# Patient Record
Sex: FEMALE | Race: WHITE | NOT HISPANIC OR LATINO | Employment: OTHER | URBAN - NONMETROPOLITAN AREA
[De-identification: names, ages, dates, MRNs, and addresses within clinical notes are randomized per-mention and may not be internally consistent; named-entity substitution may affect disease eponyms.]

---
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
## 2020-07-17 DIAGNOSIS — R22.42 LOCALIZED SWELLING, MASS AND LUMP, LEFT LOWER LIMB: ICD-10-CM

## 2020-10-25 ENCOUNTER — APPOINTMENT (EMERGENCY)
Dept: RADIOLOGY | Facility: HOSPITAL | Age: 75
DRG: 492 | End: 2020-10-25
Payer: MEDICARE

## 2020-10-25 ENCOUNTER — APPOINTMENT (EMERGENCY)
Dept: CT IMAGING | Facility: HOSPITAL | Age: 75
DRG: 492 | End: 2020-10-25
Payer: MEDICARE

## 2020-10-25 ENCOUNTER — HOSPITAL ENCOUNTER (INPATIENT)
Facility: HOSPITAL | Age: 75
LOS: 9 days | Discharge: NURSING, REHAB OR LONG-TERM CARE | DRG: 492 | End: 2020-11-03
Attending: EMERGENCY MEDICINE | Admitting: INTERNAL MEDICINE
Payer: MEDICARE

## 2020-10-25 DIAGNOSIS — I50.33 ACUTE ON CHRONIC DIASTOLIC CHF (CONGESTIVE HEART FAILURE) (HCC): ICD-10-CM

## 2020-10-25 DIAGNOSIS — S82.892A CLOSED FRACTURE OF LEFT ANKLE, INITIAL ENCOUNTER: Primary | ICD-10-CM

## 2020-10-25 DIAGNOSIS — R07.9 CHEST PAIN: ICD-10-CM

## 2020-10-25 DIAGNOSIS — E11.9 TYPE 2 DIABETES MELLITUS WITHOUT COMPLICATION, WITHOUT LONG-TERM CURRENT USE OF INSULIN (HCC): ICD-10-CM

## 2020-10-25 DIAGNOSIS — R26.2 AMBULATORY DYSFUNCTION: ICD-10-CM

## 2020-10-25 PROBLEM — I48.91 ATRIAL FIBRILLATION (HCC): Status: ACTIVE | Noted: 2020-10-25

## 2020-10-25 PROBLEM — G47.33 OSA (OBSTRUCTIVE SLEEP APNEA): Status: ACTIVE | Noted: 2020-10-25

## 2020-10-25 PROBLEM — S99.919A ANKLE INJURY: Status: ACTIVE | Noted: 2020-10-25

## 2020-10-25 LAB
ABO GROUP BLD: NORMAL
ABO GROUP BLD: NORMAL
ALBUMIN SERPL BCP-MCNC: 3.6 G/DL (ref 3.5–5)
ALP SERPL-CCNC: 95 U/L (ref 46–116)
ALT SERPL W P-5'-P-CCNC: 21 U/L (ref 12–78)
ANION GAP SERPL CALCULATED.3IONS-SCNC: 9 MMOL/L (ref 4–13)
APTT PPP: 53 SECONDS (ref 23–37)
AST SERPL W P-5'-P-CCNC: 16 U/L (ref 5–45)
BASOPHILS # BLD AUTO: 0.06 THOUSANDS/ΜL (ref 0–0.1)
BASOPHILS NFR BLD AUTO: 1 % (ref 0–1)
BILIRUB SERPL-MCNC: 0.73 MG/DL (ref 0.2–1)
BLD GP AB SCN SERPL QL: NEGATIVE
BUN SERPL-MCNC: 35 MG/DL (ref 5–25)
CALCIUM SERPL-MCNC: 9.4 MG/DL (ref 8.3–10.1)
CHLORIDE SERPL-SCNC: 102 MMOL/L (ref 100–108)
CO2 SERPL-SCNC: 30 MMOL/L (ref 21–32)
CREAT SERPL-MCNC: 1.35 MG/DL (ref 0.6–1.3)
EOSINOPHIL # BLD AUTO: 0.14 THOUSAND/ΜL (ref 0–0.61)
EOSINOPHIL NFR BLD AUTO: 2 % (ref 0–6)
ERYTHROCYTE [DISTWIDTH] IN BLOOD BY AUTOMATED COUNT: 15.4 % (ref 11.6–15.1)
GFR SERPL CREATININE-BSD FRML MDRD: 39 ML/MIN/1.73SQ M
GLUCOSE SERPL-MCNC: 155 MG/DL (ref 65–140)
GLUCOSE SERPL-MCNC: 210 MG/DL (ref 65–140)
GLUCOSE SERPL-MCNC: 215 MG/DL (ref 65–140)
HCT VFR BLD AUTO: 36.1 % (ref 34.8–46.1)
HGB BLD-MCNC: 11.8 G/DL (ref 11.5–15.4)
IMM GRANULOCYTES # BLD AUTO: 0.03 THOUSAND/UL (ref 0–0.2)
IMM GRANULOCYTES NFR BLD AUTO: 0 % (ref 0–2)
INR PPP: 3.13 (ref 0.84–1.19)
LYMPHOCYTES # BLD AUTO: 0.92 THOUSANDS/ΜL (ref 0.6–4.47)
LYMPHOCYTES NFR BLD AUTO: 11 % (ref 14–44)
MCH RBC QN AUTO: 26.9 PG (ref 26.8–34.3)
MCHC RBC AUTO-ENTMCNC: 32.7 G/DL (ref 31.4–37.4)
MCV RBC AUTO: 82 FL (ref 82–98)
MONOCYTES # BLD AUTO: 0.88 THOUSAND/ΜL (ref 0.17–1.22)
MONOCYTES NFR BLD AUTO: 10 % (ref 4–12)
NEUTROPHILS # BLD AUTO: 6.6 THOUSANDS/ΜL (ref 1.85–7.62)
NEUTS SEG NFR BLD AUTO: 76 % (ref 43–75)
NRBC BLD AUTO-RTO: 0 /100 WBCS
NT-PROBNP SERPL-MCNC: 952 PG/ML
PLATELET # BLD AUTO: 155 THOUSANDS/UL (ref 149–390)
PMV BLD AUTO: 10.6 FL (ref 8.9–12.7)
POTASSIUM SERPL-SCNC: 3.9 MMOL/L (ref 3.5–5.3)
PROT SERPL-MCNC: 7.5 G/DL (ref 6.4–8.2)
PROTHROMBIN TIME: 31.5 SECONDS (ref 11.6–14.5)
RBC # BLD AUTO: 4.38 MILLION/UL (ref 3.81–5.12)
RH BLD: POSITIVE
RH BLD: POSITIVE
SODIUM SERPL-SCNC: 141 MMOL/L (ref 136–145)
SPECIMEN EXPIRATION DATE: NORMAL
TROPONIN I SERPL-MCNC: <0.02 NG/ML
WBC # BLD AUTO: 8.63 THOUSAND/UL (ref 4.31–10.16)

## 2020-10-25 PROCEDURE — 73560 X-RAY EXAM OF KNEE 1 OR 2: CPT

## 2020-10-25 PROCEDURE — 99285 EMERGENCY DEPT VISIT HI MDM: CPT

## 2020-10-25 PROCEDURE — 99223 1ST HOSP IP/OBS HIGH 75: CPT | Performed by: INTERNAL MEDICINE

## 2020-10-25 PROCEDURE — 85730 THROMBOPLASTIN TIME PARTIAL: CPT | Performed by: PHYSICIAN ASSISTANT

## 2020-10-25 PROCEDURE — 1124F ACP DISCUSS-NO DSCNMKR DOCD: CPT | Performed by: PHYSICIAN ASSISTANT

## 2020-10-25 PROCEDURE — 84484 ASSAY OF TROPONIN QUANT: CPT | Performed by: INTERNAL MEDICINE

## 2020-10-25 PROCEDURE — 72170 X-RAY EXAM OF PELVIS: CPT

## 2020-10-25 PROCEDURE — 80053 COMPREHEN METABOLIC PANEL: CPT | Performed by: PHYSICIAN ASSISTANT

## 2020-10-25 PROCEDURE — 73552 X-RAY EXAM OF FEMUR 2/>: CPT

## 2020-10-25 PROCEDURE — 86900 BLOOD TYPING SEROLOGIC ABO: CPT | Performed by: PHYSICIAN ASSISTANT

## 2020-10-25 PROCEDURE — 73700 CT LOWER EXTREMITY W/O DYE: CPT

## 2020-10-25 PROCEDURE — 85610 PROTHROMBIN TIME: CPT | Performed by: INTERNAL MEDICINE

## 2020-10-25 PROCEDURE — 84484 ASSAY OF TROPONIN QUANT: CPT | Performed by: PHYSICIAN ASSISTANT

## 2020-10-25 PROCEDURE — 96374 THER/PROPH/DIAG INJ IV PUSH: CPT

## 2020-10-25 PROCEDURE — 85610 PROTHROMBIN TIME: CPT | Performed by: PHYSICIAN ASSISTANT

## 2020-10-25 PROCEDURE — 86850 RBC ANTIBODY SCREEN: CPT | Performed by: PHYSICIAN ASSISTANT

## 2020-10-25 PROCEDURE — 96375 TX/PRO/DX INJ NEW DRUG ADDON: CPT

## 2020-10-25 PROCEDURE — 71045 X-RAY EXAM CHEST 1 VIEW: CPT

## 2020-10-25 PROCEDURE — 85025 COMPLETE CBC W/AUTO DIFF WBC: CPT | Performed by: PHYSICIAN ASSISTANT

## 2020-10-25 PROCEDURE — 83880 ASSAY OF NATRIURETIC PEPTIDE: CPT | Performed by: PHYSICIAN ASSISTANT

## 2020-10-25 PROCEDURE — 99285 EMERGENCY DEPT VISIT HI MDM: CPT | Performed by: PHYSICIAN ASSISTANT

## 2020-10-25 PROCEDURE — 73620 X-RAY EXAM OF FOOT: CPT

## 2020-10-25 PROCEDURE — 86901 BLOOD TYPING SEROLOGIC RH(D): CPT | Performed by: PHYSICIAN ASSISTANT

## 2020-10-25 PROCEDURE — 73610 X-RAY EXAM OF ANKLE: CPT

## 2020-10-25 PROCEDURE — 2W3RX1Z IMMOBILIZATION OF LEFT LOWER LEG USING SPLINT: ICD-10-PCS | Performed by: EMERGENCY MEDICINE

## 2020-10-25 PROCEDURE — 36415 COLL VENOUS BLD VENIPUNCTURE: CPT | Performed by: PHYSICIAN ASSISTANT

## 2020-10-25 PROCEDURE — 82948 REAGENT STRIP/BLOOD GLUCOSE: CPT

## 2020-10-25 PROCEDURE — 29515 APPLICATION SHORT LEG SPLINT: CPT | Performed by: PHYSICIAN ASSISTANT

## 2020-10-25 RX ORDER — ONDANSETRON 2 MG/ML
4 INJECTION INTRAMUSCULAR; INTRAVENOUS ONCE
Status: COMPLETED | OUTPATIENT
Start: 2020-10-25 | End: 2020-10-25

## 2020-10-25 RX ORDER — FUROSEMIDE 10 MG/ML
40 INJECTION INTRAMUSCULAR; INTRAVENOUS
Status: DISCONTINUED | OUTPATIENT
Start: 2020-10-25 | End: 2020-10-26

## 2020-10-25 RX ORDER — FENTANYL CITRATE 50 UG/ML
50 INJECTION, SOLUTION INTRAMUSCULAR; INTRAVENOUS ONCE
Status: COMPLETED | OUTPATIENT
Start: 2020-10-25 | End: 2020-10-25

## 2020-10-25 RX ORDER — DILTIAZEM HYDROCHLORIDE 300 MG/1
300 CAPSULE, COATED, EXTENDED RELEASE ORAL DAILY
COMMUNITY
Start: 2020-09-08 | End: 2021-07-29 | Stop reason: HOSPADM

## 2020-10-25 RX ORDER — TRAMADOL HYDROCHLORIDE 50 MG/1
50 TABLET ORAL EVERY 6 HOURS PRN
Status: DISCONTINUED | OUTPATIENT
Start: 2020-10-25 | End: 2020-11-03 | Stop reason: HOSPADM

## 2020-10-25 RX ORDER — GABAPENTIN 300 MG/1
300 CAPSULE ORAL 2 TIMES DAILY
Status: DISCONTINUED | OUTPATIENT
Start: 2020-10-25 | End: 2020-11-03 | Stop reason: HOSPADM

## 2020-10-25 RX ORDER — FAMOTIDINE 20 MG/1
20 TABLET, FILM COATED ORAL DAILY
Status: DISCONTINUED | OUTPATIENT
Start: 2020-10-25 | End: 2020-11-03 | Stop reason: HOSPADM

## 2020-10-25 RX ORDER — LEVOTHYROXINE SODIUM 0.15 MG/1
150 TABLET ORAL
Status: DISCONTINUED | OUTPATIENT
Start: 2020-10-26 | End: 2020-11-03 | Stop reason: HOSPADM

## 2020-10-25 RX ORDER — FUROSEMIDE 10 MG/ML
20 INJECTION INTRAMUSCULAR; INTRAVENOUS ONCE
Status: COMPLETED | OUTPATIENT
Start: 2020-10-25 | End: 2020-10-25

## 2020-10-25 RX ORDER — FUROSEMIDE 40 MG/1
80 TABLET ORAL DAILY
COMMUNITY
Start: 2020-09-08 | End: 2021-07-29 | Stop reason: HOSPADM

## 2020-10-25 RX ORDER — NITROGLYCERIN 0.4 MG/1
0.4 TABLET SUBLINGUAL ONCE
Status: COMPLETED | OUTPATIENT
Start: 2020-10-25 | End: 2020-10-25

## 2020-10-25 RX ORDER — ACETAMINOPHEN 325 MG/1
650 TABLET ORAL EVERY 6 HOURS PRN
Status: DISCONTINUED | OUTPATIENT
Start: 2020-10-25 | End: 2020-10-29

## 2020-10-25 RX ORDER — WARFARIN SODIUM 5 MG/1
4 TABLET ORAL
COMMUNITY
Start: 2020-09-08

## 2020-10-25 RX ORDER — MORPHINE SULFATE 4 MG/ML
4 INJECTION, SOLUTION INTRAMUSCULAR; INTRAVENOUS ONCE
Status: COMPLETED | OUTPATIENT
Start: 2020-10-25 | End: 2020-10-25

## 2020-10-25 RX ORDER — GABAPENTIN 300 MG/1
300 CAPSULE ORAL 2 TIMES DAILY
COMMUNITY
Start: 2020-09-08

## 2020-10-25 RX ORDER — RANITIDINE 300 MG/1
300 TABLET ORAL DAILY PRN
COMMUNITY
End: 2022-07-08

## 2020-10-25 RX ORDER — LEVOTHYROXINE SODIUM 0.15 MG/1
150 TABLET ORAL DAILY
COMMUNITY
Start: 2020-09-08

## 2020-10-25 RX ORDER — ONDANSETRON 2 MG/ML
4 INJECTION INTRAMUSCULAR; INTRAVENOUS EVERY 6 HOURS PRN
Status: DISCONTINUED | OUTPATIENT
Start: 2020-10-25 | End: 2020-11-03 | Stop reason: HOSPADM

## 2020-10-25 RX ORDER — NITROGLYCERIN 0.4 MG/1
0.4 TABLET SUBLINGUAL
Status: DISCONTINUED | OUTPATIENT
Start: 2020-10-25 | End: 2020-11-03 | Stop reason: HOSPADM

## 2020-10-25 RX ORDER — ASCORBIC ACID 500 MG
1 TABLET ORAL DAILY
COMMUNITY

## 2020-10-25 RX ADMIN — GABAPENTIN 300 MG: 300 CAPSULE ORAL at 17:08

## 2020-10-25 RX ADMIN — NITROGLYCERIN 0.4 MG: 0.4 TABLET SUBLINGUAL at 11:50

## 2020-10-25 RX ADMIN — FAMOTIDINE 20 MG: 20 TABLET, FILM COATED ORAL at 13:07

## 2020-10-25 RX ADMIN — INSULIN LISPRO 1 UNITS: 100 INJECTION, SOLUTION INTRAVENOUS; SUBCUTANEOUS at 21:46

## 2020-10-25 RX ADMIN — FUROSEMIDE 20 MG: 10 INJECTION, SOLUTION INTRAMUSCULAR; INTRAVENOUS at 12:31

## 2020-10-25 RX ADMIN — FENTANYL CITRATE 50 MCG: 0.05 INJECTION, SOLUTION INTRAMUSCULAR; INTRAVENOUS at 11:52

## 2020-10-25 RX ADMIN — MORPHINE SULFATE 4 MG: 4 INJECTION INTRAVENOUS at 11:10

## 2020-10-25 RX ADMIN — FUROSEMIDE 40 MG: 10 INJECTION, SOLUTION INTRAMUSCULAR; INTRAVENOUS at 15:36

## 2020-10-25 RX ADMIN — ACETAMINOPHEN 650 MG: 325 TABLET ORAL at 15:36

## 2020-10-25 RX ADMIN — INSULIN LISPRO 1 UNITS: 100 INJECTION, SOLUTION INTRAVENOUS; SUBCUTANEOUS at 15:37

## 2020-10-25 RX ADMIN — GABAPENTIN 300 MG: 300 CAPSULE ORAL at 13:07

## 2020-10-25 RX ADMIN — ONDANSETRON 4 MG: 2 INJECTION INTRAMUSCULAR; INTRAVENOUS at 11:49

## 2020-10-26 ENCOUNTER — APPOINTMENT (INPATIENT)
Dept: CT IMAGING | Facility: HOSPITAL | Age: 75
DRG: 492 | End: 2020-10-26
Payer: MEDICARE

## 2020-10-26 PROBLEM — N28.9 KIDNEY DYSFUNCTION: Status: ACTIVE | Noted: 2020-10-26

## 2020-10-26 PROBLEM — R07.9 CHEST PAIN: Status: RESOLVED | Noted: 2020-10-25 | Resolved: 2020-10-26

## 2020-10-26 LAB
ALBUMIN SERPL BCP-MCNC: 3.2 G/DL (ref 3.5–5)
ALP SERPL-CCNC: 93 U/L (ref 46–116)
ALT SERPL W P-5'-P-CCNC: 22 U/L (ref 12–78)
ANION GAP SERPL CALCULATED.3IONS-SCNC: 7 MMOL/L (ref 4–13)
AST SERPL W P-5'-P-CCNC: 15 U/L (ref 5–45)
BASOPHILS # BLD AUTO: 0.06 THOUSANDS/ΜL (ref 0–0.1)
BASOPHILS NFR BLD AUTO: 1 % (ref 0–1)
BILIRUB SERPL-MCNC: 1.08 MG/DL (ref 0.2–1)
BUN SERPL-MCNC: 35 MG/DL (ref 5–25)
CALCIUM ALBUM COR SERPL-MCNC: 9.5 MG/DL (ref 8.3–10.1)
CALCIUM SERPL-MCNC: 8.9 MG/DL (ref 8.3–10.1)
CHLORIDE SERPL-SCNC: 101 MMOL/L (ref 100–108)
CO2 SERPL-SCNC: 32 MMOL/L (ref 21–32)
CREAT SERPL-MCNC: 1.33 MG/DL (ref 0.6–1.3)
EOSINOPHIL # BLD AUTO: 0.21 THOUSAND/ΜL (ref 0–0.61)
EOSINOPHIL NFR BLD AUTO: 3 % (ref 0–6)
ERYTHROCYTE [DISTWIDTH] IN BLOOD BY AUTOMATED COUNT: 15.4 % (ref 11.6–15.1)
GFR SERPL CREATININE-BSD FRML MDRD: 39 ML/MIN/1.73SQ M
GLUCOSE SERPL-MCNC: 159 MG/DL (ref 65–140)
GLUCOSE SERPL-MCNC: 169 MG/DL (ref 65–140)
GLUCOSE SERPL-MCNC: 171 MG/DL (ref 65–140)
GLUCOSE SERPL-MCNC: 203 MG/DL (ref 65–140)
GLUCOSE SERPL-MCNC: 215 MG/DL (ref 65–140)
HCT VFR BLD AUTO: 32.9 % (ref 34.8–46.1)
HGB BLD-MCNC: 10.8 G/DL (ref 11.5–15.4)
IMM GRANULOCYTES # BLD AUTO: 0.03 THOUSAND/UL (ref 0–0.2)
IMM GRANULOCYTES NFR BLD AUTO: 0 % (ref 0–2)
INR PPP: 3.78 (ref 0.84–1.19)
LYMPHOCYTES # BLD AUTO: 1 THOUSANDS/ΜL (ref 0.6–4.47)
LYMPHOCYTES NFR BLD AUTO: 13 % (ref 14–44)
MCH RBC QN AUTO: 27.1 PG (ref 26.8–34.3)
MCHC RBC AUTO-ENTMCNC: 32.8 G/DL (ref 31.4–37.4)
MCV RBC AUTO: 83 FL (ref 82–98)
MONOCYTES # BLD AUTO: 0.75 THOUSAND/ΜL (ref 0.17–1.22)
MONOCYTES NFR BLD AUTO: 9 % (ref 4–12)
NEUTROPHILS # BLD AUTO: 5.97 THOUSANDS/ΜL (ref 1.85–7.62)
NEUTS SEG NFR BLD AUTO: 74 % (ref 43–75)
NRBC BLD AUTO-RTO: 0 /100 WBCS
PLATELET # BLD AUTO: 133 THOUSANDS/UL (ref 149–390)
PMV BLD AUTO: 11.3 FL (ref 8.9–12.7)
POTASSIUM SERPL-SCNC: 3.9 MMOL/L (ref 3.5–5.3)
PROT SERPL-MCNC: 6.9 G/DL (ref 6.4–8.2)
PROTHROMBIN TIME: 36.4 SECONDS (ref 11.6–14.5)
RBC # BLD AUTO: 3.98 MILLION/UL (ref 3.81–5.12)
SODIUM SERPL-SCNC: 140 MMOL/L (ref 136–145)
WBC # BLD AUTO: 8.02 THOUSAND/UL (ref 4.31–10.16)

## 2020-10-26 PROCEDURE — 97530 THERAPEUTIC ACTIVITIES: CPT

## 2020-10-26 PROCEDURE — 82948 REAGENT STRIP/BLOOD GLUCOSE: CPT

## 2020-10-26 PROCEDURE — 80053 COMPREHEN METABOLIC PANEL: CPT | Performed by: INTERNAL MEDICINE

## 2020-10-26 PROCEDURE — 99232 SBSQ HOSP IP/OBS MODERATE 35: CPT | Performed by: NURSE PRACTITIONER

## 2020-10-26 PROCEDURE — 97163 PT EVAL HIGH COMPLEX 45 MIN: CPT

## 2020-10-26 PROCEDURE — 85025 COMPLETE CBC W/AUTO DIFF WBC: CPT | Performed by: INTERNAL MEDICINE

## 2020-10-26 PROCEDURE — 99223 1ST HOSP IP/OBS HIGH 75: CPT | Performed by: ORTHOPAEDIC SURGERY

## 2020-10-26 PROCEDURE — 97167 OT EVAL HIGH COMPLEX 60 MIN: CPT

## 2020-10-26 RX ORDER — FUROSEMIDE 10 MG/ML
40 INJECTION INTRAMUSCULAR; INTRAVENOUS
Status: COMPLETED | OUTPATIENT
Start: 2020-10-26 | End: 2020-10-26

## 2020-10-26 RX ORDER — CEFAZOLIN SODIUM 2 G/50ML
2000 SOLUTION INTRAVENOUS ONCE
Status: COMPLETED | OUTPATIENT
Start: 2020-10-29 | End: 2020-10-29

## 2020-10-26 RX ADMIN — FAMOTIDINE 20 MG: 20 TABLET, FILM COATED ORAL at 09:14

## 2020-10-26 RX ADMIN — FUROSEMIDE 40 MG: 10 INJECTION, SOLUTION INTRAMUSCULAR; INTRAVENOUS at 09:14

## 2020-10-26 RX ADMIN — DILTIAZEM HYDROCHLORIDE 300 MG: 180 CAPSULE, COATED, EXTENDED RELEASE ORAL at 09:13

## 2020-10-26 RX ADMIN — LEVOTHYROXINE SODIUM 150 MCG: 150 TABLET ORAL at 05:10

## 2020-10-26 RX ADMIN — GABAPENTIN 300 MG: 300 CAPSULE ORAL at 17:24

## 2020-10-26 RX ADMIN — INSULIN LISPRO 1 UNITS: 100 INJECTION, SOLUTION INTRAVENOUS; SUBCUTANEOUS at 11:46

## 2020-10-26 RX ADMIN — INSULIN LISPRO 1 UNITS: 100 INJECTION, SOLUTION INTRAVENOUS; SUBCUTANEOUS at 09:14

## 2020-10-26 RX ADMIN — INSULIN LISPRO 1 UNITS: 100 INJECTION, SOLUTION INTRAVENOUS; SUBCUTANEOUS at 21:24

## 2020-10-26 RX ADMIN — INSULIN LISPRO 1 UNITS: 100 INJECTION, SOLUTION INTRAVENOUS; SUBCUTANEOUS at 17:24

## 2020-10-26 RX ADMIN — FUROSEMIDE 40 MG: 10 INJECTION, SOLUTION INTRAMUSCULAR; INTRAVENOUS at 17:24

## 2020-10-26 RX ADMIN — GABAPENTIN 300 MG: 300 CAPSULE ORAL at 09:13

## 2020-10-27 ENCOUNTER — APPOINTMENT (INPATIENT)
Dept: CT IMAGING | Facility: HOSPITAL | Age: 75
DRG: 492 | End: 2020-10-27
Payer: MEDICARE

## 2020-10-27 PROBLEM — E87.6 HYPOKALEMIA: Status: ACTIVE | Noted: 2020-10-27

## 2020-10-27 PROBLEM — N18.30 STAGE 3 CHRONIC KIDNEY DISEASE (HCC): Status: ACTIVE | Noted: 2020-10-26

## 2020-10-27 LAB
ANION GAP SERPL CALCULATED.3IONS-SCNC: 7 MMOL/L (ref 4–13)
BASOPHILS # BLD AUTO: 0.05 THOUSANDS/ΜL (ref 0–0.1)
BASOPHILS NFR BLD AUTO: 1 % (ref 0–1)
BUN SERPL-MCNC: 36 MG/DL (ref 5–25)
CALCIUM SERPL-MCNC: 8.6 MG/DL (ref 8.3–10.1)
CHLORIDE SERPL-SCNC: 99 MMOL/L (ref 100–108)
CO2 SERPL-SCNC: 32 MMOL/L (ref 21–32)
CREAT SERPL-MCNC: 1.27 MG/DL (ref 0.6–1.3)
EOSINOPHIL # BLD AUTO: 0.2 THOUSAND/ΜL (ref 0–0.61)
EOSINOPHIL NFR BLD AUTO: 2 % (ref 0–6)
ERYTHROCYTE [DISTWIDTH] IN BLOOD BY AUTOMATED COUNT: 15 % (ref 11.6–15.1)
GFR SERPL CREATININE-BSD FRML MDRD: 42 ML/MIN/1.73SQ M
GLUCOSE SERPL-MCNC: 133 MG/DL (ref 65–140)
GLUCOSE SERPL-MCNC: 165 MG/DL (ref 65–140)
GLUCOSE SERPL-MCNC: 167 MG/DL (ref 65–140)
GLUCOSE SERPL-MCNC: 187 MG/DL (ref 65–140)
GLUCOSE SERPL-MCNC: 282 MG/DL (ref 65–140)
HCT VFR BLD AUTO: 30.8 % (ref 34.8–46.1)
HGB BLD-MCNC: 10.4 G/DL (ref 11.5–15.4)
IMM GRANULOCYTES # BLD AUTO: 0.05 THOUSAND/UL (ref 0–0.2)
IMM GRANULOCYTES NFR BLD AUTO: 1 % (ref 0–2)
INR PPP: 3.01 (ref 0.84–1.19)
LYMPHOCYTES # BLD AUTO: 1 THOUSANDS/ΜL (ref 0.6–4.47)
LYMPHOCYTES NFR BLD AUTO: 12 % (ref 14–44)
MCH RBC QN AUTO: 27.5 PG (ref 26.8–34.3)
MCHC RBC AUTO-ENTMCNC: 33.8 G/DL (ref 31.4–37.4)
MCV RBC AUTO: 82 FL (ref 82–98)
MONOCYTES # BLD AUTO: 0.8 THOUSAND/ΜL (ref 0.17–1.22)
MONOCYTES NFR BLD AUTO: 10 % (ref 4–12)
NEUTROPHILS # BLD AUTO: 6.11 THOUSANDS/ΜL (ref 1.85–7.62)
NEUTS SEG NFR BLD AUTO: 74 % (ref 43–75)
NRBC BLD AUTO-RTO: 0 /100 WBCS
PLATELET # BLD AUTO: 136 THOUSANDS/UL (ref 149–390)
PMV BLD AUTO: 10.6 FL (ref 8.9–12.7)
POTASSIUM SERPL-SCNC: 3.4 MMOL/L (ref 3.5–5.3)
PROTHROMBIN TIME: 30.6 SECONDS (ref 11.6–14.5)
RBC # BLD AUTO: 3.78 MILLION/UL (ref 3.81–5.12)
SODIUM SERPL-SCNC: 138 MMOL/L (ref 136–145)
WBC # BLD AUTO: 8.21 THOUSAND/UL (ref 4.31–10.16)

## 2020-10-27 PROCEDURE — 99232 SBSQ HOSP IP/OBS MODERATE 35: CPT | Performed by: FAMILY MEDICINE

## 2020-10-27 PROCEDURE — 80048 BASIC METABOLIC PNL TOTAL CA: CPT | Performed by: NURSE PRACTITIONER

## 2020-10-27 PROCEDURE — 97530 THERAPEUTIC ACTIVITIES: CPT

## 2020-10-27 PROCEDURE — 73700 CT LOWER EXTREMITY W/O DYE: CPT

## 2020-10-27 PROCEDURE — NC001 PR NO CHARGE: Performed by: ORTHOPAEDIC SURGERY

## 2020-10-27 PROCEDURE — 85610 PROTHROMBIN TIME: CPT | Performed by: NURSE PRACTITIONER

## 2020-10-27 PROCEDURE — 82948 REAGENT STRIP/BLOOD GLUCOSE: CPT

## 2020-10-27 PROCEDURE — 85025 COMPLETE CBC W/AUTO DIFF WBC: CPT | Performed by: NURSE PRACTITIONER

## 2020-10-27 PROCEDURE — 97110 THERAPEUTIC EXERCISES: CPT

## 2020-10-27 RX ADMIN — INSULIN LISPRO 2 UNITS: 100 INJECTION, SOLUTION INTRAVENOUS; SUBCUTANEOUS at 16:42

## 2020-10-27 RX ADMIN — GABAPENTIN 300 MG: 300 CAPSULE ORAL at 08:07

## 2020-10-27 RX ADMIN — DILTIAZEM HYDROCHLORIDE 300 MG: 180 CAPSULE, COATED, EXTENDED RELEASE ORAL at 08:10

## 2020-10-27 RX ADMIN — LEVOTHYROXINE SODIUM 150 MCG: 150 TABLET ORAL at 05:22

## 2020-10-27 RX ADMIN — FUROSEMIDE 60 MG: 40 TABLET ORAL at 08:07

## 2020-10-27 RX ADMIN — FAMOTIDINE 20 MG: 20 TABLET, FILM COATED ORAL at 08:08

## 2020-10-27 RX ADMIN — TRAMADOL HYDROCHLORIDE 50 MG: 50 TABLET, FILM COATED ORAL at 21:18

## 2020-10-27 RX ADMIN — ACETAMINOPHEN 650 MG: 325 TABLET ORAL at 05:23

## 2020-10-27 RX ADMIN — GABAPENTIN 300 MG: 300 CAPSULE ORAL at 17:51

## 2020-10-27 RX ADMIN — INSULIN LISPRO 1 UNITS: 100 INJECTION, SOLUTION INTRAVENOUS; SUBCUTANEOUS at 21:10

## 2020-10-27 RX ADMIN — ACETAMINOPHEN 650 MG: 325 TABLET ORAL at 17:53

## 2020-10-27 RX ADMIN — INSULIN LISPRO 1 UNITS: 100 INJECTION, SOLUTION INTRAVENOUS; SUBCUTANEOUS at 07:57

## 2020-10-28 ENCOUNTER — ANESTHESIA EVENT (INPATIENT)
Dept: PERIOP | Facility: HOSPITAL | Age: 75
DRG: 492 | End: 2020-10-28
Payer: MEDICARE

## 2020-10-28 LAB
ALBUMIN SERPL BCP-MCNC: 2.9 G/DL (ref 3.5–5)
ALP SERPL-CCNC: 101 U/L (ref 46–116)
ALT SERPL W P-5'-P-CCNC: 18 U/L (ref 12–78)
ANION GAP SERPL CALCULATED.3IONS-SCNC: 7 MMOL/L (ref 4–13)
AST SERPL W P-5'-P-CCNC: 12 U/L (ref 5–45)
BASOPHILS # BLD AUTO: 0.04 THOUSANDS/ΜL (ref 0–0.1)
BASOPHILS NFR BLD AUTO: 1 % (ref 0–1)
BILIRUB SERPL-MCNC: 0.89 MG/DL (ref 0.2–1)
BUN SERPL-MCNC: 40 MG/DL (ref 5–25)
CALCIUM ALBUM COR SERPL-MCNC: 9.8 MG/DL (ref 8.3–10.1)
CALCIUM SERPL-MCNC: 8.9 MG/DL (ref 8.3–10.1)
CHLORIDE SERPL-SCNC: 100 MMOL/L (ref 100–108)
CO2 SERPL-SCNC: 31 MMOL/L (ref 21–32)
CREAT SERPL-MCNC: 1.32 MG/DL (ref 0.6–1.3)
EOSINOPHIL # BLD AUTO: 0.3 THOUSAND/ΜL (ref 0–0.61)
EOSINOPHIL NFR BLD AUTO: 4 % (ref 0–6)
ERYTHROCYTE [DISTWIDTH] IN BLOOD BY AUTOMATED COUNT: 14.6 % (ref 11.6–15.1)
GFR SERPL CREATININE-BSD FRML MDRD: 40 ML/MIN/1.73SQ M
GLUCOSE SERPL-MCNC: 158 MG/DL (ref 65–140)
GLUCOSE SERPL-MCNC: 160 MG/DL (ref 65–140)
GLUCOSE SERPL-MCNC: 177 MG/DL (ref 65–140)
GLUCOSE SERPL-MCNC: 185 MG/DL (ref 65–140)
GLUCOSE SERPL-MCNC: 206 MG/DL (ref 65–140)
HCT VFR BLD AUTO: 32 % (ref 34.8–46.1)
HGB BLD-MCNC: 10.7 G/DL (ref 11.5–15.4)
IMM GRANULOCYTES # BLD AUTO: 0.04 THOUSAND/UL (ref 0–0.2)
IMM GRANULOCYTES NFR BLD AUTO: 1 % (ref 0–2)
INR PPP: 2.15 (ref 0.84–1.19)
LYMPHOCYTES # BLD AUTO: 1.01 THOUSANDS/ΜL (ref 0.6–4.47)
LYMPHOCYTES NFR BLD AUTO: 14 % (ref 14–44)
MCH RBC QN AUTO: 27.2 PG (ref 26.8–34.3)
MCHC RBC AUTO-ENTMCNC: 33.4 G/DL (ref 31.4–37.4)
MCV RBC AUTO: 81 FL (ref 82–98)
MONOCYTES # BLD AUTO: 0.71 THOUSAND/ΜL (ref 0.17–1.22)
MONOCYTES NFR BLD AUTO: 10 % (ref 4–12)
NEUTROPHILS # BLD AUTO: 5.35 THOUSANDS/ΜL (ref 1.85–7.62)
NEUTS SEG NFR BLD AUTO: 70 % (ref 43–75)
NRBC BLD AUTO-RTO: 0 /100 WBCS
PLATELET # BLD AUTO: 168 THOUSANDS/UL (ref 149–390)
PMV BLD AUTO: 11.1 FL (ref 8.9–12.7)
POTASSIUM SERPL-SCNC: 3.5 MMOL/L (ref 3.5–5.3)
PROT SERPL-MCNC: 6.9 G/DL (ref 6.4–8.2)
PROTHROMBIN TIME: 23.5 SECONDS (ref 11.6–14.5)
RBC # BLD AUTO: 3.94 MILLION/UL (ref 3.81–5.12)
SODIUM SERPL-SCNC: 138 MMOL/L (ref 136–145)
WBC # BLD AUTO: 7.45 THOUSAND/UL (ref 4.31–10.16)

## 2020-10-28 PROCEDURE — 97110 THERAPEUTIC EXERCISES: CPT

## 2020-10-28 PROCEDURE — 85610 PROTHROMBIN TIME: CPT | Performed by: FAMILY MEDICINE

## 2020-10-28 PROCEDURE — 80053 COMPREHEN METABOLIC PANEL: CPT | Performed by: FAMILY MEDICINE

## 2020-10-28 PROCEDURE — 82948 REAGENT STRIP/BLOOD GLUCOSE: CPT

## 2020-10-28 PROCEDURE — 97530 THERAPEUTIC ACTIVITIES: CPT

## 2020-10-28 PROCEDURE — 99232 SBSQ HOSP IP/OBS MODERATE 35: CPT | Performed by: FAMILY MEDICINE

## 2020-10-28 PROCEDURE — 85025 COMPLETE CBC W/AUTO DIFF WBC: CPT | Performed by: FAMILY MEDICINE

## 2020-10-28 PROCEDURE — NC001 PR NO CHARGE: Performed by: ORTHOPAEDIC SURGERY

## 2020-10-28 RX ORDER — POTASSIUM CHLORIDE 20 MEQ/1
20 TABLET, EXTENDED RELEASE ORAL DAILY
Status: DISCONTINUED | OUTPATIENT
Start: 2020-10-28 | End: 2020-11-03 | Stop reason: HOSPADM

## 2020-10-28 RX ORDER — DOCUSATE SODIUM 100 MG/1
100 CAPSULE, LIQUID FILLED ORAL 2 TIMES DAILY
Status: DISCONTINUED | OUTPATIENT
Start: 2020-10-28 | End: 2020-11-01

## 2020-10-28 RX ORDER — POLYETHYLENE GLYCOL 3350 17 G/17G
17 POWDER, FOR SOLUTION ORAL DAILY PRN
Status: DISCONTINUED | OUTPATIENT
Start: 2020-10-28 | End: 2020-11-01

## 2020-10-28 RX ORDER — SODIUM CHLORIDE 9 MG/ML
100 INJECTION, SOLUTION INTRAVENOUS ONCE
Status: COMPLETED | OUTPATIENT
Start: 2020-10-28 | End: 2020-10-28

## 2020-10-28 RX ADMIN — POTASSIUM CHLORIDE 20 MEQ: 1500 TABLET, EXTENDED RELEASE ORAL at 12:52

## 2020-10-28 RX ADMIN — LEVOTHYROXINE SODIUM 150 MCG: 150 TABLET ORAL at 05:18

## 2020-10-28 RX ADMIN — SODIUM CHLORIDE 100 ML/HR: 0.9 INJECTION, SOLUTION INTRAVENOUS at 09:11

## 2020-10-28 RX ADMIN — INSULIN LISPRO 1 UNITS: 100 INJECTION, SOLUTION INTRAVENOUS; SUBCUTANEOUS at 15:55

## 2020-10-28 RX ADMIN — POLYETHYLENE GLYCOL 3350 17 G: 17 POWDER, FOR SOLUTION ORAL at 09:52

## 2020-10-28 RX ADMIN — GABAPENTIN 300 MG: 300 CAPSULE ORAL at 17:24

## 2020-10-28 RX ADMIN — DOCUSATE SODIUM 100 MG: 100 CAPSULE, LIQUID FILLED ORAL at 09:52

## 2020-10-28 RX ADMIN — GABAPENTIN 300 MG: 300 CAPSULE ORAL at 08:27

## 2020-10-28 RX ADMIN — ACETAMINOPHEN 650 MG: 325 TABLET ORAL at 15:56

## 2020-10-28 RX ADMIN — DILTIAZEM HYDROCHLORIDE 300 MG: 180 CAPSULE, COATED, EXTENDED RELEASE ORAL at 08:27

## 2020-10-28 RX ADMIN — INSULIN LISPRO 1 UNITS: 100 INJECTION, SOLUTION INTRAVENOUS; SUBCUTANEOUS at 11:39

## 2020-10-28 RX ADMIN — FAMOTIDINE 20 MG: 20 TABLET, FILM COATED ORAL at 08:27

## 2020-10-28 RX ADMIN — INSULIN LISPRO 1 UNITS: 100 INJECTION, SOLUTION INTRAVENOUS; SUBCUTANEOUS at 08:28

## 2020-10-28 RX ADMIN — ACETAMINOPHEN 650 MG: 325 TABLET ORAL at 05:18

## 2020-10-28 RX ADMIN — DOCUSATE SODIUM 100 MG: 100 CAPSULE, LIQUID FILLED ORAL at 17:24

## 2020-10-28 RX ADMIN — FUROSEMIDE 60 MG: 40 TABLET ORAL at 08:27

## 2020-10-28 RX ADMIN — INSULIN LISPRO 1 UNITS: 100 INJECTION, SOLUTION INTRAVENOUS; SUBCUTANEOUS at 21:03

## 2020-10-29 ENCOUNTER — APPOINTMENT (INPATIENT)
Dept: RADIOLOGY | Facility: HOSPITAL | Age: 75
DRG: 492 | End: 2020-10-29
Payer: MEDICARE

## 2020-10-29 ENCOUNTER — ANESTHESIA (INPATIENT)
Dept: PERIOP | Facility: HOSPITAL | Age: 75
DRG: 492 | End: 2020-10-29
Payer: MEDICARE

## 2020-10-29 VITALS — HEART RATE: 73 BPM

## 2020-10-29 PROBLEM — E11.40 TYPE 2 DIABETES MELLITUS WITH DIABETIC NEUROPATHY (HCC): Status: ACTIVE | Noted: 2020-10-29

## 2020-10-29 PROBLEM — E66.9 CLASS 2 OBESITY IN ADULT: Status: ACTIVE | Noted: 2020-10-29

## 2020-10-29 PROBLEM — Z95.0 PACEMAKER: Status: ACTIVE | Noted: 2020-10-29

## 2020-10-29 LAB
ALBUMIN SERPL BCP-MCNC: 2.7 G/DL (ref 3.5–5)
ALP SERPL-CCNC: 98 U/L (ref 46–116)
ALT SERPL W P-5'-P-CCNC: 21 U/L (ref 12–78)
ANION GAP SERPL CALCULATED.3IONS-SCNC: 8 MMOL/L (ref 4–13)
AST SERPL W P-5'-P-CCNC: 12 U/L (ref 5–45)
BASOPHILS # BLD AUTO: 0.05 THOUSANDS/ΜL (ref 0–0.1)
BASOPHILS NFR BLD AUTO: 1 % (ref 0–1)
BILIRUB SERPL-MCNC: 0.71 MG/DL (ref 0.2–1)
BUN SERPL-MCNC: 42 MG/DL (ref 5–25)
CALCIUM ALBUM COR SERPL-MCNC: 9.9 MG/DL (ref 8.3–10.1)
CALCIUM SERPL-MCNC: 8.9 MG/DL (ref 8.3–10.1)
CHLORIDE SERPL-SCNC: 103 MMOL/L (ref 100–108)
CO2 SERPL-SCNC: 29 MMOL/L (ref 21–32)
CREAT SERPL-MCNC: 1.2 MG/DL (ref 0.6–1.3)
EOSINOPHIL # BLD AUTO: 0.3 THOUSAND/ΜL (ref 0–0.61)
EOSINOPHIL NFR BLD AUTO: 4 % (ref 0–6)
ERYTHROCYTE [DISTWIDTH] IN BLOOD BY AUTOMATED COUNT: 14.7 % (ref 11.6–15.1)
GFR SERPL CREATININE-BSD FRML MDRD: 45 ML/MIN/1.73SQ M
GLUCOSE SERPL-MCNC: 155 MG/DL (ref 65–140)
GLUCOSE SERPL-MCNC: 167 MG/DL (ref 65–140)
GLUCOSE SERPL-MCNC: 169 MG/DL (ref 65–140)
GLUCOSE SERPL-MCNC: 170 MG/DL (ref 65–140)
GLUCOSE SERPL-MCNC: 199 MG/DL (ref 65–140)
HCT VFR BLD AUTO: 31.3 % (ref 34.8–46.1)
HGB BLD-MCNC: 10.3 G/DL (ref 11.5–15.4)
IMM GRANULOCYTES # BLD AUTO: 0.02 THOUSAND/UL (ref 0–0.2)
IMM GRANULOCYTES NFR BLD AUTO: 0 % (ref 0–2)
INR PPP: 1.5 (ref 0.84–1.19)
LYMPHOCYTES # BLD AUTO: 0.85 THOUSANDS/ΜL (ref 0.6–4.47)
LYMPHOCYTES NFR BLD AUTO: 12 % (ref 14–44)
MCH RBC QN AUTO: 26.9 PG (ref 26.8–34.3)
MCHC RBC AUTO-ENTMCNC: 32.9 G/DL (ref 31.4–37.4)
MCV RBC AUTO: 82 FL (ref 82–98)
MONOCYTES # BLD AUTO: 0.66 THOUSAND/ΜL (ref 0.17–1.22)
MONOCYTES NFR BLD AUTO: 10 % (ref 4–12)
NEUTROPHILS # BLD AUTO: 5.02 THOUSANDS/ΜL (ref 1.85–7.62)
NEUTS SEG NFR BLD AUTO: 73 % (ref 43–75)
NRBC BLD AUTO-RTO: 0 /100 WBCS
PLATELET # BLD AUTO: 166 THOUSANDS/UL (ref 149–390)
PMV BLD AUTO: 10.4 FL (ref 8.9–12.7)
POTASSIUM SERPL-SCNC: 3.9 MMOL/L (ref 3.5–5.3)
PROT SERPL-MCNC: 6.6 G/DL (ref 6.4–8.2)
PROTHROMBIN TIME: 17.8 SECONDS (ref 11.6–14.5)
RBC # BLD AUTO: 3.83 MILLION/UL (ref 3.81–5.12)
SODIUM SERPL-SCNC: 140 MMOL/L (ref 136–145)
WBC # BLD AUTO: 6.9 THOUSAND/UL (ref 4.31–10.16)

## 2020-10-29 PROCEDURE — C1713 ANCHOR/SCREW BN/BN,TIS/BN: HCPCS | Performed by: ORTHOPAEDIC SURGERY

## 2020-10-29 PROCEDURE — 73610 X-RAY EXAM OF ANKLE: CPT

## 2020-10-29 PROCEDURE — 85610 PROTHROMBIN TIME: CPT | Performed by: FAMILY MEDICINE

## 2020-10-29 PROCEDURE — 85025 COMPLETE CBC W/AUTO DIFF WBC: CPT | Performed by: FAMILY MEDICINE

## 2020-10-29 PROCEDURE — C1769 GUIDE WIRE: HCPCS | Performed by: ORTHOPAEDIC SURGERY

## 2020-10-29 PROCEDURE — G9197 ORDER FOR CEPH: HCPCS | Performed by: ORTHOPAEDIC SURGERY

## 2020-10-29 PROCEDURE — 82948 REAGENT STRIP/BLOOD GLUCOSE: CPT

## 2020-10-29 PROCEDURE — 0QSH04Z REPOSITION LEFT TIBIA WITH INTERNAL FIXATION DEVICE, OPEN APPROACH: ICD-10-PCS | Performed by: ORTHOPAEDIC SURGERY

## 2020-10-29 PROCEDURE — 0QSK04Z REPOSITION LEFT FIBULA WITH INTERNAL FIXATION DEVICE, OPEN APPROACH: ICD-10-PCS | Performed by: ORTHOPAEDIC SURGERY

## 2020-10-29 PROCEDURE — 27814 TREATMENT OF ANKLE FRACTURE: CPT | Performed by: PHYSICIAN ASSISTANT

## 2020-10-29 PROCEDURE — 99232 SBSQ HOSP IP/OBS MODERATE 35: CPT | Performed by: FAMILY MEDICINE

## 2020-10-29 PROCEDURE — 27814 TREATMENT OF ANKLE FRACTURE: CPT | Performed by: ORTHOPAEDIC SURGERY

## 2020-10-29 PROCEDURE — 80053 COMPREHEN METABOLIC PANEL: CPT | Performed by: FAMILY MEDICINE

## 2020-10-29 DEVICE — 2.7MM LOCKING SCREW SLF-TPNG WITH T8 STARDRIVE RECESS 18MM: Type: IMPLANTABLE DEVICE | Site: ANKLE | Status: FUNCTIONAL

## 2020-10-29 DEVICE — WASHER 7.0MM: Type: IMPLANTABLE DEVICE | Site: ANKLE | Status: FUNCTIONAL

## 2020-10-29 DEVICE — 4.0MM CANNULATED SCREW-SHORT THREAD 56MM: Type: IMPLANTABLE DEVICE | Site: ANKLE | Status: FUNCTIONAL

## 2020-10-29 DEVICE — 4.0MM CANNULATED SCREW SHORT THREAD/40MM: Type: IMPLANTABLE DEVICE | Site: ANKLE | Status: FUNCTIONAL

## 2020-10-29 DEVICE — 3.5MM CORTEX SCREW SELF-TAPPING 14MM: Type: IMPLANTABLE DEVICE | Site: ANKLE | Status: FUNCTIONAL

## 2020-10-29 DEVICE — 2.7MM LOCKING SCREW SLF-TPNG WITH T8 STARDRIVE RECESS 20MM: Type: IMPLANTABLE DEVICE | Site: ANKLE | Status: FUNCTIONAL

## 2020-10-29 DEVICE — 2.7MM LOCKING SCREW SLF-TPNG WITH T8 STARDRIVE RECESS 16MM: Type: IMPLANTABLE DEVICE | Site: ANKLE | Status: FUNCTIONAL

## 2020-10-29 DEVICE — 2.7MM/3.5MM LCP LATERAL DISTAL FIBULA PLATE 6H/LEFT/112MM
Type: IMPLANTABLE DEVICE | Site: ANKLE | Status: FUNCTIONAL
Brand: LCP

## 2020-10-29 RX ORDER — CEFAZOLIN SODIUM 2 G/50ML
2000 SOLUTION INTRAVENOUS EVERY 8 HOURS
Status: COMPLETED | OUTPATIENT
Start: 2020-10-29 | End: 2020-10-30

## 2020-10-29 RX ORDER — PANTOPRAZOLE SODIUM 40 MG/1
40 TABLET, DELAYED RELEASE ORAL DAILY
Status: DISCONTINUED | OUTPATIENT
Start: 2020-10-29 | End: 2020-11-03 | Stop reason: HOSPADM

## 2020-10-29 RX ORDER — SODIUM CHLORIDE 9 MG/ML
75 INJECTION, SOLUTION INTRAVENOUS CONTINUOUS
Status: DISCONTINUED | OUTPATIENT
Start: 2020-10-29 | End: 2020-11-01

## 2020-10-29 RX ORDER — MAGNESIUM HYDROXIDE 1200 MG/15ML
LIQUID ORAL AS NEEDED
Status: DISCONTINUED | OUTPATIENT
Start: 2020-10-29 | End: 2020-10-29 | Stop reason: HOSPADM

## 2020-10-29 RX ORDER — ACETAMINOPHEN 325 MG/1
650 TABLET ORAL EVERY 6 HOURS SCHEDULED
Status: DISCONTINUED | OUTPATIENT
Start: 2020-10-29 | End: 2020-11-03 | Stop reason: HOSPADM

## 2020-10-29 RX ORDER — SODIUM CHLORIDE, SODIUM LACTATE, POTASSIUM CHLORIDE, CALCIUM CHLORIDE 600; 310; 30; 20 MG/100ML; MG/100ML; MG/100ML; MG/100ML
INJECTION, SOLUTION INTRAVENOUS CONTINUOUS PRN
Status: DISCONTINUED | OUTPATIENT
Start: 2020-10-29 | End: 2020-10-29

## 2020-10-29 RX ORDER — GINSENG 100 MG
CAPSULE ORAL AS NEEDED
Status: DISCONTINUED | OUTPATIENT
Start: 2020-10-29 | End: 2020-10-29 | Stop reason: HOSPADM

## 2020-10-29 RX ORDER — CEFAZOLIN SODIUM 2 G/50ML
SOLUTION INTRAVENOUS AS NEEDED
Status: DISCONTINUED | OUTPATIENT
Start: 2020-10-29 | End: 2020-10-29

## 2020-10-29 RX ORDER — FENTANYL CITRATE 50 UG/ML
INJECTION, SOLUTION INTRAMUSCULAR; INTRAVENOUS AS NEEDED
Status: DISCONTINUED | OUTPATIENT
Start: 2020-10-29 | End: 2020-10-29

## 2020-10-29 RX ORDER — ONDANSETRON 2 MG/ML
INJECTION INTRAMUSCULAR; INTRAVENOUS AS NEEDED
Status: DISCONTINUED | OUTPATIENT
Start: 2020-10-29 | End: 2020-10-29

## 2020-10-29 RX ORDER — LIDOCAINE HYDROCHLORIDE 10 MG/ML
INJECTION, SOLUTION EPIDURAL; INFILTRATION; INTRACAUDAL; PERINEURAL AS NEEDED
Status: DISCONTINUED | OUTPATIENT
Start: 2020-10-29 | End: 2020-10-29

## 2020-10-29 RX ORDER — FENTANYL CITRATE/PF 50 MCG/ML
25 SYRINGE (ML) INJECTION
Status: DISCONTINUED | OUTPATIENT
Start: 2020-10-29 | End: 2020-10-29 | Stop reason: HOSPADM

## 2020-10-29 RX ORDER — CEFAZOLIN SODIUM 2 G/50ML
2000 SOLUTION INTRAVENOUS ONCE
Status: DISCONTINUED | OUTPATIENT
Start: 2020-10-29 | End: 2020-10-29

## 2020-10-29 RX ORDER — WARFARIN SODIUM 5 MG/1
5 TABLET ORAL
Status: DISCONTINUED | OUTPATIENT
Start: 2020-10-29 | End: 2020-11-01

## 2020-10-29 RX ORDER — PROPOFOL 10 MG/ML
INJECTION, EMULSION INTRAVENOUS AS NEEDED
Status: DISCONTINUED | OUTPATIENT
Start: 2020-10-29 | End: 2020-10-29

## 2020-10-29 RX ADMIN — POTASSIUM CHLORIDE 20 MEQ: 1500 TABLET, EXTENDED RELEASE ORAL at 14:24

## 2020-10-29 RX ADMIN — ONDANSETRON 4 MG: 2 INJECTION INTRAMUSCULAR; INTRAVENOUS at 10:57

## 2020-10-29 RX ADMIN — LEVOTHYROXINE SODIUM 150 MCG: 150 TABLET ORAL at 05:25

## 2020-10-29 RX ADMIN — ACETAMINOPHEN 650 MG: 325 TABLET ORAL at 13:25

## 2020-10-29 RX ADMIN — INSULIN LISPRO 1 UNITS: 100 INJECTION, SOLUTION INTRAVENOUS; SUBCUTANEOUS at 21:44

## 2020-10-29 RX ADMIN — ACETAMINOPHEN 650 MG: 325 TABLET ORAL at 23:43

## 2020-10-29 RX ADMIN — ACETAMINOPHEN 650 MG: 325 TABLET ORAL at 17:17

## 2020-10-29 RX ADMIN — CEFAZOLIN SODIUM 2000 MG: 2 SOLUTION INTRAVENOUS at 05:28

## 2020-10-29 RX ADMIN — FUROSEMIDE 60 MG: 40 TABLET ORAL at 14:24

## 2020-10-29 RX ADMIN — SODIUM CHLORIDE, SODIUM LACTATE, POTASSIUM CHLORIDE, AND CALCIUM CHLORIDE: .6; .31; .03; .02 INJECTION, SOLUTION INTRAVENOUS at 09:13

## 2020-10-29 RX ADMIN — TRAMADOL HYDROCHLORIDE 50 MG: 50 TABLET, FILM COATED ORAL at 14:25

## 2020-10-29 RX ADMIN — PROPOFOL 130 MG: 10 INJECTION, EMULSION INTRAVENOUS at 09:30

## 2020-10-29 RX ADMIN — TRAMADOL HYDROCHLORIDE 50 MG: 50 TABLET, FILM COATED ORAL at 20:44

## 2020-10-29 RX ADMIN — FENTANYL CITRATE 25 MCG: 50 INJECTION INTRAMUSCULAR; INTRAVENOUS at 11:46

## 2020-10-29 RX ADMIN — DILTIAZEM HYDROCHLORIDE 300 MG: 180 CAPSULE, COATED, EXTENDED RELEASE ORAL at 14:23

## 2020-10-29 RX ADMIN — PANTOPRAZOLE SODIUM 40 MG: 40 TABLET, DELAYED RELEASE ORAL at 13:26

## 2020-10-29 RX ADMIN — FENTANYL CITRATE 50 MCG: 50 INJECTION, SOLUTION INTRAMUSCULAR; INTRAVENOUS at 09:30

## 2020-10-29 RX ADMIN — CEFAZOLIN SODIUM 2000 MG: 2 SOLUTION INTRAVENOUS at 09:21

## 2020-10-29 RX ADMIN — WARFARIN SODIUM 5 MG: 5 TABLET ORAL at 17:17

## 2020-10-29 RX ADMIN — ENOXAPARIN SODIUM 30 MG: 40 INJECTION SUBCUTANEOUS at 21:43

## 2020-10-29 RX ADMIN — LIDOCAINE HYDROCHLORIDE 50 MG: 10 INJECTION, SOLUTION EPIDURAL; INFILTRATION; INTRACAUDAL; PERINEURAL at 09:30

## 2020-10-29 RX ADMIN — INSULIN LISPRO 1 UNITS: 100 INJECTION, SOLUTION INTRAVENOUS; SUBCUTANEOUS at 16:42

## 2020-10-29 RX ADMIN — CEFAZOLIN SODIUM 2000 MG: 2 SOLUTION INTRAVENOUS at 16:19

## 2020-10-29 RX ADMIN — GABAPENTIN 300 MG: 300 CAPSULE ORAL at 17:18

## 2020-10-29 RX ADMIN — FENTANYL CITRATE 50 MCG: 50 INJECTION, SOLUTION INTRAMUSCULAR; INTRAVENOUS at 11:09

## 2020-10-29 RX ADMIN — FAMOTIDINE 20 MG: 20 TABLET, FILM COATED ORAL at 14:26

## 2020-10-29 RX ADMIN — SODIUM CHLORIDE, SODIUM LACTATE, POTASSIUM CHLORIDE, AND CALCIUM CHLORIDE: .6; .31; .03; .02 INJECTION, SOLUTION INTRAVENOUS at 11:11

## 2020-10-29 RX ADMIN — DOCUSATE SODIUM 100 MG: 100 CAPSULE, LIQUID FILLED ORAL at 17:17

## 2020-10-29 RX ADMIN — SODIUM CHLORIDE 75 ML/HR: 0.9 INJECTION, SOLUTION INTRAVENOUS at 12:17

## 2020-10-30 LAB
ANION GAP SERPL CALCULATED.3IONS-SCNC: 7 MMOL/L (ref 4–13)
BASOPHILS # BLD AUTO: 0.05 THOUSANDS/ΜL (ref 0–0.1)
BASOPHILS NFR BLD AUTO: 1 % (ref 0–1)
BUN SERPL-MCNC: 30 MG/DL (ref 5–25)
CALCIUM SERPL-MCNC: 8.6 MG/DL (ref 8.3–10.1)
CHLORIDE SERPL-SCNC: 103 MMOL/L (ref 100–108)
CO2 SERPL-SCNC: 30 MMOL/L (ref 21–32)
CREAT SERPL-MCNC: 1.24 MG/DL (ref 0.6–1.3)
EOSINOPHIL # BLD AUTO: 0.27 THOUSAND/ΜL (ref 0–0.61)
EOSINOPHIL NFR BLD AUTO: 3 % (ref 0–6)
ERYTHROCYTE [DISTWIDTH] IN BLOOD BY AUTOMATED COUNT: 14.8 % (ref 11.6–15.1)
GFR SERPL CREATININE-BSD FRML MDRD: 43 ML/MIN/1.73SQ M
GLUCOSE SERPL-MCNC: 155 MG/DL (ref 65–140)
GLUCOSE SERPL-MCNC: 156 MG/DL (ref 65–140)
GLUCOSE SERPL-MCNC: 160 MG/DL (ref 65–140)
GLUCOSE SERPL-MCNC: 164 MG/DL (ref 65–140)
GLUCOSE SERPL-MCNC: 203 MG/DL (ref 65–140)
HCT VFR BLD AUTO: 32.9 % (ref 34.8–46.1)
HGB BLD-MCNC: 10.8 G/DL (ref 11.5–15.4)
IMM GRANULOCYTES # BLD AUTO: 0.03 THOUSAND/UL (ref 0–0.2)
IMM GRANULOCYTES NFR BLD AUTO: 0 % (ref 0–2)
INR PPP: 1.68 (ref 0.84–1.19)
LYMPHOCYTES # BLD AUTO: 0.79 THOUSANDS/ΜL (ref 0.6–4.47)
LYMPHOCYTES NFR BLD AUTO: 9 % (ref 14–44)
MCH RBC QN AUTO: 27.1 PG (ref 26.8–34.3)
MCHC RBC AUTO-ENTMCNC: 32.8 G/DL (ref 31.4–37.4)
MCV RBC AUTO: 83 FL (ref 82–98)
MONOCYTES # BLD AUTO: 0.88 THOUSAND/ΜL (ref 0.17–1.22)
MONOCYTES NFR BLD AUTO: 10 % (ref 4–12)
NEUTROPHILS # BLD AUTO: 6.43 THOUSANDS/ΜL (ref 1.85–7.62)
NEUTS SEG NFR BLD AUTO: 77 % (ref 43–75)
NRBC BLD AUTO-RTO: 0 /100 WBCS
PLATELET # BLD AUTO: 182 THOUSANDS/UL (ref 149–390)
PMV BLD AUTO: 10.2 FL (ref 8.9–12.7)
POTASSIUM SERPL-SCNC: 4.1 MMOL/L (ref 3.5–5.3)
PROTHROMBIN TIME: 19.4 SECONDS (ref 11.6–14.5)
RBC # BLD AUTO: 3.99 MILLION/UL (ref 3.81–5.12)
SODIUM SERPL-SCNC: 140 MMOL/L (ref 136–145)
WBC # BLD AUTO: 8.45 THOUSAND/UL (ref 4.31–10.16)

## 2020-10-30 PROCEDURE — 97530 THERAPEUTIC ACTIVITIES: CPT

## 2020-10-30 PROCEDURE — 99024 POSTOP FOLLOW-UP VISIT: CPT | Performed by: PHYSICIAN ASSISTANT

## 2020-10-30 PROCEDURE — 99232 SBSQ HOSP IP/OBS MODERATE 35: CPT | Performed by: FAMILY MEDICINE

## 2020-10-30 PROCEDURE — 85610 PROTHROMBIN TIME: CPT | Performed by: FAMILY MEDICINE

## 2020-10-30 PROCEDURE — 82948 REAGENT STRIP/BLOOD GLUCOSE: CPT

## 2020-10-30 PROCEDURE — 85025 COMPLETE CBC W/AUTO DIFF WBC: CPT | Performed by: FAMILY MEDICINE

## 2020-10-30 PROCEDURE — 80048 BASIC METABOLIC PNL TOTAL CA: CPT | Performed by: FAMILY MEDICINE

## 2020-10-30 PROCEDURE — 97535 SELF CARE MNGMENT TRAINING: CPT

## 2020-10-30 RX ADMIN — TRAMADOL HYDROCHLORIDE 50 MG: 50 TABLET, FILM COATED ORAL at 05:28

## 2020-10-30 RX ADMIN — INSULIN LISPRO 1 UNITS: 100 INJECTION, SOLUTION INTRAVENOUS; SUBCUTANEOUS at 11:09

## 2020-10-30 RX ADMIN — DILTIAZEM HYDROCHLORIDE 300 MG: 180 CAPSULE, COATED, EXTENDED RELEASE ORAL at 08:37

## 2020-10-30 RX ADMIN — FUROSEMIDE 60 MG: 40 TABLET ORAL at 08:38

## 2020-10-30 RX ADMIN — FAMOTIDINE 20 MG: 20 TABLET, FILM COATED ORAL at 08:38

## 2020-10-30 RX ADMIN — ACETAMINOPHEN 650 MG: 325 TABLET ORAL at 11:10

## 2020-10-30 RX ADMIN — INSULIN LISPRO 1 UNITS: 100 INJECTION, SOLUTION INTRAVENOUS; SUBCUTANEOUS at 21:43

## 2020-10-30 RX ADMIN — ENOXAPARIN SODIUM 30 MG: 40 INJECTION SUBCUTANEOUS at 08:38

## 2020-10-30 RX ADMIN — ACETAMINOPHEN 650 MG: 325 TABLET ORAL at 05:28

## 2020-10-30 RX ADMIN — INSULIN LISPRO 1 UNITS: 100 INJECTION, SOLUTION INTRAVENOUS; SUBCUTANEOUS at 07:47

## 2020-10-30 RX ADMIN — DOCUSATE SODIUM 100 MG: 100 CAPSULE, LIQUID FILLED ORAL at 08:37

## 2020-10-30 RX ADMIN — WARFARIN SODIUM 5 MG: 5 TABLET ORAL at 17:02

## 2020-10-30 RX ADMIN — POTASSIUM CHLORIDE 20 MEQ: 1500 TABLET, EXTENDED RELEASE ORAL at 08:38

## 2020-10-30 RX ADMIN — GABAPENTIN 300 MG: 300 CAPSULE ORAL at 17:02

## 2020-10-30 RX ADMIN — DOCUSATE SODIUM 100 MG: 100 CAPSULE, LIQUID FILLED ORAL at 17:02

## 2020-10-30 RX ADMIN — SODIUM CHLORIDE 75 ML/HR: 0.9 INJECTION, SOLUTION INTRAVENOUS at 15:27

## 2020-10-30 RX ADMIN — CEFAZOLIN SODIUM 2000 MG: 2 SOLUTION INTRAVENOUS at 07:48

## 2020-10-30 RX ADMIN — CEFAZOLIN SODIUM 2000 MG: 2 SOLUTION INTRAVENOUS at 00:55

## 2020-10-30 RX ADMIN — ACETAMINOPHEN 650 MG: 325 TABLET ORAL at 23:20

## 2020-10-30 RX ADMIN — INSULIN LISPRO 1 UNITS: 100 INJECTION, SOLUTION INTRAVENOUS; SUBCUTANEOUS at 17:01

## 2020-10-30 RX ADMIN — GABAPENTIN 300 MG: 300 CAPSULE ORAL at 08:38

## 2020-10-30 RX ADMIN — PANTOPRAZOLE SODIUM 40 MG: 40 TABLET, DELAYED RELEASE ORAL at 08:38

## 2020-10-30 RX ADMIN — ACETAMINOPHEN 650 MG: 325 TABLET ORAL at 17:01

## 2020-10-30 RX ADMIN — LEVOTHYROXINE SODIUM 150 MCG: 150 TABLET ORAL at 05:28

## 2020-10-31 LAB
ANION GAP SERPL CALCULATED.3IONS-SCNC: 8 MMOL/L (ref 4–13)
BASOPHILS # BLD AUTO: 0.05 THOUSANDS/ΜL (ref 0–0.1)
BASOPHILS NFR BLD AUTO: 1 % (ref 0–1)
BUN SERPL-MCNC: 25 MG/DL (ref 5–25)
CALCIUM SERPL-MCNC: 8.7 MG/DL (ref 8.3–10.1)
CHLORIDE SERPL-SCNC: 105 MMOL/L (ref 100–108)
CO2 SERPL-SCNC: 28 MMOL/L (ref 21–32)
CREAT SERPL-MCNC: 1.27 MG/DL (ref 0.6–1.3)
EOSINOPHIL # BLD AUTO: 0.41 THOUSAND/ΜL (ref 0–0.61)
EOSINOPHIL NFR BLD AUTO: 6 % (ref 0–6)
ERYTHROCYTE [DISTWIDTH] IN BLOOD BY AUTOMATED COUNT: 14.8 % (ref 11.6–15.1)
GFR SERPL CREATININE-BSD FRML MDRD: 42 ML/MIN/1.73SQ M
GLUCOSE SERPL-MCNC: 143 MG/DL (ref 65–140)
GLUCOSE SERPL-MCNC: 151 MG/DL (ref 65–140)
GLUCOSE SERPL-MCNC: 161 MG/DL (ref 65–140)
GLUCOSE SERPL-MCNC: 169 MG/DL (ref 65–140)
GLUCOSE SERPL-MCNC: 207 MG/DL (ref 65–140)
HCT VFR BLD AUTO: 29.4 % (ref 34.8–46.1)
HGB BLD-MCNC: 9.6 G/DL (ref 11.5–15.4)
IMM GRANULOCYTES # BLD AUTO: 0.02 THOUSAND/UL (ref 0–0.2)
IMM GRANULOCYTES NFR BLD AUTO: 0 % (ref 0–2)
INR PPP: 1.8 (ref 0.84–1.19)
LYMPHOCYTES # BLD AUTO: 0.71 THOUSANDS/ΜL (ref 0.6–4.47)
LYMPHOCYTES NFR BLD AUTO: 11 % (ref 14–44)
MCH RBC QN AUTO: 27 PG (ref 26.8–34.3)
MCHC RBC AUTO-ENTMCNC: 32.7 G/DL (ref 31.4–37.4)
MCV RBC AUTO: 83 FL (ref 82–98)
MONOCYTES # BLD AUTO: 0.65 THOUSAND/ΜL (ref 0.17–1.22)
MONOCYTES NFR BLD AUTO: 10 % (ref 4–12)
NEUTROPHILS # BLD AUTO: 4.53 THOUSANDS/ΜL (ref 1.85–7.62)
NEUTS SEG NFR BLD AUTO: 72 % (ref 43–75)
NRBC BLD AUTO-RTO: 0 /100 WBCS
PLATELET # BLD AUTO: 158 THOUSANDS/UL (ref 149–390)
PMV BLD AUTO: 10.2 FL (ref 8.9–12.7)
POTASSIUM SERPL-SCNC: 3.7 MMOL/L (ref 3.5–5.3)
PROTHROMBIN TIME: 20.5 SECONDS (ref 11.6–14.5)
RBC # BLD AUTO: 3.55 MILLION/UL (ref 3.81–5.12)
SODIUM SERPL-SCNC: 141 MMOL/L (ref 136–145)
WBC # BLD AUTO: 6.37 THOUSAND/UL (ref 4.31–10.16)

## 2020-10-31 PROCEDURE — 82948 REAGENT STRIP/BLOOD GLUCOSE: CPT

## 2020-10-31 PROCEDURE — 85610 PROTHROMBIN TIME: CPT | Performed by: FAMILY MEDICINE

## 2020-10-31 PROCEDURE — 85025 COMPLETE CBC W/AUTO DIFF WBC: CPT | Performed by: FAMILY MEDICINE

## 2020-10-31 PROCEDURE — 99024 POSTOP FOLLOW-UP VISIT: CPT | Performed by: PHYSICIAN ASSISTANT

## 2020-10-31 PROCEDURE — 99232 SBSQ HOSP IP/OBS MODERATE 35: CPT | Performed by: FAMILY MEDICINE

## 2020-10-31 PROCEDURE — 80048 BASIC METABOLIC PNL TOTAL CA: CPT | Performed by: FAMILY MEDICINE

## 2020-10-31 RX ADMIN — ACETAMINOPHEN 650 MG: 325 TABLET ORAL at 05:59

## 2020-10-31 RX ADMIN — POLYETHYLENE GLYCOL 3350 17 G: 17 POWDER, FOR SOLUTION ORAL at 23:10

## 2020-10-31 RX ADMIN — ACETAMINOPHEN 650 MG: 325 TABLET ORAL at 12:02

## 2020-10-31 RX ADMIN — SODIUM CHLORIDE 75 ML/HR: 0.9 INJECTION, SOLUTION INTRAVENOUS at 03:31

## 2020-10-31 RX ADMIN — ENOXAPARIN SODIUM 30 MG: 40 INJECTION SUBCUTANEOUS at 08:18

## 2020-10-31 RX ADMIN — INSULIN LISPRO 1 UNITS: 100 INJECTION, SOLUTION INTRAVENOUS; SUBCUTANEOUS at 16:37

## 2020-10-31 RX ADMIN — INSULIN LISPRO 1 UNITS: 100 INJECTION, SOLUTION INTRAVENOUS; SUBCUTANEOUS at 21:37

## 2020-10-31 RX ADMIN — DILTIAZEM HYDROCHLORIDE 300 MG: 180 CAPSULE, COATED, EXTENDED RELEASE ORAL at 08:17

## 2020-10-31 RX ADMIN — INSULIN LISPRO 1 UNITS: 100 INJECTION, SOLUTION INTRAVENOUS; SUBCUTANEOUS at 07:33

## 2020-10-31 RX ADMIN — GLYCERIN 1 SUPPOSITORY: 2 SUPPOSITORY RECTAL at 17:18

## 2020-10-31 RX ADMIN — PANTOPRAZOLE SODIUM 40 MG: 40 TABLET, DELAYED RELEASE ORAL at 08:17

## 2020-10-31 RX ADMIN — INSULIN LISPRO 1 UNITS: 100 INJECTION, SOLUTION INTRAVENOUS; SUBCUTANEOUS at 12:02

## 2020-10-31 RX ADMIN — ACETAMINOPHEN 650 MG: 325 TABLET ORAL at 23:04

## 2020-10-31 RX ADMIN — FUROSEMIDE 60 MG: 40 TABLET ORAL at 08:17

## 2020-10-31 RX ADMIN — GABAPENTIN 300 MG: 300 CAPSULE ORAL at 08:18

## 2020-10-31 RX ADMIN — ACETAMINOPHEN 650 MG: 325 TABLET ORAL at 16:36

## 2020-10-31 RX ADMIN — POLYETHYLENE GLYCOL 3350 17 G: 17 POWDER, FOR SOLUTION ORAL at 16:33

## 2020-10-31 RX ADMIN — DOCUSATE SODIUM 100 MG: 100 CAPSULE, LIQUID FILLED ORAL at 08:17

## 2020-10-31 RX ADMIN — LEVOTHYROXINE SODIUM 150 MCG: 150 TABLET ORAL at 05:59

## 2020-10-31 RX ADMIN — SODIUM CHLORIDE 75 ML/HR: 0.9 INJECTION, SOLUTION INTRAVENOUS at 16:35

## 2020-10-31 RX ADMIN — DOCUSATE SODIUM 100 MG: 100 CAPSULE, LIQUID FILLED ORAL at 16:36

## 2020-10-31 RX ADMIN — FAMOTIDINE 20 MG: 20 TABLET, FILM COATED ORAL at 08:18

## 2020-10-31 RX ADMIN — WARFARIN SODIUM 5 MG: 5 TABLET ORAL at 16:36

## 2020-10-31 RX ADMIN — POTASSIUM CHLORIDE 20 MEQ: 1500 TABLET, EXTENDED RELEASE ORAL at 08:17

## 2020-10-31 RX ADMIN — GABAPENTIN 300 MG: 300 CAPSULE ORAL at 16:36

## 2020-11-01 PROBLEM — K59.03 DRUG-INDUCED CONSTIPATION: Status: ACTIVE | Noted: 2020-11-01

## 2020-11-01 PROBLEM — E87.6 HYPOKALEMIA: Status: RESOLVED | Noted: 2020-10-27 | Resolved: 2020-11-01

## 2020-11-01 LAB
ANION GAP SERPL CALCULATED.3IONS-SCNC: 7 MMOL/L (ref 4–13)
BASOPHILS # BLD AUTO: 0.03 THOUSANDS/ΜL (ref 0–0.1)
BASOPHILS NFR BLD AUTO: 1 % (ref 0–1)
BUN SERPL-MCNC: 27 MG/DL (ref 5–25)
CALCIUM SERPL-MCNC: 8.7 MG/DL (ref 8.3–10.1)
CHLORIDE SERPL-SCNC: 107 MMOL/L (ref 100–108)
CO2 SERPL-SCNC: 28 MMOL/L (ref 21–32)
CREAT SERPL-MCNC: 1.25 MG/DL (ref 0.6–1.3)
EOSINOPHIL # BLD AUTO: 0.36 THOUSAND/ΜL (ref 0–0.61)
EOSINOPHIL NFR BLD AUTO: 6 % (ref 0–6)
ERYTHROCYTE [DISTWIDTH] IN BLOOD BY AUTOMATED COUNT: 15 % (ref 11.6–15.1)
GFR SERPL CREATININE-BSD FRML MDRD: 42 ML/MIN/1.73SQ M
GLUCOSE SERPL-MCNC: 120 MG/DL (ref 65–140)
GLUCOSE SERPL-MCNC: 126 MG/DL (ref 65–140)
GLUCOSE SERPL-MCNC: 150 MG/DL (ref 65–140)
GLUCOSE SERPL-MCNC: 176 MG/DL (ref 65–140)
GLUCOSE SERPL-MCNC: 220 MG/DL (ref 65–140)
HCT VFR BLD AUTO: 29.1 % (ref 34.8–46.1)
HGB BLD-MCNC: 9.5 G/DL (ref 11.5–15.4)
IMM GRANULOCYTES # BLD AUTO: 0.02 THOUSAND/UL (ref 0–0.2)
IMM GRANULOCYTES NFR BLD AUTO: 0 % (ref 0–2)
INR PPP: 1.83 (ref 0.84–1.19)
LYMPHOCYTES # BLD AUTO: 0.77 THOUSANDS/ΜL (ref 0.6–4.47)
LYMPHOCYTES NFR BLD AUTO: 13 % (ref 14–44)
MCH RBC QN AUTO: 27 PG (ref 26.8–34.3)
MCHC RBC AUTO-ENTMCNC: 32.6 G/DL (ref 31.4–37.4)
MCV RBC AUTO: 83 FL (ref 82–98)
MONOCYTES # BLD AUTO: 0.56 THOUSAND/ΜL (ref 0.17–1.22)
MONOCYTES NFR BLD AUTO: 9 % (ref 4–12)
NEUTROPHILS # BLD AUTO: 4.32 THOUSANDS/ΜL (ref 1.85–7.62)
NEUTS SEG NFR BLD AUTO: 71 % (ref 43–75)
NRBC BLD AUTO-RTO: 0 /100 WBCS
PLATELET # BLD AUTO: 189 THOUSANDS/UL (ref 149–390)
PMV BLD AUTO: 10.4 FL (ref 8.9–12.7)
POTASSIUM SERPL-SCNC: 3.9 MMOL/L (ref 3.5–5.3)
PROTHROMBIN TIME: 20.8 SECONDS (ref 11.6–14.5)
RBC # BLD AUTO: 3.52 MILLION/UL (ref 3.81–5.12)
SODIUM SERPL-SCNC: 142 MMOL/L (ref 136–145)
WBC # BLD AUTO: 6.06 THOUSAND/UL (ref 4.31–10.16)

## 2020-11-01 PROCEDURE — 82948 REAGENT STRIP/BLOOD GLUCOSE: CPT

## 2020-11-01 PROCEDURE — 99024 POSTOP FOLLOW-UP VISIT: CPT | Performed by: PHYSICIAN ASSISTANT

## 2020-11-01 PROCEDURE — 80048 BASIC METABOLIC PNL TOTAL CA: CPT | Performed by: FAMILY MEDICINE

## 2020-11-01 PROCEDURE — 85025 COMPLETE CBC W/AUTO DIFF WBC: CPT | Performed by: FAMILY MEDICINE

## 2020-11-01 PROCEDURE — 85610 PROTHROMBIN TIME: CPT | Performed by: FAMILY MEDICINE

## 2020-11-01 PROCEDURE — 99232 SBSQ HOSP IP/OBS MODERATE 35: CPT | Performed by: FAMILY MEDICINE

## 2020-11-01 RX ORDER — POLYETHYLENE GLYCOL 3350 17 G/17G
17 POWDER, FOR SOLUTION ORAL DAILY
Status: DISCONTINUED | OUTPATIENT
Start: 2020-11-01 | End: 2020-11-03 | Stop reason: HOSPADM

## 2020-11-01 RX ORDER — WARFARIN SODIUM 5 MG/1
5 TABLET ORAL
Status: DISCONTINUED | OUTPATIENT
Start: 2020-11-02 | End: 2020-11-01

## 2020-11-01 RX ORDER — BISACODYL 10 MG
10 SUPPOSITORY, RECTAL RECTAL DAILY PRN
Status: DISCONTINUED | OUTPATIENT
Start: 2020-11-01 | End: 2020-11-03 | Stop reason: HOSPADM

## 2020-11-01 RX ORDER — WARFARIN SODIUM 7.5 MG/1
7.5 TABLET ORAL
Status: DISCONTINUED | OUTPATIENT
Start: 2020-11-01 | End: 2020-11-01

## 2020-11-01 RX ORDER — WARFARIN SODIUM 5 MG/1
5 TABLET ORAL
Status: DISCONTINUED | OUTPATIENT
Start: 2020-11-03 | End: 2020-11-03 | Stop reason: HOSPADM

## 2020-11-01 RX ORDER — WARFARIN SODIUM 7.5 MG/1
7.5 TABLET ORAL
Status: DISCONTINUED | OUTPATIENT
Start: 2020-11-03 | End: 2020-11-01

## 2020-11-01 RX ORDER — AMOXICILLIN 250 MG
2 CAPSULE ORAL 2 TIMES DAILY
Status: DISCONTINUED | OUTPATIENT
Start: 2020-11-01 | End: 2020-11-03 | Stop reason: HOSPADM

## 2020-11-01 RX ORDER — WARFARIN SODIUM 5 MG/1
5 TABLET ORAL
Status: DISCONTINUED | OUTPATIENT
Start: 2020-11-01 | End: 2020-11-01

## 2020-11-01 RX ORDER — WARFARIN SODIUM 7.5 MG/1
7.5 TABLET ORAL
Status: DISCONTINUED | OUTPATIENT
Start: 2020-11-01 | End: 2020-11-03 | Stop reason: HOSPADM

## 2020-11-01 RX ADMIN — FAMOTIDINE 20 MG: 20 TABLET, FILM COATED ORAL at 08:13

## 2020-11-01 RX ADMIN — ACETAMINOPHEN 650 MG: 325 TABLET ORAL at 17:01

## 2020-11-01 RX ADMIN — INSULIN LISPRO 1 UNITS: 100 INJECTION, SOLUTION INTRAVENOUS; SUBCUTANEOUS at 12:11

## 2020-11-01 RX ADMIN — POTASSIUM CHLORIDE 20 MEQ: 1500 TABLET, EXTENDED RELEASE ORAL at 08:09

## 2020-11-01 RX ADMIN — LEVOTHYROXINE SODIUM 150 MCG: 150 TABLET ORAL at 05:32

## 2020-11-01 RX ADMIN — DOCUSATE SODIUM 100 MG: 100 CAPSULE, LIQUID FILLED ORAL at 08:13

## 2020-11-01 RX ADMIN — ACETAMINOPHEN 650 MG: 325 TABLET ORAL at 05:31

## 2020-11-01 RX ADMIN — POLYETHYLENE GLYCOL 3350 17 G: 17 POWDER, FOR SOLUTION ORAL at 08:08

## 2020-11-01 RX ADMIN — POLYETHYLENE GLYCOL 3350 17 G: 17 POWDER, FOR SOLUTION ORAL at 10:32

## 2020-11-01 RX ADMIN — WARFARIN SODIUM 7.5 MG: 7.5 TABLET ORAL at 17:01

## 2020-11-01 RX ADMIN — INSULIN LISPRO 1 UNITS: 100 INJECTION, SOLUTION INTRAVENOUS; SUBCUTANEOUS at 08:08

## 2020-11-01 RX ADMIN — DOCUSATE SODIUM AND SENNOSIDES 2 TABLET: 8.6; 5 TABLET ORAL at 17:01

## 2020-11-01 RX ADMIN — GABAPENTIN 300 MG: 300 CAPSULE ORAL at 08:13

## 2020-11-01 RX ADMIN — DOCUSATE SODIUM AND SENNOSIDES 2 TABLET: 8.6; 5 TABLET ORAL at 10:32

## 2020-11-01 RX ADMIN — BISACODYL 10 MG: 10 SUPPOSITORY RECTAL at 10:32

## 2020-11-01 RX ADMIN — ACETAMINOPHEN 650 MG: 325 TABLET ORAL at 12:11

## 2020-11-01 RX ADMIN — PANTOPRAZOLE SODIUM 40 MG: 40 TABLET, DELAYED RELEASE ORAL at 08:13

## 2020-11-01 RX ADMIN — DILTIAZEM HYDROCHLORIDE 300 MG: 180 CAPSULE, COATED, EXTENDED RELEASE ORAL at 08:13

## 2020-11-01 RX ADMIN — GABAPENTIN 300 MG: 300 CAPSULE ORAL at 17:01

## 2020-11-01 RX ADMIN — FUROSEMIDE 60 MG: 40 TABLET ORAL at 08:13

## 2020-11-01 RX ADMIN — ACETAMINOPHEN 650 MG: 325 TABLET ORAL at 23:29

## 2020-11-01 RX ADMIN — INSULIN LISPRO 5 UNITS: 100 INJECTION, SOLUTION INTRAVENOUS; SUBCUTANEOUS at 17:01

## 2020-11-01 RX ADMIN — ENOXAPARIN SODIUM 30 MG: 40 INJECTION SUBCUTANEOUS at 08:09

## 2020-11-02 PROBLEM — K59.03 DRUG-INDUCED CONSTIPATION: Status: RESOLVED | Noted: 2020-11-01 | Resolved: 2020-11-02

## 2020-11-02 LAB
GLUCOSE SERPL-MCNC: 135 MG/DL (ref 65–140)
GLUCOSE SERPL-MCNC: 135 MG/DL (ref 65–140)
GLUCOSE SERPL-MCNC: 144 MG/DL (ref 65–140)
GLUCOSE SERPL-MCNC: 156 MG/DL (ref 65–140)
INR PPP: 1.88 (ref 0.84–1.19)
PROTHROMBIN TIME: 21.2 SECONDS (ref 11.6–14.5)

## 2020-11-02 PROCEDURE — 97535 SELF CARE MNGMENT TRAINING: CPT

## 2020-11-02 PROCEDURE — 97530 THERAPEUTIC ACTIVITIES: CPT

## 2020-11-02 PROCEDURE — 82948 REAGENT STRIP/BLOOD GLUCOSE: CPT

## 2020-11-02 PROCEDURE — 97116 GAIT TRAINING THERAPY: CPT

## 2020-11-02 PROCEDURE — 85610 PROTHROMBIN TIME: CPT | Performed by: NURSE PRACTITIONER

## 2020-11-02 PROCEDURE — 99024 POSTOP FOLLOW-UP VISIT: CPT | Performed by: PHYSICIAN ASSISTANT

## 2020-11-02 PROCEDURE — 99232 SBSQ HOSP IP/OBS MODERATE 35: CPT | Performed by: NURSE PRACTITIONER

## 2020-11-02 RX ORDER — WARFARIN SODIUM 2.5 MG/1
2.5 TABLET ORAL
Status: COMPLETED | OUTPATIENT
Start: 2020-11-02 | End: 2020-11-02

## 2020-11-02 RX ADMIN — GABAPENTIN 300 MG: 300 CAPSULE ORAL at 17:19

## 2020-11-02 RX ADMIN — ENOXAPARIN SODIUM 30 MG: 40 INJECTION SUBCUTANEOUS at 08:06

## 2020-11-02 RX ADMIN — FAMOTIDINE 20 MG: 20 TABLET, FILM COATED ORAL at 08:07

## 2020-11-02 RX ADMIN — INSULIN LISPRO 5 UNITS: 100 INJECTION, SOLUTION INTRAVENOUS; SUBCUTANEOUS at 12:45

## 2020-11-02 RX ADMIN — ACETAMINOPHEN 650 MG: 325 TABLET ORAL at 05:33

## 2020-11-02 RX ADMIN — INSULIN LISPRO 5 UNITS: 100 INJECTION, SOLUTION INTRAVENOUS; SUBCUTANEOUS at 08:04

## 2020-11-02 RX ADMIN — PANTOPRAZOLE SODIUM 40 MG: 40 TABLET, DELAYED RELEASE ORAL at 08:07

## 2020-11-02 RX ADMIN — ACETAMINOPHEN 650 MG: 325 TABLET ORAL at 12:13

## 2020-11-02 RX ADMIN — POTASSIUM CHLORIDE 20 MEQ: 1500 TABLET, EXTENDED RELEASE ORAL at 08:07

## 2020-11-02 RX ADMIN — LEVOTHYROXINE SODIUM 150 MCG: 150 TABLET ORAL at 05:33

## 2020-11-02 RX ADMIN — DOCUSATE SODIUM AND SENNOSIDES 2 TABLET: 8.6; 5 TABLET ORAL at 08:06

## 2020-11-02 RX ADMIN — WARFARIN SODIUM 7.5 MG: 7.5 TABLET ORAL at 17:19

## 2020-11-02 RX ADMIN — ACETAMINOPHEN 650 MG: 325 TABLET ORAL at 23:48

## 2020-11-02 RX ADMIN — WARFARIN SODIUM 2.5 MG: 2.5 TABLET ORAL at 17:20

## 2020-11-02 RX ADMIN — ACETAMINOPHEN 650 MG: 325 TABLET ORAL at 17:18

## 2020-11-02 RX ADMIN — INSULIN LISPRO 5 UNITS: 100 INJECTION, SOLUTION INTRAVENOUS; SUBCUTANEOUS at 17:20

## 2020-11-02 RX ADMIN — DOCUSATE SODIUM AND SENNOSIDES 2 TABLET: 8.6; 5 TABLET ORAL at 17:18

## 2020-11-02 RX ADMIN — INSULIN LISPRO 1 UNITS: 100 INJECTION, SOLUTION INTRAVENOUS; SUBCUTANEOUS at 08:04

## 2020-11-02 RX ADMIN — GABAPENTIN 300 MG: 300 CAPSULE ORAL at 08:06

## 2020-11-02 RX ADMIN — FUROSEMIDE 60 MG: 40 TABLET ORAL at 08:06

## 2020-11-02 RX ADMIN — DILTIAZEM HYDROCHLORIDE 300 MG: 180 CAPSULE, COATED, EXTENDED RELEASE ORAL at 08:06

## 2020-11-03 VITALS
DIASTOLIC BLOOD PRESSURE: 68 MMHG | HEIGHT: 62 IN | BODY MASS INDEX: 39.66 KG/M2 | SYSTOLIC BLOOD PRESSURE: 149 MMHG | HEART RATE: 76 BPM | RESPIRATION RATE: 18 BRPM | TEMPERATURE: 97.8 F | OXYGEN SATURATION: 97 % | WEIGHT: 215.5 LBS

## 2020-11-03 LAB
GLUCOSE SERPL-MCNC: 129 MG/DL (ref 65–140)
GLUCOSE SERPL-MCNC: 148 MG/DL (ref 65–140)
GLUCOSE SERPL-MCNC: 184 MG/DL (ref 65–140)
INR PPP: 2.08 (ref 0.84–1.19)
PROTHROMBIN TIME: 22.9 SECONDS (ref 11.6–14.5)
SARS-COV-2 RNA RESP QL NAA+PROBE: NEGATIVE

## 2020-11-03 PROCEDURE — 87635 SARS-COV-2 COVID-19 AMP PRB: CPT | Performed by: NURSE PRACTITIONER

## 2020-11-03 PROCEDURE — 85610 PROTHROMBIN TIME: CPT | Performed by: NURSE PRACTITIONER

## 2020-11-03 PROCEDURE — 99024 POSTOP FOLLOW-UP VISIT: CPT | Performed by: ORTHOPAEDIC SURGERY

## 2020-11-03 PROCEDURE — 97530 THERAPEUTIC ACTIVITIES: CPT

## 2020-11-03 PROCEDURE — 82948 REAGENT STRIP/BLOOD GLUCOSE: CPT

## 2020-11-03 PROCEDURE — 99239 HOSP IP/OBS DSCHRG MGMT >30: CPT | Performed by: NURSE PRACTITIONER

## 2020-11-03 PROCEDURE — 97110 THERAPEUTIC EXERCISES: CPT

## 2020-11-03 RX ORDER — POTASSIUM CHLORIDE 20 MEQ/1
20 TABLET, EXTENDED RELEASE ORAL DAILY
Qty: 30 TABLET | Refills: 0
Start: 2020-11-04 | End: 2022-07-08

## 2020-11-03 RX ORDER — PANTOPRAZOLE SODIUM 40 MG/1
40 TABLET, DELAYED RELEASE ORAL DAILY
Qty: 30 TABLET | Refills: 0
Start: 2020-11-04 | End: 2021-06-18 | Stop reason: HOSPADM

## 2020-11-03 RX ADMIN — DOCUSATE SODIUM AND SENNOSIDES 2 TABLET: 8.6; 5 TABLET ORAL at 08:38

## 2020-11-03 RX ADMIN — INSULIN LISPRO 5 UNITS: 100 INJECTION, SOLUTION INTRAVENOUS; SUBCUTANEOUS at 12:16

## 2020-11-03 RX ADMIN — POTASSIUM CHLORIDE 20 MEQ: 1500 TABLET, EXTENDED RELEASE ORAL at 08:38

## 2020-11-03 RX ADMIN — INSULIN LISPRO 5 UNITS: 100 INJECTION, SOLUTION INTRAVENOUS; SUBCUTANEOUS at 08:39

## 2020-11-03 RX ADMIN — FUROSEMIDE 60 MG: 40 TABLET ORAL at 08:38

## 2020-11-03 RX ADMIN — ACETAMINOPHEN 650 MG: 325 TABLET ORAL at 05:01

## 2020-11-03 RX ADMIN — FAMOTIDINE 20 MG: 20 TABLET, FILM COATED ORAL at 08:38

## 2020-11-03 RX ADMIN — LEVOTHYROXINE SODIUM 150 MCG: 150 TABLET ORAL at 05:01

## 2020-11-03 RX ADMIN — DILTIAZEM HYDROCHLORIDE 300 MG: 180 CAPSULE, COATED, EXTENDED RELEASE ORAL at 08:38

## 2020-11-03 RX ADMIN — GABAPENTIN 300 MG: 300 CAPSULE ORAL at 08:38

## 2020-11-03 RX ADMIN — ACETAMINOPHEN 650 MG: 325 TABLET ORAL at 12:15

## 2020-11-03 RX ADMIN — PANTOPRAZOLE SODIUM 40 MG: 40 TABLET, DELAYED RELEASE ORAL at 08:38

## 2020-11-04 ENCOUNTER — TELEPHONE (OUTPATIENT)
Dept: OBGYN CLINIC | Facility: HOSPITAL | Age: 75
End: 2020-11-04

## 2020-11-11 ENCOUNTER — HOSPITAL ENCOUNTER (OUTPATIENT)
Dept: RADIOLOGY | Facility: CLINIC | Age: 75
Discharge: HOME | End: 2020-11-11
Payer: COMMERCIAL

## 2020-11-11 ENCOUNTER — OFFICE VISIT (OUTPATIENT)
Dept: OBGYN CLINIC | Facility: CLINIC | Age: 75
End: 2020-11-11

## 2020-11-11 VITALS
TEMPERATURE: 97 F | SYSTOLIC BLOOD PRESSURE: 126 MMHG | DIASTOLIC BLOOD PRESSURE: 78 MMHG | WEIGHT: 215 LBS | HEIGHT: 62 IN | BODY MASS INDEX: 39.56 KG/M2 | HEART RATE: 74 BPM

## 2020-11-11 DIAGNOSIS — S82.842D CLOSED BIMALLEOLAR FRACTURE OF LEFT ANKLE WITH ROUTINE HEALING, SUBSEQUENT ENCOUNTER: ICD-10-CM

## 2020-11-11 DIAGNOSIS — S82.842D CLOSED BIMALLEOLAR FRACTURE OF LEFT ANKLE WITH ROUTINE HEALING, SUBSEQUENT ENCOUNTER: Primary | ICD-10-CM

## 2020-11-11 PROBLEM — S82.842A CLOSED BIMALLEOLAR FRACTURE OF LEFT ANKLE: Status: ACTIVE | Noted: 2020-11-11

## 2020-11-11 PROCEDURE — 99024 POSTOP FOLLOW-UP VISIT: CPT | Performed by: ORTHOPAEDIC SURGERY

## 2020-11-11 PROCEDURE — 73610 X-RAY EXAM OF ANKLE: CPT

## 2020-11-18 ENCOUNTER — OFFICE VISIT (OUTPATIENT)
Dept: OBGYN CLINIC | Facility: CLINIC | Age: 75
End: 2020-11-18

## 2020-11-18 VITALS
SYSTOLIC BLOOD PRESSURE: 120 MMHG | HEART RATE: 55 BPM | DIASTOLIC BLOOD PRESSURE: 70 MMHG | WEIGHT: 205 LBS | HEIGHT: 62 IN | TEMPERATURE: 97 F | BODY MASS INDEX: 37.73 KG/M2

## 2020-11-18 DIAGNOSIS — S82.842D CLOSED BIMALLEOLAR FRACTURE OF LEFT ANKLE WITH ROUTINE HEALING, SUBSEQUENT ENCOUNTER: Primary | ICD-10-CM

## 2020-11-18 PROCEDURE — 99024 POSTOP FOLLOW-UP VISIT: CPT | Performed by: ORTHOPAEDIC SURGERY

## 2020-11-18 RX ORDER — FAMOTIDINE 40 MG/1
TABLET, FILM COATED ORAL
COMMUNITY
Start: 2020-09-08 | End: 2021-06-18 | Stop reason: HOSPADM

## 2020-12-11 ENCOUNTER — HOSPITAL ENCOUNTER (OUTPATIENT)
Dept: RADIOLOGY | Facility: CLINIC | Age: 75
Discharge: HOME | End: 2020-12-11
Payer: MEDICARE

## 2020-12-11 ENCOUNTER — OFFICE VISIT (OUTPATIENT)
Dept: OBGYN CLINIC | Facility: CLINIC | Age: 75
End: 2020-12-11

## 2020-12-11 VITALS
RESPIRATION RATE: 16 BRPM | SYSTOLIC BLOOD PRESSURE: 154 MMHG | BODY MASS INDEX: 37.73 KG/M2 | HEART RATE: 62 BPM | DIASTOLIC BLOOD PRESSURE: 70 MMHG | HEIGHT: 62 IN | WEIGHT: 205 LBS

## 2020-12-11 DIAGNOSIS — S82.842D CLOSED BIMALLEOLAR FRACTURE OF LEFT ANKLE WITH ROUTINE HEALING, SUBSEQUENT ENCOUNTER: ICD-10-CM

## 2020-12-11 DIAGNOSIS — S82.842D CLOSED BIMALLEOLAR FRACTURE OF LEFT ANKLE WITH ROUTINE HEALING, SUBSEQUENT ENCOUNTER: Primary | ICD-10-CM

## 2020-12-11 PROCEDURE — 73610 X-RAY EXAM OF ANKLE: CPT

## 2020-12-11 PROCEDURE — 99024 POSTOP FOLLOW-UP VISIT: CPT | Performed by: ORTHOPAEDIC SURGERY

## 2021-01-08 ENCOUNTER — OFFICE VISIT (OUTPATIENT)
Dept: OBGYN CLINIC | Facility: CLINIC | Age: 76
End: 2021-01-08

## 2021-01-08 ENCOUNTER — HOSPITAL ENCOUNTER (OUTPATIENT)
Dept: RADIOLOGY | Facility: CLINIC | Age: 76
Discharge: HOME | End: 2021-01-08
Payer: MEDICARE

## 2021-01-08 VITALS
DIASTOLIC BLOOD PRESSURE: 76 MMHG | BODY MASS INDEX: 37.73 KG/M2 | HEIGHT: 62 IN | WEIGHT: 205 LBS | TEMPERATURE: 96.5 F | HEART RATE: 44 BPM | SYSTOLIC BLOOD PRESSURE: 138 MMHG

## 2021-01-08 DIAGNOSIS — S82.892D CLOSED FRACTURE OF LEFT ANKLE WITH ROUTINE HEALING, SUBSEQUENT ENCOUNTER: Primary | ICD-10-CM

## 2021-01-08 DIAGNOSIS — S82.892D CLOSED FRACTURE OF LEFT ANKLE WITH ROUTINE HEALING, SUBSEQUENT ENCOUNTER: ICD-10-CM

## 2021-01-08 PROBLEM — S82.892A CLOSED FRACTURE OF LEFT ANKLE: Status: ACTIVE | Noted: 2021-01-08

## 2021-01-08 PROCEDURE — 99024 POSTOP FOLLOW-UP VISIT: CPT | Performed by: ORTHOPAEDIC SURGERY

## 2021-01-08 PROCEDURE — 73610 X-RAY EXAM OF ANKLE: CPT

## 2021-01-08 NOTE — PROGRESS NOTES
Patient Name:  Carly Hartman  MRN:  [id number]    Assessment     1  Closed fracture of left ankle with routine healing, subsequent encounter  XR ankle 3+ vw left    Ambulatory referral to Physical Therapy    Ankle Cude ankle/Ankle Brace       Plan     The patient may begin to WBAT on the LLE  She will also be transitioned to a brace  The patient does have a history of bilateral foot drop  She was informed that it is ok for her to transition to her foot drop brace if she feels she needs to  She was again provided a referral to PT  We will see the patient back in 4-6 weeks for recheck  XRs of the left ankle will be obtained only if clinical indicated  She and her hsuband were encouraged to contact the office should questions or concerns arise  Return in about 4 weeks (around 2/5/2021)  This patient was evaluated both by myself and Dr Loli Dove  Subjective   Carly Hartman returns for follow-up of her left ankle fracture  The patient is 10 week(s) post ORIF and returns for routine follow-up  She reports overall, she has been doing well  She denies any significant pain in her left ankle at this time  She has continued to be nonweightbearing in the walker cast boot  She did not go to physical therapy since she was last seen  Objective     /76 (BP Location: Right arm, Patient Position: Sitting, Cuff Size: Large)   Pulse (!) 44   Temp (!) 96 5 °F (35 8 °C) (Temporal)   Ht 5' 2" (1 575 m)   Wt 93 kg (205 lb)   BMI 37 49 kg/m²     The left ankle exam demonstrates boot to be in place upon arrival, this was removed without difficulty  The incisions are well healed and benign  She has mild residual swelling as to be expected  Thigh and calf compartments are soft and compressible  She has significantly limited range of motion secondary to prolonged immobilization  She has no significant tenderness to palpation  Motor and sensory exams are grossly intact, distal pulses are palpable    Limb is warm and well perfused good color and capillary refill the toes  The remainder of the left lower extremity exam is benign  Data Review     I have personally reviewed pertinent films and reports in PACS and my interpretation is as follows:     X-rays of the left ankle performed today in clinic demonstrate a bimalleolar fracture status post ORIF is nearly completely healed  No interval changes in alignment        Ananya Garcia PA-C

## 2021-03-05 ENCOUNTER — HOSPITAL ENCOUNTER (OUTPATIENT)
Dept: RADIOLOGY | Facility: CLINIC | Age: 76
Discharge: HOME | End: 2021-03-05
Payer: MEDICARE

## 2021-03-05 ENCOUNTER — OFFICE VISIT (OUTPATIENT)
Dept: OBGYN CLINIC | Facility: CLINIC | Age: 76
End: 2021-03-05
Payer: MEDICARE

## 2021-03-05 VITALS
WEIGHT: 200 LBS | HEART RATE: 46 BPM | SYSTOLIC BLOOD PRESSURE: 132 MMHG | DIASTOLIC BLOOD PRESSURE: 70 MMHG | TEMPERATURE: 97.4 F | BODY MASS INDEX: 36.8 KG/M2 | HEIGHT: 62 IN

## 2021-03-05 DIAGNOSIS — S82.892D CLOSED FRACTURE OF LEFT ANKLE WITH ROUTINE HEALING, SUBSEQUENT ENCOUNTER: ICD-10-CM

## 2021-03-05 DIAGNOSIS — S82.892D CLOSED FRACTURE OF LEFT ANKLE WITH ROUTINE HEALING, SUBSEQUENT ENCOUNTER: Primary | ICD-10-CM

## 2021-03-05 PROCEDURE — 99213 OFFICE O/P EST LOW 20 MIN: CPT | Performed by: ORTHOPAEDIC SURGERY

## 2021-03-05 PROCEDURE — 73610 X-RAY EXAM OF ANKLE: CPT

## 2021-03-05 NOTE — PROGRESS NOTES
ASSESSMENT/PLAN:    Diagnoses and all orders for this visit:    Closed fracture of left ankle with routine healing, subsequent encounter  -     XR ankle 3+ vw left; Future  -     Ambulatory referral to Physical Therapy; Future    Other orders  -     Cancel: DXA bone density spine hip and pelvis; Future  -     Cancel: XR ankle 3+ vw left; Future          Plan:  I would recommend follow-up in 6 weeks  I do recommend that she attend physical therapy and a prescription was placed in her chart and provided  She may transition from the ankle brace to her footdrop brace  She is permitted weight-bearing as tolerated on the left lower extremity  She was reassured it is safe for her to try and attempt to resume her pre-injury level of activity  Return in about 6 weeks (around 4/16/2021)  _____________________________________________________  CHIEF COMPLAINT:  Chief Complaint   Patient presents with    Follow-up         SUBJECTIVE:  Babatunde Muñoz is a 77y o  year old female who presents for follow up   Of her left ankle fracture  She is doing better  She has not attended physical therapy  She states that she did not feel that she was ready to attend physical therapy  She is not yet doing much ambulation, significantly less ambulation than she was doing prior to her injury  She states that she is nervous and anxious that she might re-injure herself  Charan Moreau PAST MEDICAL HISTORY:  Past Medical History:   Diagnosis Date    Arthritis     [facility]     Diabetes mellitus [facility]     Disease of thyroid gland     Hypertension     Renal disorder        PAST SURGICAL HISTORY:  Past Surgical History:   Procedure Laterality Date    CARDIAC PACEMAKER PLACEMENT      CHOLECYSTECTOMY      JOINT REPLACEMENT      bilateral knee replacements    ORIF TIBIA & FIBULA FRACTURES Left 10/29/2020    Procedure: OPEN REDUCTION W/ INTERNAL FIXATION (ORIF) ANKLE;  Surgeon: Yash Hodge;   Location:  MAIN OR;  Service: Orthopedics    TONSILLECTOMY      TUBAL LIGATION         FAMILY HISTORY:  Family History   Problem Relation Age of Onset    Atrial fibrillation Mother     Cancer Father        SOCIAL HISTORY:  Social History     Tobacco Use    Smoking status: Never Smoker    Smokeless tobacco: Never Used   Substance Use Topics    Alcohol use: Not Currently    Drug use: Not Currently       MEDICATIONS:    Current Outpatient Medications:     Ascorbic Acid, Vitamin C, (VITAMIN C) 100 MG tablet, Take 500 mg by mouth, Disp: , Rfl:     diltiazem (CARDIZEM CD) 300 mg 24 hr capsule, Take 300 mg by mouth daily , Disp: , Rfl:     famotidine (PEPCID) 40 MG tablet, , Disp: , Rfl:     furosemide (LASIX) 40 mg tablet, Take 80 mg by mouth daily , Disp: , Rfl:     gabapentin (NEURONTIN) 300 mg capsule, Take 300 mg by mouth 2 (two) times a day 1 tab qam, 2 tabs qhs, Disp: , Rfl:     levothyroxine 150 mcg tablet, Take 150 mcg by mouth daily , Disp: , Rfl:     metFORMIN (GLUCOPHAGE) 500 mg tablet, 500 mg 2 (two) times a day with meals , Disp: , Rfl:     Multiple Vitamin (Multi-Day) TABS, Take 1 tablet by mouth daily , Disp: , Rfl:     warfarin (COUMADIN) 5 mg tablet, Take 5 mg by mouth 1 and 1/2 tab every day, except Tuesday and Thursday take 1 tablet , Disp: , Rfl:     pantoprazole (PROTONIX) 40 mg tablet, Take 1 tablet (40 mg total) by mouth daily (Patient not taking: Reported on 3/5/2021), Disp: 30 tablet, Rfl: 0    potassium chloride (K-DUR,KLOR-CON) 20 mEq tablet, Take 1 tablet (20 mEq total) by mouth daily (Patient not taking: Reported on 3/5/2021), Disp: 30 tablet, Rfl: 0    ranitidine (ZANTAC) 300 MG tablet, Take 300 mg by mouth daily as needed , Disp: , Rfl:     ALLERGIES:  Allergies   Allergen Reactions    Rofecoxib Angioedema, Swelling, Hives and Other (See Comments)     swelling, sob  swelling, sob  Other reaction(s): Swelling / Edema  swelling, sob  Other reaction(s): SWELLING  Other reaction(s): Angioedema      Oxycodone-Acetaminophen GI Intolerance and Other (See Comments)     Unsure of rxn   Unsure of rxn   Unsure of rxn   Other reaction(s): "CAN'T BREATH"      Medical Tape Rash       REVIEW OF SYSTEMS:  Pertinent items are noted in HPI  A comprehensive review of systems was negative       _____________________________________________________  PHYSICAL EXAMINATION:  General: alert, oriented times 3 and appears comfortable  Psychiatric: Normal  HEENT:  Normocephalic, atraumatic  Cardiovascular:  Regular  Pulmonary: No wheezing or stridor  Skin: No masses, erthema, lacerations, fluctation, ulcerations  Neurovascular: Motor and sensory exams are grossly intact although the patient does have chronic bilateral foot drop, unchanged today  Pulses are palpable  Sensation is decreased, consistent with her chronic sensory deficit in the lower extremities  MUSCULOSKELETAL EXAMINATION:      The left ankle exam demonstrates well-healed incisions  There is no significant swelling  There is decreased active dorsiflexion but I am able to passively dorsiflex to neutral, slightly beyond  The medial and a lateral ankle are nontender over both bony and soft tissue structures  There is no tenderness throughout the foot  There is generalized thinning of the skin and changes consistent with her chronic neurologic issues  _____________________________________________________  STUDIES REVIEWED:    X-rays of her ankle were obtained today  These demonstrate the fracture to be fully healed with stable fixation  There is no evidence of loosening of any hardware              Shiv Mott

## 2021-03-18 ENCOUNTER — OFFICE VISIT (OUTPATIENT)
Dept: OBGYN CLINIC | Facility: CLINIC | Age: 76
End: 2021-03-18
Payer: MEDICARE

## 2021-03-18 ENCOUNTER — HOSPITAL ENCOUNTER (OUTPATIENT)
Dept: RADIOLOGY | Facility: CLINIC | Age: 76
Discharge: HOME | End: 2021-03-18
Payer: MEDICARE

## 2021-03-18 VITALS
HEART RATE: 78 BPM | BODY MASS INDEX: 36.8 KG/M2 | WEIGHT: 200 LBS | SYSTOLIC BLOOD PRESSURE: 130 MMHG | DIASTOLIC BLOOD PRESSURE: 70 MMHG | HEIGHT: 62 IN | TEMPERATURE: 97.2 F

## 2021-03-18 DIAGNOSIS — S82.842D CLOSED BIMALLEOLAR FRACTURE OF LEFT ANKLE WITH ROUTINE HEALING, SUBSEQUENT ENCOUNTER: Primary | ICD-10-CM

## 2021-03-18 DIAGNOSIS — S82.892D CLOSED FRACTURE OF LEFT ANKLE WITH ROUTINE HEALING, SUBSEQUENT ENCOUNTER: ICD-10-CM

## 2021-03-18 PROCEDURE — 99213 OFFICE O/P EST LOW 20 MIN: CPT | Performed by: ORTHOPAEDIC SURGERY

## 2021-03-18 PROCEDURE — 73610 X-RAY EXAM OF ANKLE: CPT

## 2021-03-18 NOTE — PROGRESS NOTES
ASSESSMENT/PLAN:    Diagnoses and all orders for this visit:    Closed bimalleolar fracture of left ankle with routine healing, subsequent encounter  -     XR ankle 3+ vw left; Future    Other orders  -     Ferrous Sulfate (IRON PO); Take by mouth          X-rays performed today in clinic reviewed with the patient and her   The patient was again reassured that her fracture is fully healed and the hardware has not shifted in position  She was reminded that due to prolonged immobilization, she should expect to feel some pain as she begins to resume her normal activities  She should continue using the walker as needed for additional stability  She will continue using her footdrop brace as she was using preoperatively  We will see her back as scheduled in April  She was encouraged to contact us should questions or concerns arise    Return in about 4 weeks (around 4/15/2021)  This patient was evaluated both by myself and Dr Fausto Atwood         _____________________________________________________  CHIEF COMPLAINT:  Chief Complaint   Patient presents with    Follow-up         SUBJECTIVE:  Magdalena Manuel is a 77 y o  female who presents for follow up   Of her left ankle  The patient is now 5 months out status post ORIF left ankle  She reports since she was last seen at the beginning of the month, she has noted increased pain in the lateral aspect of her foot  She is concerned that the hardware in her ankle has shifted positions  She notes the pain when she is ambulating and weight bearing  She has been wearing her foot drop brace but notes the pain with and without it on  She denies new injuries or trauma  Denies new numbness and tingling       PAST MEDICAL HISTORY:  Past Medical History:   Diagnosis Date    Arthritis     [facility] [address] [facility]     [facility]     Disease of thyroid gland     Hypertension     Renal disorder        PAST SURGICAL HISTORY:  Past Surgical History:   Procedure Laterality Date    CARDIAC PACEMAKER PLACEMENT      CHOLECYSTECTOMY      JOINT REPLACEMENT      bilateral knee replacements    ORIF TIBIA & FIBULA FRACTURES Left 10/29/2020    Procedure: OPEN REDUCTION W/ INTERNAL FIXATION (ORIF) ANKLE;  Surgeon: Bree Martinez;   Location: [facility];  Service: Orthopedics    TONSILLECTOMY      TUBAL LIGATION         FAMILY HISTORY:  Family History   Problem Relation Age of Onset    Atrial fibrillation Mother     Cancer Father        SOCIAL HISTORY:  Social History     Tobacco Use    Smoking status: Never Smoker    Smokeless tobacco: Never Used   Substance Use Topics    Alcohol use: Not Currently    Drug use: Not Currently       MEDICATIONS:    Current Outpatient Medications:     Ascorbic Acid, Vitamin C, (VITAMIN C) 100 MG tablet, Take 500 mg by mouth, Disp: , Rfl:     diltiazem (CARDIZEM CD) 300 mg 24 hr capsule, Take 300 mg by mouth daily , Disp: , Rfl:     Ferrous Sulfate (IRON PO), Take by mouth, Disp: , Rfl:     furosemide (LASIX) 40 mg tablet, Take 80 mg by mouth daily , Disp: , Rfl:     gabapentin (NEURONTIN) 300 mg capsule, Take 300 mg by mouth 2 (two) times a day 1 tab qam, 2 tabs qhs, Disp: , Rfl:     levothyroxine 150 mcg tablet, Take 150 mcg by mouth daily , Disp: , Rfl:     metFORMIN (GLUCOPHAGE) 500 mg tablet, 500 mg 2 (two) times a day with meals , Disp: , Rfl:     Multiple Vitamin (Multi-Day) TABS, Take 1 tablet by mouth daily , Disp: , Rfl:     ranitidine (ZANTAC) 300 MG tablet, Take 300 mg by mouth daily as needed , Disp: , Rfl:     warfarin (COUMADIN) 5 mg tablet, Take 5 mg by mouth 1 and 1/2 tab every day, except Tuesday and Thursday take 1 tablet , Disp: , Rfl:     famotidine (PEPCID) 40 MG tablet, , Disp: , Rfl:     pantoprazole (PROTONIX) 40 mg tablet, Take 1 tablet (40 mg total) by mouth daily (Patient not taking: Reported on 3/5/2021), Disp: 30 tablet, Rfl: 0    potassium chloride (K-DUR,KLOR-CON) 20 mEq tablet, Take 1 tablet (20 mEq total) by mouth daily (Patient not taking: Reported on 3/5/2021), Disp: 30 tablet, Rfl: 0    ALLERGIES:  Allergies   Allergen Reactions    Rofecoxib Angioedema, Swelling, Hives and Other (See Comments)     swelling, sob  swelling, sob  Other reaction(s): Swelling / Edema  swelling, sob  Other reaction(s): SWELLING  Other reaction(s): Angioedema      Oxycodone-Acetaminophen GI Intolerance and Other (See Comments)     Unsure of rxn   Unsure of rxn   Unsure of rxn   Other reaction(s): "CAN'T BREATH"      Medical Tape Rash       REVIEW OF SYSTEMS:  Pertinent items are noted in HPI  A comprehensive review of systems was negative       _____________________________________________________  PHYSICAL EXAMINATION:  General: well developed and well nourished, alert, oriented times 3 and appears comfortable  Psychiatric: Normal  HEENT:  Normocephalic, atraumatic  Cardiovascular:  Regular  Pulmonary: No wheezing or stridor  Skin: No masses, erthema, lacerations, fluctation, ulcerations  Neurovascular: Motor and sensory exams are grossly intact  Distal pulses are palpable  Limb is warm and well perfused with good color and capillary refill  MUSCULOSKELETAL EXAMINATION:    The left ankle exam demonstrates skin intact, the incisions are well healed and benign  No erythema, no active drainage  Moderate lower extremity swelling  She has significant stiffness as result of prolonged immobilization  She has no tenderness to palpation over the hardware or osseous structures  She does demonstrate tenderness over the peroneal tendons  She has little active Misty E flexion of the left ankle but I am able to passively dorsiflex her to neutral   The remainder of the left lower extremity exam is benign      _____________________________________________________  STUDIES REVIEWED:  I have personally reviewed pertinent films and reports in PACS and my interpretation is as follows:      X-rays of the left ankle performed today in clinic demonstrate no acute osseous abnormalities, fracture appears healed and hardware remains in stable alignment  PROCEDURES PERFORMED:   Procedures  None performed today            Ananya Garcia PA-C

## 2021-03-23 ENCOUNTER — EVALUATION (OUTPATIENT)
Dept: PHYSICAL THERAPY | Facility: CLINIC | Age: 76
End: 2021-03-23
Payer: MEDICARE

## 2021-03-23 DIAGNOSIS — S82.892D CLOSED FRACTURE OF LEFT ANKLE WITH ROUTINE HEALING, SUBSEQUENT ENCOUNTER: ICD-10-CM

## 2021-03-23 DIAGNOSIS — Z48.89 AFTERCARE FOLLOWING SURGERY: Primary | ICD-10-CM

## 2021-03-23 DIAGNOSIS — M25.572 ACUTE LEFT ANKLE PAIN: ICD-10-CM

## 2021-03-23 DIAGNOSIS — R26.2 DIFFICULTY WALKING: ICD-10-CM

## 2021-03-23 PROCEDURE — 97112 NEUROMUSCULAR REEDUCATION: CPT | Performed by: PHYSICAL THERAPIST

## 2021-03-23 PROCEDURE — 97162 PT EVAL MOD COMPLEX 30 MIN: CPT | Performed by: PHYSICAL THERAPIST

## 2021-03-23 PROCEDURE — 97535 SELF CARE MNGMENT TRAINING: CPT | Performed by: PHYSICAL THERAPIST

## 2021-03-23 PROCEDURE — 97140 MANUAL THERAPY 1/> REGIONS: CPT | Performed by: PHYSICAL THERAPIST

## 2021-03-23 NOTE — PROGRESS NOTES
PT Evaluation   Today's date: 3/23/2021  Patient name: Chaparro Turner  : 1945  MRN: [id number]  Referring provider: Shady Guzman DO  Dx:   Encounter Diagnosis     ICD-10-CM    1  Aftercare following surgery  Z48 [id number]    2  Closed fracture of left ankle with routine healing, subsequent encounter  [id number] Ambulatory referral to Physical Therapy   3  Difficulty walking  R26 2    4  Acute left ankle pain  M25 572      Assessment  Assessment details: Patient displays Fractured left ankle with surgical repairs and placement of screws / Pins / Plates per patient  ORIF  Using a wheeled walker to ambulate now since her accident  Wearing Bilateral AFO's to assist foot drop issues  Impairments: abnormal gait, abnormal or restricted ROM, abnormal movement, activity intolerance, impaired balance, impaired physical strength and pain with function  Understanding of Dx/Px/POC: excellent   Prognosis: good    Goals  STG 2-4 weeks:    Increase B LE strength 2-5 lbs  Decrease pain by 1-2 levels on 1-10 pain scale  Increase standing/walking tolerance to >30 minutes  Patient independent with HEP  LTG 6-8 weeks:   Increase B LE strength 10-20 lbs  Decrease pain to 0-2/10 with activity  Increase single leg stance >30 seconds  Increase standing/walking tolerance to >90 minutes  Increase range of motion to normal   Improve gait pattern, coordination and balance to normal   D/C with HEP  Plan  Plan details: All planned modality interventions and planned therapy interventions are provided PRN    Patient would benefit from: PT eval and skilled physical therapy  Planned modality interventions: unattended electrical stimulation  Planned therapy interventions: joint mobilization, manual therapy, balance, balance/weight bearing training, neuromuscular re-education, patient education, postural training, self care, coordination, strengthening, stretching, therapeutic activities, therapeutic exercise, therapeutic training, transfer training, home exercise program, graded exercise, gait training and flexibility  Frequency: 3x week  Duration in weeks: 12  Treatment plan discussed with: patient      Subjective Evaluation    Pain  Quality: discomfort, knife-like, pulling, squeezing, sharp, tight and throbbing  Relieving factors: support, rest, relaxation and change in position  Aggravating factors: lifting, running, stair climbing, walking and standing    Treatments  Current treatment: physical therapy  Patient Goals  Patient goals for therapy: decreased pain, improved balance, increased motion, return to work, return to [employer] activities, independence with ADLs/IADLs and increased strength      Date of onset:  11/29/2021     Date of Surgery:  11/29/2021    History of Present Episode: 3/23/2021  Raven Delgado states she was at home and she went to hang up a coat in the closet  She lost her balance and fell  She injured her left ankle at that time  Surgical repair of her left ankle fracture with ORIF / pins / screws and plates per patient  Patient has foot drop and wears ankle braces to prevent foot drop  Past Medical History:    3/23/2021  Raven Delgado reports diabetes type two  L TKR SX x 2  Heart issues  Pacemaker  CHF at times  Previous Level of Functional Ability:  3/23/2021  Raven Delgado states she had been doing well and able to stand and walk without difficulty  Inspection / Palpation:  Ankle / Foot:   3/23/2021  Mesomorphic / Endomorphic body type  No signs of infection  No signs of wounds  No signs of drainage  No signs of ecchymotic regions  No signs of erythremic regions  Mild to moderate signs of muscle spasm  Mild to moderate signs of muscle guarding  Mild to moderate signs of tenderness reported to palpation  Mild to moderate signs of atrophy noted  Mild to moderate signs of swelling  Positive signs of a surgery site  Current conditions appears consistent with recent acute episode      Chief Complaints:  3/23/2021  Aristides Hollingsworth reports moderate difficulty with standing  Aristides Hollingsworth reports moderate difficulty with walking  Aristides Hollingsworth reports moderate to severe difficulty with movement / use of her left ankle  Aristides Hollingsworth reports moderate to severe difficulty with use of stairs  Aristides Hollingsworth reports severe difficulty with running  Aristides Hollingsworth reports severe difficulty with jumping  Aristides Hollingsworth reports moderate to severe difficulty with use of inclines  Aristides Hollingsworth reports moderate difficulty with sleeping  Aristides Hollingsworth reports moderate to severe difficulty with her strength and endurance  Aristides Hollingsworth reports moderate limitations with her range of motion  Aristides Hollingsworth reports mild to moderate difficulty lying on her left ankle region  Aristides Hollingsworth reports moderate to severe difficulty performing chores while using her left ankle region  ANKLE  PAIN Resting Moving Palpation Standing Walking   3/23/2021 Rt 0 0 0 0 0   3/23/2021 Lt 3-4 3-4 3-6 5-7 4-7     ANKLE  PAIN Jumping Running Sleeping Stairs Twisting   3/23/2021 Rt 0 0 0 0 0   3/23/2021 Lt NA NA 2-6 7-10 5-8     ANKLE PROM Plantarflexion Dorsiflexion Inversion Eversion   3/23/2021  Rt 45° 14° 24° 10°   3/23/2021  Lt 44° 13° 23° 9°     ANKLE MMT / PAIN Plantarflexion Dorsiflexion Inversion Eversion   3/23/2021    Rt 0/10   14 lbs 0/10   4 lbs 0/10   3 lbs 0/10   3 lbs   3/23/2021    Lt 0/10   14 lbs 0/10   3 lbs 0/10   3 lbs 0/10   3 lbs     Ankle Screen Inversion Stress Eversion Stress Achilles Tendon Pes Planus   3/23/2021 Rt Negative Negative Negative Negative   3/23/2021 Lt Negative Negative Negative Negative     Ankle Screen Bursitis / Tendonitis Anterior Talofibular Posterior Talofibular Plantar fasciitis   3/23/2021 Rt Negative Negative Negative Negative   3/23/2021 Lt Negative Negative Negative Negative     Precautions:  Aftercare Left Ankle Fracture with ORIF / Nelsonla Ibrahim / Delmy Cabot / Screws / Repair      All treatments below will be provided with a focus on strengthening, flexibility, ROM, postural, endurance and any possible swelling and pain which may be present without ignoring   neural issues involving balance, coordination and proprioception plus potential numbness   and tingling which are also important and necessary to provide full functional mobility and   quality care        Daily Treatment Log  Manual  3/23       MT, ROM 15'       HEP 15'       Exercise Log 3/23       Balance Board        Chair Squats        P-Bar-GT-Forward, Backward,Side-Even & Dips        BOSU-Walk        Foam Pad SLR,Hip/KneeFl,Step Ups        Foam Beam        Fitter-Riky        BAPS- L-2        Monster Steps        T/G-Squats,PF        W/P- Ankle IR,ER        W/P-Hip-Abd,Add,Flex,Ext        NuStep        NK Table        [id number]        TM        Stepper        Bike        ME, PE 15'               Modalities 3/23       Andres Ernst / Cesar Aj / Cinthia Hernandez

## 2021-03-23 NOTE — LETTER
2021  PT Evaluation Plan of [address] [facility], DO  [address] [facility]  2nd [facility]  [facility]    Patient: Coleman Sommers   YOB: 1945   Date of Visit: 3/23/2021     Encounter Diagnosis     ICD-10-CM    1  Aftercare following surgery  Z48 89    2  Closed fracture of left ankle with routine healing, subsequent encounter  S82 892D Ambulatory referral to Physical Therapy   3  Difficulty walking  R26 2    4  Acute left ankle pain  M25 572        Dear Dr Nell Keller:    Thank you for your recent referral of Coleman Sommers  Please review the attached evaluation summary from Allison's recent visit  Please verify that you agree with the plan of care by signing the attached order  If you have any questions or concerns, please do not hesitate to call  I sincerely appreciate the opportunity to share in the care of one of your patients and hope to have another opportunity to work with you in the near future  Sincerely,    Zackary Garcia, PT      Referring Provider:      I certify that I have read the below Plan of Care and certify the need for these services furnished under this plan of treatment while under my care  Yadira Faith, DO  [address] [address]  [place]          PT Evaluation   Today's date: 3/23/2021  Patient name: Coleman Sommers  : 1945  MRN: [id number]  Referring provider: Gerard Quiroga DO  Dx:   Encounter Diagnosis     ICD-10-CM    1  Aftercare following surgery  Z48 89    2  Closed fracture of left ankle with routine healing, subsequent encounter  S82 892D Ambulatory referral to Physical Therapy   3  Difficulty walking  R26 2    4  Acute left ankle pain  M25 572      Assessment  Assessment details: Patient displays Fractured left ankle with surgical repairs and placement of screws / Pins / Plates per patient  ORIF  Using a wheeled walker to ambulate now since her accident    Wearing Bilateral AFO's to assist foot drop issues  Impairments: abnormal gait, abnormal or restricted ROM, abnormal movement, activity intolerance, impaired balance, impaired physical strength and pain with function  Understanding of Dx/Px/POC: excellent   Prognosis: good    Goals  STG 2-4 weeks:    Increase B LE strength 2-5 lbs  Decrease pain by 1-2 levels on 1-10 pain scale  Increase standing/walking tolerance to >30 minutes  Patient independent with HEP  LTG 6-8 weeks:   Increase B LE strength 10-20 lbs  Decrease pain to 0-2/10 with activity  Increase single leg stance >30 seconds  Increase standing/walking tolerance to >90 minutes  Increase range of motion to normal   Improve gait pattern, coordination and balance to normal   D/C with HEP  Plan  Plan details: All planned modality interventions and planned therapy interventions are provided PRN    Patient would benefit from: PT eval and skilled physical therapy  Planned modality interventions: unattended electrical stimulation  Planned therapy interventions: joint mobilization, manual therapy, balance, balance/weight bearing training, neuromuscular re-education, patient education, postural training, self care, coordination, strengthening, stretching, therapeutic activities, therapeutic exercise, therapeutic training, transfer training, home exercise program, graded exercise, gait training and flexibility  Frequency: 3x week  Duration in weeks: 12  Treatment plan discussed with: patient      Subjective Evaluation    Pain  Quality: discomfort, knife-like, pulling, squeezing, sharp, tight and throbbing  Relieving factors: support, rest, relaxation and change in position  Aggravating factors: lifting, running, stair climbing, walking and standing    Treatments  Current treatment: physical therapy  Patient Goals  Patient goals for therapy: decreased pain, improved balance, increased motion, return to work, return to [employer] activities, independence with ADLs/IADLs and increased strength      Date of onset:  11/29/2021     Date of Surgery:  11/29/2021    History of Present Episode: 3/23/2021  Stacia Oh states she was at home and she went to hang up a coat in the closet  She lost her balance and fell  She injured her left ankle at that time  Surgical repair of her left ankle fracture with ORIF / pins / screws and plates per patient  Patient has foot drop and wears ankle braces to prevent foot drop  Past Medical History:    3/23/2021  Stacia Oh reports diabetes type two  L TKR SX x 2  Heart issues  Pacemaker  CHF at times  Previous Level of Functional Ability:  3/23/2021  [facility] states she had been doing well and able to stand and walk without difficulty  Inspection / Palpation:  Ankle / Foot:   3/23/2021  Mesomorphic / Endomorphic body type  No signs of infection  No signs of wounds  No signs of drainage  No signs of ecchymotic regions  No signs of erythremic regions  Mild to moderate signs of muscle spasm  Mild to moderate signs of muscle guarding  Mild to moderate signs of tenderness reported to palpation  Mild to moderate signs of atrophy noted  Mild to moderate signs of swelling  Positive signs of a surgery site  Current conditions appears consistent with recent acute episode  Chief Complaints:  3/23/2021  [facility] reports moderate difficulty with standing  [facility] reports moderate difficulty with walking  [facility] reports moderate to severe difficulty with movement / use of her left ankle  [facility] reports moderate to severe difficulty with use of stairs  [facility] reports severe difficulty with running  [facility] reports severe difficulty with jumping  [facility] reports moderate to severe difficulty with use of inclines  [facility] reports moderate difficulty with sleeping  [facility] reports moderate to severe difficulty with her strength and endurance  [facility] reports moderate limitations with her range of motion    [facility] reports mild to moderate difficulty lying on her left ankle region  Earnstine Fair reports moderate to severe difficulty performing chores while using her left ankle region  ANKLE  PAIN Resting Moving Palpation Standing Walking   3/23/2021 Rt 0 0 0 0 0   3/23/2021 Lt 3-4 3-4 3-6 5-7 4-7     ANKLE  PAIN Jumping Running Sleeping Stairs Twisting   3/23/2021 Rt 0 0 0 0 0   3/23/2021 Lt NA NA 2-6 7-10 5-8     ANKLE PROM Plantarflexion Dorsiflexion Inversion Eversion   3/23/2021  Rt 45° 14° 24° 10°   3/23/2021  Lt 44° 13° 23° 9°     ANKLE MMT / PAIN Plantarflexion Dorsiflexion Inversion Eversion   3/23/2021    Rt 0/10   14 lbs 0/10   4 lbs 0/10   3 lbs 0/10   3 lbs   3/23/2021    Lt 0/10   14 lbs 0/10   3 lbs 0/10   3 lbs 0/10   3 lbs     Ankle Screen Inversion Stress Eversion Stress Achilles Tendon Pes Planus   3/23/2021 Rt Negative Negative Negative Negative   3/23/2021 Lt Negative Negative Negative Negative     Ankle Screen Bursitis / Tendonitis Anterior Talofibular Posterior Talofibular Plantar fasciitis   3/23/2021 Rt Negative Negative Negative Negative   3/23/2021 Lt Negative Negative Negative Negative     Precautions:  Aftercare Left Ankle Fracture with ORIF / Lacho Germain / Eric Amend / Screws / Repair  All treatments below will be provided with a focus on strengthening, flexibility, ROM, postural,   endurance and any possible swelling and pain which may be present without ignoring   neural issues involving balance, coordination and proprioception plus potential numbness   and tingling which are also important and necessary to provide full functional mobility and   quality care        Daily Treatment Log  Manual  3/23       MT, ROM 15'       HEP 15'       Exercise Log 3/23       Balance Board        Chair Squats        P-Bar-GT-Forward, Backward,Side-Even & Dips        BOSU-Walk        Foam Pad SLR,Hip/KneeFl,Step Ups        Foam Beam        Fitter-Slalom        BAPS- L-2        Monster Steps        T/G-Squats,PF        W/P- Ankle IR,ER W/P-Hip-Abd,Add,Flex,Ext        NuStep        NK Table        Briggki-HU-Av        TM        Stepper        Bike        ME, PE 15'               Modalities 3/23       Eber Eisenberg / Jovanni Tracy / YAYA        US

## 2021-03-25 ENCOUNTER — OFFICE VISIT (OUTPATIENT)
Dept: PHYSICAL THERAPY | Facility: CLINIC | Age: 76
End: 2021-03-25
Payer: MEDICARE

## 2021-03-25 DIAGNOSIS — R26.2 DIFFICULTY WALKING: ICD-10-CM

## 2021-03-25 DIAGNOSIS — M25.572 ACUTE LEFT ANKLE PAIN: ICD-10-CM

## 2021-03-25 DIAGNOSIS — Z48.89 AFTERCARE FOLLOWING SURGERY: Primary | ICD-10-CM

## 2021-03-25 DIAGNOSIS — S82.892D CLOSED FRACTURE OF LEFT ANKLE WITH ROUTINE HEALING, SUBSEQUENT ENCOUNTER: ICD-10-CM

## 2021-03-25 PROCEDURE — 97112 NEUROMUSCULAR REEDUCATION: CPT

## 2021-03-25 PROCEDURE — 97140 MANUAL THERAPY 1/> REGIONS: CPT

## 2021-03-25 NOTE — PROGRESS NOTES
Today's date: 3/25/2021  Patient name: Aleah Godwin  : 1945  MRN: [id number]  Referring provider: Gisel Yan DO  Dx:   Encounter Diagnosis     ICD-10-CM    1  Aftercare following surgery  Z48 89    2  Closed fracture of left ankle with routine healing, subsequent encounter  S82 892D    3  Difficulty walking  R26 2    4  Acute left ankle pain  M25 572      Subjective:  No new c/o pain today  Objective: See treatment log below  [facility] continues to be advised to follow her home exercise program as tolerated  When [facility] is feeling good she follows her rehab exercises in the gym and on other days she follows her home exercise program at home as tolerated  In the future we plan on having [facility] stay at home and follow her home exercise program for four to six weeks and than assess for either more Rehab treatments or discharge from Rehab care  Assessment: Tolerated treatment well  Patient exhibited good technique with therapeutic exercises and would benefit from continued PT  Allison's goals are to continue to improve with her rehab program and improve with functional mobility, speed, repetition and decreased c/o pain with her gym and home exercise program   Allison's final goal for her rehab is to be discharged from Rehab care after obtaining her full functional rehab potential     Plan: Continue per plan of care  Progress treatment as tolerated  Precautions:  Aftercare Left Ankle Fracture with ORIF / [place] / Pins / Screws / Repair  All treatments below will be provided with a focus on strengthening, flexibility, ROM, postural,   endurance and any possible swelling and pain which may be present without ignoring   neural issues involving balance, coordination and proprioception plus potential numbness   and tingling which are also important and necessary to provide full functional mobility and   quality care        Daily Treatment Log  Manual  3/23 3/25      MT, ROM 15' 30'      HEP 15' Exercise Log 3/23 3/25      Balance Board        Chair Squats        P-Bar-GT-Forward, Backward,Side-Even & Dips        BOSU-Walk        Foam Pad SLR,Hip/KneeFl,Step Ups        Foam Beam        Fitter-Slalom        BAPS- L-2        Monster Steps        T/G-Squats,PF        W/P- Ankle IR,ER  5# 30x      W/P-Hip-Abd,Add,Flex,Ext        NuStep  10' L1      NK Table        Nmdtkts-IW-Ed        TM        Stepper        Bike        ME, PE 15' 15'              Modalities 3/23 3/25      MH / PE / ES  21' [facility]

## 2021-03-30 ENCOUNTER — OFFICE VISIT (OUTPATIENT)
Dept: PHYSICAL THERAPY | Facility: CLINIC | Age: 76
End: 2021-03-30
Payer: MEDICARE

## 2021-03-30 DIAGNOSIS — R26.2 DIFFICULTY WALKING: ICD-10-CM

## 2021-03-30 DIAGNOSIS — S82.892D CLOSED FRACTURE OF LEFT ANKLE WITH ROUTINE HEALING, SUBSEQUENT ENCOUNTER: ICD-10-CM

## 2021-03-30 DIAGNOSIS — M25.572 ACUTE LEFT ANKLE PAIN: ICD-10-CM

## 2021-03-30 DIAGNOSIS — Z48.89 AFTERCARE FOLLOWING SURGERY: Primary | ICD-10-CM

## 2021-03-30 PROCEDURE — 97110 THERAPEUTIC EXERCISES: CPT | Performed by: PHYSICAL THERAPIST

## 2021-03-30 PROCEDURE — 97112 NEUROMUSCULAR REEDUCATION: CPT | Performed by: PHYSICAL THERAPIST

## 2021-03-30 PROCEDURE — 97140 MANUAL THERAPY 1/> REGIONS: CPT | Performed by: PHYSICAL THERAPIST

## 2021-03-30 NOTE — PROGRESS NOTES
Daily Note     Today's date: 3/30/2021  Patient name: Juliano Bruno  : 1945  MRN: [id number]  Referring provider: Eulogio Beltran DO  Dx:   Encounter Diagnosis     ICD-10-CM    1  Aftercare following surgery  Z48 89    2  Closed fracture of left ankle with routine healing, subsequent encounter  S82 892D    3  Difficulty walking  R26 2    4  Acute left ankle pain  M25 572                   Subjective: The patient states that she was sore after her last PT session and needed to take Tylenol  She also took some Tylenol before coming to her PT session today because she was sore  Objective: See treatment diary below  Assessment: Good tolerance to TE today  Progressed patient as outlined below in daily treatment diary  Some verbal cues needed for correct form with completing TE  Tightness is noted in her ankle with all planes for PROM  No increase in pain noted during session today  HP x 20 minutes to end session  Continued PT would be beneficial to improve function  Plan: Continue per plan of care  Progress as able in upcoming visits         Precautions:  Aftercare Left Ankle Fracture with ORIF / Plates / Pins / Screws / Repair      All treatments below will be provided with a focus on strengthening, flexibility, ROM, postural,   endurance and any possible swelling and pain which may be present without ignoring   neural issues involving balance, coordination and proprioception plus potential numbness   and tingling which are also important and necessary to provide full functional mobility and   quality care        Daily Treatment Log  Manual  3/23 3/25 3/30       MT, ROM 15' 30' 30'       HEP 15'           Exercise Log 3/23 3/25 3/30       Balance Board             Chair Squats     15x Squats       P-Bar-GT-Forward, Backward,Side-Even & Dips             BOSU-Walk             Foam Pad SLR,Hip/KneeFl,Step Ups             Foam Beam            Fitter-Slalom     F/B 20x        BAPS- L-2             Monster Steps             T/G-Squats,PF             W/P- Ankle IR,ER   5# 30x 5# 30x       W/P-Hip-Abd,Add,Flex,Ext             NuStep   10' L1 L1 10'       NK Table             Febssjj-QT-Fa             TM             Stepper             Bike             ME, PE 13' 13'                       Modalities 3/23 3/25 3/30       MH / PE / ES   20' [address] 20' HP        US

## 2021-04-01 ENCOUNTER — OFFICE VISIT (OUTPATIENT)
Dept: PHYSICAL THERAPY | Facility: CLINIC | Age: 76
End: 2021-04-01
Payer: MEDICARE

## 2021-04-01 DIAGNOSIS — S82.892D CLOSED FRACTURE OF LEFT ANKLE WITH ROUTINE HEALING, SUBSEQUENT ENCOUNTER: ICD-10-CM

## 2021-04-01 DIAGNOSIS — Z48.89 AFTERCARE FOLLOWING SURGERY: Primary | ICD-10-CM

## 2021-04-01 DIAGNOSIS — M25.572 ACUTE LEFT ANKLE PAIN: ICD-10-CM

## 2021-04-01 DIAGNOSIS — R26.2 DIFFICULTY WALKING: ICD-10-CM

## 2021-04-01 PROCEDURE — 97112 NEUROMUSCULAR REEDUCATION: CPT

## 2021-04-01 PROCEDURE — 97140 MANUAL THERAPY 1/> REGIONS: CPT

## 2021-04-01 NOTE — PROGRESS NOTES
Daily Note     Today's date: 2021  Patient name: Heaven Lombardi  : 1945  MRN: [id number]  Referring provider: Esequiel John DO  Dx:   Encounter Diagnosis     ICD-10-CM    1  Aftercare following surgery  Z48 89    2  Closed fracture of left ankle with routine healing, subsequent encounter  S82 892D    3  Difficulty walking  R26 2    4  Acute left ankle pain  M25 572                   Subjective: No new c/o pain today  Objective: See treatment diary below      Assessment: Tolerated treatment well  Patient exhibited good technique with therapeutic exercises and would benefit from continued PT to increase ROM/strength and endurance to improve mobility and gait  Patient presents with to therapy with open L medial heel blister  PTA recommended tegaderm bandage to aid healing process  Plan: Continue per plan of care  Progress treatment as tolerated            Precautions:  Aftercare Left Ankle Fracture with ORIF / Plates / Pins / Screws / Repair      All treatments below will be provided with a focus on strengthening, flexibility, ROM, postural,   endurance and any possible swelling and pain which may be present without ignoring   neural issues involving balance, coordination and proprioception plus potential numbness   and tingling which are also important and necessary to provide full functional mobility and   quality care        Daily Treatment Log  Manual  3/23 3/25 3/30  4/1     MT, ROM 15' 30' 30'  30'     HEP 15'           Exercise Log 3/23 3/25 3/30  4/1     Balance Board             Chair Squats     15x Squats       P-Bar-GT-Forward, Backward,Side-Even & Dips             BOSU-Walk             Foam Pad SLR,Hip/KneeFl,Step Ups             Foam Beam            Fitter-Slalom     F/B 20x        BAPS- L-2             Toe/Heel Raises        3x10     T/G-Squats,PF             W/P- Ankle IR,ER   5# 30x 5# 30x  Towel Ex 3x10     W/P-Hip-Abd,Add,Flex,Ext             NuStep   10' L1 L1 10'  20' L1   NK Justin Noguera-PF-Ex             TM             Stepper             Bike             ME, PE 13' 13'    15'                   Modalities 3/23 3/25 3/30  4/1     MH / PE / ES   20' MH 20'         US

## 2021-04-06 ENCOUNTER — OFFICE VISIT (OUTPATIENT)
Dept: PHYSICAL THERAPY | Facility: CLINIC | Age: 76
End: 2021-04-06
Payer: MEDICARE

## 2021-04-06 DIAGNOSIS — Z48.89 AFTERCARE FOLLOWING SURGERY: Primary | ICD-10-CM

## 2021-04-06 DIAGNOSIS — R26.2 DIFFICULTY WALKING: ICD-10-CM

## 2021-04-06 DIAGNOSIS — S82.892D CLOSED FRACTURE OF LEFT ANKLE WITH ROUTINE HEALING, SUBSEQUENT ENCOUNTER: ICD-10-CM

## 2021-04-06 DIAGNOSIS — M25.572 ACUTE LEFT ANKLE PAIN: ICD-10-CM

## 2021-04-06 NOTE — PROGRESS NOTES
Daily Note     Today's date: 2021  Patient name: Carina Rhodes  : 1945  MRN: [id number]  Referring provider: Kirti Wilson DO  Dx:   Encounter Diagnosis     ICD-10-CM    1  Aftercare following surgery  Z48 89    2  Closed fracture of left ankle with routine healing, subsequent encounter  S82 892D    3  Difficulty walking  R26 2    4  Acute left ankle pain  M25 572                   Subjective: Patient presents to therapy today with no shoe on her L foot because of her blister on her L heel getting worse  "My blister won't stop seeping  I can't keep a bandage on because they just fall off because of all the wetness  I ripped more of the skin off last night while I was sleeping because the bandage fell off  My  has been cleaning it and trying to bandage it, but its not getting better  I also have decreased sensation in my legs and can't feel the wound "  PTA consulted with PT and patient was referred to her PCP to get the L heel checked out especially due to the area of blister getting larger and decreased sensation in B LEs  Objective: See treatment diary below      Assessment: Tolerated treatment well  Patient exhibited good technique with therapeutic exercises and would benefit from continued PT to increase L ankle ROM/strength and endurance to improve mobility and gait  Plan: Continue per plan of care  Progress treatment as tolerated         Precautions:  Aftercare Left Ankle Fracture with ORIF / Plates / Pins / Screws / Repair      All treatments below will be provided with a focus on strengthening, flexibility, ROM, postural,   endurance and any possible swelling and pain which may be present without ignoring   neural issues involving balance, coordination and proprioception plus potential numbness   and tingling which are also important and necessary to provide full functional mobility and   quality care        Daily Treatment Log  Manual  3/23 3/25 3/30  4/1     MT, ROM 15' 30' 30'  30'     HEP 15'           Neuro Re-Ed 3/23 3/25 3/30  4/1    Balance Board             Chair Squats     15x Squats       P-Bar-GT-Forward, Backward,Side-Even & Dips             BOSU-Walk             Foam Pad SLR,Hip/KneeFl,Step Ups             Foam Beam            Fitter-Slalom     F/B 20x        BAPS- L-2             Toe/Heel Raises        3x10     Ther Exer        T/G-Squats,PF             W/P- Ankle IR,ER   5# 30x 5# 30x  Towel Ex 3x10     W/P-Hip-Abd,Add,Flex,Ext             NuStep   10' L1 L1 10'  20' L1     NK Table             Drtpgmd-ME-Jz             TM             Stepper             Bike             ME, PE 13' 13'    15'                  Modalities 3/23 3/25 3/30  4/1     MH / PE / ES   20' MH 20' HP        US

## 2021-04-08 ENCOUNTER — APPOINTMENT (OUTPATIENT)
Dept: PHYSICAL THERAPY | Facility: CLINIC | Age: 76
End: 2021-04-08
Payer: MEDICARE

## 2021-04-13 ENCOUNTER — APPOINTMENT (OUTPATIENT)
Dept: PHYSICAL THERAPY | Facility: CLINIC | Age: 76
End: 2021-04-13
Payer: MEDICARE

## 2021-04-15 ENCOUNTER — OFFICE VISIT (OUTPATIENT)
Dept: OBGYN CLINIC | Facility: CLINIC | Age: 76
End: 2021-04-15
Payer: MEDICARE

## 2021-04-15 VITALS
TEMPERATURE: 97.7 F | BODY MASS INDEX: 37.54 KG/M2 | SYSTOLIC BLOOD PRESSURE: 130 MMHG | HEART RATE: 63 BPM | HEIGHT: 62 IN | DIASTOLIC BLOOD PRESSURE: 80 MMHG | WEIGHT: 204 LBS

## 2021-04-15 DIAGNOSIS — S82.892A CLOSED FRACTURE OF LEFT ANKLE, INITIAL ENCOUNTER: Primary | ICD-10-CM

## 2021-04-15 PROCEDURE — 99213 OFFICE O/P EST LOW 20 MIN: CPT | Performed by: ORTHOPAEDIC SURGERY

## 2021-04-15 NOTE — PROGRESS NOTES
ASSESSMENT/PLAN:    Diagnoses and all orders for this visit:    Closed fracture of left ankle, initial encounter    Other orders  -     Cancel: DXA bone density spine hip and pelvis; Future          Mary Yee was reassured that the swelling on the media land posterior aspect of the calf does not appear to be a blood clot or abscess  She was offered a Doppler ultrasound to formally rule out both of these which she declined  In regards to her left knee, she was encouraged to contact her surgeon for follow-up   However, I saw no evidence of instability on the exam and she admits that she has no sensation of instability of the knee  She and her  are both hesitant to return to him because he is based out of the [facility] [place] and that is quite far for them to drive  She was informed that we would be happy to see her for evaluation if she chooses  However, she was was reassured, once again, I saw no cause for concern based on today's exam and due to the fact that she denies any episodes of instability or pain in the knee  Return in about 6 weeks (around 5/27/2021)  _____________________________________________________  CHIEF COMPLAINT:  Chief Complaint   Patient presents with    Follow-up         SUBJECTIVE:  Pete Gomes is a 77 y o  female who presents for follow up Left ankle fracture  The patient reports overall, she has been doing well  She does continue to report some intermittent sharp pains in the left foot  She is more concerned about some new swelling in her lateral calf  She denies new injuries or trauma  She is concerned for a blood clot or possible abscess  Mary Yee typically will wear a footdrop brace on the left lower extremity but has not been wearing it within the past week because she had a blister on her heel from the brace  She denies fevers, chills, chest pain, or shortness of breath  Denies numbness or tingling distally          The patient also complains of left knee pain which has been present since her left ankle injury in October  Recently her physical therapist commented that he believes that   Her knee feels "loose"  She is approximately 2 years status post left knee TKA  She has not seen her surgeon, Dr Geraldine Adam of [facility], since shortly after the  Surgery   She does not notice any evidence of instability when she is ambulating  PAST MEDICAL HISTORY:  Past Medical History:   Diagnosis Date    Arthritis     [facility]     [facility]     Disease of thyroid gland     Hypertension     Renal disorder        PAST SURGICAL HISTORY:  Past Surgical History:   Procedure Laterality Date    CARDIAC PACEMAKER PLACEMENT      CHOLECYSTECTOMY      JOINT REPLACEMENT      bilateral knee replacements    ORIF TIBIA & FIBULA FRACTURES Left 10/29/2020    Procedure: OPEN REDUCTION W/ INTERNAL FIXATION (ORIF) ANKLE;  Surgeon: Inez Sarmiento;   Location:  MAIN OR;  Service: Orthopedics    TONSILLECTOMY      TUBAL LIGATION         FAMILY HISTORY:  Family History   Problem Relation Age of Onset    Atrial fibrillation Mother     Cancer Father        SOCIAL HISTORY:  Social History     Tobacco Use    Smoking status: Never Smoker    Smokeless tobacco: Never Used   Substance Use Topics    Alcohol use: Not Currently    Drug use: Not Currently       MEDICATIONS:    Current Outpatient Medications:     Ascorbic Acid, Vitamin C, (VITAMIN C) 100 MG tablet, Take 500 mg by mouth, Disp: , Rfl:     diltiazem (CARDIZEM CD) 300 mg 24 hr capsule, Take 300 mg by mouth daily , Disp: , Rfl:     famotidine (PEPCID) 40 MG tablet, , Disp: , Rfl:     Ferrous Sulfate (IRON PO), Take by mouth, Disp: , Rfl:     furosemide (LASIX) 40 mg tablet, Take 80 mg by mouth daily , Disp: , Rfl:     gabapentin (NEURONTIN) 300 mg capsule, Take 300 mg by mouth 2 (two) times a day 1 tab qam, 2 tabs qhs, Disp: , Rfl:     levothyroxine 150 mcg tablet, Take 150 mcg by mouth daily , Disp: , Rfl:     metFORMIN (GLUCOPHAGE) 500 mg tablet, 500 mg 2 (two) times a day with meals , Disp: , Rfl:     Multiple Vitamin (Multi-Day) TABS, Take 1 tablet by mouth daily , Disp: , Rfl:     ranitidine (ZANTAC) 300 MG tablet, Take 300 mg by mouth daily as needed , Disp: , Rfl:     warfarin (COUMADIN) 5 mg tablet, Take 5 mg by mouth 1 and 1/2 tab every day, except Tuesday and Thursday take 1 tablet , Disp: , Rfl:     pantoprazole (PROTONIX) 40 mg tablet, Take 1 tablet (40 mg total) by mouth daily (Patient not taking: Reported on 3/5/2021), Disp: 30 tablet, Rfl: 0    potassium chloride (K-DUR,KLOR-CON) 20 mEq tablet, Take 1 tablet (20 mEq total) by mouth daily (Patient not taking: Reported on 3/5/2021), Disp: 30 tablet, Rfl: 0    ALLERGIES:  Allergies   Allergen Reactions    Rofecoxib Angioedema, Swelling, Hives and Other (See Comments)     swelling, sob  swelling, sob  Other reaction(s): Swelling / Edema  swelling, sob  Other reaction(s): SWELLING  Other reaction(s): Angioedema      Oxycodone-Acetaminophen GI Intolerance and Other (See Comments)     Unsure of rxn   Unsure of rxn   Unsure of rxn   Other reaction(s): "CAN'T BREATH"      Medical Tape Rash       REVIEW OF SYSTEMS:  Pertinent items are noted in HPI  A comprehensive review of systems was negative       _____________________________________________________  PHYSICAL EXAMINATION:  General: well developed and well nourished, alert, oriented times 3 and appears comfortable  Psychiatric: Normal  HEENT:  Normocephalic, atraumatic  Cardiovascular:  Regular  Pulmonary: No wheezing or stridor  Skin: No masses, erthema, lacerations, fluctation, ulcerations  Neurovascular: Motor and sensory exams are grossly intact, distal pulses are palpable  Limb is warm and well perfused good color and capillary refill the toes       MUSCULOSKELETAL EXAMINATION:      The left ankle exam demonstrates the incisions to be well healed and benign, no erythema, no ecchymosis, no active drainage  She does have mild residual swelling  No tenderness to palpation over the   Medial and lateral malleoli  She is able to actively dorsiflex and plantar flex the ankle without pain  She does have obvious swelling on the  Proximal calf without erythema rubor  Palpation to the swelling does not elicit any complaints of pain  Homans sign is negative  There is no palpable fluid collection  The remainder of the left lower extremity exam is benign  _____________________________________________________  STUDIES REVIEWED:  No new imaging performed today    PROCEDURES PERFORMED:   Procedures  None performed today            Antwon Houston

## 2021-04-16 DIAGNOSIS — R18.8 OTHER ASCITES: ICD-10-CM

## 2021-04-16 DIAGNOSIS — M79.662 PAIN IN LEFT LOWER LEG: ICD-10-CM

## 2021-04-16 DIAGNOSIS — J90 PLEURAL EFFUSION, NOT ELSEWHERE CLASSIFIED: ICD-10-CM

## 2021-04-19 ENCOUNTER — HOSPITAL ENCOUNTER (OUTPATIENT)
Dept: NON INVASIVE DIAGNOSTICS | Facility: HOSPITAL | Age: 76
Discharge: HOME | End: 2021-04-19
Payer: MEDICARE

## 2021-04-19 DIAGNOSIS — M79.662 PAIN IN LEFT LOWER LEG: ICD-10-CM

## 2021-04-19 PROCEDURE — 93971 EXTREMITY STUDY: CPT | Performed by: SURGERY

## 2021-04-19 PROCEDURE — 93971 EXTREMITY STUDY: CPT

## 2021-04-25 NOTE — PROGRESS NOTES
PT Discharge  Today's date: 2021  Patient name: Joaquina Cedillo  : 1945  MRN: [id number]  Referring provider: Jacky Julian DO  Dx:   Encounter Diagnosis     ICD-10-CM    1  Aftercare following surgery  Z48 [id number]    2  Closed fracture of left ankle with routine healing, subsequent encounter  [id number] [id number]    3  Difficulty walking  R26 2    4  Acute left ankle pain  M25 572      Assessment/Plan    Subjective    Objective     2021  Joaquina Cedillo has not returned for more treatment  Joaquina Cedillo did not attend today's appointment  I cannot provide you with a current progress report but I can provide you with information based on previous performance  Joaquina Cedillo is discharged at this time

## 2021-05-27 ENCOUNTER — OFFICE VISIT (OUTPATIENT)
Dept: OBGYN CLINIC | Facility: CLINIC | Age: 76
End: 2021-05-27
Payer: MEDICARE

## 2021-05-27 VITALS
HEART RATE: 60 BPM | TEMPERATURE: 97.1 F | SYSTOLIC BLOOD PRESSURE: 138 MMHG | WEIGHT: 205 LBS | HEIGHT: 62 IN | BODY MASS INDEX: 37.73 KG/M2 | DIASTOLIC BLOOD PRESSURE: 76 MMHG

## 2021-05-27 DIAGNOSIS — S82.892A CLOSED FRACTURE OF LEFT ANKLE, INITIAL ENCOUNTER: Primary | ICD-10-CM

## 2021-05-27 PROCEDURE — 99213 OFFICE O/P EST LOW 20 MIN: CPT | Performed by: ORTHOPAEDIC SURGERY

## 2021-05-27 NOTE — PROGRESS NOTES
ASSESSMENT/PLAN:    Diagnoses and all orders for this visit:    Closed fracture of left ankle, initial encounter    Other orders  -     Cancel: DXA bone density spine hip and pelvis; Future        Pennie Brown was provided home exercises to work on knee and ankle strengthening  She can resume activities as tolerated  She was informed that persistent pain are common after fractures and that she may experience pain in the knee and ankle for the rest of her life  She was encouraged to elevate the left lower extremity more often as she typically sits with her legs dependent  She was encouraged to contact her knee surgeon should pain persist despite exercises and formal PT  We will see Mrs Danette Mott on an as needed basis  She and her  were encouraged to contact the office should questions or concern arise  Return if symptoms worsen or fail to improve       _____________________________________________________  CHIEF COMPLAINT:  Chief Complaint   Patient presents with    Follow-up         SUBJECTIVE:  Hilda Phillips is a 77 y o  female who presents for follow up of her left ankle fracture  She reports overall, she is doing well  She continues to see her PCP's office for wound care of an ulcer on left foot as a result of her foot drop brace  She had to discontinue PT because of the ulcer but plans to resume PT when cleared by her PCP  She denies new falls or trauma  She also complains of left knee pain that comes and goes  She is s/p left distal femur replacement, performed at [facility] in 2018         PAST MEDICAL HISTORY:  Past Medical History:   Diagnosis Date    Arthritis     [facility]     [facility]     Disease of thyroid gland     Hypertension     Renal disorder        PAST SURGICAL HISTORY:  Past Surgical History:   Procedure Laterality Date    CARDIAC PACEMAKER PLACEMENT      CHOLECYSTECTOMY      JOINT REPLACEMENT      bilateral knee replacements    ORIF TIBIA & FIBULA FRACTURES Left 10/29/2020    Procedure: OPEN REDUCTION W/ INTERNAL FIXATION (ORIF) ANKLE;  Surgeon: Gregg Barnhart;   Location: [facility];  Service: Orthopedics    TONSILLECTOMY      TUBAL LIGATION         FAMILY HISTORY:  Family History   Problem Relation Age of Onset    Atrial fibrillation Mother     Cancer Father        SOCIAL HISTORY:  Social History     Tobacco Use    Smoking status: Never Smoker    Smokeless tobacco: Never Used   Substance Use Topics    Alcohol use: Not Currently    Drug use: Not Currently       MEDICATIONS:    Current Outpatient Medications:     Ascorbic Acid, Vitamin C, (VITAMIN C) 100 MG tablet, Take 500 mg by mouth, Disp: , Rfl:     diltiazem (CARDIZEM CD) 300 mg 24 hr capsule, Take 300 mg by mouth daily , Disp: , Rfl:     famotidine (PEPCID) 40 MG tablet, , Disp: , Rfl:     Ferrous Sulfate (IRON PO), Take by mouth, Disp: , Rfl:     furosemide (LASIX) 40 mg tablet, Take 80 mg by mouth daily , Disp: , Rfl:     gabapentin (NEURONTIN) 300 mg capsule, Take 300 mg by mouth 2 (two) times a day 1 tab qam, 2 tabs qhs, Disp: , Rfl:     levothyroxine 150 mcg tablet, Take 150 mcg by mouth daily , Disp: , Rfl:     metFORMIN (GLUCOPHAGE) 500 mg tablet, 500 mg 2 (two) times a day with meals , Disp: , Rfl:     Multiple Vitamin (Multi-Day) TABS, Take 1 tablet by mouth daily , Disp: , Rfl:     potassium chloride (K-DUR,KLOR-CON) 20 mEq tablet, Take 1 tablet (20 mEq total) by mouth daily, Disp: 30 tablet, Rfl: 0    ranitidine (ZANTAC) 300 MG tablet, Take 300 mg by mouth daily as needed , Disp: , Rfl:     warfarin (COUMADIN) 5 mg tablet, Take 5 mg by mouth 1 and 1/2 tab every day, except Tuesday and Thursday take 1 tablet , Disp: , Rfl:     pantoprazole (PROTONIX) 40 mg tablet, Take 1 tablet (40 mg total) by mouth daily (Patient not taking: Reported on 3/5/2021), Disp: 30 tablet, Rfl: 0    ALLERGIES:  Allergies   Allergen Reactions    Rofecoxib Angioedema, Swelling, Hives and Other (See Comments)     swelling, sob  swelling, sob  Other reaction(s): Swelling / Edema  swelling, sob  Other reaction(s): SWELLING  Other reaction(s): Angioedema      Oxycodone-Acetaminophen GI Intolerance and Other (See Comments)     Unsure of rxn   Unsure of rxn   Unsure of rxn   Other reaction(s): "CAN'T BREATH"      Medical Tape Rash       REVIEW OF SYSTEMS:  Pertinent items are noted in HPI  A comprehensive review of systems was negative       _____________________________________________________  PHYSICAL EXAMINATION:  General: well developed and well nourished, alert, oriented times 3 and appears comfortable  Psychiatric: Normal  HEENT:  Normocephalic, atraumatic  Cardiovascular:  Regular  Pulmonary: No wheezing or stridor  Skin: No masses, erthema, lacerations, fluctation, ulcerations  Neurovascular: Motor and sensory exams are grossly intact  Distal pulses are palpable  Limb is warm and well perfused with good color and capillary refill  MUSCULOSKELETAL EXAMINATION:    The left ankle exam demonstrates the ulcer to be cover with wound care dressing, this was not removed during today's visit  She has limited active and passive ROM of the ankle secondary to her injury  Calf pain unchanged from prior examination  Thigh and calf compartments are soft and compressible  The remainder of the left lower extremity exam is benign  _____________________________________________________  STUDIES REVIEWED:  No new imaging performed today    PROCEDURES PERFORMED:   Procedures  None performed today            Ananya Garcia PA-C

## 2021-06-15 ENCOUNTER — HOSPITAL ENCOUNTER (INPATIENT)
Facility: HOSPITAL | Age: 76
LOS: 3 days | Discharge: HOME HEALTH | DRG: 291 | End: 2021-06-18
Attending: EMERGENCY MEDICINE | Admitting: FAMILY MEDICINE
Payer: MEDICARE

## 2021-06-15 ENCOUNTER — APPOINTMENT (EMERGENCY)
Dept: RADIOLOGY | Facility: HOSPITAL | Age: 76
DRG: 291 | End: 2021-06-15
Payer: MEDICARE

## 2021-06-15 DIAGNOSIS — I50.9 CHF (CONGESTIVE HEART FAILURE) (HCC): ICD-10-CM

## 2021-06-15 DIAGNOSIS — R79.1 SUPRATHERAPEUTIC INR: ICD-10-CM

## 2021-06-15 DIAGNOSIS — N17.9 ACUTE KIDNEY INJURY SUPERIMPOSED ON CHRONIC KIDNEY DISEASE (HCC): ICD-10-CM

## 2021-06-15 DIAGNOSIS — R06.00 DYSPNEA: Primary | ICD-10-CM

## 2021-06-15 DIAGNOSIS — E11.9 TYPE 2 DIABETES MELLITUS WITHOUT COMPLICATION, WITHOUT LONG-TERM CURRENT USE OF INSULIN (HCC): ICD-10-CM

## 2021-06-15 DIAGNOSIS — I50.33 ACUTE ON CHRONIC DIASTOLIC CHF (CONGESTIVE HEART FAILURE) (HCC): ICD-10-CM

## 2021-06-15 DIAGNOSIS — J90 PLEURAL EFFUSION: ICD-10-CM

## 2021-06-15 DIAGNOSIS — N18.9 ACUTE KIDNEY INJURY SUPERIMPOSED ON CHRONIC KIDNEY DISEASE (HCC): ICD-10-CM

## 2021-06-15 LAB
ALBUMIN SERPL BCP-MCNC: 3.8 G/DL (ref 3.5–5)
ALP SERPL-CCNC: 116 U/L (ref 46–116)
ALT SERPL W P-5'-P-CCNC: 19 U/L (ref 12–78)
ANION GAP SERPL CALCULATED.3IONS-SCNC: 8 MMOL/L (ref 4–13)
AST SERPL W P-5'-P-CCNC: 15 U/L (ref 5–45)
ATRIAL RATE: 55 BPM
BASOPHILS # BLD AUTO: 0.08 THOUSANDS/ΜL (ref 0–0.1)
BASOPHILS NFR BLD AUTO: 1 % (ref 0–1)
BILIRUB SERPL-MCNC: 0.65 MG/DL (ref 0.2–1)
BUN SERPL-MCNC: 36 MG/DL (ref 5–25)
CALCIUM SERPL-MCNC: 9.1 MG/DL (ref 8.3–10.1)
CHLORIDE SERPL-SCNC: 103 MMOL/L (ref 100–108)
CO2 SERPL-SCNC: 31 MMOL/L (ref 21–32)
CREAT SERPL-MCNC: 1.72 MG/DL (ref 0.6–1.3)
EOSINOPHIL # BLD AUTO: 0.22 THOUSAND/ΜL (ref 0–0.61)
EOSINOPHIL NFR BLD AUTO: 4 % (ref 0–6)
ERYTHROCYTE [DISTWIDTH] IN BLOOD BY AUTOMATED COUNT: 17.4 % (ref 11.6–15.1)
EST. AVERAGE GLUCOSE BLD GHB EST-MCNC: 154 MG/DL
GFR SERPL CREATININE-BSD FRML MDRD: 29 ML/MIN/1.73SQ M
GLUCOSE SERPL-MCNC: 151 MG/DL (ref 65–140)
GLUCOSE SERPL-MCNC: 155 MG/DL (ref 65–140)
GLUCOSE SERPL-MCNC: 159 MG/DL (ref 65–140)
GLUCOSE SERPL-MCNC: 204 MG/DL (ref 65–140)
HBA1C MFR BLD: 7 %
HCT VFR BLD AUTO: 35.9 % (ref 34.8–46.1)
HGB BLD-MCNC: 11.2 G/DL (ref 11.5–15.4)
IMM GRANULOCYTES # BLD AUTO: 0.03 THOUSAND/UL (ref 0–0.2)
IMM GRANULOCYTES NFR BLD AUTO: 1 % (ref 0–2)
INR PPP: 2.95 (ref 0.84–1.19)
INR PPP: 3.12 (ref 0.84–1.19)
LYMPHOCYTES # BLD AUTO: 0.59 THOUSANDS/ΜL (ref 0.6–4.47)
LYMPHOCYTES NFR BLD AUTO: 9 % (ref 14–44)
MCH RBC QN AUTO: 24.9 PG (ref 26.8–34.3)
MCHC RBC AUTO-ENTMCNC: 31.2 G/DL (ref 31.4–37.4)
MCV RBC AUTO: 80 FL (ref 82–98)
MONOCYTES # BLD AUTO: 0.48 THOUSAND/ΜL (ref 0.17–1.22)
MONOCYTES NFR BLD AUTO: 8 % (ref 4–12)
NEUTROPHILS # BLD AUTO: 4.85 THOUSANDS/ΜL (ref 1.85–7.62)
NEUTS SEG NFR BLD AUTO: 77 % (ref 43–75)
NRBC BLD AUTO-RTO: 0 /100 WBCS
NT-PROBNP SERPL-MCNC: 1808 PG/ML
PLATELET # BLD AUTO: 172 THOUSANDS/UL (ref 149–390)
PMV BLD AUTO: 10.4 FL (ref 8.9–12.7)
POTASSIUM SERPL-SCNC: 4.6 MMOL/L (ref 3.5–5.3)
PROT SERPL-MCNC: 7.7 G/DL (ref 6.4–8.2)
PROTHROMBIN TIME: 30.1 SECONDS (ref 11.6–14.5)
PROTHROMBIN TIME: 31.4 SECONDS (ref 11.6–14.5)
QRS AXIS: 152 DEGREES
QRSD INTERVAL: 118 MS
QT INTERVAL: 464 MS
QTC INTERVAL: 464 MS
RBC # BLD AUTO: 4.49 MILLION/UL (ref 3.81–5.12)
SODIUM SERPL-SCNC: 142 MMOL/L (ref 136–145)
T WAVE AXIS: 86 DEGREES
TROPONIN I SERPL-MCNC: <0.02 NG/ML
TSH SERPL DL<=0.05 MIU/L-ACNC: 5.83 UIU/ML (ref 0.36–3.74)
VENTRICULAR RATE: 60 BPM
WBC # BLD AUTO: 6.25 THOUSAND/UL (ref 4.31–10.16)

## 2021-06-15 PROCEDURE — 99285 EMERGENCY DEPT VISIT HI MDM: CPT

## 2021-06-15 PROCEDURE — 80053 COMPREHEN METABOLIC PANEL: CPT

## 2021-06-15 PROCEDURE — 84484 ASSAY OF TROPONIN QUANT: CPT

## 2021-06-15 PROCEDURE — 36415 COLL VENOUS BLD VENIPUNCTURE: CPT

## 2021-06-15 PROCEDURE — 96374 THER/PROPH/DIAG INJ IV PUSH: CPT

## 2021-06-15 PROCEDURE — 83880 ASSAY OF NATRIURETIC PEPTIDE: CPT | Performed by: EMERGENCY MEDICINE

## 2021-06-15 PROCEDURE — 99223 1ST HOSP IP/OBS HIGH 75: CPT | Performed by: FAMILY MEDICINE

## 2021-06-15 PROCEDURE — 85025 COMPLETE CBC W/AUTO DIFF WBC: CPT

## 2021-06-15 PROCEDURE — 84443 ASSAY THYROID STIM HORMONE: CPT | Performed by: NURSE PRACTITIONER

## 2021-06-15 PROCEDURE — 85610 PROTHROMBIN TIME: CPT | Performed by: EMERGENCY MEDICINE

## 2021-06-15 PROCEDURE — 99285 EMERGENCY DEPT VISIT HI MDM: CPT | Performed by: EMERGENCY MEDICINE

## 2021-06-15 PROCEDURE — 71045 X-RAY EXAM CHEST 1 VIEW: CPT

## 2021-06-15 PROCEDURE — 82948 REAGENT STRIP/BLOOD GLUCOSE: CPT

## 2021-06-15 PROCEDURE — 93005 ELECTROCARDIOGRAM TRACING: CPT

## 2021-06-15 PROCEDURE — 85610 PROTHROMBIN TIME: CPT | Performed by: NURSE PRACTITIONER

## 2021-06-15 PROCEDURE — 83036 HEMOGLOBIN GLYCOSYLATED A1C: CPT | Performed by: NURSE PRACTITIONER

## 2021-06-15 RX ORDER — GABAPENTIN 300 MG/1
300 CAPSULE ORAL 2 TIMES DAILY
Status: DISCONTINUED | OUTPATIENT
Start: 2021-06-15 | End: 2021-06-18 | Stop reason: HOSPADM

## 2021-06-15 RX ORDER — INSULIN GLARGINE 100 [IU]/ML
15 INJECTION, SOLUTION SUBCUTANEOUS
Status: DISCONTINUED | OUTPATIENT
Start: 2021-06-15 | End: 2021-06-18 | Stop reason: HOSPADM

## 2021-06-15 RX ORDER — INSULIN GLARGINE 100 [IU]/ML
20 INJECTION, SOLUTION SUBCUTANEOUS
Status: DISCONTINUED | OUTPATIENT
Start: 2021-06-15 | End: 2021-06-15

## 2021-06-15 RX ORDER — POTASSIUM CHLORIDE 20 MEQ/1
20 TABLET, EXTENDED RELEASE ORAL DAILY
Status: DISCONTINUED | OUTPATIENT
Start: 2021-06-15 | End: 2021-06-18 | Stop reason: HOSPADM

## 2021-06-15 RX ORDER — ASCORBIC ACID 500 MG
500 TABLET ORAL DAILY
Status: DISCONTINUED | OUTPATIENT
Start: 2021-06-15 | End: 2021-06-18 | Stop reason: HOSPADM

## 2021-06-15 RX ORDER — WARFARIN SODIUM 5 MG/1
5 TABLET ORAL
Status: DISCONTINUED | OUTPATIENT
Start: 2021-06-15 | End: 2021-06-15

## 2021-06-15 RX ORDER — WARFARIN SODIUM 5 MG/1
5 TABLET ORAL
Status: DISCONTINUED | OUTPATIENT
Start: 2021-06-15 | End: 2021-06-18 | Stop reason: HOSPADM

## 2021-06-15 RX ORDER — CALCIUM CARBONATE 200(500)MG
1000 TABLET,CHEWABLE ORAL DAILY PRN
Status: DISCONTINUED | OUTPATIENT
Start: 2021-06-15 | End: 2021-06-18 | Stop reason: HOSPADM

## 2021-06-15 RX ORDER — FUROSEMIDE 10 MG/ML
40 INJECTION INTRAMUSCULAR; INTRAVENOUS 2 TIMES DAILY
Status: DISCONTINUED | OUTPATIENT
Start: 2021-06-15 | End: 2021-06-15

## 2021-06-15 RX ORDER — FUROSEMIDE 10 MG/ML
40 INJECTION INTRAMUSCULAR; INTRAVENOUS ONCE
Status: COMPLETED | OUTPATIENT
Start: 2021-06-15 | End: 2021-06-15

## 2021-06-15 RX ORDER — ONDANSETRON 2 MG/ML
4 INJECTION INTRAMUSCULAR; INTRAVENOUS EVERY 6 HOURS PRN
Status: DISCONTINUED | OUTPATIENT
Start: 2021-06-15 | End: 2021-06-18 | Stop reason: HOSPADM

## 2021-06-15 RX ORDER — FAMOTIDINE 20 MG/1
20 TABLET, FILM COATED ORAL DAILY
Status: DISCONTINUED | OUTPATIENT
Start: 2021-06-15 | End: 2021-06-18 | Stop reason: HOSPADM

## 2021-06-15 RX ORDER — WARFARIN SODIUM 7.5 MG/1
7.5 TABLET ORAL
Status: DISCONTINUED | OUTPATIENT
Start: 2021-06-16 | End: 2021-06-18

## 2021-06-15 RX ORDER — HEPARIN SODIUM 5000 [USP'U]/ML
5000 INJECTION, SOLUTION INTRAVENOUS; SUBCUTANEOUS EVERY 8 HOURS SCHEDULED
Status: DISCONTINUED | OUTPATIENT
Start: 2021-06-15 | End: 2021-06-15 | Stop reason: SDUPTHER

## 2021-06-15 RX ORDER — FUROSEMIDE 10 MG/ML
60 INJECTION INTRAMUSCULAR; INTRAVENOUS 2 TIMES DAILY
Status: DISCONTINUED | OUTPATIENT
Start: 2021-06-15 | End: 2021-06-18 | Stop reason: HOSPADM

## 2021-06-15 RX ORDER — LEVOTHYROXINE SODIUM 0.15 MG/1
150 TABLET ORAL
Status: DISCONTINUED | OUTPATIENT
Start: 2021-06-16 | End: 2021-06-18 | Stop reason: HOSPADM

## 2021-06-15 RX ORDER — POLYETHYLENE GLYCOL 3350 17 G/17G
17 POWDER, FOR SOLUTION ORAL DAILY PRN
Status: DISCONTINUED | OUTPATIENT
Start: 2021-06-15 | End: 2021-06-18 | Stop reason: HOSPADM

## 2021-06-15 RX ADMIN — GABAPENTIN 300 MG: 300 CAPSULE ORAL at 13:04

## 2021-06-15 RX ADMIN — POTASSIUM CHLORIDE 20 MEQ: 1500 TABLET, EXTENDED RELEASE ORAL at 13:29

## 2021-06-15 RX ADMIN — OXYCODONE HYDROCHLORIDE AND ACETAMINOPHEN 500 MG: 500 TABLET ORAL at 13:04

## 2021-06-15 RX ADMIN — GABAPENTIN 300 MG: 300 CAPSULE ORAL at 21:48

## 2021-06-15 RX ADMIN — INSULIN LISPRO 1 UNITS: 100 INJECTION, SOLUTION INTRAVENOUS; SUBCUTANEOUS at 17:37

## 2021-06-15 RX ADMIN — INSULIN LISPRO 1 UNITS: 100 INJECTION, SOLUTION INTRAVENOUS; SUBCUTANEOUS at 13:06

## 2021-06-15 RX ADMIN — FUROSEMIDE 60 MG: 10 INJECTION, SOLUTION INTRAMUSCULAR; INTRAVENOUS at 17:37

## 2021-06-15 RX ADMIN — FAMOTIDINE 20 MG: 20 TABLET, FILM COATED ORAL at 13:04

## 2021-06-15 RX ADMIN — FUROSEMIDE 40 MG: 10 INJECTION, SOLUTION INTRAMUSCULAR; INTRAVENOUS at 10:48

## 2021-06-15 RX ADMIN — INSULIN GLARGINE 15 UNITS: 100 INJECTION, SOLUTION SUBCUTANEOUS at 21:48

## 2021-06-15 RX ADMIN — NITROGLYCERIN 1 INCH: 20 OINTMENT TOPICAL at 10:48

## 2021-06-15 NOTE — ASSESSMENT & PLAN NOTE
Wt Readings from Last 3 Encounters:   06/15/21 102 kg (224 lb 6 9 oz)   05/27/21 93 kg (205 lb)   04/15/21 92 5 kg (204 lb)     Background:  Presented to the ED given significant shortness of breath  She denies any chest pain on my evaluation however she did report chest pain in the emergency department and has had status post nitro paste application  Also endorses orthopnea, leg swelling, leg gain, and occasional abdominal discomfort  She also denies any dietary indiscretion or mixed medication doses     With acute exacerbation as evidence by dyspnea, lower extremity swelling, and reports awaking   CXR appears to have some vascular congestion; final read pending   BNP pending   EKG reviewed   Unable to review echocardiogram   Repeat ECHO ordered   Lasix 40mg IV BID   Lipid panel ordered    Monitor electrolytes with diuresis    Daily weights   Strict I & Os   Cardiac low sodium <2g, fluid restriction <1500, carb controlled diet   Cardiology consulted; input appreciated

## 2021-06-15 NOTE — H&P
[address] 1945, 77 y o  female MRN: [id number]  Unit/Bed#: -01 Encounter: [id number]  Primary Care Provider: Cierra Burgos MD   Date and time admitted to hospital: 6/15/2021  9:27 AM    Acute on chronic diastolic CHF (congestive heart failure) ([address] )  Assessment & Plan  Wt Readings from Last 3 Encounters:   06/15/21 102 kg (224 lb 6 9 oz)   05/27/21 93 kg (205 lb)   04/15/21 92 5 kg (204 lb)     Background:  Presented to the ED given significant shortness of breath  She denies any chest pain on my evaluation however she did report chest pain in the emergency department and has had status post nitro paste application  Also endorses orthopnea, leg swelling, leg gain, and occasional abdominal discomfort  She also denies any dietary indiscretion or mixed medication doses     With acute exacerbation as evidence by dyspnea, lower extremity swelling, and reports awaking   CXR appears to have some vascular congestion; final read pending   BNP pending   EKG reviewed   Unable to review echocardiogram   Repeat ECHO ordered   Lasix 40mg IV BID   Lipid panel ordered    Monitor electrolytes with diuresis    Daily weights   Strict I & Os   Cardiac low sodium <2g, fluid restriction <1500, carb controlled diet   Cardiology consulted; input appreciated            Acute kidney injury superimposed on chronic kidney disease [facility])  Assessment & Plan  Lab Results   Component Value Date    EGFR 29 06/15/2021    EGFR 42 11/01/2020    EGFR 42 10/31/2020    CREATININE 1 72 (H) 06/15/2021    CREATININE 1 25 11/01/2020    CREATININE 1 27 10/31/2020   · Present on admission as evidence by creatinine of 1 72  · Baseline appears to be approximately 1 2 on review of records  · Likely secondary to hypervolemia in the setting of CHF exacerbation  · IV diuresis ordered; cardiology to manage  · Avoid nephrotoxins, relative hypotension, and NSAIDs as appropriate  · Monitor electrolytes and volume status closely  · Repeat kidney function with morning labs    Atrial fibrillation [facility])  Assessment & Plan  · Appears rate controlled on admission given heart rate of 51  · Continue home dose Cardizem  · Anticoagulated on Coumadin  · 5 mg on Tuesday and Thursday  · 7 5 mg on Sunday, Monday, Wednesday, Friday, and Saturday  · Goal INR 2-3  · Monitor with routine vitals    Type 2 diabetes mellitus with diabetic neuropathy [facility])  Assessment & Plan  Lab Results   Component Value Date    HGBA1C 5 9 (H) 11/30/2018     No results for input(s): POCGLU in the last 72 hours  Blood Sugar Average: Last 72 hrs:  · Repeat A1c ordered; appears to be well controlled as of last value however  · Managed as an outpatient on metformin; hold while inpatient  · Long-acting and SSI ordered  · Carb controlled diet  · Monitor with POCT BG and titrate regimen as indicated    JOS (obstructive sleep apnea)  Assessment & Plan  · CPAP while inpatient    Pacemaker  Assessment & Plan  · Secondary to tachy-yunier syndrome  · Follows outpatient with Cardiology in [address] [place] [address] Dr Angelica Huang with [facility]   · Continue diltiazem per PTA medication regimen  · EKG reviewed    Hypertension  Assessment & Plan  · Blood pressure mildly elevated on admission with systolics in the [address]  · Managed as an outpatient on diltiazem and oral Lasix  · Continue diltiazem  · IV Lasix ordered in the setting of primary problem  · Monitor with routine vitals    Disease of thyroid gland  Assessment & Plan  · Continue home dose Synthroid therapy    VTE Pharmacologic Prophylaxis: VTE Score: 6 High Risk (Score >/= 5) - Pharmacological DVT Prophylaxis Ordered: warfarin (Coumadin)  Sequential Compression Devices Ordered  Code Status: Level 1 - Full Code POA   Discussion with family: Updated  () at bedside      Anticipated Length of Stay: Patient will be admitted on an inpatient basis with an anticipated length of stay of greater than 2 midnights secondary to CHF exacerbation  Total Time for Visit, including Counseling / Coordination of Care: 45 minutes Greater than 50% of this total time spent on direct patient counseling and coordination of care  Chief Complaint:  Shortness of breath    History of Present Illness:  Leellen Bumpers is a 77 y o  female with a PMH of sick sinus syndrome/tachy-yunier syndrome status post pacemaker placement, chronic congestive heart failure, hypertension, hyperlipidemia, hypothyroidism, GERD, CKD and diabetes who presents with worsening shortness of breath  Patient reports that over the past few days she has increasingly become more and more shortness of breath and on 06/15 was unable to take it anymore and came to the emergency department  She reports that significantly worse with exertion  At her last cardiology visit she reports that her diuretic was doubled however it was only recently she began to feel worsening shortness of breath  In the emergency department she did report chest pain and a nitro paste was applied  On my evaluation she is asymptomatic in regards to chest pain but does report ongoing shortness of breath as well as lower extremity edema  She denies any dietary indiscretion or missed medication doses  See rest of plan as documented above    Review of Systems:  Review of Systems   Constitutional: Negative for chills, fatigue and fever  HENT: Negative for congestion  Respiratory: Positive for shortness of breath  Negative for cough and wheezing  Orthopnea, dyspnea on exertion   Cardiovascular: Positive for leg swelling  Negative for chest pain and palpitations  Gastrointestinal: Positive for abdominal pain (At times in the epigastric region)  Negative for abdominal distention, diarrhea, nausea and vomiting  Genitourinary: Negative for difficulty urinating and dysuria  Neurological: Positive for dizziness and light-headedness  Negative for syncope and headaches  Past Medical and Surgical History:   Past Medical History:   Diagnosis Date    Arthritis     [facility]est[facility]     Diabetes mellitus [facility]     Disease of thyroid gland     Hypertension     Renal disorder        Past Surgical History:   Procedure Laterality Date    CARDIAC PACEMAKER PLACEMENT      CHOLECYSTECTOMY      JOINT REPLACEMENT      bilateral knee replacements    ORIF TIBIA & FIBULA FRACTURES Left 10/29/2020    Procedure: OPEN REDUCTION W/ INTERNAL FIXATION (ORIF) ANKLE;  Surgeon: Jose Patton; Location:  MAIN OR;  Service: Orthopedics    TONSILLECTOMY      TUBAL LIGATION         Meds/Allergies:  Prior to Admission medications    Medication Sig Start Date End Date Taking?  Authorizing Provider   Ascorbic Acid, Vitamin C, (VITAMIN C) 100 MG tablet Take 500 mg by mouth   Yes Historical Provider, MD   diltiazem (CARDIZEM CD) 300 mg 24 hr capsule Take 300 mg by mouth daily  9/8/20  Yes Historical Provider, MD   famotidine (PEPCID) 40 MG tablet  9/8/20  Yes Historical Provider, MD   furosemide (LASIX) 40 mg tablet Take 80 mg by mouth daily  9/8/20  Yes Historical Provider, MD   gabapentin (NEURONTIN) 300 mg capsule Take 300 mg by mouth 2 (two) times a day 1 tab qam, 2 tabs qhs 9/8/20  Yes Historical Provider, MD   levothyroxine 150 mcg tablet Take 150 mcg by mouth daily  9/8/20  Yes Historical Provider, MD   metFORMIN (GLUCOPHAGE) 500 mg tablet 500 mg 2 (two) times a day with meals  9/8/20  Yes Historical Provider, MD   Multiple Vitamin (Multi-Day) TABS Take 1 tablet by mouth daily    Yes Historical Provider, MD   potassium chloride (K-DUR,KLOR-CON) 20 mEq tablet Take 1 tablet (20 mEq total) by mouth daily 11/4/20 6/15/21 Yes SAM Kimbrough   warfarin (COUMADIN) 5 mg tablet Take 5 mg by mouth Sun, Wed, Friday- 1 tab  Tues, Thursday, Saturday- 2 tab 9/8/20  Yes Historical Provider, MD   Ferrous Sulfate (IRON PO) Take by mouth    Historical Provider, MD pantoprazole (PROTONIX) 40 mg tablet Take 1 tablet (40 mg total) by mouth daily  Patient not taking: Reported on 3/5/2021 11/4/20   SAM Kimbrough   ranitidine (ZANTAC) 300 MG tablet Take 300 mg by mouth daily as needed     Historical Provider, MD     I have reviewed home medications with patient personally  Allergies: Allergies   Allergen Reactions    Rofecoxib Angioedema, Swelling, Hives and Other (See Comments)     swelling, sob  swelling, sob  Other reaction(s): Swelling / Edema  swelling, sob  Other reaction(s): SWELLING  Other reaction(s): Angioedema      Oxycodone-Acetaminophen GI Intolerance and Other (See Comments)     Unsure of rxn   Unsure of rxn   Unsure of rxn   Other reaction(s): "CAN'T BREATH"      Medical Tape Rash       Social History:  Marital Status: /Civil Union   Patient Pre-hospital Living Situation: Home  Patient Pre-hospital Level of Mobility: walks  Patient Pre-hospital Diet Restrictions:  Low sodium with fluid restriction  Substance Use History:   Social History     Substance and Sexual Activity   Alcohol Use Not Currently     Social History     Tobacco Use   Smoking Status Never Smoker   Smokeless Tobacco Never Used     Social History     Substance and Sexual Activity   Drug Use Not Currently       Family History:  Family History   Problem Relation Age of Onset    Atrial fibrillation Mother     Cancer Father        Physical Exam:     Vitals:   Blood Pressure: 169/70 (06/15/21 1144)  Pulse: (!) 51 (06/15/21 1144)  Temperature: (!) 97 2 °F (36 2 °C) (06/15/21 1144)  Temp Source: Temporal (06/15/21 0941)  Respirations: 12 (06/15/21 1144)  Height: 5' 2" (157 5 cm) (06/15/21 0941)  Weight - Scale: 102 kg (224 lb 6 9 oz) (06/15/21 0941)  SpO2: 96 % (06/15/21 1144)    Physical Exam  Vitals and nursing note reviewed  Constitutional:       General: She is not in acute distress  Appearance: Normal appearance  She is well-developed  She is obese   She is not ill-appearing or diaphoretic  HENT:      Head: Normocephalic and atraumatic  Eyes:      Conjunctiva/sclera: Conjunctivae normal    Cardiovascular:      Rate and Rhythm: Normal rate and regular rhythm  Pulses: Normal pulses  Radial pulses are 2+ on the right side and 2+ on the left side  Dorsalis pedis pulses are 2+ on the right side and 2+ on the left side  Pulmonary:      Effort: No respiratory distress  Breath sounds: Rales present  No wheezing  Abdominal:      General: Abdomen is flat  Bowel sounds are normal  There is no distension  Palpations: Abdomen is soft  Tenderness: There is no abdominal tenderness  Musculoskeletal:      Cervical back: Neck supple  Right lower leg: Edema present  Left lower leg: Edema present  Skin:     General: Skin is warm and dry  Capillary Refill: Capillary refill takes less than 2 seconds  Neurological:      Mental Status: She is alert and oriented to person, place, and time  Mental status is at baseline  Psychiatric:         Mood and Affect: Mood normal          Behavior: Behavior normal  Behavior is cooperative           Judgment: Judgment normal           Additional Data:     Lab Results:  Results from last 7 days   Lab Units 06/15/21  0955   WBC Thousand/uL 6 25   HEMOGLOBIN g/dL 11 2*   HEMATOCRIT % 35 9   PLATELETS Thousands/uL 172   NEUTROS PCT % 77*   LYMPHS PCT % 9*   MONOS PCT % 8   EOS PCT % 4     Results from last 7 days   Lab Units 06/15/21  0955   SODIUM mmol/L 142   POTASSIUM mmol/L 4 6   CHLORIDE mmol/L 103   CO2 mmol/L 31   BUN mg/dL 36*   CREATININE mg/dL 1 72*   ANION GAP mmol/L 8   CALCIUM mg/dL 9 1   ALBUMIN g/dL 3 8   TOTAL BILIRUBIN mg/dL 0 65   ALK PHOS U/L 116   ALT U/L 19   AST U/L 15   GLUCOSE RANDOM mg/dL 204*     Results from last 7 days   Lab Units 06/15/21  0955   INR  2 95*                   Imaging: Reviewed radiology reports from this admission including: chest xray  XR chest 1 view portable   ED Interpretation by Yvette Gaston DO (06/15 1023)   CHF changes, right pleural effusion          EKG and Other Studies Reviewed on Admission:   · EKG: Reviewed  As well as outpatient records  ** Please Note: This note has been constructed using a voice recognition system   **

## 2021-06-15 NOTE — ASSESSMENT & PLAN NOTE
· Secondary to tachy-yunier syndrome  · Follows outpatient with Cardiology in [address] [facility] [address] Maryjane Goodwin with [facility]   · Continue diltiazem per PTA medication regimen  · EKG reviewed

## 2021-06-15 NOTE — CONSULTS
Consultation - Cardiology   Deisi Speaker 77 y o  female MRN: [id number]  Unit/Bed#: -01 Encounter: [id number]    Assessment/Plan     Assessment:  Acute on chronic HFpEF, abdominal fullness, elevated JVD, 14 lb weight gain, orthopnea  Persistent Afib- rate controlled on coumadin   HTN  SSS sp PPM- [employer], atrial lead programmed off for poor function/sensing  CKD  DM2  Mod pHTN    Plan:  1  Continue lasix 40 IV BID, may need to up-titrate based on response  2  Monitor I and O, daily standing weights, electrolytes and renal function  3  Repeat EKG as most recent ekg appears to be in SB but notes reporting persistent afib  4  Will continue to follow     History of Present Illness   Physician Requesting Consult: Nayeli Brito MD  Reason for Consult / Principal Problem: chf  HPI: Deisi Speaker is a 77y o  year old female with history of persistent afib, HFpEF, SSS sp PPM, HTN, Dm2, mod pHTN presented to the ED from PCP office for concerns with CHF exacerbation  Pt follows as an OP with [facility] cardiology  She reports that she has been feeling more SOB lately associated with orthopnea, 14 lb weight gain and abdominal fullness  She tried to increase her lasix on her own but it did not help her symptoms  She typically takes lasix 80 mg daily  Pt is currently on RA but required oxygen via nasal cannula upon admission  She would like to transfer her care to [facility]  Inpatient consult to Cardiology  Consult performed by: Frank Stone PA-C  Consult ordered by: SAM Frost          Review of Systems   Constitutional: Positive for fatigue and unexpected weight change  Negative for chills  Respiratory: Positive for shortness of breath  Negative for chest tightness and wheezing  Cardiovascular: Negative for chest pain, palpitations and leg swelling  Gastrointestinal: Positive for abdominal distention  Genitourinary: Negative for difficulty urinating  Musculoskeletal: Positive for arthralgias  Skin: Negative for color change, pallor and rash  Neurological: Negative for dizziness, syncope and light-headedness  Psychiatric/Behavioral: Negative for agitation, behavioral problems and confusion  Historical Information   Past Medical History:   Diagnosis Date    Arthritis     [facility]     Diabetes mellitus ([address] )     Disease of thyroid gland     Hypertension     Renal disorder      Past Surgical History:   Procedure Laterality Date    CARDIAC PACEMAKER PLACEMENT      CHOLECYSTECTOMY      JOINT REPLACEMENT      bilateral knee replacements    ORIF TIBIA & FIBULA FRACTURES Left 10/29/2020    Procedure: OPEN REDUCTION W/ INTERNAL FIXATION (ORIF) ANKLE;  Surgeon: Mitul Shipley; Location:  MAIN OR;  Service: Orthopedics    TONSILLECTOMY      TUBAL LIGATION       Social History     Substance and Sexual Activity   Alcohol Use Not Currently     Social History     Substance and Sexual Activity   Drug Use Not Currently     E-Cigarette/Vaping    E-Cigarette Use Never User      E-Cigarette/Vaping Substances     Social History     Tobacco Use   Smoking Status Never Smoker   Smokeless Tobacco Never Used     Family History: non-contributory    Meds/Allergies   all current active meds have been reviewed  Allergies   Allergen Reactions    Rofecoxib Angioedema, Swelling, Hives and Other (See Comments)     swelling, sob  swelling, sob  Other reaction(s): Swelling / Edema  swelling, sob  Other reaction(s): SWELLING  Other reaction(s): Angioedema      Oxycodone-Acetaminophen GI Intolerance and Other (See Comments)     Unsure of rxn   Unsure of rxn   Unsure of rxn   Other reaction(s): "CAN'T BREATH"      Medical Tape Rash       Objective   Vitals: Blood pressure 169/70, pulse (!) 51, temperature (!) 97 2 °F (36 2 °C), resp  rate 12, height 5' 2" (1 575 m), weight 102 kg (224 lb 6 9 oz), SpO2 96 %    Orthostatic Blood Pressures Most Recent Value   Blood Pressure  169/70 filed at 06/15/2021 1144   Patient Position - Orthostatic VS  Lying filed at 06/15/2021 1115          No intake or output data in the 24 hours ending 06/15/21 1200    Invasive Devices     Peripheral Intravenous Line            Peripheral IV 06/15/21 Right Antecubital <1 day                Physical Exam    Lab Results:   I have personally reviewed pertinent lab results  CBC with diff:   Results from last 7 days   Lab Units 06/15/21  0955   WBC Thousand/uL 6 25   RBC Million/uL 4 49   HEMOGLOBIN g/dL 11 2*   HEMATOCRIT % 35 9   MCV fL 80*   MCH pg 24 9*   MCHC g/dL 31 2*   RDW % 17 4*   MPV fL 10 4   PLATELETS Thousands/uL 172     CMP:   Results from last 7 days   Lab Units 06/15/21  0955   SODIUM mmol/L 142   POTASSIUM mmol/L 4 6   CHLORIDE mmol/L 103   CO2 mmol/L 31   BUN mg/dL 36*   CREATININE mg/dL 1 72*   CALCIUM mg/dL 9 1   AST U/L 15   ALT U/L 19   ALK PHOS U/L 116   EGFR ml/min/1 73sq m 29     Troponin:   0   Lab Value Date/Time    TROPONINI <0 02 06/15/2021 0955    TROPONINI <0 02 10/25/2020 1803    TROPONINI <0 02 10/25/2020 1532    TROPONINI <0 02 10/25/2020 1144     BNP:   Results from last 7 days   Lab Units 06/15/21  0955   POTASSIUM mmol/L 4 6   CHLORIDE mmol/L 103   CO2 mmol/L 31   BUN mg/dL 36*   CREATININE mg/dL 1 72*   CALCIUM mg/dL 9 1   EGFR ml/min/1 73sq m 29     Coags:   Results from last 7 days   Lab Units 06/15/21  0955   INR  2 95*     TSH:     Magnesium:     Imaging: I have personally reviewed pertinent reports  EKG: Baseline artifact  Possible NSR with first degree AV block  Right axis deviation  Incomplete right bundle branch block  Possible Right ventricular hypertrophy  Possible Anterior infarct , age undetermined  Abnormal ECG  No previous ECGs available  Confirmed by Mary Larson ([id number]) on 6/15/2021 10:05:36 AM      Echocardiogram: 04/05/2021: Normal ventricle size, wall thickness with preserved systolic function   Ejection fraction 60%  Dilated right ventricle  Leads in the right heart  Dilated left and right atrium  Trace mitral regurgitation  Moderate to severe tricuspid regurgitation with estimated right ventricular systolic pressure of 72 mm of mercury  Echocardiogram: 09/08/2020: Normal ventricle size with preserved systolic function  Ejection fraction 59%  Septal flattening secondary to right-sided pressure/volume overload  Severely dilated right ventricle with preserved systolic function  Leads in the right heart  Severely dilated left atrium  Mild-to-moderate mitral regurgitation  Trace aortic insufficiency  Moderate tricuspid regurgitation with estimated right ventricular systolic pressure of 95-52 mm of mercury  Compared to prior study right ventricle systolic pressure was previously estimated at 47 mm of mercury  Julienne Dye

## 2021-06-15 NOTE — ED PROVIDER NOTES
History  Chief Complaint   Patient presents with    Shortness of Breath     started about 5 days ago, progessively worse, worse when ambulating causing frequent rest periods denies chest pain     81-year-old female complains of worsening dyspnea over the past 5 days, exertional dyspnea no orthopnea  No home oxygen or CPAP  Similar prior episodes of CHF  Diuretic use yesterday  No chest pain  No hemoptysis  No fever or chills  Past medical history includes CKD, CHF, atrial fibrillation on warfarin      History provided by:  Patient and spouse  Shortness of Breath  Severity:  Moderate  Onset quality:  Gradual  Timing:  Constant  Progression:  Worsening  Chronicity:  Recurrent  Context: activity    Worsened by: Activity  Ineffective treatments:  Diuretics  Associated symptoms: no abdominal pain, no chest pain, no diaphoresis, no fever and no hemoptysis        Prior to Admission Medications   Prescriptions Last Dose Informant Patient Reported? Taking?    Ascorbic Acid, Vitamin C, (VITAMIN C) 100 MG tablet 6/15/2021 at Unknown time  Yes Yes   Sig: Take 500 mg by mouth   Ferrous Sulfate (IRON PO) More than a month at Unknown time  Yes No   Sig: Take by mouth   Multiple Vitamin (Multi-Day) TABS 6/15/2021 at Unknown time  Yes Yes   Sig: Take 1 tablet by mouth daily    diltiazem (CARDIZEM CD) 300 mg 24 hr capsule 6/15/2021 at Unknown time  Yes Yes   Sig: Take 300 mg by mouth daily    famotidine (PEPCID) 40 MG tablet 2021 at Unknown time  Yes Yes   furosemide (LASIX) 40 mg tablet 6/15/2021 at Unknown time  Yes Yes   Sig: Take 80 mg by mouth daily    gabapentin (NEURONTIN) 300 mg capsule 6/15/2021 at Unknown time  Yes Yes   Sig: Take 300 mg by mouth 2 (two) times a day 1 tab qam, 2 tabs qhs   levothyroxine 150 mcg tablet 6/15/2021 at Unknown time  Yes Yes   Sig: Take 150 mcg by mouth daily    metFORMIN (GLUCOPHAGE) 500 mg tablet 6/15/2021 at Unknown time  Yes Yes   Si mg 2 (two) times a day with meals pantoprazole (PROTONIX) 40 mg tablet Not Taking at Unknown time  No No   Sig: Take 1 tablet (40 mg total) by mouth daily   Patient not taking: Reported on 3/5/2021   potassium chloride (K-DUR,KLOR-CON) 20 mEq tablet 6/15/2021 at Unknown time  No Yes   Sig: Take 1 tablet (20 mEq total) by mouth daily   ranitidine (ZANTAC) 300 MG tablet Not Taking at Unknown time  Yes No   Sig: Take 300 mg by mouth daily as needed    warfarin (COUMADIN) 5 mg tablet   Yes Yes   Sig: Take 5 mg by mouth Sun, Wed, Friday- 1 tab  Tues, Thursday, Saturday- 2 tab      Facility-Administered Medications: None       Past Medical History:   Diagnosis Date    Arthritis     CHF (congestive heart failure) ([facility] )     Diabetes mellitus ([facility] )     Disease of thyroid gland     Hypertension     Renal disorder        Past Surgical History:   Procedure Laterality Date    CARDIAC PACEMAKER PLACEMENT      CHOLECYSTECTOMY      JOINT REPLACEMENT      bilateral knee replacements    ORIF TIBIA & FIBULA FRACTURES Left 10/29/2020    Procedure: OPEN REDUCTION W/ INTERNAL FIXATION (ORIF) ANKLE;  Surgeon: Keri Funes; Location:  MAIN OR;  Service: Orthopedics    TONSILLECTOMY      TUBAL LIGATION         Family History   Problem Relation Age of Onset    Atrial fibrillation Mother     Cancer Father      I have reviewed and agree with the history as documented  E-Cigarette/Vaping    E-Cigarette Use Never User      E-Cigarette/Vaping Substances     Social History     Tobacco Use    Smoking status: Never Smoker    Smokeless tobacco: Never Used   Vaping Use    Vaping Use: Never used   Substance Use Topics    Alcohol use: Not Currently    Drug use: Not Currently       Review of Systems   Constitutional: Negative for diaphoresis and fever  Respiratory: Positive for shortness of breath  Negative for hemoptysis  Cardiovascular: Negative for chest pain  Gastrointestinal: Negative for abdominal pain     All other systems reviewed and are negative  Physical Exam  Physical Exam  Vitals and nursing note reviewed  Constitutional:       Comments: Pleasant, comfortable-appearing, but speaks sentences   HENT:      Head: Normocephalic and atraumatic  Eyes:      Conjunctiva/sclera: Conjunctivae normal       Pupils: Pupils are equal, round, and reactive to light  Cardiovascular:      Rate and Rhythm: Normal rate and regular rhythm  Heart sounds: Normal heart sounds  Pulmonary:      Effort: Pulmonary effort is normal       Breath sounds: Examination of the right-middle field reveals decreased breath sounds  Examination of the right-lower field reveals decreased breath sounds  Examination of the left-lower field reveals decreased breath sounds  Decreased breath sounds present  Abdominal:      General: Bowel sounds are normal  There is no distension  Palpations: Abdomen is soft  Tenderness: There is no abdominal tenderness  Musculoskeletal:         General: Normal range of motion  Cervical back: Neck supple  Right lower leg: Edema present  Left lower leg: Edema present  Comments: Right ankle posterior foot brace   Skin:     General: Skin is warm and dry  Neurological:      Mental Status: She is alert and oriented to person, place, and time  Cranial Nerves: No cranial nerve deficit  Coordination: Coordination normal    Psychiatric:         Behavior: Behavior normal          Thought Content:  Thought content normal          Judgment: Judgment normal          Vital Signs  ED Triage Vitals [06/15/21 0941]   Temperature Pulse Respirations Blood Pressure SpO2   (!) 97 2 °F (36 2 °C) (!) 52 21 (!) 171/74 95 %      Temp Source Heart Rate Source Patient Position - Orthostatic VS BP Location FiO2 (%)   Temporal Monitor Lying Left arm --      Pain Score       1           Vitals:    06/15/21 1030 06/15/21 1115 06/15/21 1144 06/15/21 1236   BP: 161/69 146/67 169/70    Pulse: (!) 46 (!) 44 (!) 51 (!) 47   Patient Position - Orthostatic VS: Lying Lying           Visual Acuity      ED Medications  Medications   ascorbic acid (VITAMIN C) tablet 500 mg (500 mg Oral Given 6/15/21 1304)   diltiazem (CARDIZEM CD) 24 hr capsule 300 mg (300 mg Oral Not Given 6/15/21 1308)   famotidine (PEPCID) tablet 20 mg (20 mg Oral Given 6/15/21 1304)   gabapentin (NEURONTIN) capsule 300 mg (300 mg Oral Given 6/15/21 1304)   levothyroxine tablet 150 mcg (has no administration in time range)   potassium chloride (K-DUR,KLOR-CON) CR tablet 20 mEq (20 mEq Oral Given 6/15/21 1329)   warfarin (COUMADIN) tablet 7 5 mg (has no administration in time range)   polyethylene glycol (MIRALAX) packet 17 g (has no administration in time range)   ondansetron (ZOFRAN) injection 4 mg (has no administration in time range)   calcium carbonate (TUMS) chewable tablet 1,000 mg (has no administration in time range)   furosemide (LASIX) injection 40 mg (has no administration in time range)   insulin glargine (LANTUS) subcutaneous injection 20 Units 0 2 mL (has no administration in time range)   insulin lispro (HumaLOG) 100 units/mL subcutaneous injection 1-6 Units (1 Units Subcutaneous Given 6/15/21 1306)   insulin lispro (HumaLOG) 100 units/mL subcutaneous injection 1-6 Units (has no administration in time range)   warfarin (COUMADIN) tablet 5 mg (has no administration in time range)   furosemide (LASIX) injection 40 mg (40 mg Intravenous Given 6/15/21 1048)   nitroglycerin (NITRO-BID) 2 % TD ointment 1 inch (1 inch Topical Given 6/15/21 1048)       Diagnostic Studies  Results Reviewed     Procedure Component Value Units Date/Time    TSH, 3rd generation [[id number]] Collected: 06/15/21 0955    Lab Status:  In process Specimen: Blood from Arm, Right Updated: 06/15/21 1313    Protime-INR [[id number]]  (Abnormal) Collected: 06/15/21 0955    Lab Status: Final result Specimen: Blood from Arm, Right Updated: 06/15/21 1139     Protime 30 1 seconds      INR 2 95    NT-BNP PRO [[id number]] Collected: 06/15/21 0955    Lab Status: In process Specimen: Blood from Arm, Right Updated: 06/15/21 1105    Trauma tubes on hold [[id number]] Collected: 06/15/21 0955    Lab Status: Final result Specimen: Blood from Arm, Right Updated: 06/15/21 1101    Narrative: The following orders were created for panel order Trauma tubes on hold  Procedure                               Abnormality         Status                     ---------                               -----------         ------                     Ryder Zackary Top on FPQR[[id number]]                           Final result                 Please view results for these tests on the individual orders      Troponin I [[id number]]  (Normal) Collected: 06/15/21 0955    Lab Status: Final result Specimen: Blood from Arm, Right Updated: 06/15/21 1048     Troponin I <0 02 ng/mL     Comprehensive metabolic panel [[id number]]  (Abnormal) Collected: 06/15/21 0955    Lab Status: Final result Specimen: Blood from Arm, Right Updated: 06/15/21 1030     Sodium 142 mmol/L      Potassium 4 6 mmol/L      Chloride 103 mmol/L      CO2 31 mmol/L      ANION GAP 8 mmol/L      BUN 36 mg/dL      Creatinine 1 72 mg/dL      Glucose 204 mg/dL      Calcium 9 1 mg/dL      AST 15 U/L      ALT 19 U/L      Alkaline Phosphatase 116 U/L      Total Protein 7 7 g/dL      Albumin 3 8 g/dL      Total Bilirubin 0 65 mg/dL      eGFR 29 ml/min/1 73sq m     Narrative:      [facility] guidelines for Chronic Kidney Disease (CKD):     Stage 1 with normal or high GFR (GFR > 90 mL/min/1 73 square meters)    Stage 2 Mild CKD (GFR = 60-89 mL/min/1 73 square meters)    Stage 3A Moderate CKD (GFR = 45-59 mL/min/1 73 square meters)    Stage 3B Moderate CKD (GFR = 30-44 mL/min/1 73 square meters)    Stage 4 Severe CKD (GFR = 15-29 mL/min/1 73 square meters)    Stage 5 End Stage CKD (GFR <15 mL/min/1 73 square meters)  Note: GFR calculation is accurate only with a steady state creatinine    CBC and differential [[id number]]  (Abnormal) Collected: 06/15/21 0955    Lab Status: Final result Specimen: Blood from Arm, Right Updated: 06/15/21 1000     WBC 6 25 Thousand/uL      RBC 4 49 Million/uL      Hemoglobin 11 2 g/dL      Hematocrit 35 9 %      MCV 80 fL      MCH 24 9 pg      MCHC 31 2 g/dL      RDW 17 4 %      MPV 10 4 fL      Platelets 740 Thousands/uL      nRBC 0 /100 WBCs      Neutrophils Relative 77 %      Immat GRANS % 1 %      Lymphocytes Relative 9 %      Monocytes Relative 8 %      Eosinophils Relative 4 %      Basophils Relative 1 %      Neutrophils Absolute 4 85 Thousands/µL      Immature Grans Absolute 0 03 Thousand/uL      Lymphocytes Absolute 0 59 Thousands/µL      Monocytes Absolute 0 48 Thousand/µL      Eosinophils Absolute 0 22 Thousand/µL      Basophils Absolute 0 08 Thousands/µL                  XR chest 1 view portable   ED Interpretation by Olivia German DO (06/15 1023)   CHF changes, right pleural effusion                 Procedures  Procedures         ED Course  ED Course as of Tony 15 1339   Tue Tony 15, 2021   0952 EKG 9:37 a m  normal sinus rhythm rate 60, doubt junctional, PVC, T-wave inversion V1 V2 aVL no ST elevation or depression interpreted by me      1010 Monitor sinus bradycardia rate 50                                SBIRT 20yo+      Most Recent Value   SBIRT (23 yo +)   In order to provide better care to our patients, we are screening all of our patients for alcohol and drug use  Would it be okay to ask you these screening questions? Yes Filed at: 06/15/2021 0945   Initial Alcohol Screen: US AUDIT-C    1  How often do you have a drink containing alcohol?  0 Filed at: 06/15/2021 0945   2  How many drinks containing alcohol do you have on a typical day you are drinking? 0 Filed at: 06/15/2021 0945   3a  Male UNDER 65: How often do you have five or more drinks on one occasion? 0 Filed at: 06/15/2021 0945   3b  FEMALE Any Age, or MALE 65+:  How often do you have 4 or more drinks on one occassion? 0 Filed at: 06/15/2021 0945   Audit-C Score  0 Filed at: 06/15/2021 0945   DANIELA: How many times in the past year have you    Used an illegal drug or used a prescription medication for non-medical reasons? Never Filed at: 06/15/2021 0945                    MDM    Disposition  Final diagnoses:   Dyspnea   CHF (congestive heart failure) (HCC)   Pleural effusion     Time reflects when diagnosis was documented in both MDM as applicable and the Disposition within this note     Time User Action Codes Description Comment    6/15/2021 10:25 AM Foye Moment Add [R06 00] Dyspnea     6/15/2021 10:25 AM Foye Moment Add [I50 9] CHF (congestive heart failure) ([facility] )     6/15/2021 10:25 AM Foye Moment Add [J90] Pleural effusion       ED Disposition     ED Disposition Condition Date/Time Comment    Admit Stable Tue Tony 15, 2021 11:06 AM Case was discussed with Arianna and the patient's admission status was agreed to be Admission Status: inpatient status to the service of Dr Bayron Martinez           Follow-up Information    None         Current Discharge Medication List      CONTINUE these medications which have NOT CHANGED    Details   Ascorbic Acid, Vitamin C, (VITAMIN C) 100 MG tablet Take 500 mg by mouth      diltiazem (CARDIZEM CD) 300 mg 24 hr capsule Take 300 mg by mouth daily       famotidine (PEPCID) 40 MG tablet       furosemide (LASIX) 40 mg tablet Take 80 mg by mouth daily       gabapentin (NEURONTIN) 300 mg capsule Take 300 mg by mouth 2 (two) times a day 1 tab qam, 2 tabs qhs      levothyroxine 150 mcg tablet Take 150 mcg by mouth daily       metFORMIN (GLUCOPHAGE) 500 mg tablet 500 mg 2 (two) times a day with meals       Multiple Vitamin (Multi-Day) TABS Take 1 tablet by mouth daily       potassium chloride (K-DUR,KLOR-CON) 20 mEq tablet Take 1 tablet (20 mEq total) by mouth daily  Qty: 30 tablet, Refills: 0    Associated Diagnoses: Acute on chronic diastolic CHF (congestive heart failure) (HCC)      warfarin (COUMADIN) 5 mg tablet Take 5 mg by mouth Sun, Wed, Friday- 1 tab  Tues, Thursday, Saturday- 2 tab      Ferrous Sulfate (IRON PO) Take by mouth      pantoprazole (PROTONIX) 40 mg tablet Take 1 tablet (40 mg total) by mouth daily  Qty: 30 tablet, Refills: 0    Associated Diagnoses: Type 2 diabetes mellitus without complication, without long-term current use of insulin (HCC)      ranitidine (ZANTAC) 300 MG tablet Take 300 mg by mouth daily as needed            No discharge procedures on file      PDMP Review     None          ED Provider  Electronically Signed by           Maddy Varela DO  06/15/21 4840

## 2021-06-15 NOTE — ASSESSMENT & PLAN NOTE
Lab Results   Component Value Date    HGBA1C 5 9 (H) 11/30/2018     No results for input(s): POCGLU in the last 72 hours      Blood Sugar Average: Last 72 hrs:  · Repeat A1c ordered; appears to be well controlled as of last value however  · Managed as an outpatient on metformin; hold while inpatient  · Long-acting and SSI ordered  · Carb controlled diet  · Monitor with POCT BG and titrate regimen as indicated

## 2021-06-15 NOTE — ASSESSMENT & PLAN NOTE
· Appears rate controlled on admission given heart rate of 51  · Continue home dose Cardizem  · Anticoagulated on Coumadin  · 5 mg on Tuesday and Thursday  · 7 5 mg on Sunday, Monday, Wednesday, Friday, and Saturday  · Goal INR 2-3  · Monitor with routine vitals

## 2021-06-15 NOTE — PLAN OF CARE
Problem: Potential for Falls  Goal: Patient will remain free of falls  Description: INTERVENTIONS:  - Educate patient/family on patient safety including physical limitations  - Instruct patient to call for assistance with activity   - Consult OT/PT to assist with strengthening/mobility   - Keep Call bell within reach  - Keep bed low and locked with side rails adjusted as appropriate  - Keep care items and personal belongings within reach  - Initiate and maintain comfort rounds  - Make Fall Risk Sign visible to staff  - Offer Toileting every 2 Hours, in advance of need  - Initiate/Maintain bed alarm  - Obtain necessary fall risk management equipment:   - Apply yellow socks and bracelet for high fall risk patients  - Consider moving patient to room near nurses station  Outcome: Progressing     Problem: MOBILITY - ADULT  Goal: Maintain or return to baseline ADL function  Description: INTERVENTIONS:  -  Assess patient's ability to carry out ADLs; assess patient's baseline for ADL function and identify physical deficits which impact ability to perform ADLs (bathing, care of mouth/teeth, toileting, grooming, dressing, etc )  - Assess/evaluate cause of self-care deficits   - Assess range of motion  - Assess patient's mobility; develop plan if impaired  - Assess patient's need for assistive devices and provide as appropriate  - Encourage maximum independence but intervene and supervise when necessary  - Involve family in performance of ADLs  - Assess for home care needs following discharge   - Consider OT consult to assist with ADL evaluation and planning for discharge  - Provide patient education as appropriate  Outcome: Progressing  Goal: Maintains/Returns to pre admission functional level  Description: INTERVENTIONS:  - Perform BMAT or MOVE assessment daily    - Set and communicate daily mobility goal to care team and patient/family/caregiver     - Collaborate with rehabilitation services on mobility goals if consulted  - Perform Range of Motion 4 times a day  - Reposition patient every 2 hours    - Dangle patient 3 times a day  - Stand patient 3 times a day  - Ambulate patient 3 times a day  - Out of bed to chair 3 times a day   - Out of bed for meals 3 times a day  - Out of bed for toileting  - Record patient progress and toleration of activity level   Outcome: Progressing     Problem: PAIN - ADULT  Goal: Verbalizes/displays adequate comfort level or baseline comfort level  Description: Interventions:  - Encourage patient to monitor pain and request assistance  - Assess pain using appropriate pain scale  - Administer analgesics based on type and severity of pain and evaluate response  - Implement non-pharmacological measures as appropriate and evaluate response  - Consider cultural and social influences on pain and pain management  - Notify physician/advanced practitioner if interventions unsuccessful or patient reports new pain  Outcome: Progressing     Problem: INFECTION - ADULT  Goal: Absence or prevention of progression during hospitalization  Description: INTERVENTIONS:  - Assess and monitor for signs and symptoms of infection  - Monitor lab/diagnostic results  - Monitor all insertion sites, i e  indwelling lines, tubes, and drains  - Monitor endotracheal if appropriate and nasal secretions for changes in amount and color  - [place] appropriate cooling/warming therapies per order  - Administer medications as ordered  - Instruct and encourage patient and family to use good hand hygiene technique  - Identify and instruct in appropriate isolation precautions for identified infection/condition  Outcome: Progressing  Goal: Absence of fever/infection during neutropenic period  Description: INTERVENTIONS:  - Monitor WBC    Outcome: Progressing     Problem: SAFETY ADULT  Goal: Patient will remain free of falls  Description: INTERVENTIONS:  - Educate patient/family on patient safety including physical limitations  - Instruct patient to call for assistance with activity   - Consult OT/PT to assist with strengthening/mobility   - Keep Call bell within reach  - Keep bed low and locked with side rails adjusted as appropriate  - Keep care items and personal belongings within reach  - Initiate and maintain comfort rounds  - Make Fall Risk Sign visible to staff  - Offer Toileting every 2 Hours, in advance of need  - Initiate/Maintain bed alarm  - Obtain necessary fall risk management equipment: walker  - Apply yellow socks and bracelet for high fall risk patients  - Consider moving patient to room near nurses station  Outcome: Progressing  Goal: Maintain or return to baseline ADL function  Description: INTERVENTIONS:  -  Assess patient's ability to carry out ADLs; assess patient's baseline for ADL function and identify physical deficits which impact ability to perform ADLs (bathing, care of mouth/teeth, toileting, grooming, dressing, etc )  - Assess/evaluate cause of self-care deficits   - Assess range of motion  - Assess patient's mobility; develop plan if impaired  - Assess patient's need for assistive devices and provide as appropriate  - Encourage maximum independence but intervene and supervise when necessary  - Involve family in performance of ADLs  - Assess for home care needs following discharge   - Consider OT consult to assist with ADL evaluation and planning for discharge  - Provide patient education as appropriate  Outcome: Progressing  Goal: Maintains/Returns to pre admission functional level  Description: INTERVENTIONS:  - Perform BMAT or MOVE assessment daily    - Set and communicate daily mobility goal to care team and patient/family/caregiver  - Collaborate with rehabilitation services on mobility goals if consulted  - Perform Range of Motion 3 times a day  - Reposition patient every 2 hours    - Dangle patient 3 times a day  - Stand patient 3 times a day  - Ambulate patient 3 times a day  - Out of bed to chair 3 times a day   - Out of bed for meals 3 times a day  - Out of bed for toileting  - Record patient progress and toleration of activity level   Outcome: Progressing     Problem: DISCHARGE PLANNING  Goal: Discharge to home or other facility with appropriate resources  Description: INTERVENTIONS:  - Identify barriers to discharge w/patient and caregiver  - Arrange for needed discharge resources and transportation as appropriate  - Identify discharge learning needs (meds, wound care, etc )  - Arrange for interpretive services to assist at discharge as needed  - Refer to Case Management Department for coordinating discharge planning if the patient needs post-hospital services based on physician/advanced practitioner order or complex needs related to functional status, cognitive ability, or social support system  Outcome: Progressing     Problem: Nutrition/Hydration-ADULT  Goal: Nutrient/Hydration intake appropriate for improving, restoring or maintaining nutritional needs  Description: Monitor and assess patient's nutrition/hydration status for malnutrition  Collaborate with interdisciplinary team and initiate plan and interventions as ordered  Monitor patient's weight and dietary intake as ordered or per policy  Utilize nutrition screening tool and intervene as necessary  Determine patient's food preferences and provide high-protein, high-caloric foods as appropriate       INTERVENTIONS:  - Monitor oral intake, urinary output, labs, and treatment plans  - Assess nutrition and hydration status and recommend course of action  - Evaluate amount of meals eaten  - Assist patient with eating if necessary   - Allow adequate time for meals  - Recommend/ encourage appropriate diets, oral nutritional supplements, and vitamin/mineral supplements  - Order, calculate, and assess calorie counts as needed  - Recommend, monitor, and adjust tube feedings and TPN/PPN based on assessed needs  - Assess need for intravenous fluids  - Provide specific nutrition/hydration education as appropriate  - Include patient/family/caregiver in decisions related to nutrition  Outcome: Progressing

## 2021-06-15 NOTE — ASSESSMENT & PLAN NOTE
Lab Results   Component Value Date    EGFR 29 06/15/2021    EGFR 42 11/01/2020    EGFR 42 10/31/2020    CREATININE 1 72 (H) 06/15/2021    CREATININE 1 25 11/01/2020    CREATININE 1 27 10/31/2020   · Present on admission as evidence by creatinine of 1 72  · Baseline appears to be approximately 1 2 on review of records  · Likely secondary to hypervolemia in the setting of CHF exacerbation  · IV diuresis ordered; cardiology to manage  · Avoid nephrotoxins, relative hypotension, and NSAIDs as appropriate  · Monitor electrolytes and volume status closely  · Repeat kidney function with morning labs

## 2021-06-15 NOTE — PLAN OF CARE
Problem: Potential for Falls  Goal: Patient will remain free of falls  Description: INTERVENTIONS:  - Educate patient/family on patient safety including physical limitations  - Instruct patient to call for assistance with activity   - Consult OT/PT to assist with strengthening/mobility   - Keep Call bell within reach  - Keep bed low and locked with side rails adjusted as appropriate  - Keep care items and personal belongings within reach  - Initiate and maintain comfort rounds  - Make Fall Risk Sign visible to staff  - Obtain necessary fall risk management equipment:   - Apply yellow socks and bracelet for high fall risk patients  - Consider moving patient to room near nurses station  Outcome: Progressing     Problem: MOBILITY - ADULT  Goal: Maintain or return to baseline ADL function  Description: INTERVENTIONS:  -  Assess patient's ability to carry out ADLs; assess patient's baseline for ADL function and identify physical deficits which impact ability to perform ADLs (bathing, care of mouth/teeth, toileting, grooming, dressing, etc )  - Assess/evaluate cause of self-care deficits   - Assess range of motion  - Assess patient's mobility; develop plan if impaired  - Assess patient's need for assistive devices and provide as appropriate  - Encourage maximum independence but intervene and supervise when necessary  - Involve family in performance of ADLs  - Assess for home care needs following discharge   - Consider OT consult to assist with ADL evaluation and planning for discharge  - Provide patient education as appropriate  Outcome: Progressing  Goal: Maintains/Returns to pre admission functional level  Description: INTERVENTIONS:  - Perform BMAT or MOVE assessment daily    - Set and communicate daily mobility goal to care team and patient/family/caregiver     - Collaborate with rehabilitation services on mobility goals if consulted  - Perform Range of Motion   - Reposition patient every   - Dangle patient  - Stand patient  - Ambulate patient   - Out of bed to chair   - Out of bed for meal  - Out of bed for toileting  - Record patient progress and toleration of activity level   Outcome: Progressing     Problem: PAIN - ADULT  Goal: Verbalizes/displays adequate comfort level or baseline comfort level  Description: Interventions:  - Encourage patient to monitor pain and request assistance  - Assess pain using appropriate pain scale  - Administer analgesics based on type and severity of pain and evaluate response  - Implement non-pharmacological measures as appropriate and evaluate response  - Consider cultural and social influences on pain and pain management  - Notify physician/advanced practitioner if interventions unsuccessful or patient reports new pain  Outcome: Progressing     Problem: INFECTION - ADULT  Goal: Absence or prevention of progression during hospitalization  Description: INTERVENTIONS:  - Assess and monitor for signs and symptoms of infection  - Monitor lab/diagnostic results  - Monitor all insertion sites, i e  indwelling lines, tubes, and drains  - Monitor endotracheal if appropriate and nasal secretions for changes in amount and color  - [place] appropriate cooling/warming therapies per order  - Administer medications as ordered  - Instruct and encourage patient and family to use good hand hygiene technique  - Identify and instruct in appropriate isolation precautions for identified infection/condition  Outcome: Progressing  Goal: Absence of fever/infection during neutropenic period  Description: INTERVENTIONS:  - Monitor WBC    Outcome: Progressing     Problem: SAFETY ADULT  Goal: Patient will remain free of falls  Description: INTERVENTIONS:  - Educate patient/family on patient safety including physical limitations  - Instruct patient to call for assistance with activity   - Consult OT/PT to assist with strengthening/mobility   - Keep Call bell within reach  - Keep bed low and locked with side rails adjusted as appropriate  - Keep care items and personal belongings within reach  - Initiate and maintain comfort rounds  - Make Fall Risk Sign visible to staff  - Apply yellow socks and bracelet for high fall risk patients  - Consider moving patient to room near nurses station  Outcome: Progressing  Goal: Maintain or return to baseline ADL function  Description: INTERVENTIONS:  -  Assess patient's ability to carry out ADLs; assess patient's baseline for ADL function and identify physical deficits which impact ability to perform ADLs (bathing, care of mouth/teeth, toileting, grooming, dressing, etc )  - Assess/evaluate cause of self-care deficits   - Assess range of motion  - Assess patient's mobility; develop plan if impaired  - Assess patient's need for assistive devices and provide as appropriate  - Encourage maximum independence but intervene and supervise when necessary  - Involve family in performance of ADLs  - Assess for home care needs following discharge   - Consider OT consult to assist with ADL evaluation and planning for discharge  - Provide patient education as appropriate  Outcome: Progressing  Goal: Maintains/Returns to pre admission functional level  Description: INTERVENTIONS:  - Perform BMAT or MOVE assessment daily    - Set and communicate daily mobility goal to care team and patient/family/caregiver     - Collaborate with rehabilitation services on mobility goals if consulted  - Out of bed for toileting  - Record patient progress and toleration of activity level   Outcome: Progressing     Problem: DISCHARGE PLANNING  Goal: Discharge to home or other facility with appropriate resources  Description: INTERVENTIONS:  - Identify barriers to discharge w/patient and caregiver  - Arrange for needed discharge resources and transportation as appropriate  - Identify discharge learning needs (meds, wound care, etc )  - Arrange for interpretive services to assist at discharge as needed  - Refer to Case Management Department for coordinating discharge planning if the patient needs post-hospital services based on physician/advanced practitioner order or complex needs related to functional status, cognitive ability, or social support system  Outcome: Progressing

## 2021-06-15 NOTE — ASSESSMENT & PLAN NOTE
· Blood pressure mildly elevated on admission with systolics in the 509O  · Managed as an outpatient on diltiazem and oral Lasix  · Continue diltiazem  · IV Lasix ordered in the setting of primary problem  · Monitor with routine vitals

## 2021-06-16 ENCOUNTER — APPOINTMENT (INPATIENT)
Dept: NON INVASIVE DIAGNOSTICS | Facility: HOSPITAL | Age: 76
DRG: 291 | End: 2021-06-16
Payer: MEDICARE

## 2021-06-16 LAB
ALBUMIN SERPL BCP-MCNC: 3.5 G/DL (ref 3.5–5)
ALP SERPL-CCNC: 107 U/L (ref 46–116)
ALT SERPL W P-5'-P-CCNC: 14 U/L (ref 12–78)
ANION GAP SERPL CALCULATED.3IONS-SCNC: 9 MMOL/L (ref 4–13)
AST SERPL W P-5'-P-CCNC: 12 U/L (ref 5–45)
BASOPHILS # BLD AUTO: 0.05 THOUSANDS/ΜL (ref 0–0.1)
BASOPHILS NFR BLD AUTO: 1 % (ref 0–1)
BILIRUB SERPL-MCNC: 0.58 MG/DL (ref 0.2–1)
BUN SERPL-MCNC: 38 MG/DL (ref 5–25)
CALCIUM SERPL-MCNC: 8.6 MG/DL (ref 8.3–10.1)
CHLORIDE SERPL-SCNC: 102 MMOL/L (ref 100–108)
CHOLEST SERPL-MCNC: 135 MG/DL (ref 50–200)
CO2 SERPL-SCNC: 30 MMOL/L (ref 21–32)
CREAT SERPL-MCNC: 1.78 MG/DL (ref 0.6–1.3)
EOSINOPHIL # BLD AUTO: 0.24 THOUSAND/ΜL (ref 0–0.61)
EOSINOPHIL NFR BLD AUTO: 4 % (ref 0–6)
ERYTHROCYTE [DISTWIDTH] IN BLOOD BY AUTOMATED COUNT: 17.3 % (ref 11.6–15.1)
GFR SERPL CREATININE-BSD FRML MDRD: 28 ML/MIN/1.73SQ M
GLUCOSE SERPL-MCNC: 137 MG/DL (ref 65–140)
GLUCOSE SERPL-MCNC: 147 MG/DL (ref 65–140)
GLUCOSE SERPL-MCNC: 158 MG/DL (ref 65–140)
GLUCOSE SERPL-MCNC: 173 MG/DL (ref 65–140)
GLUCOSE SERPL-MCNC: 183 MG/DL (ref 65–140)
HCT VFR BLD AUTO: 34.6 % (ref 34.8–46.1)
HDLC SERPL-MCNC: 64 MG/DL
HGB BLD-MCNC: 10.5 G/DL (ref 11.5–15.4)
IMM GRANULOCYTES # BLD AUTO: 0.02 THOUSAND/UL (ref 0–0.2)
IMM GRANULOCYTES NFR BLD AUTO: 0 % (ref 0–2)
INR PPP: 3.16 (ref 0.84–1.19)
LDLC SERPL CALC-MCNC: 59 MG/DL (ref 0–100)
LYMPHOCYTES # BLD AUTO: 0.75 THOUSANDS/ΜL (ref 0.6–4.47)
LYMPHOCYTES NFR BLD AUTO: 12 % (ref 14–44)
MAGNESIUM SERPL-MCNC: 2.2 MG/DL (ref 1.6–2.6)
MCH RBC QN AUTO: 24.4 PG (ref 26.8–34.3)
MCHC RBC AUTO-ENTMCNC: 30.3 G/DL (ref 31.4–37.4)
MCV RBC AUTO: 80 FL (ref 82–98)
MONOCYTES # BLD AUTO: 0.61 THOUSAND/ΜL (ref 0.17–1.22)
MONOCYTES NFR BLD AUTO: 10 % (ref 4–12)
NEUTROPHILS # BLD AUTO: 4.66 THOUSANDS/ΜL (ref 1.85–7.62)
NEUTS SEG NFR BLD AUTO: 73 % (ref 43–75)
NRBC BLD AUTO-RTO: 0 /100 WBCS
PLATELET # BLD AUTO: 158 THOUSANDS/UL (ref 149–390)
PMV BLD AUTO: 10.5 FL (ref 8.9–12.7)
POTASSIUM SERPL-SCNC: 4.3 MMOL/L (ref 3.5–5.3)
PROT SERPL-MCNC: 7.2 G/DL (ref 6.4–8.2)
PROTHROMBIN TIME: 31.8 SECONDS (ref 11.6–14.5)
RBC # BLD AUTO: 4.31 MILLION/UL (ref 3.81–5.12)
SODIUM SERPL-SCNC: 141 MMOL/L (ref 136–145)
TRIGL SERPL-MCNC: 60 MG/DL
WBC # BLD AUTO: 6.33 THOUSAND/UL (ref 4.31–10.16)

## 2021-06-16 PROCEDURE — 82948 REAGENT STRIP/BLOOD GLUCOSE: CPT

## 2021-06-16 PROCEDURE — 99232 SBSQ HOSP IP/OBS MODERATE 35: CPT | Performed by: NURSE PRACTITIONER

## 2021-06-16 PROCEDURE — 80053 COMPREHEN METABOLIC PANEL: CPT | Performed by: NURSE PRACTITIONER

## 2021-06-16 PROCEDURE — 83735 ASSAY OF MAGNESIUM: CPT | Performed by: NURSE PRACTITIONER

## 2021-06-16 PROCEDURE — 85025 COMPLETE CBC W/AUTO DIFF WBC: CPT | Performed by: NURSE PRACTITIONER

## 2021-06-16 PROCEDURE — 85610 PROTHROMBIN TIME: CPT | Performed by: NURSE PRACTITIONER

## 2021-06-16 PROCEDURE — 80061 LIPID PANEL: CPT | Performed by: NURSE PRACTITIONER

## 2021-06-16 RX ORDER — ACETAMINOPHEN 325 MG/1
650 TABLET ORAL EVERY 6 HOURS PRN
Status: DISCONTINUED | OUTPATIENT
Start: 2021-06-16 | End: 2021-06-18 | Stop reason: HOSPADM

## 2021-06-16 RX ORDER — ALENDRONATE SODIUM 70 MG/1
70 TABLET ORAL
COMMUNITY
End: 2022-07-08

## 2021-06-16 RX ADMIN — OXYCODONE HYDROCHLORIDE AND ACETAMINOPHEN 500 MG: 500 TABLET ORAL at 08:10

## 2021-06-16 RX ADMIN — FAMOTIDINE 20 MG: 20 TABLET, FILM COATED ORAL at 08:11

## 2021-06-16 RX ADMIN — POTASSIUM CHLORIDE 20 MEQ: 1500 TABLET, EXTENDED RELEASE ORAL at 08:10

## 2021-06-16 RX ADMIN — INSULIN LISPRO 1 UNITS: 100 INJECTION, SOLUTION INTRAVENOUS; SUBCUTANEOUS at 11:50

## 2021-06-16 RX ADMIN — INSULIN GLARGINE 15 UNITS: 100 INJECTION, SOLUTION SUBCUTANEOUS at 23:21

## 2021-06-16 RX ADMIN — GABAPENTIN 300 MG: 300 CAPSULE ORAL at 08:10

## 2021-06-16 RX ADMIN — DILTIAZEM HYDROCHLORIDE 300 MG: 180 CAPSULE, COATED, EXTENDED RELEASE ORAL at 08:11

## 2021-06-16 RX ADMIN — GABAPENTIN 300 MG: 300 CAPSULE ORAL at 17:14

## 2021-06-16 RX ADMIN — ACETAMINOPHEN 650 MG: 325 TABLET ORAL at 23:28

## 2021-06-16 RX ADMIN — ACETAMINOPHEN 650 MG: 325 TABLET ORAL at 09:35

## 2021-06-16 RX ADMIN — INSULIN LISPRO 1 UNITS: 100 INJECTION, SOLUTION INTRAVENOUS; SUBCUTANEOUS at 23:22

## 2021-06-16 RX ADMIN — FUROSEMIDE 60 MG: 10 INJECTION, SOLUTION INTRAMUSCULAR; INTRAVENOUS at 08:10

## 2021-06-16 RX ADMIN — LEVOTHYROXINE SODIUM 150 MCG: 150 TABLET ORAL at 06:06

## 2021-06-16 RX ADMIN — WARFARIN SODIUM 7.5 MG: 7.5 TABLET ORAL at 17:14

## 2021-06-16 RX ADMIN — FUROSEMIDE 60 MG: 10 INJECTION, SOLUTION INTRAMUSCULAR; INTRAVENOUS at 17:14

## 2021-06-16 NOTE — RESPIRATORY THERAPY NOTE
RT Protocol Note  Audelia Ranks 77 y o  female MRN: [id number]  Unit/Bed#: -01 Encounter: [id number]    Assessment    Principal Problem:    Acute on chronic diastolic CHF (congestive heart failure) ([place] [address] )  Active Problems:    Atrial fibrillation (HCC)    JOS (obstructive sleep apnea)    Pacemaker    Type 2 diabetes mellitus with diabetic neuropathy (HCC)    Disease of thyroid gland    Hypertension    Acute kidney injury superimposed on chronic kidney disease ([place] [address] )      Home Pulmonary Medications:  None       Past Medical History:   Diagnosis Date    Arthritis     CHF (congestive heart failure) ([place] [address] )     Diabetes mellitus ([place] [address] )     Disease of thyroid gland     Hypertension     Renal disorder      Social History     Socioeconomic History    Marital status: /Civil Union     Spouse name: None    Number of children: None    Years of education: None    Highest education level: None   Occupational History    None   Tobacco Use    Smoking status: Never Smoker    Smokeless tobacco: Never Used   Vaping Use    Vaping Use: Never used   Substance and Sexual Activity    Alcohol use: Not Currently    Drug use: Not Currently    Sexual activity: Yes   Other Topics Concern    None   Social History Narrative    None     Social Determinants of Health     Financial Resource Strain:     Difficulty of Paying Living Expenses:    Food Insecurity:     Worried About Running Out of Food in the Last Year:     Ran Out of Food in the Last Year:    Transportation Needs:     Lack of Transportation (Medical):      Lack of Transportation (Non-Medical):    Physical Activity:     Days of Exercise per Week:     Minutes of Exercise per Session:    Stress:     Feeling of Stress :    Social Connections:     Frequency of Communication with Friends and Family:     Frequency of Social Gatherings with Friends and Family:     Attends [employer] Services:     Active Member of Clubs or Organizations:     Attends Club or Organization Meetings:     Marital Status:    Intimate Partner Violence:     Fear of Current or Ex-Partner:     Emotionally Abused:     Physically Abused:     Sexually Abused:        Subjective         Objective    Physical Exam:   Assessment Type: Assess only  General Appearance: Sleeping  Respiratory Pattern: Normal  Chest Assessment: Chest expansion symmetrical  Bilateral Breath Sounds: Diminished (per nurse)  Cough: Non-productive (per nurse)  O2 Device: 2L nasal canula    Vitals:  Blood pressure 154/67, pulse (!) 53, temperature 98 7 °F (37 1 °C), resp  rate 17, height 5' 2" (1 575 m), weight 97 6 kg (215 lb 2 7 oz), SpO2 94 %  Imaging and other studies: Pulmonary vascular congestion is mildly improved  There is no pneumothorax  Mild elevation right hemidiaphragm is noted  Some increased density at the right base may be related atelectasis or infiltrate      O2 Device: 2L nasal canula     Plan    Respiratory Plan: Mild Distress pathway        Resp Comments: sleeping soundly

## 2021-06-16 NOTE — PROGRESS NOTES
Progress Note - Cardiology   Opal Dallas 77 y o  female MRN: [id number]  Unit/Bed#: -01 Encounter: [id number]    Assessment:  Acute on chronic HFpEF, abdominal fullness, elevated JVD, 14 lb weight gain, orthopnea  Persistent Afib- rate controlled on coumadin   HTN  SSS sp PPM- [employer], atrial lead programmed off for poor function/sensing  CKD  DM2  Mod pHTN    Plan:  - Continue Lasix 60 IV BID  - Monitor I and O, daily standing weights, electrolytes and renal function  - will continue to follow     Subjective/Objective     Subjective: pt reports abdominal fullness has improved but is still present  Reports orthopnea is much better and she can more easily breath  She is happy with improvement  Objective: urinary output appropriate  Renal function stable  Electrolytes stable  Vitals reviewed and stable  Vitals: /68   Pulse 55   Temp (!) 97 3 °F (36 3 °C)   Resp 20   Ht 5' 2" (1 575 m)   Wt 97 6 kg (215 lb 2 7 oz) Comment: pt not able to stand at this time  SpO2 99%   BMI 39 35 kg/m²   Vitals:    06/15/21 1154 06/16/21 0300   Weight: 99 kg (218 lb 3 2 oz) 97 6 kg (215 lb 2 7 oz)     Orthostatic Blood Pressures      Most Recent Value   Blood Pressure  150/68 filed at 06/16/2021 0751   Patient Position - Orthostatic VS  Lying filed at 06/15/2021 1115            Intake/Output Summary (Last 24 hours) at 6/16/2021 1034  Last data filed at 6/16/2021 0825  Gross per 24 hour   Intake 660 ml   Output 2250 ml   Net -1590 ml       Invasive Devices     Peripheral Intravenous Line            Peripheral IV 06/15/21 Right Antecubital 1 day          Drain            External Urinary Catheter <1 day                  Physical Exam:   Vitals reviewed  In no acute distress  Head of bed elevated  No edema  +JVD  Rales  No w/r  No m/g/r  Abdominal tenderness noted to palpitation    Lab Results:   I have personally reviewed pertinent lab results      CBC with diff:   Results from last 7 days   Lab Units 06/16/21  0454   WBC Thousand/uL 6 33   RBC Million/uL 4 31   HEMOGLOBIN g/dL 10 5*   HEMATOCRIT % 34 6*   MCV fL 80*   MCH pg 24 4*   MCHC g/dL 30 3*   RDW % 17 3*   MPV fL 10 5   PLATELETS Thousands/uL 158     CMP:   Results from last 7 days   Lab Units 06/16/21  0454   SODIUM mmol/L 141   POTASSIUM mmol/L 4 3   CHLORIDE mmol/L 102   CO2 mmol/L 30   BUN mg/dL 38*   CREATININE mg/dL 1 78*   CALCIUM mg/dL 8 6   AST U/L 12   ALT U/L 14   ALK PHOS U/L 107   EGFR ml/min/1 73sq m 28     Troponin:   0   Lab Value Date/Time    TROPONINI <0 02 06/15/2021 0955    TROPONINI <0 02 10/25/2020 1803    TROPONINI <0 02 10/25/2020 1532    TROPONINI <0 02 10/25/2020 1144     BNP:   Results from last 7 days   Lab Units 06/16/21  0454   POTASSIUM mmol/L 4 3   CHLORIDE mmol/L 102   CO2 mmol/L 30   BUN mg/dL 38*   CREATININE mg/dL 1 78*   CALCIUM mg/dL 8 6   EGFR ml/min/1 73sq m 28     Coags:   Results from last 7 days   Lab Units 06/16/21  0635   INR  3 16*     TSH:   Results from last 7 days   Lab Units 06/15/21  0955   TSH 3RD GENERATON uIU/mL 5 834*     Magnesium:   Results from last 7 days   Lab Units 06/16/21  0454   MAGNESIUM mg/dL 2 2     Imaging: I have personally reviewed pertinent reports  Echocardiogram: 04/05/2021: Normal ventricle size, wall thickness with preserved systolic function  Ejection fraction 60%  Dilated right ventricle  Leads in the right heart  Dilated left and right atrium  Trace mitral regurgitation  Moderate to severe tricuspid regurgitation with estimated right ventricular systolic pressure of 72 mm of mercury  Echocardiogram: 09/08/2020: Normal ventricle size with preserved systolic function  Ejection fraction 59%  Septal flattening secondary to right-sided pressure/volume overload  Severely dilated right ventricle with preserved systolic function  Leads in the right heart  Severely dilated left atrium  Mild-to-moderate mitral regurgitation  Trace aortic insufficiency   Moderate tricuspid regurgitation with estimated right ventricular systolic pressure of 81-96 mm of mercury  Compared to prior study right ventricle systolic pressure was previously estimated at 47 mm of mercury  :

## 2021-06-16 NOTE — ASSESSMENT & PLAN NOTE
· Appears rate controlled on admission given heart rate of 51  · Continue home dose Cardizem  · Anticoagulated on Coumadin  · 5 mg on Tuesday and Thursday  · 7 5 mg on Sunday, Monday, Wednesday, Friday, and Saturday  · Goal INR 2-3; INR remains at goal   · Continue with daily draws while IP   · Monitor with routine vitals

## 2021-06-16 NOTE — PROGRESS NOTES
[address]  Progress Note - Angel Mosquera 1945, 77 y o  female MRN: [id number]  Unit/Bed#: -[place] Encounter: [id number]  Primary Care Provider: Doyle Collet, MD   Date and time admitted to hospital: 6/15/2021  9:27 AM    * Acute on chronic diastolic CHF (congestive heart failure) ([address] )  Assessment & Plan  Wt Readings from Last 3 Encounters:   06/16/21 97 6 kg (215 lb 2 7 oz)   05/27/21 93 kg (205 lb)   04/15/21 92 5 kg (204 lb)     Background:  Presented to the ED given significant shortness of breath  She denies any chest pain on my evaluation however she did report chest pain in the emergency department and has had status post nitro paste application  Also endorses orthopnea, leg swelling, leg gain, and occasional abdominal discomfort  She also denies any dietary indiscretion or mixed medication doses     With acute exacerbation as evidence by dyspnea, lower extremity swelling, and reports awaking   CXR appears to have some vascular congestion; final read pending   BNP 1808   EKG reviewed   Unable to review OP Echo; Repeat ECHO ordered   Lasix 60mg IV BID   Lipid panel ordered    Monitor electrolytes with diuresis    Daily weights   Strict I & Os   Cardiac low sodium <2g, fluid restriction <1500, carb controlled diet   Cardiology consulted; input appreciated    Acute kidney injury superimposed on chronic kidney disease [facility])  Assessment & Plan  Lab Results   Component Value Date    EGFR 28 06/16/2021    EGFR 29 06/15/2021    EGFR 42 11/01/2020    CREATININE 1 78 (H) 06/16/2021    CREATININE 1 72 (H) 06/15/2021    CREATININE 1 25 11/01/2020   · Present on admission as evidence by creatinine of 1 72; stable however remaining elevated   · Baseline appears to be approximately 1 2 on review of records  · Likely secondary to hypervolemia in the setting of CHF exacerbation  · IV diuresis ordered; cardiology to manage  · Avoid nephrotoxins, relative hypotension, and NSAIDs as appropriate  · Monitor electrolytes and volume status closely  · Repeat kidney function with morning labs    Atrial fibrillation [facility])  Assessment & Plan  · Appears rate controlled on admission given heart rate of 51  · Continue home dose Cardizem  · Anticoagulated on Coumadin  · 5 mg on Tuesday and Thursday  · 7 5 mg on Sunday, Monday, Wednesday, Friday, and Saturday  · Goal INR 2-3; INR remains at goal   · Continue with daily draws while IP   · Monitor with routine vitals    Type 2 diabetes mellitus with diabetic neuropathy [facility])  Assessment & Plan  Lab Results   Component Value Date    HGBA1C 7 0 (H) 06/15/2021     Recent Labs     06/15/21  1614 06/15/21  2102 06/16/21  0752 06/16/21  1122   POCGLU 155* 159* 147* 183*       Blood Sugar Average: Last 72 hrs:  · Repeat A1c ordered; appears to be well controlled as of last value however  · Managed as an outpatient on metformin; hold while inpatient  · Long-acting and SSI ordered  · Carb controlled diet  · Monitor with POCT BG and titrate regimen as indicated    JOS (obstructive sleep apnea)  Assessment & Plan  · CPAP while inpatient    Pacemaker  Assessment & Plan  · Secondary to tachy-yunier syndrome  · Follows outpatient with Cardiology in [address] [place] [address] Dr Christi Samson with [facility]   · Continue diltiazem per PTA medication regimen  · EKG reviewed    Hypertension  Assessment & Plan  · Blood pressure mildly elevated on admission with systolics in the 766E[address]734Y  · Managed as an outpatient on diltiazem and oral Lasix  · Continue diltiazem  · IV Lasix ordered in the setting of primary problem  · Pending BP on 6/16 would consider initiation of amlodipine versus hydralazine for better blood pressure control   · Monitor with routine vitals    Disease of thyroid gland  Assessment & Plan  · Continue home dose Synthroid therapy      VTE Pharmacologic Prophylaxis: VTE Score: 6 High Risk (Score >/= 5) - Pharmacological DVT Prophylaxis Ordered: warfarin (Coumadin)  Sequential Compression Devices Ordered  Patient Centered Rounds: I performed bedside rounds with nursing staff today  Discussions with Specialists or Other Care Team Provider:  Nursing staff, case management, and cardiology team    Education and Discussions with Family / Patient: Attempted to update  () via phone  Left voicemail  Time Spent for Care: 30 minutes  More than 50% of total time spent on counseling and coordination of care as described above  Current Length of Stay: 1 day(s)  Current Patient Status: Inpatient   Certification Statement: The patient will continue to require additional inpatient hospital stay due to IV diuresis  Discharge Plan: Anticipate discharge in 24-48 hrs to Pending PT/OT evaluations as well as response to IV diuretic    Code Status: Level 1 - Full Code    Subjective:   Patient reporting her breathing is much improved  Denies any chest pain  Objective:     Vitals:   Temp (24hrs), Av 8 °F (36 6 °C), Min:97 3 °F (36 3 °C), Max:98 7 °F (37 1 °C)    Temp:  [97 3 °F (36 3 °C)-98 7 °F (37 1 °C)] 97 3 °F (36 3 °C)  HR:  [47-55] 55  Resp:  [14-20] 20  BP: (150-161)/(67-70) 150/68  SpO2:  [91 %-99 %] 99 %  Body mass index is 39 35 kg/m²  Input and Output Summary (last 24 hours): Intake/Output Summary (Last 24 hours) at 2021 1200  Last data filed at 2021 1123  Gross per 24 hour   Intake 660 ml   Output 2850 ml   Net -2190 ml       Physical Exam:   Physical Exam  Vitals and nursing note reviewed  Constitutional:       General: She is not in acute distress  Appearance: Normal appearance  She is well-developed  She is obese  She is not ill-appearing or diaphoretic  HENT:      Head: Normocephalic and atraumatic  Eyes:      Conjunctiva/sclera: Conjunctivae normal    Cardiovascular:      Rate and Rhythm: Normal rate and regular rhythm  Pulses: Normal pulses             Radial pulses are 2+ on the right side and 2+ on the left side         Dorsalis pedis pulses are 2+ on the right side and 2+ on the left side  Pulmonary:      Effort: Pulmonary effort is normal  No respiratory distress  Breath sounds: No wheezing  Abdominal:      General: Abdomen is flat  Bowel sounds are normal  There is no distension  Palpations: Abdomen is soft  Tenderness: There is no abdominal tenderness  Musculoskeletal:      Cervical back: Neck supple  Right lower leg: Edema present  Left lower leg: Edema present  Skin:     General: Skin is warm and dry  Capillary Refill: Capillary refill takes less than 2 seconds  Neurological:      Mental Status: She is alert and oriented to person, place, and time  Mental status is at baseline  Psychiatric:         Mood and Affect: Mood normal          Behavior: Behavior normal  Behavior is cooperative           Judgment: Judgment normal           Additional Data:     Labs:  Results from last 7 days   Lab Units 06/16/21  0454   WBC Thousand/uL 6 33   HEMOGLOBIN g/dL 10 5*   HEMATOCRIT % 34 6*   PLATELETS Thousands/uL 158   NEUTROS PCT % 73   LYMPHS PCT % 12*   MONOS PCT % 10   EOS PCT % 4     Results from last 7 days   Lab Units 06/16/21  0454   SODIUM mmol/L 141   POTASSIUM mmol/L 4 3   CHLORIDE mmol/L 102   CO2 mmol/L 30   BUN mg/dL 38*   CREATININE mg/dL 1 78*   ANION GAP mmol/L 9   CALCIUM mg/dL 8 6   ALBUMIN g/dL 3 5   TOTAL BILIRUBIN mg/dL 0 58   ALK PHOS U/L 107   ALT U/L 14   AST U/L 12   GLUCOSE RANDOM mg/dL 137     Results from last 7 days   Lab Units 06/16/21  0635   INR  3 16*     Results from last 7 days   Lab Units 06/16/21  1122 06/16/21  0752 06/15/21  2102 06/15/21  1614 06/15/21  1303   POC GLUCOSE mg/dl 183* 147* 159* 155* 151*     Results from last 7 days   Lab Units 06/15/21  1253   HEMOGLOBIN A1C % 7 0*           Lines/Drains:  Invasive Devices     Peripheral Intravenous Line            Peripheral IV 06/15/21 Right Antecubital 1 day          Drain            External Urinary Catheter <1 day              Imaging: Reviewed radiology reports from this admission including: chest xray    Recent Cultures (last 7 days):         Last 24 Hours Medication List:   Current Facility-Administered Medications   Medication Dose Route Frequency Provider Last Rate    acetaminophen  650 mg Oral Q6H PRN SAM Kimbrough      Ascorbic Acid (Vitamin C)  500 mg Oral Daily SAM Kimbrough      calcium carbonate  1,000 mg Oral Daily PRN SAM Kimbrough      diltiazem  300 mg Oral Daily SAM Vee      famotidine  20 mg Oral Daily SAM Vee      furosemide  60 mg Intravenous BID Margo Johnson MD      gabapentin  300 mg Oral BID SAM Kimbrough      insulin glargine  15 Units Subcutaneous HS Margo Johnson MD      insulin lispro  1-6 Units Subcutaneous TID AC SAM Kimbrough      insulin lispro  1-6 Units Subcutaneous HS SAM Kimbrough      levothyroxine  150 mcg Oral Early Morning SAM Kimbrough      ondansetron  4 mg Intravenous Q6H PRN SAM Kimbrough      polyethylene glycol  17 g Oral Daily PRN SAM Kimbrough      potassium chloride  20 mEq Oral Daily SAM Vee      warfarin  5 mg Oral Once per day on Tue Thu Cecille Beyer, [address]      warfarin  7 5 mg Oral Once per day on Sun Mon Wed Fri Sat Jane brewer, [address]          Today, Patient Was Seen By: SAM Vee    **Please Note: This note may have been constructed using a voice recognition system  **

## 2021-06-16 NOTE — NURSING NOTE
Patient requesting Fosamax since she takes this outpatient weekly and is due for this today; per Dr Prince Lyn cannot give as inpatient, pt notified

## 2021-06-16 NOTE — PLAN OF CARE
Problem: Potential for Falls  Goal: Patient will remain free of falls  Description: INTERVENTIONS:  - Educate patient/family on patient safety including physical limitations  - Instruct patient to call for assistance with activity   - Consult OT/PT to assist with strengthening/mobility   - Keep Call bell within reach  - Keep bed low and locked with side rails adjusted as appropriate  - Keep care items and personal belongings within reach  - Initiate and maintain comfort rounds  - Make Fall Risk Sign visible to staff  - Offer Toileting every 2 Hours, in advance of need  - Initiate/Maintain bed alarm  - Obtain necessary fall risk management equipment: yellow socks, yellow bracelet, walker  Problem: MOBILITY - ADULT  Goal: Maintain or return to baseline ADL function  Description: INTERVENTIONS:  -  Assess patient's ability to carry out ADLs; assess patient's baseline for ADL function and identify physical deficits which impact ability to perform ADLs (bathing, care of mouth/teeth, toileting, grooming, dressing, etc )  - Assess/evaluate cause of self-care deficits   - Assess range of motion  - Assess patient's mobility; develop plan if impaired  - Assess patient's need for assistive devices and provide as appropriate  - Encourage maximum independence but intervene and supervise when necessary  - Involve family in performance of ADLs  - Assess for home care needs following discharge   - Consider OT consult to assist with ADL evaluation and planning for discharge  - Provide patient education as appropriate  Outcome: Progressing     Problem: PAIN - ADULT  Goal: Verbalizes/displays adequate comfort level or baseline comfort level  Description: Interventions:  - Encourage patient to monitor pain and request assistance  - Assess pain using appropriate pain scale0-10  - Administer analgesics based on type and severity of pain and evaluate response  - Implement non-pharmacological measures as appropriate and evaluate response  - Consider cultural and social influences on pain and pain management  - Notify physician/advanced practitioner if interventions unsuccessful or patient reports new pain  Outcome: Progressing     Problem: INFECTION - ADULT  Goal: Absence or prevention of progression during hospitalization  Description: INTERVENTIONS:  - Assess and monitor for signs and symptoms of infection  - Monitor lab/diagnostic results  - Monitor all insertion sites, i e  indwelling lines, tubes, and drains  - Monitor endotracheal if appropriate and nasal secretions for changes in amount and color  - [place] appropriate cooling/warming therapies per order  - Administer medications as ordered  - Instruct and encourage patient and family to use good hand hygiene technique  - Identify and instruct in appropriate isolation precautions for identified infection/condition  Outcome: Progressing     Problem: SAFETY ADULT  Goal: Patient will remain free of falls  Description: INTERVENTIONS:  - Educate patient/family on patient safety including physical limitations  - Instruct patient to call for assistance with activity   - Consult OT/PT to assist with strengthening/mobility   - Keep Call bell within reach  - Keep bed low and locked with side rails adjusted as appropriate  - Keep care items and personal belongings within reach  - Initiate and maintain comfort rounds  - Make Fall Risk Sign visible to staff  - Offer Toileting every 2 Hours, in advance of need  - Initiate/Maintain bed alarm  - Apply yellow socks and bracelet for high fall risk patients  - Consider moving patient to room near nurses station  Outcome: Progressing     - Apply yellow socks and bracelet for high fall risk patients  - Consider moving patient to room near nurses station  Outcome: Progressing

## 2021-06-16 NOTE — ASSESSMENT & PLAN NOTE
· Blood pressure mildly elevated on admission with systolics in the 000I-507S  · Managed as an outpatient on diltiazem and oral Lasix  · Continue diltiazem  · IV Lasix ordered in the setting of primary problem  · Pending BP on 6/16 would consider initiation of amlodipine versus hydralazine for better blood pressure control   · Monitor with routine vitals

## 2021-06-16 NOTE — ASSESSMENT & PLAN NOTE
Lab Results   Component Value Date    EGFR 28 06/16/2021    EGFR 29 06/15/2021    EGFR 42 11/01/2020    CREATININE 1 78 (H) 06/16/2021    CREATININE 1 72 (H) 06/15/2021    CREATININE 1 25 11/01/2020   · Present on admission as evidence by creatinine of 1 72; stable however remaining elevated   · Baseline appears to be approximately 1 2 on review of records  · Likely secondary to hypervolemia in the setting of CHF exacerbation  · IV diuresis ordered; cardiology to manage  · Avoid nephrotoxins, relative hypotension, and NSAIDs as appropriate  · Monitor electrolytes and volume status closely  · Repeat kidney function with morning labs

## 2021-06-16 NOTE — ASSESSMENT & PLAN NOTE
Lab Results   Component Value Date    HGBA1C 7 0 (H) 06/15/2021     Recent Labs     06/15/21  1614 06/15/21  2102 06/16/21  0752 06/16/21  1122   POCGLU 155* 159* 147* 183*       Blood Sugar Average: Last 72 hrs:  · Repeat A1c ordered; appears to be well controlled as of last value however  · Managed as an outpatient on metformin; hold while inpatient  · Long-acting and SSI ordered  · Carb controlled diet  · Monitor with POCT BG and titrate regimen as indicated

## 2021-06-16 NOTE — PLAN OF CARE
Problem: CARDIOVASCULAR - ADULT  Goal: Maintains optimal cardiac output and hemodynamic stability  Description: INTERVENTIONS:  - Monitor I/O, vital signs and rhythm  - Monitor for S/S and trends of decreased cardiac output,SOB, CP  - Administer and titrate ordered vasoactive medications to optimize hemodynamic stability  - Assess quality of pulses, skin color and temperature  - Assess for signs of decreased coronary artery perfusion  - Instruct patient to report change in severity of symptoms  Outcome: Progressing     Problem: RESPIRATORY - ADULT  Goal: Achieves optimal ventilation and oxygenation  Description: INTERVENTIONS:  - Assess for changes in respiratory status  - Assess for changes in mentation and behavior  - Position to facilitate oxygenation and minimize respiratory effort  - Oxygen administered by appropriate delivery if ordered  - Initiate smoking cessation education as indicated  - Encourage broncho-pulmonary hygiene including cough, deep breathe, Incentive Spirometry  - Assess the need for suctioning and aspirate as needed  - Assess and instruct to report SOB or any respiratory difficulty  - Respiratory Therapy support as indicated  Outcome: Progressing

## 2021-06-16 NOTE — ASSESSMENT & PLAN NOTE
Wt Readings from Last 3 Encounters:   06/16/21 97 6 kg (215 lb 2 7 oz)   05/27/21 93 kg (205 lb)   04/15/21 92 5 kg (204 lb)     Background:  Presented to the ED given significant shortness of breath  She denies any chest pain on my evaluation however she did report chest pain in the emergency department and has had status post nitro paste application  Also endorses orthopnea, leg swelling, leg gain, and occasional abdominal discomfort  She also denies any dietary indiscretion or mixed medication doses     With acute exacerbation as evidence by dyspnea, lower extremity swelling, and reports awaking   CXR appears to have some vascular congestion; final read pending   BNP 1808   EKG reviewed   Unable to review OP Echo; Repeat ECHO ordered   Lasix 60mg IV BID   Lipid panel ordered    Monitor electrolytes with diuresis    Daily weights   Strict I & Os   Cardiac low sodium <2g, fluid restriction <1500, carb controlled diet   Cardiology consulted; input appreciated

## 2021-06-16 NOTE — ASSESSMENT & PLAN NOTE
· Secondary to tachy-yunier syndrome  · Follows outpatient with Cardiology in [address] Torito Stuart with [facility]   · Continue diltiazem per PTA medication regimen  · EKG reviewed

## 2021-06-17 LAB
ANION GAP SERPL CALCULATED.3IONS-SCNC: 10 MMOL/L (ref 4–13)
BUN SERPL-MCNC: 44 MG/DL (ref 5–25)
CALCIUM SERPL-MCNC: 8.6 MG/DL (ref 8.3–10.1)
CHLORIDE SERPL-SCNC: 102 MMOL/L (ref 100–108)
CO2 SERPL-SCNC: 28 MMOL/L (ref 21–32)
CREAT SERPL-MCNC: 1.89 MG/DL (ref 0.6–1.3)
GFR SERPL CREATININE-BSD FRML MDRD: 26 ML/MIN/1.73SQ M
GLUCOSE SERPL-MCNC: 124 MG/DL (ref 65–140)
GLUCOSE SERPL-MCNC: 128 MG/DL (ref 65–140)
GLUCOSE SERPL-MCNC: 171 MG/DL (ref 65–140)
GLUCOSE SERPL-MCNC: 173 MG/DL (ref 65–140)
GLUCOSE SERPL-MCNC: 187 MG/DL (ref 65–140)
INR PPP: 2.94 (ref 0.84–1.19)
POTASSIUM SERPL-SCNC: 4 MMOL/L (ref 3.5–5.3)
PROTHROMBIN TIME: 30 SECONDS (ref 11.6–14.5)
SODIUM SERPL-SCNC: 140 MMOL/L (ref 136–145)

## 2021-06-17 PROCEDURE — 97535 SELF CARE MNGMENT TRAINING: CPT

## 2021-06-17 PROCEDURE — 97116 GAIT TRAINING THERAPY: CPT

## 2021-06-17 PROCEDURE — 80048 BASIC METABOLIC PNL TOTAL CA: CPT | Performed by: NURSE PRACTITIONER

## 2021-06-17 PROCEDURE — 82948 REAGENT STRIP/BLOOD GLUCOSE: CPT

## 2021-06-17 PROCEDURE — 99232 SBSQ HOSP IP/OBS MODERATE 35: CPT | Performed by: PHYSICIAN ASSISTANT

## 2021-06-17 PROCEDURE — 97163 PT EVAL HIGH COMPLEX 45 MIN: CPT

## 2021-06-17 PROCEDURE — 97167 OT EVAL HIGH COMPLEX 60 MIN: CPT

## 2021-06-17 PROCEDURE — 85610 PROTHROMBIN TIME: CPT | Performed by: NURSE PRACTITIONER

## 2021-06-17 RX ORDER — NYSTATIN 100000 [USP'U]/G
POWDER TOPICAL 2 TIMES DAILY
Status: DISCONTINUED | OUTPATIENT
Start: 2021-06-17 | End: 2021-06-18 | Stop reason: HOSPADM

## 2021-06-17 RX ADMIN — FUROSEMIDE 60 MG: 10 INJECTION, SOLUTION INTRAMUSCULAR; INTRAVENOUS at 10:00

## 2021-06-17 RX ADMIN — FAMOTIDINE 20 MG: 20 TABLET, FILM COATED ORAL at 10:00

## 2021-06-17 RX ADMIN — LEVOTHYROXINE SODIUM 150 MCG: 150 TABLET ORAL at 06:35

## 2021-06-17 RX ADMIN — POTASSIUM CHLORIDE 20 MEQ: 1500 TABLET, EXTENDED RELEASE ORAL at 10:00

## 2021-06-17 RX ADMIN — INSULIN LISPRO 1 UNITS: 100 INJECTION, SOLUTION INTRAVENOUS; SUBCUTANEOUS at 21:08

## 2021-06-17 RX ADMIN — FUROSEMIDE 60 MG: 10 INJECTION, SOLUTION INTRAMUSCULAR; INTRAVENOUS at 17:26

## 2021-06-17 RX ADMIN — WARFARIN SODIUM 5 MG: 7.5 TABLET ORAL at 17:26

## 2021-06-17 RX ADMIN — NYSTATIN: 100000 POWDER TOPICAL at 20:00

## 2021-06-17 RX ADMIN — INSULIN LISPRO 1 UNITS: 100 INJECTION, SOLUTION INTRAVENOUS; SUBCUTANEOUS at 17:00

## 2021-06-17 RX ADMIN — GABAPENTIN 300 MG: 300 CAPSULE ORAL at 17:26

## 2021-06-17 RX ADMIN — INSULIN GLARGINE 15 UNITS: 100 INJECTION, SOLUTION SUBCUTANEOUS at 21:24

## 2021-06-17 RX ADMIN — DILTIAZEM HYDROCHLORIDE 300 MG: 180 CAPSULE, COATED, EXTENDED RELEASE ORAL at 10:00

## 2021-06-17 RX ADMIN — INSULIN LISPRO 1 UNITS: 100 INJECTION, SOLUTION INTRAVENOUS; SUBCUTANEOUS at 12:00

## 2021-06-17 RX ADMIN — OXYCODONE HYDROCHLORIDE AND ACETAMINOPHEN 500 MG: 500 TABLET ORAL at 10:00

## 2021-06-17 RX ADMIN — GABAPENTIN 300 MG: 300 CAPSULE ORAL at 10:00

## 2021-06-17 NOTE — ASSESSMENT & PLAN NOTE
Wt Readings from Last 3 Encounters:   06/17/21 97 8 kg (215 lb 11 2 oz)   05/27/21 93 kg (205 lb)   04/15/21 92 5 kg (204 lb)     · Presented to the ED given significant shortness of breath, orthopnea, leg swelling, wt gain  She denies any dietary indiscretion or mixed medication doses   CXR appears to have some vascular congestion,    Echocardiogram: 04/05/2021: Normal ventricle size, wall thickness with preserved systolic function  Ejection fraction 60%  Dilated right ventricle  Leads in the right heart  Dilated left and right atrium  Trace mitral regurgitation  Moderate to severe tricuspid regurgitation with estimated right ventricular systolic pressure of 72 mm of mercury   Appreciate cards input    Continue Lasix 60mg IV BID   Daily weights, Strict I&Os;  Cardiac low sodium <2g, fluid restriction <1500    Weight change: -3 959 kg (-8 lb 11 7 oz)     Intake/Output Summary (Last 24 hours) at 6/17/2021 1119  Last data filed at 6/17/2021 0949  Gross per 24 hour   Intake 780 ml   Output 3450 ml   Net -2670 ml

## 2021-06-17 NOTE — PLAN OF CARE
Problem: PHYSICAL THERAPY ADULT  Goal: Performs mobility at highest level of function for planned discharge setting  See evaluation for individualized goals  Description: Treatment/Interventions: Functional transfer training, LE strengthening/ROM, Therapeutic exercise, Endurance training, Patient/family training, Bed mobility, Gait training, Equipment eval/education, Compensatory technique education, Spoke to nursing, Spoke to case management, OT  Equipment Recommended:  (walker-pt has)       See flowsheet documentation for full assessment, interventions and recommendations  0/26/5210 [id number] by Hilda Castorena PT  Note: Prognosis: Good  Problem List: Decreased strength, Decreased endurance, Impaired balance, Decreased mobility, Decreased safety awareness, Obesity, Decreased skin integrity  Pt requires increased time to complete mobility  She was able to ambulate with improved gait pattern during treatment as well as increased distance and min CARPENTER  She is limited by decreased strength, balance, endurance  She will continue to benefit from PT services to maximize LOF  Barriers to Discharge: None  Barriers to Discharge Comments: pt has support form spouse prn     PT Discharge Recommendation: Home with home health rehabilitation     PT - OK to Discharge:  (whenmedically cleared)    See flowsheet documentation for full assessment

## 2021-06-17 NOTE — OCCUPATIONAL THERAPY NOTE
Occupational Therapy Evaluation     Evaluation: 0928-0329  Treatment: 4585-4386    Patient Name: Jayme Badillo  FQBOG'B Date: 6/17/2021  Problem List  Principal Problem:    Acute on chronic diastolic CHF (congestive heart failure) (HCC)  Active Problems:    Atrial fibrillation (HCC)    JOS (obstructive sleep apnea)    Type 2 diabetes mellitus with diabetic neuropathy (HCC)    Disease of thyroid gland    Hypertension    Acute kidney injury superimposed on chronic kidney disease ([facility] )    Past Medical History  Past Medical History:   Diagnosis Date    Arthritis     CHF (congestive heart failure) ([facility] )     Diabetes mellitus ([facility] )     Disease of thyroid gland     Hypertension     Renal disorder      Past Surgical History  Past Surgical History:   Procedure Laterality Date    CARDIAC PACEMAKER PLACEMENT      CHOLECYSTECTOMY      JOINT REPLACEMENT      bilateral knee replacements    ORIF TIBIA & FIBULA FRACTURES Left 10/29/2020    Procedure: OPEN REDUCTION W/ INTERNAL FIXATION (ORIF) ANKLE;  Surgeon: Debbie Zapata; Location:  MAIN OR;  Service: Orthopedics    TONSILLECTOMY      TUBAL LIGATION           06/17/21 0914   Note Type   Note type Evaluation   Restrictions/Precautions   Braces or Orthoses MAFO  (RLE)   Other Precautions Chair Alarm; Bed Alarm; Fall Risk  (2L O2 at start of sesion, RA at end of session, RN aware)   Pain Assessment   Pain Assessment Tool 0-10   Pain Score No Pain   Home Living   Type of Home House  (0 FERNANDO)   Home Layout Two level;1/2 bath on main level;Bed/bath upstairs  (stair glide to 2nd floor)   Bathroom Shower/Tub Tub/shower unit   [employer] Raised   Bathroom Equipment Tub transfer bench;Grab bars in shower;Grab bars around toilet   Home Equipment Walker;Cane  (transport chair  uses RW at baseline)   Additional Comments Pt reports living in a 2 story home with 0 FERNANDO  stair glide to 2nd floor to full bed/bath  1/2 bath on 1st floor   Pt ambulates with RW at baseline and has a transport chair for PRN use   Prior Function   Level of [place] Independent with ADLs and functional mobility   Lives With Spouse   Receives Help From Family   ADL Assistance Independent   IADLs Needs assistance   Falls in the last 6 months 0   Vocational Retired   Comments Pt reports completing ADLs and functional ambulation @ Mod I with RW  Pt has assistance for IADLs and community mobility  ADL   Where Assessed Edge of bed   Eating Assistance 7  Independent   Eating Deficit Setup   Grooming Assistance 7  Independent   Grooming Deficit Setup   UB Bathing Assistance 5  Supervision/Setup   UB Bathing Deficit Setup   LB Bathing Assistance   (SBA)   LB Bathing Deficit Verbal cueing;Supervision/safety; Increased time to complete; Buttocks;Right lower leg including foot; Left lower leg including foot   UB Dressing Assistance 5  Supervision/Setup   UB Dressing Deficit Setup   LB Dressing Assistance   (SBA)   LB Dressing Deficit Requires assistive device for steadying;Supervision/safety;Verbal cueing; Increased time to complete; Don/doff R sock; Don/doff L sock; Thread RLE into pants; Thread LLE into pants;Pull up over hips   Toileting Assistance    (SBA)   Toileting Deficit Verbal cueing;Supervison/safety; Increased time to complete;Grab bar use;Clothing management up;Clothing management down;Perineal hygiene   Additional Comments Pt completing ADL tasks while seated at EOB  UB Dressing @ S after set-up  LB Dressing @ SBA  Pt has assisatnce for donning socks at home "when i wear them"  Pt demonstrates ability to thread feet through pants and complete CM around waist while standing RW RW @ SBA with no LOB noted and Good safety awareness  Pt completing grooming and self-feeding tasks @ I after set-up  Please refer to treatment note for perofrmance durin toileting and bathing tasks  Bed Mobility   Supine to Sit 4  Minimal assistance   Additional items Assist x 1; Increased time required;Verbal cues  (trunk management) Additional Comments HOB flat, no bedrails   Transfers   Sit to Stand   (SBA)   Additional items Increased time required;Verbal cues   Stand to Sit   (SBA)   Additional items Increased time required;Verbal cues   Stand pivot   (Steadying Assist)   Additional items Increased time required;Verbal cues   Additional Comments Pt completing functional transfers with use of RW for UB support  SBA for STS and Steadying Assist for SPT with increased time PRN  pt with RLE foot drop, MAFO in place at time of evaluation  Balance   Static Sitting Normal   Dynamic Sitting Good   Static Standing Fair   Dynamic Standing Fair -   Activity Tolerance   Activity Tolerance Patient limited by fatigue   Medical Staff Made Aware [facility] RN, Andie Lazaro Assessment   RUE Assessment WFL   LUE Assessment   LUE Assessment WFL   Hand Function   Gross Motor Coordination Functional   Fine Motor Coordination Functional   Sensation   Light Touch No apparent deficits   Cognition   Overall Cognitive Status WFL   Arousal/Participation Alert; Responsive; Cooperative   Attention Attends with cues to redirect   Orientation Level Oriented X4   Memory Within functional limits   Following Commands Follows all commands and directions without difficulty   Assessment   Limitation Decreased ADL status; Decreased UE strength;Decreased endurance;Decreased self-care trans;Decreased high-level ADLs   Prognosis Good   Assessment Pt is a 76 y/o F admitted to [address] [place] [address] 6/15/2021 d/t experiencing increased SOB  Dx: acute on chronic diastolic CHF  Pt with PMHx impacting performance during functional tasks including: arthritis, CHF, DM, HTN, renal disorder  Pt reports living at home in a 2 story home with 0 FERNANDO and stair glide to 2nd floor  Pt reports completing ADLs and functional ambulation @ Mod I with RW  Pt has assistance for IADLs and community mobility from family  On evaluation, Pt A&Ox4  Pt on 2L of O2 satting at 98%   Pt trailed on RA, maintaining in low 90's with ADL tasks  Upon longer ambulation, Pt with noted increased SOB and O2 reading 85% with a poor pluth  Pt then recovering to 94% on RA with rest  Upon leaving room, Pt in no distress, O2 at 95% on RA, AGAPITO dumont  Pt completing supine>sit @ Min A  UB dressing @ S  LB Dressing @ SBA  Pt completing STS @ SBA and SPT @ Steadying Assist with RW  Pt BUE ROM and MS WFL  Pt's barriers to d/c include: decrease activity tolerance, decrease standing balance, decrease performance during ADL tasks, decrease UB MS, decrease generalized strength, decrease activity engagement and decrease performance during functional transfers  Pt would benefit from continued acute OT services to address deficits although no further OT services are recommended upon d/c from [address] [place] [address] d/t Pt having assistance at home  Plan   Treatment Interventions ADL retraining;Functional transfer training;UE strengthening/ROM; Endurance training;Patient/family training;Equipment evaluation/education; Neuromuscular reeducation; Compensatory technique education;Continued evaluation; Energy conservation; Activityengagement   Goal Expiration Date 06/27/21   OT Treatment Day 1   OT Frequency 2-3x/wk   Additional Treatment Session   Start Time 0932   End Time 0940   Treatment Assessment Pt seen for treatment session #1 this date  PT alert and agreeable to participate at this time  PT completing short distance ambulation into restroom with use of RW for UB support @ Steadying Assist with increased time  Pt then seated on toilet to complete toileting tasks @ SBA including STS, CM, and pericare  Pt completing UB washing @ S after set-up  LB wasing @ SBA with increased time and UB support from RW or grab bars  Pt then retunring to recliner chair @ SBA for short distance ambulation with RW   Pt seated OOB in recliner chair with call bell in reach, chair alarm intact and all needs met at this time    Additional Treatment Day 1 Recommendation   OT Discharge Recommendation No rehabilitation needs   AM-PAC Daily Activity Inpatient   Lower Body Dressing 3   Bathing 3   Toileting 3   Upper Body Dressing 4   Grooming 4   Eating 4   Daily Activity Raw Score 21   Daily Activity Standardized Score (Calc for Raw Score >=11) 44 27       The patient's raw score on the AM-PAC Daily Activity inpatient short form is 21, standardized score is 44 27, greater than 39 4  Patients at this level are likely to benefit from DC to home  Please refer to the recommendation of the Occupational Therapist for safe DC planning  Pt goals to be met by 6/27/2021    1  Pt will demonstrate ability to complete supine<>sit @ Mod I in order to increase safety and independence during ADL tasks  2  Pt will demonstrate ability to complete LB dressing @ Mod I in order to increase safety and independence during meaningful tasks  3  Pt will demonstrate ability to complete toileting tasks including CM and pericare @ Mod I in order to increase safety and independence during meaningful tasks  4  Pt will demonstrate ability to complete EOB, chair, toilet/commode transfers @ Mod I in order to increase performance and participation during functional tasks  5  Pt will demonstrate ability to stand for 7-8 minutes while maintaining G balance with use of RW for UB support PRN  6  Pt will demonstrate ability to tolerate 30-35 minute OT session with no vc'ing for deep breathing or use of energy conservation techniques in order to increase activity tolerance during functional tasks  7  Pt will demonstrate Good carryover of use of energy conservation/compensatory strategies during ADLs and functional tasks in order to increase safety and reduce risk for falls  8  Pt will demonstrate Good attention and participation in continued evaluation of functional ambulation house hold distances in order to assist with safe d/c planning    9  Pt will attend to continued cognitive assessments 100% of the time in order to provide most appropriate d/c recommendations  10  Pt will follow 100% simple 2-step commands and be A&O x4 consistently with environmental cues to increase participation in functional activities  11  Pt will identify 3 areas of interest/hobbies and 1 intervention on how to incorporate into daily life in order to increase interaction with environment and peers as well as increase participation in meaningful tasks  12  Pt will demonstrate 100% carryover of BUE HEP in order to increase BUE MS and increase performance during functional tasks upon d/c home      Liana Conte OTR/L

## 2021-06-17 NOTE — PLAN OF CARE
Problem: PHYSICAL THERAPY ADULT  Goal: Performs mobility at highest level of function for planned discharge setting  See evaluation for individualized goals  Description: Treatment/Interventions: Functional transfer training, LE strengthening/ROM, Therapeutic exercise, Endurance training, Patient/family training, Bed mobility, Gait training, Equipment eval/education, Compensatory technique education, Spoke to nursing, Spoke to case management, OT  Equipment Recommended:  (walker-pt has)       See flowsheet documentation for full assessment, interventions and recommendations  Note: Prognosis: Good  Problem List: Decreased strength, Decreased endurance, Impaired balance, Decreased mobility, Decreased safety awareness, Obesity, Decreased skin integrity  Assessment: Pt is a 77 y o  female seen for PT evaluation s/p admission to [address] [place] [address] on 6/15/2021 with Acute on chronic diastolic CHF (congestive heart failure) ([facility] [address] )  Order placed for PT services    Upon evaluation: Pt is presenting with impaired functional mobility due to decreased strength, decreased ROM, decreased endurance, impaired balance, gait deviations, altered sensation, decreased safety awareness, fall risk and impaired skin integrity requiring minimal assistance for bed mobility, stand by to steadying assistance for bed mobility and transfers and steadying assistance for ambulation with RW  Pt's clinical presentation is currently unpredictable given the functional mobility deficits above, especially weakness, decreased ROM, decreased skin integrity, decreased endurance, gait deviations, decreased activity tolerance, decreased functional mobility tolerance, altered sensation, decreased safety awareness and SOB upon exertion, coupled with fall risks as indicated by AM-PAC 6-Clicks: 85/85 as well as impaired balance, polypharmacy, decreased safety awareness and limited sensation/neuropathy and combined with medical complications of abnormal renal lab values, abnormal H&H, abnormal blood sugars, need for input for mobility technique/safety and abnormal BNP, ALMA  Pt's PMHx and comorbidities that may affect physical performance and progress include: CHF, CKD, A fib, DM, HTN and sick sinus syndrome/tachy-yunier syndrome s/p pacemaker placement, HLD, hypothyroidism, arthritis  Personal factors affecting pt at time of IE include: inability to perform ADLs, inability to navigate level surfaces without external assistance and inability to ambulate household distances  Pt will benefit from continued skilled PT services to address deficits as defined above and to maximize level of functional mobility to facilitate return toward PLOF and improved QOL  From PT/mobility standpoint, recommendation at time of d/c would be Home PT, home with family support and with walker pending progress in order to reduce fall risk and maximize pt's functional independence and consistency with mobility in order to facilitate return to PLOF  Recommend ther ex next 1-2 sessions  Barriers to Discharge: None  Barriers to Discharge Comments: pt has support form spouse prn     PT Discharge Recommendation: Home with home health rehabilitation     PT - OK to Discharge:  (whenmedically cleared)    See flowsheet documentation for full assessment

## 2021-06-17 NOTE — PROGRESS NOTES
Progress Note - Cardiology   Cora Main 77 y o  female MRN: [id number]  Unit/Bed#: -01 Encounter: [id number]    Assessment:  Acute on chronic HFpEF- still appearing hypervolemic but improved  Persistent Afib- rate controlled on coumadin, reporting more palpitations since last night  HTN  SSS sp PPM- [employer], atrial lead programmed off for poor function/sensing  CKD  DM2  Mod pHTN    Plan:  - Continue Lasix 60 IV BID  - Monitor I and O, daily standing weights, electrolytes and renal function  - place on telemetry for palpitations  - will continue to follow     Subjective/Objective     Subjective: pt reports feeling better  Her breathing is back to normal  Reporting stomach is better but again not back to baseline  Reports palpitations over night  No CP but some arm pain on the left side  Objective: urinary output appropriate  Renal function stable  Electrolytes stable  Vitals reviewed and stable  Vitals: /64   Pulse (!) 48   Temp 97 5 °F (36 4 °C)   Resp 18   Ht 5' 2" (1 575 m)   Wt 97 8 kg (215 lb 11 2 oz)   SpO2 100%   BMI 39 45 kg/m²   Vitals:    06/17/21 0444 06/17/21 0538   Weight: 97 8 kg (215 lb 11 2 oz) 97 8 kg (215 lb 11 2 oz)     Orthostatic Blood Pressures      Most Recent Value   Blood Pressure  155/64 filed at 06/17/2021 9907   Patient Position - Orthostatic VS  Lying filed at 06/15/2021 1115            Intake/Output Summary (Last 24 hours) at 6/17/2021 0858  Last data filed at 6/17/2021 0706  Gross per 24 hour   Intake 540 ml   Output 3450 ml   Net -2910 ml       Invasive Devices     Peripheral Intravenous Line            Peripheral IV 06/15/21 Right Antecubital 1 day          Drain            External Urinary Catheter 1 day                  Physical Exam:   Vitals reviewed     In no acute distress  Head of bed elevated  No edema  +JVD  No Rales  No w/r  No m/g/r  Abdominal tenderness noted to palpitation    Lab Results:   I have personally reviewed pertinent lab results  CBC with diff:   Results from last 7 days   Lab Units 06/16/21  0454   WBC Thousand/uL 6 33   RBC Million/uL 4 31   HEMOGLOBIN g/dL 10 5*   HEMATOCRIT % 34 6*   MCV fL 80*   MCH pg 24 4*   MCHC g/dL 30 3*   RDW % 17 3*   MPV fL 10 5   PLATELETS Thousands/uL 158     CMP:   Results from last 7 days   Lab Units 06/17/21  0444 06/16/21  0454   SODIUM mmol/L 140 141   POTASSIUM mmol/L 4 0 4 3   CHLORIDE mmol/L 102 102   CO2 mmol/L 28 30   BUN mg/dL 44* 38*   CREATININE mg/dL 1 89* 1 78*   CALCIUM mg/dL 8 6 8 6   AST U/L  --  12   ALT U/L  --  14   ALK PHOS U/L  --  107   EGFR ml/min/1 73sq m 26 28     Troponin:   0   Lab Value Date/Time    TROPONINI <0 02 06/15/2021 0955    TROPONINI <0 02 10/25/2020 1803    TROPONINI <0 02 10/25/2020 1532    TROPONINI <0 02 10/25/2020 1144     BNP:   Results from last 7 days   Lab Units 06/17/21  0444   POTASSIUM mmol/L 4 0   CHLORIDE mmol/L 102   CO2 mmol/L 28   BUN mg/dL 44*   CREATININE mg/dL 1 89*   CALCIUM mg/dL 8 6   EGFR ml/min/1 73sq m 26     Coags:   Results from last 7 days   Lab Units 06/17/21  0710   INR  2 94*     TSH:   Results from last 7 days   Lab Units 06/15/21  0955   TSH 3RD GENERATON uIU/mL 5 834*     Magnesium:   Results from last 7 days   Lab Units 06/16/21  0454   MAGNESIUM mg/dL 2 2     Imaging: I have personally reviewed pertinent reports  Echocardiogram: 04/05/2021: Normal ventricle size, wall thickness with preserved systolic function  Ejection fraction 60%  Dilated right ventricle  Leads in the right heart  Dilated left and right atrium  Trace mitral regurgitation  Moderate to severe tricuspid regurgitation with estimated right ventricular systolic pressure of 72 mm of mercury  Echocardiogram: 09/08/2020: Normal ventricle size with preserved systolic function  Ejection fraction 59%  Septal flattening secondary to right-sided pressure/volume overload  Severely dilated right ventricle with preserved systolic function   Leads in the right heart  Severely dilated left atrium  Mild-to-moderate mitral regurgitation  Trace aortic insufficiency  Moderate tricuspid regurgitation with estimated right ventricular systolic pressure of 00-45 mm of mercury  Compared to prior study right ventricle systolic pressure was previously estimated at 47 mm of mercury  :

## 2021-06-17 NOTE — ASSESSMENT & PLAN NOTE
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

## 2021-06-17 NOTE — PROGRESS NOTES
[address]  Progress Note - Teri Dennis 1945, 77 y o  female MRN: [id number]  Unit/Bed#: -01 Encounter: [id number]  Primary Care Provider: Tarsha Melendez MD   Date and time admitted to hospital: 6/15/2021  9:27 AM    * Acute on chronic diastolic CHF (congestive heart failure) ([address] )  Assessment & Plan  Wt Readings from Last 3 Encounters:   06/17/21 97 8 kg (215 lb 11 2 oz)   05/27/21 93 kg (205 lb)   04/15/21 92 5 kg (204 lb)     · Presented to the ED given significant shortness of breath, orthopnea, leg swelling, wt gain  She denies any dietary indiscretion or mixed medication doses   CXR appears to have some vascular congestion,    Echocardiogram: 04/05/2021: Normal ventricle size, wall thickness with preserved systolic function  Ejection fraction 60%  Dilated right ventricle  Leads in the right heart  Dilated left and right atrium  Trace mitral regurgitation  Moderate to severe tricuspid regurgitation with estimated right ventricular systolic pressure of 72 mm of mercury   Appreciate cards input    Continue Lasix 60mg IV BID   Daily weights, Strict I&Os;  Cardiac low sodium <2g, fluid restriction <1500    Weight change: -3 959 kg (-8 lb 11 7 oz)     Intake/Output Summary (Last 24 hours) at 6/17/2021 1119  Last data filed at 6/17/2021 0949  Gross per 24 hour   Intake 780 ml   Output 3450 ml   Net -2670 ml       Acute kidney injury superimposed on chronic kidney disease [facility])  Assessment & Plan  Lab Results   Component Value Date    EGFR 26 06/17/2021    EGFR 28 06/16/2021    EGFR 29 06/15/2021    CREATININE 1 89 (H) 06/17/2021    CREATININE 1 78 (H) 06/16/2021    CREATININE 1 72 (H) 06/15/2021   · Present on admission as evidence by creatinine of 1 72; stable however remaining elevated   · Baseline appears to be approximately 1 2 on review of records  · Likely secondary to hypervolemia in the setting of CHF exacerbation  · IV diuresis ordered; cardiology to manage  · Avoid nephrotoxins, relative hypotension, and NSAIDs as appropriate  · Monitor electrolytes and volume status closely  · Repeat kidney function with morning labs    Atrial fibrillation ([address] )  Assessment & Plan  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  · Follows outpatient with Cardiology in [address] [facility] [address] Jul[address] Richardson with [facility]   · Continue home dose Cardizem  · Anticoagulated on Coumadin  · 5 mg on Tuesday and Thursday  · 7 5 mg on Sunday, Monday, Wednesday, Friday, and Saturday  · Goal INR 2-3; INR remains at goal   · Continue with daily draws while IP     Type 2 diabetes mellitus with diabetic neuropathy [facility])  Assessment & Plan  Lab Results   Component Value Date    HGBA1C 7 0 (H) 06/15/2021     Recent Labs     06/16/21  1122 06/16/21  1614 06/16/21  2114 06/17/21  0747   POCGLU 183* 158* 173* 128     Blood Sugar Average: Last 72 hrs:  · Repeat A1c ordered; appears to be well controlled as of last value however  · Managed as an outpatient on metformin; hold while inpatient  · Long-acting and SSI ordered  · Carb controlled diet  · Monitor with POCT BG and titrate regimen as indicated    Hypertension  Assessment & Plan  · Blood pressure mildly elevated on admission with systolics in the [id number]  · Managed as an outpatient on diltiazem and oral Lasix  · Continue diltiazem  · IV Lasix ordered in the setting of primary problem  · Consider initiation of amlodipine versus hydralazine for better blood pressure control if it does not improve with IV diuresis   · Monitor with routine vitals    Disease of thyroid gland  Assessment & Plan  · Continue home dose Synthroid therapy    JOS (obstructive sleep apnea)  Assessment & Plan  · CPAP while inpatient    VTE Pharmacologic Prophylaxis:   Pharmacologic: Warfarin (Coumadin)  Mechanical VTE Prophylaxis in Place: Yes    Patient Centered Rounds: I have performed bedside rounds with nursing staff today      Discussions with Specialists or Other Care Team Provider: nursing     Education and Discussions with Family / Patient: patient, declined call to      Time Spent for Care: 30 minutes  More than 50% of total time spent on counseling and coordination of care as described above  Current Length of Stay: 2 day(s)    Current Patient Status: Inpatient   Certification Statement: The patient will continue to require additional inpatient hospital stay due to pending improvement in diuresis     Discharge Plan: pending clinical course     Code Status: Level 1 - Full Code    Subjective:   Pt seen and examined at bedside  Feels less SOB but not back top baseline  No CP  Tolerating diet  No BM  Objective:     Vitals:   Temp (24hrs), Av 7 °F (36 5 °C), Min:97 5 °F (36 4 °C), Max:97 9 °F (36 6 °C)    Temp:  [97 5 °F (36 4 °C)-97 9 °F (36 6 °C)] 97 5 °F (36 4 °C)  HR:  [44-57] 48  Resp:  [16-18] 18  BP: (144-155)/(63-64) 155/64  SpO2:  [87 %-100 %] 100 %  Body mass index is 39 45 kg/m²  Input and Output Summary (last 24 hours): Intake/Output Summary (Last 24 hours) at 2021 1408  Last data filed at 2021 1248  Gross per 24 hour   Intake 780 ml   Output 3250 ml   Net -2470 ml       Physical Exam:     Physical Exam  Constitutional:       Appearance: Normal appearance  HENT:      Head: Normocephalic and atraumatic  Mouth/Throat:      Mouth: Mucous membranes are moist    Eyes:      Extraocular Movements: Extraocular movements intact  Neck:      Vascular: JVD present  Cardiovascular:      Rate and Rhythm: Normal rate and regular rhythm  Heart sounds: Murmur heard  Pulmonary:      Effort: Pulmonary effort is normal       Breath sounds: Rales present  Comments: 91-92% on RA at rest   Abdominal:      General: Abdomen is flat  Palpations: Abdomen is soft  Musculoskeletal:         General: Swelling present  Normal range of motion  Cervical back: Normal range of motion and neck supple        Comments: LLE immobilizer    Skin: General: Skin is warm and dry  Neurological:      General: No focal deficit present  Mental Status: She is alert  Psychiatric:         Mood and Affect: Mood normal        Additional Data:     Labs:    Results from last 7 days   Lab Units 06/16/21  0454   WBC Thousand/uL 6 33   HEMOGLOBIN g/dL 10 5*   HEMATOCRIT % 34 6*   PLATELETS Thousands/uL 158   NEUTROS PCT % 73   LYMPHS PCT % 12*   MONOS PCT % 10   EOS PCT % 4     Results from last 7 days   Lab Units 06/17/21  0444 06/16/21  0454   SODIUM mmol/L 140 141   POTASSIUM mmol/L 4 0 4 3   CHLORIDE mmol/L 102 102   CO2 mmol/L 28 30   BUN mg/dL 44* 38*   CREATININE mg/dL 1 89* 1 78*   ANION GAP mmol/L 10 9   CALCIUM mg/dL 8 6 8 6   ALBUMIN g/dL  --  3 5   TOTAL BILIRUBIN mg/dL  --  0 58   ALK PHOS U/L  --  107   ALT U/L  --  14   AST U/L  --  12   GLUCOSE RANDOM mg/dL 124 137     Results from last 7 days   Lab Units 06/17/21  0710   INR  2 94*     Results from last 7 days   Lab Units 06/17/21  1122 06/17/21  0747 06/16/21  2114 06/16/21  1614 06/16/21  1122 06/16/21  0752 06/15/21  2102 06/15/21  1614 06/15/21  1303   POC GLUCOSE mg/dl 187* 128 173* 158* 183* 147* 159* 155* 151*     Results from last 7 days   Lab Units 06/15/21  1253   HEMOGLOBIN A1C % 7 0*               * I Have Reviewed All Lab Data Listed Above  * Additional Pertinent Lab Tests Reviewed:  [address] Admission Reviewed    Imaging:    Imaging Reports Reviewed Today Include: all  Imaging Personally Reviewed by Myself Includes:  none    Recent Cultures (last 7 days):           Last 24 Hours Medication List:   Current Facility-Administered Medications   Medication Dose Route Frequency Provider Last Rate    acetaminophen  650 mg Oral Q6H PRN SAM Kimbrough      Ascorbic Acid (Vitamin C)  500 mg Oral Daily SAM Kimbrough      calcium carbonate  1,000 mg Oral Daily PRN SAM Kimbrough      diltiazem  300 mg Oral Daily [facility] famotidine  20 mg Oral Daily SAM Kimbrough      furosemide  60 mg Intravenous BID Cely Chacko MD      gabapentin  300 mg Oral BID SAM Kimbrough      insulin glargine  15 Units Subcutaneous HS Cely Chacko MD      insulin lispro  1-6 Units Subcutaneous TID AC SAM Kimbrough      insulin lispro  1-6 Units Subcutaneous HS SAM Kimbrough      levothyroxine  150 mcg Oral Early Morning SAM Kimbrough      ondansetron  4 mg Intravenous Q6H PRN SAM Kimbrough      polyethylene glycol  17 g Oral Daily PRN SAM Kimbrough      potassium chloride  20 mEq Oral Daily SAM Vee      warfarin  5 mg Oral Once per day on Tue Thu Cecille Beyer, [address]      warfarin  7 5 mg Oral Once per day on Sun Mon Wed Fri Sat Jane brewer, [address]          Today, Patient Was Seen By: Margi Knig PA-C    ** Please Note: Dictation voice to text software may have been used in the creation of this document   **

## 2021-06-17 NOTE — RESPIRATORY THERAPY NOTE
Resp Care        06/17/21 1145   Respiratory Assessment   Assessment Type Assess only   General Appearance Alert; Awake   Respiratory Pattern Normal   Chest Assessment Chest expansion symmetrical   Bilateral Breath Sounds Diminished   Cough Non-productive   Resp Comments Pt no resp distress, will d/c protocol at this time      O2 Device 2 L

## 2021-06-17 NOTE — PROGRESS NOTES
Patient is an admission, not a readmission  Patient was admitted for acute on chronic diastolic CHF  LOS: 2  Patients risk for unplanned readmission score is 18  CM met with patient at the bedside,baseline information  was obtained  CM discussed the role of CM in helping the patient develop a discharge plan and assist the patient in carry out their plan      06/17/21 1103   Patient Information   Mental Status Alert   Primary Caregiver Self   Support System Immediate family   Legal Information   Advance Directives Power of  for health care   Activities of Daily Living Prior to Admission   Functional Status [facility]  Wheelchair  (Pt has a transport WC, Shower chair, Chair lift, gaurds on high toilets and grab bars )   Living Arrangement House   Ambulation Minimum assistance   Income Information   Income Source SSI/SSD   Means of Transportation   Means of Transport to Appts: Family     Patient has identified their , Cipriano Murguia as their primary   Their , Cipriano Murguia  is able to assist upon discharge  POA: Chance Darling and Camilla Rad their daughters  Patient is unsure who is the POA of healthcare  No advance directive:  Pt verbalized [address], her daughters, would be the person assisting with decisions with making if need be  PCP: Bennett Conway  Patient is okay with CM setting up PCP appointment  Patient would like an afternoon appointment to be made  Pt has a prescription plan and uses [facility]  Medications are affordable  Patient is not a [place]  Pt denies SA/MH history  House set up: Patient lives in a home with their Cipriano  It is 2SH with 1 FERNANDO to enter in the back  Bedroom and Bathroom are on the 2nd floor, accessed by flight of stairs  Patient does have a chair lift  Functional level PTA: Patient uses DME equipment to get around the home     DME use: Patient has a transport WC, RW, Shower Chair, Chair lift, Guards on high toilets and grab bars in the home  Prior use of Home Care or STR: Pt reports using [facility] before but is unsure with which agency she used  Pt also reports STR and knows that it was right off [address] and was [facility]  Transportation: Patient does not drive  Her , Isai Mobley usually drives her around  Her , Isai Mobley will pick her up from the [facility] [employer]: None  CM spoke with patient who reported that they have had CHF previously  Patient has a scale at home that she uses  CM educated patient on the zones and provided a handout to patient with the three zones and what to do everyday  CM discussed having [facility] for nursing to help patient teach her about managing CHF  Patient is agreeable with [facility] nursing  CM explained that she needs to wait and see what other recommendations will be made from PT/OT  CM reviewed d/c planning process including the following: identifying help at home, patient preference for d/c planning needs, availability of treatment team to discuss questions or concerns patient and/or family may have regarding understanding medications and recognizing signs and symptoms once discharged  CM also encouraged patient to follow up with all recommended appointments after discharge  Patient advised of importance for patient and family to participate in managing patients medical well being  Currently, DC plan is home  CM will continue to follow

## 2021-06-17 NOTE — PLAN OF CARE
Problem: Potential for Falls  Goal: Patient will remain free of falls  Description: INTERVENTIONS:  - Educate patient/family on patient safety including physical limitations  - Instruct patient to call for assistance with activity   - Consult OT/PT to assist with strengthening/mobility   - Keep Call bell within reach  - Keep bed low and locked with side rails adjusted as appropriate  - Keep care items and personal belongings within reach  - Initiate and maintain comfort rounds  - Make Fall Risk Sign visible to staff  - Offer Toileting every 2 Hours, in advance of need  - Initiate/Maintain chair/bed alarm  - Obtain necessary fall risk management equipment:walker  - Apply yellow socks and bracelet for high fall risk patients  - Consider moving patient to room near nurses station  Outcome: Progressing     Problem: SAFETY ADULT  Goal: Patient will remain free of falls  Description: INTERVENTIONS:  - Educate patient/family on patient safety including physical limitations  - Instruct patient to call for assistance with activity   - Consult OT/PT to assist with strengthening/mobility   - Keep Call bell within reach  - Keep bed low and locked with side rails adjusted as appropriate  - Keep care items and personal belongings within reach  - Initiate and maintain comfort rounds  - Make Fall Risk Sign visible to staff  - Offer Toileting every 2 Hours, in advance of need  - Initiate/Maintain chair/bed alarm  - Obtain necessary fall risk management equipment:walker  - Apply yellow socks and bracelet for high fall risk patients  - Consider moving patient to room near nurses station  Outcome: Progressing     Problem: RESPIRATORY - ADULT  Goal: Achieves optimal ventilation and oxygenation  Description: INTERVENTIONS:  - Assess for changes in respiratory status  - Assess for changes in mentation and behavior  - Position to facilitate oxygenation and minimize respiratory effort  - Oxygen administered by appropriate delivery if ordered  - Initiate smoking cessation education as indicated  - Encourage broncho-pulmonary hygiene including cough, deep breathe, Incentive Spirometry  - Assess the need for suctioning and aspirate as needed  - Assess and instruct to report SOB or any respiratory difficulty  - Respiratory Therapy support as indicated  Outcome: Progressing

## 2021-06-17 NOTE — ASSESSMENT & PLAN NOTE
Lab Results   Component Value Date    EGFR 26 06/17/2021    EGFR 28 06/16/2021    EGFR 29 06/15/2021    CREATININE 1 89 (H) 06/17/2021    CREATININE 1 78 (H) 06/16/2021    CREATININE 1 72 (H) 06/15/2021   · Present on admission as evidence by creatinine of 1 72; stable however remaining elevated   · Baseline appears to be approximately 1 2 on review of records  · Likely secondary to hypervolemia in the setting of CHF exacerbation  · IV diuresis ordered; cardiology to manage  · Avoid nephrotoxins, relative hypotension, and NSAIDs as appropriate  · Monitor electrolytes and volume status closely  · Repeat kidney function with morning labs

## 2021-06-17 NOTE — PHYSICAL THERAPY NOTE
PHYSICAL THERAPY EVALUATION  NAME:  Daniella Avalos  DATE: 06/17/21    AGE:   77 y o  Mrn:   [id number]  ADMIT DX:  CHF (congestive heart failure) ([facility]) [I50 9]  Dyspnea [R06 00]  SOB (shortness of breath) [R06 02]  Pleural effusion [J90]    Past Medical History:   Diagnosis Date    Arthritis     CHF (congestive heart failure) ([facility])     Diabetes mellitus ([facility] [address] )     Disease of thyroid gland     Hypertension     Renal disorder      Length Of Stay: 2  Performed at least 2 patient identifiers during session: Name and Birthday  PHYSICAL THERAPY EVALUATION :      06/17/21 0915   PT Last Visit   PT Visit Date 06/17/21   Note Type   Note type Evaluation   Pain Assessment   Pain Assessment Tool 0-10   Pain Score No Pain   Home Living   Type of Home House  (no FERNANDO)   Home Layout Two level;1/2 bath on main level;Bed/bath upstairs  (stair glide to 2nd floor)   Bathroom Shower/Tub Tub/shower unit   [employer] Raised   Bathroom Equipment Tub transfer bench;Grab bars in shower;Grab bars around toilet   Home Equipment Walker;Cane  (uses RW  has transport chair)   Additional Comments Reports living in a 2SH with no FERNANDO and stair glide to 2nd floor bedroom and full bathroom  Reports using RW PTA  Prior Function   Level of [place] Independent with ADLs and functional mobility   Lives With Spouse   Receives Help From Family   ADL Assistance Independent   IADLs Needs assistance   Falls in the last 6 months 0   Vocational Retired   Comments Reports being independent for mobility with RW  Restrictions/Precautions   Braces or Orthoses MAFO;Other (Comment)  (R LE  L LE MAFO not worn due to skin integrity at heel)   Other Precautions Chair Alarm; Bed Alarm; Fall Risk;O2  (2 lpm upon arrival, then room air )   General   Additional Pertinent History As of 3/5/2021, pt WBAT L LE s/p fracture in November 2020      Cognition   Orientation Level Oriented X4   Following Commands Follows all commands and directions without difficulty   RLE Assessment   RLE Assessment X   RLE Overall AROM   R Hip Flexion >90 degrees   R Hip ABduction WFL   R Knee Flexion WFL   R Knee Extension ~-10 degrees   R Ankle Dorsiflexion minimal   Strength RLE   RLE Overall Strength 3-/5   LLE Overall AROM   L Hip Flexion < 90 degrees   L Hip ABduction WFL   L Knee Flexion WFL   L Knee Extension ~-30 degrees   L Ankle Dorsiflexion minimal   Strength LLE   LLE Overall Strength 2+/5   Coordination   Movements are Fluid and Coordinated 1   Light Touch   RLE Light Touch Impaired   RLE Light Touch Comments absent B ankles and down, diminished B legs   LLE Light Touch Impaired   LLE Light Touch Comments absent B ankles and down, diminished B legs   Wound 06/15/21 Pressure Injury Heel Left   Date First Assessed/Time First Assessed: 06/15/21 1509   Pre-Existing Wound: Yes  Primary Wound Type: Pressure Injury  Location: Heel  Wound Location Orientation: Left  Dressing Status: Clean;Dry; Intact   Wound Description Other (Comment)  (healing)   Pressure Injury Stage 2   [place]-wound Assessment Clean;Dry; Intact   Drainage Amount Scant   Dressing Foam, Silicon (eg  Allevyn, etc)   Patient Tolerance Tolerated well   Dressing Status Clean;Dry; Intact   Bed Mobility   Supine to Sit 4  Minimal assistance   Additional items Assist x 1; Increased time required;Verbal cues  (trunk management)   Additional Comments HOB flat without bedrails  increased time to complete mobility with min cues for technique  Transfers   Sit to Stand   (stand by assistance)   Additional items Increased time required;Verbal cues   Stand to Sit   (stand by assistance)   Additional items Increased time required;Verbal cues   Stand pivot   (steadying assistance)   Additional items Increased time required;Verbal cues   Additional Comments use of RW  sit<>stand with RW with stand by assistance   min cues for tehcnique with spt with steadying assistance wiht increased time to complete and further cues to turn completely prior to sitting  Ambulation/Elevation   Gait pattern Wide LEIF; Antalgic; Excessively slow; Short stride; Step to   Gait Assistance   (steadying assistance)   Additional items Assist x 1;Verbal cues   Assistive Device Rolling walker   Distance 16'x1 with RW with steadying assistance with slow antonella, decreased step length, step to pattern leading with R LE and min cues for increased wt on UEs to facilitate LE advancement  Balance   Static Sitting Normal   Dynamic Sitting Good   Static Standing Fair   Dynamic Standing Fair -   Ambulatory Fair -   Endurance Deficit   Endurance Deficit Yes   Endurance Deficit Description SpO2 at rest on 2 lpm 96%  trialed on room air  at rest Spo2 94-95%  wiht mobility decreased to 90%  increased to 93-94% at rest within 2' sititng rest  min CARPENTER  Activity Tolerance   Activity Tolerance Patient limited by fatigue   Medical Staff Made Aware OT, Merlinda Blizzard   Nurse Made Aware RNChristo   Assessment   Prognosis Good   Problem List Decreased strength;Decreased endurance; Impaired balance;Decreased mobility; Decreased safety awareness; Obesity; Decreased skin integrity   Barriers to Discharge None   Barriers to Discharge Comments pt has support form spouse prn   Goals   Patient Goals "Go home"   STG Expiration Date 06/27/21   PT Treatment Day 1   Plan   Treatment/Interventions Functional transfer training;LE strengthening/ROM; Therapeutic exercise; Endurance training;Patient/family training;Bed mobility;Gait training;Equipment eval/education; Compensatory technique education;Spoke to nursing;Spoke to case management;OT   PT Frequency Other (Comment)  (3-5x/week)   Recommendation   PT Discharge Recommendation Home with home health rehabilitation   Equipment Recommended   (walker-pt has)   PT - OK to Discharge   (whenmedically cleared)   Additional Comments pt sititng in recliner chair at end of session with all needs within reach and posey alarm on   AM-PAC Basic Mobility Inpatient Turning in Bed Without Bedrails 3   Lying on Back to Sitting on Edge of Flat Bed 3   Moving Bed to Chair 3   Standing Up From Chair 3   Walk in Room 3   Climb 3-5 Stairs 2   Basic Mobility Inpatient Raw Score 17   Basic Mobility Standardized Score 39 67     (Please find full objective findings from PT assessment regarding body systems outlined above)  Assessment: Pt is a 77 y o  female seen for PT evaluation s/p admission to [address] [place] [address] on 6/15/2021 with Acute on chronic diastolic CHF (congestive heart failure) ([facility] [address] )  Order placed for PT services  Upon evaluation: Pt is presenting with impaired functional mobility due to decreased strength, decreased ROM, decreased endurance, impaired balance, gait deviations, altered sensation, decreased safety awareness, fall risk and impaired skin integrity requiring minimal assistance for bed mobility, stand by to steadying assistance for bed mobility and transfers and steadying assistance for ambulation with RW  Pt's clinical presentation is currently unpredictable given the functional mobility deficits above, especially weakness, decreased ROM, decreased skin integrity, decreased endurance, gait deviations, decreased activity tolerance, decreased functional mobility tolerance, altered sensation, decreased safety awareness and SOB upon exertion, coupled with fall risks as indicated by AM-PAC 6-Clicks: 12/22 as well as impaired balance, polypharmacy, decreased safety awareness and limited sensation/neuropathy and combined with medical complications of abnormal renal lab values, abnormal H&H, abnormal blood sugars, need for input for mobility technique/safety and abnormal BNP, ALMA  Pt's PMHx and comorbidities that may affect physical performance and progress include: CHF, CKD, A fib, DM, HTN and sick sinus syndrome/tachy-yunier syndrome s/p pacemaker placement, HLD, hypothyroidism, arthritis   Personal factors affecting pt at time of IE include: inability to perform ADLs, inability to navigate level surfaces without external assistance and inability to ambulate household distances  Pt will benefit from continued skilled PT services to address deficits as defined above and to maximize level of functional mobility to facilitate return toward PLOF and improved QOL  From PT/mobility standpoint, recommendation at time of d/c would be Home PT, home with family support and with walker pending progress in order to reduce fall risk and maximize pt's functional independence and consistency with mobility in order to facilitate return to PLOF  Recommend ther ex next 1-2 sessions  The patient's AM-PAC Basic Mobility Inpatient Short Form Raw Score is 17, Standardized Score is 39 67  A standardized score less than 42 9 suggests the patient may benefit from discharge to post-acute rehabilitation services  Please also refer to the recommendation of the Physical Therapist for safe discharge planning  Anticipate progression of mobility  Pt has support form spouse  Goals: Pt will: Perform bed mobility tasks with modified I to reposition in bed and prepare for transfers  Pt will perform transfers with modified I to decrease burden of care, decrease risk for falls and improve activity tolerance and prepare for ambulation  Pt will ambulate with RW for >/= 100' with  modified I  to decrease burden of care, decrease risk for falls, improve activity tolerance and improve gait quality and to access home environment  Pt will participate in objective balance assessment to determine baseline fall risk  Pt will increase B LE strength >/= 1/2 MMT grade to facilitate functional mobility  Jes Sommers, PT,DPT     PHYSICAL THERAPY TREATMENT NOTE  Time In:0930  Time [id number]  Total Time: 6'      S: "I haven't been walking in a couple days "  O:  Sit<>stand with stand by assistance with increased time with min cues for hand placement       Spt with RW with stand by assistance with min cues for turning completely prior to sitting    Ambulated 50'x1 with RW with stand by assistance with slow antonella with decreased L step length and decreased right stance  Step through pattern  Min cues for technique and pursed lip breathing throughout  Pt with min CARPENTER  Spo2 reading 85-86% on room air, however poor pleth  Then increased immediately to 90 and 93% upon sitting  Pt walking and talking during ambulation with min CARPENTER  SpO2 at rest at end of session 94-95% on room air  A:  Pt requires increased time to complete mobility  She was able to ambulate with improved gait pattern during treatment as well as increased distance and min CARPENTER  She is limited by decreased strength, balance, endurance  She will continue to benefit from PT services to maximize LOF  P:  Recommend LE strengthening, dynamic standing balance, endurance training, gait training with RW, transfer and bed mobility       Lauren Goins, PT, DPT

## 2021-06-17 NOTE — PLAN OF CARE
Problem: OCCUPATIONAL THERAPY ADULT  Goal: Performs self-care activities at highest level of function for planned discharge setting  See evaluation for individualized goals  Description: Treatment Interventions: ADL retraining, Functional transfer training, UE strengthening/ROM, Endurance training, Patient/family training, Equipment evaluation/education, Neuromuscular reeducation, Compensatory technique education, Continued evaluation, Energy conservation, Activityengagement          See flowsheet documentation for full assessment, interventions and recommendations  Note: Limitation: Decreased ADL status, Decreased UE strength, Decreased endurance, Decreased self-care trans, Decreased high-level ADLs  Prognosis: Good  Assessment: Pt is a 78 y/o F admitted to [address] [place] [address] 6/15/2021 d/t experiencing increased SOB  Dx: acute on chronic diastolic CHF  Pt with PMHx impacting performance during functional tasks including: arthritis, CHF, DM, HTN, renal disorder  Pt reports living at home in a 2 story home with 0 FERNANDO and stair glide to 2nd floor  Pt reports completing ADLs and functional ambulation @ Mod I with RW  Pt has assistance for IADLs and community mobility from family  On evaluation, Pt A&Ox4  Pt on 2L of O2 satting at 98%  Pt trailed on RA, maintaining in low 90's with ADL tasks  Upon longer ambulation, Pt with noted increased SOB and O2 reading 85% with a poor pluth  Pt then recovering to 94% on RA with rest  Upon leaving room, Pt in no distress, O2 at 95% on RA, AGAPITO dumont  Pt completing supine>sit @ Min A  UB dressing @ S  LB Dressing @ SBA  Pt completing STS @ SBA and SPT @ Steadying Assist with RW  Pt BUE ROM and MS WFL  Pt's barriers to d/c include: decrease activity tolerance, decrease standing balance, decrease performance during ADL tasks, decrease UB MS, decrease generalized strength, decrease activity engagement and decrease performance during functional transfers   Pt would benefit from continued acute OT services to address deficits although no further OT services are recommended upon d/c from [address] [place] [address] d/t Pt having assistance at home       OT Discharge Recommendation: No rehabilitation needs

## 2021-06-17 NOTE — ASSESSMENT & PLAN NOTE
· Blood pressure mildly elevated on admission with systolics in the [id number]  · Managed as an outpatient on diltiazem and oral Lasix  · Continue diltiazem  · IV Lasix ordered in the setting of primary problem  · Consider initiation of amlodipine versus hydralazine for better blood pressure control if it does not improve with IV diuresis   · Monitor with routine vitals

## 2021-06-17 NOTE — ASSESSMENT & PLAN NOTE
Lab Results   Component Value Date    HGBA1C 7 0 (H) 06/15/2021     Recent Labs     06/16/21  1122 06/16/21  1614 06/16/21  2114 06/17/21  0747   POCGLU 183* 158* 173* 128     Blood Sugar Average: Last 72 hrs:  · Repeat A1c ordered; appears to be well controlled as of last value however  · Managed as an outpatient on metformin; hold while inpatient  · Long-acting and SSI ordered  · Carb controlled diet  · Monitor with POCT BG and titrate regimen as indicated

## 2021-06-18 VITALS
OXYGEN SATURATION: 97 % | TEMPERATURE: 97.8 F | BODY MASS INDEX: 38.39 KG/M2 | HEIGHT: 62 IN | DIASTOLIC BLOOD PRESSURE: 63 MMHG | SYSTOLIC BLOOD PRESSURE: 142 MMHG | RESPIRATION RATE: 16 BRPM | WEIGHT: 208.6 LBS | HEART RATE: 75 BPM

## 2021-06-18 PROBLEM — R79.1 SUPRATHERAPEUTIC INR: Status: ACTIVE | Noted: 2021-06-18

## 2021-06-18 LAB
ANION GAP SERPL CALCULATED.3IONS-SCNC: 9 MMOL/L (ref 4–13)
BUN SERPL-MCNC: 45 MG/DL (ref 5–25)
CALCIUM SERPL-MCNC: 8.2 MG/DL (ref 8.3–10.1)
CHLORIDE SERPL-SCNC: 103 MMOL/L (ref 100–108)
CO2 SERPL-SCNC: 29 MMOL/L (ref 21–32)
CREAT SERPL-MCNC: 1.63 MG/DL (ref 0.6–1.3)
GFR SERPL CREATININE-BSD FRML MDRD: 31 ML/MIN/1.73SQ M
GLUCOSE SERPL-MCNC: 134 MG/DL (ref 65–140)
GLUCOSE SERPL-MCNC: 150 MG/DL (ref 65–140)
GLUCOSE SERPL-MCNC: 159 MG/DL (ref 65–140)
INR PPP: 3.4 (ref 0.84–1.19)
POTASSIUM SERPL-SCNC: 3.8 MMOL/L (ref 3.5–5.3)
PROTHROMBIN TIME: 33.5 SECONDS (ref 11.6–14.5)
SODIUM SERPL-SCNC: 141 MMOL/L (ref 136–145)

## 2021-06-18 PROCEDURE — 80048 BASIC METABOLIC PNL TOTAL CA: CPT | Performed by: PHYSICIAN ASSISTANT

## 2021-06-18 PROCEDURE — 99239 HOSP IP/OBS DSCHRG MGMT >30: CPT | Performed by: FAMILY MEDICINE

## 2021-06-18 PROCEDURE — 82948 REAGENT STRIP/BLOOD GLUCOSE: CPT

## 2021-06-18 PROCEDURE — 97110 THERAPEUTIC EXERCISES: CPT

## 2021-06-18 PROCEDURE — 97116 GAIT TRAINING THERAPY: CPT

## 2021-06-18 PROCEDURE — 85610 PROTHROMBIN TIME: CPT | Performed by: NURSE PRACTITIONER

## 2021-06-18 RX ORDER — GLIPIZIDE 5 MG/1
2.5 TABLET ORAL
Qty: 30 TABLET | Refills: 0 | Status: SHIPPED | OUTPATIENT
Start: 2021-06-18 | End: 2022-07-08

## 2021-06-18 RX ORDER — GLIPIZIDE 5 MG/1
5 TABLET ORAL
Qty: 2.5 TABLET | Refills: 0 | Status: SHIPPED | OUTPATIENT
Start: 2021-06-18 | End: 2021-06-18 | Stop reason: SDUPTHER

## 2021-06-18 RX ADMIN — POTASSIUM CHLORIDE 20 MEQ: 1500 TABLET, EXTENDED RELEASE ORAL at 08:02

## 2021-06-18 RX ADMIN — FUROSEMIDE 60 MG: 10 INJECTION, SOLUTION INTRAMUSCULAR; INTRAVENOUS at 08:02

## 2021-06-18 RX ADMIN — LEVOTHYROXINE SODIUM 150 MCG: 150 TABLET ORAL at 06:05

## 2021-06-18 RX ADMIN — FAMOTIDINE 20 MG: 20 TABLET, FILM COATED ORAL at 08:02

## 2021-06-18 RX ADMIN — INSULIN LISPRO 1 UNITS: 100 INJECTION, SOLUTION INTRAVENOUS; SUBCUTANEOUS at 08:03

## 2021-06-18 RX ADMIN — ACETAMINOPHEN 650 MG: 325 TABLET ORAL at 11:56

## 2021-06-18 RX ADMIN — DILTIAZEM HYDROCHLORIDE 300 MG: 180 CAPSULE, COATED, EXTENDED RELEASE ORAL at 08:02

## 2021-06-18 RX ADMIN — GABAPENTIN 300 MG: 300 CAPSULE ORAL at 08:02

## 2021-06-18 RX ADMIN — INSULIN LISPRO 1 UNITS: 100 INJECTION, SOLUTION INTRAVENOUS; SUBCUTANEOUS at 11:48

## 2021-06-18 RX ADMIN — NYSTATIN: 100000 POWDER TOPICAL at 08:10

## 2021-06-18 RX ADMIN — OXYCODONE HYDROCHLORIDE AND ACETAMINOPHEN 500 MG: 500 TABLET ORAL at 08:02

## 2021-06-18 NOTE — ASSESSMENT & PLAN NOTE
Lab Results   Component Value Date    HGBA1C 7 0 (H) 06/15/2021     Recent Labs     06/17/21  1550 06/17/21  2046 06/18/21  0729 06/18/21  1109   POCGLU 173* 171* 159* 150*     Blood Sugar Average: Last 72 hrs:    · Patient can resume home medication  · Carb controlled diet  · Monitor with POCT BG and titrate regimen as indicat

## 2021-06-18 NOTE — PLAN OF CARE
Problem: PHYSICAL THERAPY ADULT  Goal: Performs mobility at highest level of function for planned discharge setting  See evaluation for individualized goals  Description: Treatment/Interventions: Functional transfer training, LE strengthening/ROM, Therapeutic exercise, Endurance training, Patient/family training, Bed mobility, Gait training, Equipment eval/education, Compensatory technique education, Spoke to nursing, Spoke to case management, OT  Equipment Recommended:  (walker-pt has)       See flowsheet documentation for full assessment, interventions and recommendations  Outcome: Progressing  Note: Prognosis: Good  Problem List: Decreased strength, Decreased range of motion, Decreased endurance, Impaired balance, Decreased mobility, Decreased coordination, Decreased skin integrity, Obesity, Pain  Assessment: Pt seen for PT treatment session this date with interventions consisting of transfers, ambulation, and ther ex  Pt agreeable to PT treatment session upon arrival  In comparison to previous session, pt with increased ambulation level with v/c to stay within LEIF of RW  Observed pt with decreased dorsiflexion ROM in BLE and decreased LLE knee extension with therex given as per flowsheet  Pt with fatigue throughout treatment session that resolved with rest breaks  Continue to recommend Home PT at time of d/c in order to maximize pt's functional independence and safety w/ mobility  Pt continues to be functioning below baseline level, and remains limited 2* factors listed above  PT will continue to see pt while here in order to address the deficits listed above and provide interventions consistent w/ POC in effort to achieve STGs  Barriers to Discharge: None  Barriers to Discharge Comments: pt has support form spouse prn     PT Discharge Recommendation: Home with home health rehabilitation     PT - OK to Discharge: Yes    See flowsheet documentation for full assessment

## 2021-06-18 NOTE — CASE MANAGEMENT
A post acute care recommendation was made by your care team for [facility] AT [facility]  Discussed [place] of Choice with patient  List of agencies given to patient via in person  patient aware the list is custom filtered for them by zip code location and that [facility] post acute providers are designated  Pt picked out [facility] as her top options  Pt is aware she will receive PT and nursing  MD updated Pt and CM at bedside that coumadin were high and that an INR would need to be checked tomorrow  CM spoke with pt about her choices as [facility] and [facility] denied and [facility]AND[facility] ALBERT[facility] is no longer in service  Pt was agreeable for referrals to be placed with [facility] and [facility]  CM placed referrals    [facility] was willing to have patient and SOC would be tomorrow  CM updated pt and pt agreed to services  CM updated MD       DC plan is home with [facility]  CM will continue to follow

## 2021-06-18 NOTE — PHYSICAL THERAPY NOTE
PHYSICAL THERAPY TREATMENT NOTE  NAME:  Cedrick Cabrales  DATE: 06/18/21    Length Of Stay: 3  Performed at least 2 patient identifiers during session: Name and Birthday    TREATMENT:    06/18/21 0901   PT Last Visit   PT Visit Date 06/18/21   Note Type   Note Type Treatment   Pain Assessment   Pain Assessment Tool FLACC   Pain Score 8   Pain Location/Orientation Orientation: Bilateral;Location: Arm   Pain Rating: FLACC (Rest) - Face 0   Pain Rating: FLACC (Rest) - Legs 0   Pain Rating: FLACC (Rest) - Activity 0   Pain Rating: FLACC (Rest) - Cry 0   Pain Rating: FLACC (Rest) - Consolability 0   Score: FLACC (Rest) 0   Pain Rating: FLACC (Activity) - Face 0   Pain Rating: FLACC (Activity) - Legs 0   Pain Rating: FLACC (Activity) - Activity 0   Pain Rating: FLACC (Activity) - Cry 0   Pain Rating: FLACC (Activity) - Consolability 0   Score: FLACC (Activity) 0   Restrictions/Precautions   Braces or Orthoses   (MAFO RLE, LLE MAFO not worn due to skin integrity)   Other Precautions Chair Alarm; Bed Alarm; Fall Risk;Pain   General   Chart Reviewed Yes   Family/Caregiver Present No   Cognition   Overall Cognitive Status WFL   Arousal/Participation Alert; Responsive; Cooperative   Orientation Level Oriented X4   Following Commands Follows all commands and directions without difficulty   Subjective   Subjective "I could go for a walk in the [place]"   Bed Mobility   Additional Comments Pt was OOB in recliner with chair alarm on and all needs within reach upon arrival and at end of treatment session  Transfers   Sit to Stand   (SBA->MinAx1)   Additional items Increased time required;Verbal cues   Stand to Sit   (SBA)   Additional items Increased time required;Verbal cues   Stand pivot   (SBA)   Additional items Increased time required;Verbal cues   Additional Comments All transfers with use of RW for UE support  Pt required MinAx1 for STS from toilet for weight shifting  V/c to bring RW with throughout SPT for safety  Ambulation/Elevation   Gait pattern Antalgic; Wide LEIF; Foward flexed; Short stride; Excessively slow   Gait Assistance   (SBA)   Additional items Verbal cues   Assistive Device Rolling walker   Distance Pt ambulated 40'x1, 200'x1, 20'x2, with RW  Pt with slow antonella, antalgic gait, and slight foot drop in LLE  Pt with CARPENTER that resolved with rest breaks  Balance   Static Sitting Normal   Dynamic Sitting Good   Static Standing Fair   Dynamic Standing Fair -   Ambulatory Fair -   Endurance Deficit   Endurance Deficit Yes   Endurance Deficit Description SpO2 at rest was 93%, after ambulation 87% and within 1' went back to 93% on room air   Activity Tolerance   Activity Tolerance Patient limited by fatigue   Exercises   Glute Sets Sitting;15 reps;AROM; Bilateral   Hip Abduction Sitting;10 reps;AROM; Bilateral   Hip Adduction Sitting;15 reps;AROM; Bilateral   Knee AROM Long Arc Quad Sitting;15 reps;AROM; Bilateral   Ankle Pumps Sitting;15 reps;AROM; Bilateral   Marching Sitting;15 reps;AROM; Bilateral   Assessment   Prognosis Good   Problem List Decreased strength;Decreased range of motion;Decreased endurance; Impaired balance;Decreased mobility; Decreased coordination;Decreased skin integrity;Obesity;Pain   Barriers to Discharge None   Goals   Patient Goals "Go home"   PT Treatment Day 2   Plan   Treatment/Interventions Functional transfer training;LE strengthening/ROM; Elevations; Therapeutic exercise; Endurance training;Patient/family training;Equipment eval/education; Bed mobility;Gait training   Progress Progressing toward goals   PT Frequency Other (Comment)  (3-5x/wk)   Recommendation   PT Discharge Recommendation Home with home health rehabilitation   Equipment Recommended   Jeremias Alcantara has)   PT - OK to Discharge Yes   Additional Comments When medically cleared   AM-PAC Basic Mobility Inpatient   Turning in Bed Without Bedrails 3   Lying on Back to Sitting on Edge of Flat Bed 3   Moving Bed to Chair 3   Standing Up From Chair 3   Walk in Room 3   Climb 3-5 Stairs 2   Basic Mobility Inpatient Raw Score 17   Basic Mobility Standardized Score 39 67     The patient's AM-PAC Basic Mobility Inpatient Short Form Raw Score is 17, Standardized Score is 39 67  A standardized score less than 42 9 suggests the patient may benefit from discharge to post-acute rehabilitation services  Please also refer to the recommendation of the Physical Therapist for safe discharge planning  Pt seen for PT treatment session this date with interventions consisting of transfers, ambulation, and ther ex  Pt agreeable to PT treatment session upon arrival  In comparison to previous session, pt with increased ambulation level with v/c to stay within LEIF of RW  Observed pt with decreased dorsiflexion ROM in BLE and decreased LLE knee extension with therex given as per flowsheet  Pt with fatigue throughout treatment session that resolved with rest breaks  Continue to recommend Home PT at time of d/c in order to maximize pt's functional independence and safety w/ mobility  Pt continues to be functioning below baseline level, and remains limited 2* factors listed above  PT will continue to see pt while here in order to address the deficits listed above and provide interventions consistent w/ POC in effort to achieve STGs      Adele Saucedo PTA

## 2021-06-18 NOTE — NURSING NOTE
Discharge instructions and medications reviewed with pt and   All questions answered  Education regarding HF, diabetes, and sodium restriction included in instructions  IV d/c'd  Orders for blood work given to pt

## 2021-06-18 NOTE — CASE MANAGEMENT
CM educated patient on the importance of follow up care upon discharge and the need to have a  PCP appointment post discharge  Pt was agreeable to have CM call for post dc appointment  Pt had did not have any preference on day or time  Called Dr Del Valle's office 12:50pm  1st available appointment was obtained for Wednesday, June 23rd at 2:20pm with SAMMIE Pickering  Arrival time is 2:05pm  Patient will need to bring insurance cards and copay at time of the appointment  Information was placed on AVS     Dr Brandi Humphries office informed CM that patient gets blood drawn at home by [facility]  CM updated [place], the  for pt that DC would be home with [facility] with nursing and PT  CM also informed her of the PCP appt date and time  [] : Resident [Time Spent: ___ minutes] : I have spent [unfilled] minutes of time on the encounter.

## 2021-06-18 NOTE — PLAN OF CARE
Problem: Potential for Falls  Goal: Patient will remain free of falls  Description: INTERVENTIONS:  - Educate patient/family on patient safety including physical limitations  - Instruct patient to call for assistance with activity   - Consult OT/PT to assist with strengthening/mobility   - Keep Call bell within reach  - Keep bed low and locked with side rails adjusted as appropriate  - Keep care items and personal belongings within reach  - Initiate and maintain comfort rounds  - Make Fall Risk Sign visible to staff  - Offer Toileting every 2 Hours, in advance of need  - Initiate/Maintain chair/bed alarm  - Obtain necessary fall risk management equipment:walker  - Apply yellow socks and bracelet for high fall risk patients  - Consider moving patient to room near nurses station  6/18/2021 1458 by Corry Evans RN  Outcome: Completed  6/18/2021 1033 by Corry Evans RN  Outcome: Progressing     Problem: MOBILITY - ADULT  Goal: Maintain or return to baseline ADL function  Description: INTERVENTIONS:  -  Assess patient's ability to carry out ADLs; assess patient's baseline for ADL function and identify physical deficits which impact ability to perform ADLs (bathing, care of mouth/teeth, toileting, grooming, dressing, etc )  - Assess/evaluate cause of self-care deficits   - Assess range of motion  - Assess patient's mobility; develop plan if impaired  - Assess patient's need for assistive devices and provide as appropriate  - Encourage maximum independence but intervene and supervise when necessary  - Involve family in performance of ADLs  - Assess for home care needs following discharge   - Consider OT consult to assist with ADL evaluation and planning for discharge  - Provide patient education as appropriate  6/18/2021 1458 by Corry Evans RN  Outcome: Completed  6/18/2021 1033 by Corry Evans RN  Outcome: Progressing  Goal: Maintains/Returns to pre admission functional level  Description: INTERVENTIONS:  - Perform BMAT or MOVE assessment daily    - Set and communicate daily mobility goal to care team and patient/family/caregiver     - Collaborate with rehabilitation services on mobility goals if consulted  - Out of bed for toileting  - Record patient progress and toleration of activity level   6/18/2021 1458 by Brendon Salcido RN  Outcome: Completed  6/18/2021 1033 by Brendon Salcido RN  Outcome: Progressing     Problem: PAIN - ADULT  Goal: Verbalizes/displays adequate comfort level or baseline comfort level  Description: Interventions:  - Encourage patient to monitor pain and request assistance  - Assess pain using appropriate pain scale0-10  - Administer analgesics based on type and severity of pain and evaluate response  - Implement non-pharmacological measures as appropriate and evaluate response  - Consider cultural and social influences on pain and pain management  - Notify physician/advanced practitioner if interventions unsuccessful or patient reports new pain  6/18/2021 1458 by Brendon Salcido RN  Outcome: Completed  6/18/2021 1033 by Brendon Salcido RN  Outcome: Progressing     Problem: INFECTION - ADULT  Goal: Absence or prevention of progression during hospitalization  Description: INTERVENTIONS:  - Assess and monitor for signs and symptoms of infection  - Monitor lab/diagnostic results  - Monitor all insertion sites, i e  indwelling lines, tubes, and drains  - Monitor endotracheal if appropriate and nasal secretions for changes in amount and color  - [place] appropriate cooling/warming therapies per order  - Administer medications as ordered  - Instruct and encourage patient and family to use good hand hygiene technique  - Identify and instruct in appropriate isolation precautions for identified infection/condition  6/18/2021 1458 by Brendon Salcido RN  Outcome: Completed  6/18/2021 1033 by Brendon Salcido RN  Outcome: Progressing  Goal: Absence of fever/infection during neutropenic period  Description: INTERVENTIONS:  - Monitor WBC    6/18/2021 1458 by Mecca Cuevas RN  Outcome: Completed  6/18/2021 1033 by Mecca Cuevas RN  Outcome: Progressing     Problem: SAFETY ADULT  Goal: Patient will remain free of falls  Description: INTERVENTIONS:  - Educate patient/family on patient safety including physical limitations  - Instruct patient to call for assistance with activity   - Consult OT/PT to assist with strengthening/mobility   - Keep Call bell within reach  - Keep bed low and locked with side rails adjusted as appropriate  - Keep care items and personal belongings within reach  - Initiate and maintain comfort rounds  - Make Fall Risk Sign visible to staff  - Offer Toileting every 2 Hours, in advance of need  - Initiate/Maintain chair/bed alarm  - Obtain necessary fall risk management equipment:walker  - Apply yellow socks and bracelet for high fall risk patients  - Consider moving patient to room near nurses station  6/18/2021 1458 by Mecca Cuevas RN  Outcome: Completed  6/18/2021 1033 by Mecca Cuevas RN  Outcome: Progressing  Goal: Maintain or return to baseline ADL function  Description: INTERVENTIONS:  -  Assess patient's ability to carry out ADLs; assess patient's baseline for ADL function and identify physical deficits which impact ability to perform ADLs (bathing, care of mouth/teeth, toileting, grooming, dressing, etc )  - Assess/evaluate cause of self-care deficits   - Assess range of motion  - Assess patient's mobility; develop plan if impaired  - Assess patient's need for assistive devices and provide as appropriate  - Encourage maximum independence but intervene and supervise when necessary  - Involve family in performance of ADLs  - Assess for home care needs following discharge   - Consider OT consult to assist with ADL evaluation and planning for discharge  - Provide patient education as appropriate  6/18/2021 1458 by Mecca Cuevas RN  Outcome: Completed  6/18/2021 1033 by Mecca Cuevas RN  Outcome: Progressing  Goal: Maintains/Returns to pre admission functional level  Description: INTERVENTIONS:  - Perform BMAT or MOVE assessment daily    - Set and communicate daily mobility goal to care team and patient/family/caregiver  - Collaborate with rehabilitation services on mobility goals if consulted  - Out of bed for toileting  - Record patient progress and toleration of activity level   6/18/2021 1458 by Girma Irvin RN  Outcome: Completed  6/18/2021 1033 by Girma Irvin RN  Outcome: Progressing     Problem: DISCHARGE PLANNING  Goal: Discharge to home or other facility with appropriate resources  Description: INTERVENTIONS:  - Identify barriers to discharge w/patient and caregiver  - Arrange for needed discharge resources and transportation as appropriate  - Identify discharge learning needs (meds, wound care, etc )  - Arrange for interpretive services to assist at discharge as needed  - Refer to Case Management Department for coordinating discharge planning if the patient needs post-hospital services based on physician/advanced practitioner order or complex needs related to functional status, cognitive ability, or social support system  6/18/2021 1458 by Girma Irvin RN  Outcome: Completed  6/18/2021 1033 by Girma Irvin RN  Outcome: Progressing     Problem: Nutrition/Hydration-ADULT  Goal: Nutrient/Hydration intake appropriate for improving, restoring or maintaining nutritional needs  Description: Monitor and assess patient's nutrition/hydration status for malnutrition  Collaborate with interdisciplinary team and initiate plan and interventions as ordered  Monitor patient's weight and dietary intake as ordered or per policy  Utilize nutrition screening tool and intervene as necessary  Determine patient's food preferences and provide high-protein, high-caloric foods as appropriate       INTERVENTIONS:  - Monitor oral intake, urinary output, labs, and treatment plans  - Assess nutrition and hydration status and recommend course of action  - Evaluate amount of meals eaten  - Assist patient with eating if necessary   - Allow adequate time for meals  - Recommend/ encourage appropriate diets, oral nutritional supplements, and vitamin/mineral supplements  - Order, calculate, and assess calorie counts as needed  - Recommend, monitor, and adjust tube feedings and TPN/PPN based on assessed needs  - Assess need for intravenous fluids  - Provide specific nutrition/hydration education as appropriate  - Include patient/family/caregiver in decisions related to nutrition  6/18/2021 1458 by Sergio Capellan RN  Outcome: Completed  6/18/2021 1033 by Sergio Capellan RN  Outcome: Progressing     Problem: CARDIOVASCULAR - ADULT  Goal: Maintains optimal cardiac output and hemodynamic stability  Description: INTERVENTIONS:  - Monitor I/O, vital signs and rhythm  - Monitor for S/S and trends of decreased cardiac output,SOB, CP  - Administer and titrate ordered vasoactive medications to optimize hemodynamic stability  - Assess quality of pulses, skin color and temperature  - Assess for signs of decreased coronary artery perfusion  - Instruct patient to report change in severity of symptoms  6/18/2021 1458 by Sergio Capellan RN  Outcome: Completed  6/18/2021 1033 by Sergio Capellan RN  Outcome: Progressing     Problem: RESPIRATORY - ADULT  Goal: Achieves optimal ventilation and oxygenation  Description: INTERVENTIONS:  - Assess for changes in respiratory status  - Assess for changes in mentation and behavior  - Position to facilitate oxygenation and minimize respiratory effort  - Oxygen administered by appropriate delivery if ordered  - Initiate smoking cessation education as indicated  - Encourage broncho-pulmonary hygiene including cough, deep breathe, Incentive Spirometry  - Assess the need for suctioning and aspirate as needed  - Assess and instruct to report SOB or any respiratory difficulty  - Respiratory Therapy support as indicated  6/18/2021 1458 by Tory Galan RN  Outcome: Completed  6/18/2021 1033 by Tory Galan RN  Outcome: Progressing

## 2021-06-18 NOTE — ASSESSMENT & PLAN NOTE
Wt Readings from Last 3 Encounters:   06/18/21 94 6 kg (208 lb 9 6 oz)   05/27/21 93 kg (205 lb)   04/15/21 92 5 kg (204 lb)     · Presented to the ED given significant shortness of breath, orthopnea, leg swelling, wt gain  She denies any dietary indiscretion or mixed medication doses   CXR appears to have some vascular congestion,    Echocardiogram: 04/05/2021: Normal ventricle size, wall thickness with preserved systolic function  Ejection fraction 60%  Dilated right ventricle  Leads in the right heart  Dilated left and right atrium  Trace mitral regurgitation  Moderate to severe tricuspid regurgitation with estimated right ventricular systolic pressure of 72 mm of mercury   Appreciate cards input    Patient received treatment with IV diuretics  With the treatment, patient's condition improved  Patient transition to p o   Lasix, 80 mg daily as per Cardiology recommendation  · As per patient, patient takes 2 tablet on Tuesday and Thursday, and rest the week patient takes 1 pill daily  · Today INR is 3 40-advised the patient to hold Coumadin today (6/18/21) and Tomorrow (6/19/21)-CASE MANAGEMENT WILL HELP US TO SET APPOINTMENT WITH PCP AS WELL AS COUMADIN CLINIC-if nobody contact with patient tomorrow, advised the patient to take 5 mg on Sunday, 06/20/2021-and follow-up the Coumadin clinic on Monday      Weight change: -3 221 kg (-7 lb 1 6 oz)     Intake/Output Summary (Last 24 hours) at 6/18/2021 1139  Last data filed at 6/18/2021 1107  Gross per 24 hour   Intake 300 ml   Output 2850 ml   Net -2550 ml

## 2021-06-18 NOTE — ASSESSMENT & PLAN NOTE
· Blood pressure mildly elevated on admission with systolics in the 537N-727I  · Managed as an outpatient on diltiazem and oral Lasix  · Continue diltiazem  · Monitor with routine vitals

## 2021-06-18 NOTE — ASSESSMENT & PLAN NOTE
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  · Follows outpatient with Cardiology in [place] Dr eKvin Joy with [facility]   · Continue home dose Cardizem  · Anticoagulated on Coumadin  · 5 mg on Tuesday and Thursday  · 7 5 mg on Sunday, Monday, Wednesday, Friday, and Saturday  · As per patient, patient takes 2 tablet on Tuesday and Thursday, and rest the week patient takes 1 pill daily  · Today INR is 3 40-advised the patient to hold Coumadin today (6/18/21) and Tomorrow (6/19/21)-CASE MANAGEMENT WILL HELP US TO SET APPOINTMENT WITH PCP AS WELL AS COUMADIN CLINIC-if nobody contact with patient tomorrow, advised the patient to take 5 mg on Sunday, 06/20/2021-and follow-up the Coumadin clinic on Monday

## 2021-06-18 NOTE — DISCHARGE SUMMARY
[address]  Discharge- [facility] 1945, 77 y o  female MRN: [id number]  Unit/Bed#: -01 Encounter: [id number]  Primary Care Provider: Esha Juarez MD   Date and time admitted to hospital: 6/15/2021  9:27 AM    * Acute on chronic diastolic CHF (congestive heart failure) ([facility] [address] )  Assessment & Plan  Wt Readings from Last 3 Encounters:   06/18/21 94 6 kg (208 lb 9 6 oz)   05/27/21 93 kg (205 lb)   04/15/21 92 5 kg (204 lb)     · Presented to the ED given significant shortness of breath, orthopnea, leg swelling, wt gain  She denies any dietary indiscretion or mixed medication doses   CXR appears to have some vascular congestion,    Echocardiogram: 04/05/2021: Normal ventricle size, wall thickness with preserved systolic function  Ejection fraction 60%  Dilated right ventricle  Leads in the right heart  Dilated left and right atrium  Trace mitral regurgitation  Moderate to severe tricuspid regurgitation with estimated right ventricular systolic pressure of 72 mm of mercury   Appreciate cards input    Patient received treatment with IV diuretics  With the treatment, patient's condition improved  Patient transition to p o   Lasix, 80 mg daily as per Cardiology recommendation  · As per patient, patient takes 2 tablet on Tuesday and Thursday, and rest the week patient takes 1 pill daily  · Today INR is 3 40-advised the patient to hold Coumadin today (6/18/21) and Tomorrow (6/19/21)-CASE MANAGEMENT WILL HELP US TO SET APPOINTMENT WITH PCP AS WELL AS COUMADIN CLINIC-if nobody contact with patient tomorrow, advised the patient to take 5 mg on Sunday, 06/20/2021-and follow-up the Coumadin clinic on Monday      Weight change: -3 221 kg (-7 lb 1 6 oz)     Intake/Output Summary (Last 24 hours) at 6/18/2021 1139  Last data filed at 6/18/2021 1107  Gross per 24 hour   Intake 300 ml   Output 2850 ml   Net -2550 ml       Atrial fibrillation (HCC)  Assessment & Plan  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  · Follows outpatient with Cardiology in [address] Nidhi Hoang with [facility]   · Continue home dose Cardizem  · Anticoagulated on Coumadin  · 5 mg on Tuesday and Thursday  · 7 5 mg on Sunday, Monday, Wednesday, Friday, and Saturday  · As per patient, patient takes 2 tablet on Tuesday and Thursday, and rest the week patient takes 1 pill daily  · Today INR is 3 40-advised the patient to hold Coumadin today (6/18/21) and Tomorrow (6/19/21)-CASE MANAGEMENT WILL HELP US TO SET APPOINTMENT WITH PCP AS WELL AS COUMADIN CLINIC-if nobody contact with patient tomorrow, advised the patient to take 5 mg on Sunday, 06/20/2021-and follow-up the Coumadin clinic on Monday    Supratherapeutic INR  Assessment & Plan  · As per patient, patient takes 2 tablet on Tuesday and Thursday, and rest of the week patient takes 1 pill daily  · Today INR is 3 40-advised the patient to hold Coumadin today (6/18/21) and Tomorrow (6/19/21)-CASE MANAGEMENT WILL HELP US TO SET APPOINTMENT WITH PCP AS WELL AS COUMADIN CLINIC-if nobody contact with patient tomorrow, advised the patient to take 5 mg on Sunday, 06/20/2021-and follow-up the Coumadin clinic on Monday    Acute kidney injury superimposed on chronic kidney disease [facility])  Assessment & Plan  Lab Results   Component Value Date    EGFR 31 06/18/2021    EGFR 26 06/17/2021    EGFR 28 06/16/2021    CREATININE 1 63 (H) 06/18/2021    CREATININE 1 89 (H) 06/17/2021    CREATININE 1 78 (H) 06/16/2021   · Present on admission as evidence by creatinine of 1 72;   · Continues to improve  · Baseline appears to be approximately 1 2 on review of records  · Likely secondary to hypervolemia in the setting of CHF exacerbation  · IV diuresis ordered; cardiology to manage  · Avoid nephrotoxins, relative hypotension, and NSAIDs as appropriate      Hypertension  Assessment & Plan  · Blood pressure mildly elevated on admission with systolics in the 167O-736S  · Managed as an outpatient on diltiazem and oral Lasix  · Continue diltiazem  · Monitor with routine vitals    Disease of thyroid gland  Assessment & Plan  · Continue home dose Synthroid therapy    Type 2 diabetes mellitus with diabetic neuropathy [facility])  Assessment & Plan  Lab Results   Component Value Date    HGBA1C 7 0 (H) 06/15/2021     Recent Labs     06/17/21  1550 06/17/21  2046 06/18/21  0729 06/18/21  1109   POCGLU 173* 171* 159* 150*     Blood Sugar Average: Last 72 hrs:    · Patient can resume home medication  · Carb controlled diet  · Monitor with POCT BG and titrate regimen as indicat    JOS (obstructive sleep apnea)  Assessment & Plan  · CPAP while inpatient      Discharging Physician / Practitioner: June Rolle MD  PCP: Marcel Lebron MD  Admission Date:   Admission Orders (From admission, onward)     Ordered        06/15/21 1106  INPATIENT ADMISSION  Once                   Discharge Date: 06/18/21    Medical Problems     Resolved Problems  Date Reviewed: 6/17/2021    None                Consultations During Hospital Stay:  · Cardiology  · PT/OT    Procedures Performed:   XR chest 1 view portable   ED Interpretation by Myrtlehema Clifford DO (06/15 1023)   CHF changes, right pleural effusion      Final Result by Juan Sheldon MD (06/15 0963)      Pulmonary vascular congestion is mildly improved  Right lower lobe atelectasis versus infiltrate                    Workstation performed: [id number]           ·     Significant Findings / Test Results:   Lab Results   Component Value Date    WBC 6 33 06/16/2021    HGB 10 5 (L) 06/16/2021    HCT 34 6 (L) 06/16/2021    MCV 80 (L) 06/16/2021     06/16/2021     Lab Results   Component Value Date    SODIUM 141 06/18/2021    K 3 8 06/18/2021     06/18/2021    CO2 29 06/18/2021    AGAP 9 06/18/2021    BUN 45 (H) 06/18/2021    CREATININE 1 63 (H) 06/18/2021    GLUC 134 06/18/2021    CALCIUM 8 2 (L) 06/18/2021    AST 12 06/16/2021    ALT 14 06/16/2021    ALKPHOS 107 06/16/2021    TP 7 2 06/16/2021    TBILI 0 58 06/16/2021    EGFR 31 06/18/2021     Lab Results   Component Value Date    INR 3 40 (H) 06/18/2021    INR 2 94 (H) 06/17/2021    INR 3 16 (H) 06/16/2021    PROTIME 33 5 (H) 06/18/2021    PROTIME 30 0 (H) 06/17/2021    PROTIME 31 8 (H) 06/16/2021   ·   ·     Incidental Findings:   · As mentioned above     Test Results Pending at Discharge (will require follow up): · None     Outpatient Tests Requested:  · INR    Complications:  None    Reason for Admission:  Shortness of breath    Hospital Course:     Miguelito House is a 77 y o  female patient who originally presented to the hospital on 6/15/2021 due to shortness of breath secondary to acute CHF exacerbation  Patient started aggressive diuretics therapy with IV Lasix  Cardiology consulted  With the treatment, patient's condition improved  On discharge date, patient's weight was 208 lb 9 6 Oz  Cardiology recommends to resume home dose of Lasix and patient can be discharged  Patient advised to resume all her home medication  And follow-up with PCP and Cardiology as scheduled  Patient also found ALMA, which improved with diuretic therapy  Continue to monitor  Patient also found supratherapeutic INR on discharge date, INR was 3 40  · As per patient, patient takes 2 tablet on Tuesday and Thursday, and rest the week patient takes 1 pill daily  · Today INR is 3 40-advised the patient to hold Coumadin today (6/18/21) and Tomorrow (6/19/21)-CASE MANAGEMENT WILL HELP US TO SET APPOINTMENT WITH PCP AS WELL AS COUMADIN CLINIC-if nobody contact with patient tomorrow, advised the patient to take 5 mg on Sunday, 06/20/2021-and follow-up the Coumadin clinic on Monday       Metformin discontinued due to elevated creatinine  Low-dose glipizide added  Patient remained hemodynamically stable  Patient be discharged home with home health aide  Prescription provided for BMP and INR to be checked      PCP notified by via tiger text     Please see above list of diagnoses and related plan for additional information  Condition at Discharge: stable     Discharge Day Visit / Exam:     Subjective:  Seen and evaluated during the round  Patient denies any significant complaint  Vitals: Blood Pressure: 142/63 (06/18/21 0624)  Pulse: 75 (06/18/21 0900)  Temperature: 97 8 °F (36 6 °C) (06/18/21 0624)  Temp Source: Temporal (06/15/21 0941)  Respirations: 16 (06/18/21 0624)  Height: 5' 2" (157 5 cm) (06/15/21 0941)  Weight - Scale: 94 6 kg (208 lb 9 6 oz) (06/18/21 0600)  SpO2: 97 % (06/18/21 1027)  Exam:   Physical Exam  Vitals and nursing note reviewed  Exam conducted with a chaperone present  Constitutional:       Appearance: She is not diaphoretic  HENT:      Head: Normocephalic  Nose: No congestion  Mouth/Throat:      Mouth: Mucous membranes are moist       Pharynx: No oropharyngeal exudate  Eyes:      General: No scleral icterus  Conjunctiva/sclera: Conjunctivae normal       Pupils: Pupils are equal, round, and reactive to light  Cardiovascular:      Rate and Rhythm: Normal rate  Heart sounds: No friction rub  No gallop  Pulmonary:      Effort: Pulmonary effort is normal  No respiratory distress  Breath sounds: No stridor  No wheezing or rhonchi  Abdominal:      General: Abdomen is flat  Bowel sounds are normal  There is no distension  Palpations: There is no mass  Tenderness: There is no abdominal tenderness  Hernia: No hernia is present  Musculoskeletal:         General: Normal range of motion  Cervical back: Normal range of motion  Right lower leg: No edema  Left lower leg: No edema  Lymphadenopathy:      Cervical: No cervical adenopathy  Skin:     General: Skin is warm  Capillary Refill: Capillary refill takes less than 2 seconds  Neurological:      General: No focal deficit present        Mental Status: She is alert and oriented to person, place, and time       Cranial Nerves: No cranial nerve deficit  Sensory: No sensory deficit  Motor: No weakness  Coordination: Coordination normal    Psychiatric:         Mood and Affect: Mood normal        Discussion with Family:  Try to reach the patient's spouse over the phone, unable to reach the number listed    Discharge instructions/Information to patient and family:   See after visit summary for information provided to patient and family  Provisions for Follow-Up Care:  See after visit summary for information related to follow-up care and any pertinent home health orders  Disposition:     Home with VNA Services (Reminder: Complete face to face encounter)    For Discharges to [facility] SNF:   · Not Applicable to this Patient - Not Applicable to this Patient    Planned Readmission:  If condition get worse     Discharge Statement:  I spent >35 minutes discharging the patient  This time was spent on the day of discharge  I had direct contact with the patient on the day of discharge  Greater than 50% of the total time was spent examining patient, answering all patient questions, arranging and discussing plan of care with patient as well as directly providing post-discharge instructions  Additional time then spent on discharge activities  Discharge Medications:  See after visit summary for reconciled discharge medications provided to patient and family        ** Please Note: This note has been constructed using a voice recognition system **

## 2021-06-18 NOTE — PROGRESS NOTES
Progress Note - Cardiology   Joaquina Current 77 y o  female MRN: [id number]  Unit/Bed#: -01 Encounter: [id number]    Assessment:  Acute on chronic HFpEF- appearing euvolemic today  Persistent Afib- rate controlled on coumadin, palpitations resolved  SSS sp PPM- [employer], atrial lead programmed off for poor function/sensing  CKD  DM2  Mod pHTN    Plan:  - restart lasix 80 mg PO daily starting tomorrow  - will get her follow up with our office    - recommend daily weights at home  -  Will sign off    Subjective/Objective     Subjective: pt reports feeling back to her normal self  She has no cardiac complaints  Palpitations have resolved  Objective: negative another 2 L yesterday  Renal function stable  Siting comfortably in hospital chair     Vitals: /63   Pulse 75   Temp 97 8 °F (36 6 °C)   Resp 16   Ht 5' 2" (1 575 m)   Wt 94 6 kg (208 lb 9 6 oz)   SpO2 97%   BMI 38 15 kg/m²   Vitals:    06/17/21 0538 06/18/21 0600   Weight: 97 8 kg (215 lb 11 2 oz) 94 6 kg (208 lb 9 6 oz)     Orthostatic Blood Pressures      Most Recent Value   Blood Pressure  142/63 filed at 06/18/2021 4238   Patient Position - Orthostatic VS  Lying filed at 06/15/2021 1115            Intake/Output Summary (Last 24 hours) at 6/18/2021 1043  Last data filed at 6/18/2021 1001  Gross per 24 hour   Intake 300 ml   Output 2150 ml   Net -1850 ml       Invasive Devices     Peripheral Intravenous Line            Peripheral IV 06/15/21 Right Antecubital 3 days          Drain            External Urinary Catheter 2 days                  Physical Exam:   Vitals reviewed  In no acute distress  Head of bed elevated  No edema  -JVD  No Rales  No w/r  No m/g/r  No further abdominal tenderness    Lab Results:   I have personally reviewed pertinent lab results      CBC with diff:   Results from last 7 days   Lab Units 06/16/21  0454   WBC Thousand/uL 6 33   RBC Million/uL 4 31   HEMOGLOBIN g/dL 10 5*   HEMATOCRIT % 34 6*   MCV fL 80*   MCH pg 24 4*   MCHC g/dL 30 3*   RDW % 17 3*   MPV fL 10 5   PLATELETS Thousands/uL 158     CMP:   Results from last 7 days   Lab Units 06/18/21  0555 06/16/21  0454   SODIUM mmol/L 141 141   POTASSIUM mmol/L 3 8 4 3   CHLORIDE mmol/L 103 102   CO2 mmol/L 29 30   BUN mg/dL 45* 38*   CREATININE mg/dL 1 63* 1 78*   CALCIUM mg/dL 8 2* 8 6   AST U/L  --  12   ALT U/L  --  14   ALK PHOS U/L  --  107   EGFR ml/min/1 73sq m 31 28     Troponin:   0   Lab Value Date/Time    TROPONINI <0 02 06/15/2021 0955    TROPONINI <0 02 10/25/2020 1803    TROPONINI <0 02 10/25/2020 1532    TROPONINI <0 02 10/25/2020 1144     BNP:   Results from last 7 days   Lab Units 06/18/21  0555   POTASSIUM mmol/L 3 8   CHLORIDE mmol/L 103   CO2 mmol/L 29   BUN mg/dL 45*   CREATININE mg/dL 1 63*   CALCIUM mg/dL 8 2*   EGFR ml/min/1 73sq m 31     Coags:   Results from last 7 days   Lab Units 06/18/21  0729   INR  3 40*     TSH:   Results from last 7 days   Lab Units 06/15/21  0955   TSH 3RD GENERATON uIU/mL 5 834*     Magnesium:   Results from last 7 days   Lab Units 06/16/21  0454   MAGNESIUM mg/dL 2 2     Imaging: I have personally reviewed pertinent reports  Echocardiogram: 04/05/2021: Normal ventricle size, wall thickness with preserved systolic function  Ejection fraction 60%  Dilated right ventricle  Leads in the right heart  Dilated left and right atrium  Trace mitral regurgitation  Moderate to severe tricuspid regurgitation with estimated right ventricular systolic pressure of 72 mm of mercury  Echocardiogram: 09/08/2020: Normal ventricle size with preserved systolic function  Ejection fraction 59%  Septal flattening secondary to right-sided pressure/volume overload  Severely dilated right ventricle with preserved systolic function  Leads in the right heart  Severely dilated left atrium  Mild-to-moderate mitral regurgitation  Trace aortic insufficiency   Moderate tricuspid regurgitation with estimated right ventricular systolic pressure of 60-65 mm of mercury  Compared to prior study right ventricle systolic pressure was previously estimated at 47 mm of mercury  :

## 2021-06-18 NOTE — ASSESSMENT & PLAN NOTE
Lab Results   Component Value Date    EGFR 31 06/18/2021    EGFR 26 06/17/2021    EGFR 28 06/16/2021    CREATININE 1 63 (H) 06/18/2021    CREATININE 1 89 (H) 06/17/2021    CREATININE 1 78 (H) 06/16/2021   · Present on admission as evidence by creatinine of 1 72;   · Continues to improve  · Baseline appears to be approximately 1 2 on review of records  · Likely secondary to hypervolemia in the setting of CHF exacerbation  · IV diuresis ordered; cardiology to manage  · Avoid nephrotoxins, relative hypotension, and NSAIDs as appropriate

## 2021-06-18 NOTE — DISCHARGE INSTRUCTIONS
Heart Failure   AMBULATORY CARE:   Heart failure  is a condition that does not allow your heart to fill or pump properly  Not enough oxygen in your blood gets to your organs and tissues  Fluid may not move through your body properly  Fluid builds up and causes swelling and trouble breathing  This is known as congestive heart failure  Heart failure may start in the left or right ventricle  Heart failure is often caused by damage or injury to your heart  The damage may be caused by other heart problems, diabetes, or high blood pressure  The damage may have also been caused by an infection  Heart failure is a long-term condition that tends to get worse over time  It is important to manage your health to improve your quality of life  Common signs and symptoms:   · Trouble breathing with activity that worsens to trouble breathing at rest    · Shortness of breath while lying flat    · Severe shortness of breath and coughing at night that usually wakes you    · Feeling lightheaded when you stand up    · Purple color around your mouth and nails    · Confusion or anxiety    · Chest pain at night    · Periods of no breathing, then breathing fast    · Lack of energy (often worsened by physical activity), or trouble sleeping    · Swelling in your ankles, legs, or abdomen    · Heartbeat that is fast or not regular    · Fingers and toes feel cool to the touch    Call your local emergency number (911 in the [address] [facility]) if:   · You have any of the following signs of a heart attack:      ? Squeezing, pressure, or pain in your chest    ? You may  also have any of the following:     § Discomfort or pain in your back, neck, jaw, stomach, or arm    § Shortness of breath    § Nausea or vomiting    § Lightheadedness or a sudden cold sweat      Call your doctor if:   · Your heartbeat is fast, slow, or uneven all the time  · You have symptoms of worsening heart failure:      ?  Shortness of breath at rest, at night, or that is getting worse in any way    ? Weight gain of 3 or more pounds (1 4 kg) in a day, or more than your healthcare provider says is okay    ? More swelling in your legs or ankles    ? Abdominal pain or swelling    ? More coughing    ? Loss of appetite    ? Feeling tired all the time    · You feel hopeless or depressed, or you have lost interest in things you used to enjoy  · You often feel worried or afraid  · You have questions or concerns about your condition or care  Treatment for heart failure  may include any of the following:  · Medicines  may be needed to help regulate your heart rhythm  You may also need medicines to lower your blood pressure, and to decrease extra fluids  · Oxygen  may help you breathe easier if your oxygen level is lower than normal  A CPAP machine may be used to keep your airway open while you sleep  · Cardiac rehab  is a program run by specialists who will help you safely strengthen your heart  In the program you will learn about exercise, relaxation, stress management, and heart-healthy nutrition  Cardiac rehab may be recommended if your heart failure is not severe  · Surgery  can be done to implant a pacemaker or another device in your chest to regulate your heart rhythm  Other types of surgery can open blocked heart vessels, replace a damaged heart valve, or remove scar tissue  Manage swelling from extra fluid:   · Elevate (raise) your legs above the level of your heart  This will help with fluid that builds up in your legs or ankles  Elevate your legs as often as possible during the day  Prop your legs on pillows or blankets to keep them elevated comfortably  Try not to stand for long periods of time during the day  Move around to keep your blood circulating  · Limit sodium (salt)  Ask how much sodium you can have each day  Your healthcare provider may give you a limit, such as 2,300 milligrams (mg) a day   Your provider or a dietitian can teach you how to read food labels for the number of mg in a food  He or she can also help you find ways to have less salt  For example, if you add salt to food as you cook, do not add more at the table  · Drink liquids as directed  You may need to limit the amount of liquid you drink within 24 hours  Your healthcare provider will tell you how much liquid to have and which liquids are best for you  He or she may tell you to limit liquid to 1 5 to 2 liters in a day  He or she will also tell you how often to drink liquid throughout the day  · Weigh yourself every morning  Use the same scale, in the same spot  Do this after you use the bathroom, but before you eat or drink  Wear the same type of clothing each time  Write down your weight and call your healthcare provider if you have a sudden weight gain  Swelling and weight gain are signs of fluid buildup  Manage heart failure: Your quality of life may improve with treatment and the following:  · Do not smoke  Nicotine and other chemicals in cigarettes and cigars can cause lung and heart damage  Ask your healthcare provider for information if you currently smoke and need help to quit  E-cigarettes or smokeless tobacco still contain nicotine  Talk to your healthcare provider before you use these products  · Do not drink alcohol or use illegal drugs  Alcohol and drugs can increase your risk for high blood pressure, diabetes, and coronary artery disease  · Eat heart-healthy foods  Heart-healthy foods include fruits, vegetables, lean meat (such as beef, chicken, or pork), and low-fat dairy products  Fatty fish such as salmon and tuna are also heart healthy  Other heart-healthy foods include walnuts, whole-grain breads, beans, and cooked beans  Replace butter and margarine with heart-healthy oils such as olive oil or canola oil  Your provider or a dietitian can help you create heart-healthy meal plans  · Manage any chronic health conditions you have    These include high blood pressure, diabetes, obesity, high cholesterol, metabolic syndrome, and COPD  You will have fewer symptoms if you manage these health conditions  Follow your healthcare provider's recommendations and follow up with him or her regularly  · Maintain a healthy weight  Being overweight can increase your risk for high blood pressure, diabetes, and coronary artery disease  These conditions can make your symptoms worse  Ask your healthcare provider how much you should weigh  Ask him or her to help you create a weight loss plan if you are overweight  · Stay active  Activity can help keep your symptoms from getting worse  Walking is a type of physical activity that helps maintain your strength and improve your mood  Physical activity also helps you manage your weight  Work with your healthcare provider to create an exercise plan that is right for you  · Get vaccines as directed  The flu and pneumonia can be severe for a person who has heart failure  Vaccines protect you from these infections  Get a flu shot every year as soon as it is recommended, usually in September or October  You may also need the pneumonia vaccine  Your healthcare provider can tell you if you need other vaccines, and when to get them  Follow up with your doctor or cardiologist within 7 days or as directed: You may need to return for other tests  You may need home health care  A healthcare provider will monitor your vital signs, weight, and make sure your medicines are working  Write down your questions so you remember to ask them during your visits  Join a support group:  Heart failure can be difficult to manage  It may be helpful to talk with others who have heart failure  You may learn how to better manage your condition or get emotional support  For more information:  · [address]  [place] , [place]   Phone: 3- 028 - [phone]  Web Address: https://[employer]/  org     © Copyright [employer] 2021 Information is for [employer] use only and may not be sold, redistributed or otherwise used for commercial purposes  All illustrations and images included in CareNotes® are the copyrighted property of A [employer] A M [employer] Inc  or [employer]  The above information is an  only  It is not intended as medical advice for individual conditions or treatments  Talk to your doctor, nurse or pharmacist before following any medical regimen to see if it is safe and effective for you

## 2021-06-18 NOTE — ASSESSMENT & PLAN NOTE
· As per patient, patient takes 2 tablet on Tuesday and Thursday, and rest of the week patient takes 1 pill daily  · Today INR is 3 40-advised the patient to hold Coumadin today (6/18/21) and Tomorrow (6/19/21)-CASE MANAGEMENT WILL HELP US TO SET APPOINTMENT WITH PCP AS WELL AS COUMADIN CLINIC-if nobody contact with patient tomorrow, advised the patient to take 5 mg on Sunday, 06/20/2021-and follow-up the Coumadin clinic on Monday

## 2021-06-25 ENCOUNTER — CONSULT (OUTPATIENT)
Dept: NEPHROLOGY | Facility: CLINIC | Age: 76
End: 2021-06-25
Payer: MEDICARE

## 2021-06-25 VITALS — DIASTOLIC BLOOD PRESSURE: 68 MMHG | SYSTOLIC BLOOD PRESSURE: 152 MMHG | HEART RATE: 62 BPM

## 2021-06-25 DIAGNOSIS — N18.32 STAGE 3B CHRONIC KIDNEY DISEASE (HCC): Primary | ICD-10-CM

## 2021-06-25 DIAGNOSIS — I27.20 PULMONARY HYPERTENSION (HCC): ICD-10-CM

## 2021-06-25 DIAGNOSIS — N17.9 ACUTE KIDNEY INJURY SUPERIMPOSED ON CHRONIC KIDNEY DISEASE (HCC): ICD-10-CM

## 2021-06-25 DIAGNOSIS — I50.33 ACUTE ON CHRONIC DIASTOLIC CHF (CONGESTIVE HEART FAILURE) (HCC): ICD-10-CM

## 2021-06-25 DIAGNOSIS — E66.01 SEVERE OBESITY WITH BODY MASS INDEX (BMI) OF 36.0 TO 36.9 WITH SERIOUS COMORBIDITY (HCC): ICD-10-CM

## 2021-06-25 DIAGNOSIS — I50.812 CHRONIC RIGHT-SIDED CONGESTIVE HEART FAILURE (HCC): ICD-10-CM

## 2021-06-25 DIAGNOSIS — N18.9 ACUTE KIDNEY INJURY SUPERIMPOSED ON CHRONIC KIDNEY DISEASE (HCC): ICD-10-CM

## 2021-06-25 DIAGNOSIS — E11.40 TYPE 2 DIABETES MELLITUS WITH DIABETIC NEUROPATHY, UNSPECIFIED WHETHER LONG TERM INSULIN USE (HCC): ICD-10-CM

## 2021-06-25 DIAGNOSIS — G47.33 OSA (OBSTRUCTIVE SLEEP APNEA): ICD-10-CM

## 2021-06-25 DIAGNOSIS — I10 HYPERTENSION, UNSPECIFIED TYPE: ICD-10-CM

## 2021-06-25 PROCEDURE — 99204 OFFICE O/P NEW MOD 45 MIN: CPT | Performed by: INTERNAL MEDICINE

## 2021-06-25 RX ORDER — FAMOTIDINE 40 MG/1
40 TABLET, FILM COATED ORAL DAILY
COMMUNITY
Start: 2021-02-15 | End: 2021-07-29 | Stop reason: HOSPADM

## 2021-06-25 NOTE — ASSESSMENT & PLAN NOTE
Would benefit from a Heart Failure evaluation, possible sildenafil therapy  Will follow up with cardiology

## 2021-06-25 NOTE — ASSESSMENT & PLAN NOTE
Wt Readings from Last 3 Encounters:   06/18/21 94 6 kg (208 lb 9 6 oz)   05/27/21 93 kg (205 lb)   04/15/21 92 5 kg (204 lb)       As above  Her lungs are clear today  She has minimal edema but she "puts her fluid" in her abdomen rather than her legs

## 2021-06-25 NOTE — ASSESSMENT & PLAN NOTE
She had a CPAP in the past but stopped using it and it was taken away from her single was needed    The  reports that she snores session most probably does have an element of sleep apnea

## 2021-06-25 NOTE — PROGRESS NOTES
[address] [facility] [place], [place]    Name: Opal Dallas  YOB: 1945      Assessment/Plan:         Problem List Items Addressed This Visit        Endocrine    Type 2 diabetes mellitus with diabetic neuropathy [facility])    Relevant Orders    Ambulatory Referral to Pulmonary Rehabilitation     kidney and bladder    Microalbumin / creatinine urine ratio    Protein / creatinine ratio, urine    Urinalysis with microscopic       Respiratory    JOS (obstructive sleep apnea)     She had a CPAP in the past but stopped using it and it was taken away from her single was needed  The  reports that she snores session most probably does have an element of sleep apnea            Cardiovascular and Mediastinum    Acute on chronic diastolic CHF (congestive heart failure) ([facility])     Wt Readings from Last 3 Encounters:   06/18/21 94 6 kg (208 lb 9 6 oz)   05/27/21 93 kg (205 lb)   04/15/21 92 5 kg (204 lb)       Her echocardiogram was reviewed  She has preserved left ventricular systolic function but has diastolic dysfunction she has moderate to severe tricuspid regurgitation  And she has severe pulmonary hypertension with a pulmonary artery pressure of 72 mm  She was not a smoker but did work in a [employer]  I think she would benefit from a pulmonary he evaluation to see if medications could be used for her pulmonary hypertension such as sildenafil    She could see our Heart Failure specialist Dr Carin Mclean  Her children are computer literate but she does not have a computer  Consider a telemedicine call         Relevant Orders    Ambulatory Referral to Pulmonary Rehabilitation    US kidney and bladder    Microalbumin / creatinine urine ratio    Protein / creatinine ratio, urine    Urinalysis with microscopic    CHF (congestive heart failure) ([facility] )     Wt Readings from Last 3 Encounters:   06/18/21 94 6 kg (208 lb 9 6 oz)   05/27/21 93 kg (205 lb)   04/15/21 92 5 kg (204 lb)       As above  Her lungs are clear today  She has minimal edema but she "puts her fluid" in her abdomen rather than her legs           Hypertension    Pulmonary hypertension ([facility] )     Would benefit from a Heart Failure evaluation, possible sildenafil therapy  Will follow up with cardiology            Genitourinary    Stage 3 chronic kidney disease [facility]) - Primary     Lab Results   Component Value Date    EGFR 31 06/18/2021    EGFR 26 06/17/2021    EGFR 28 06/16/2021    CREATININE 1 63 (H) 06/18/2021    CREATININE 1 89 (H) 06/17/2021    CREATININE 1 78 (H) 06/16/2021   She has long history of diabetes greater than 25 years out with the retinopathy  However will check urinary protein studies as well as a kidney ultrasound  She may have underlying diabetic nephropathy  I suspect her chronic kidney disease is related to underlying diastolic congestive failure/pulmonary hypertension and volume overload  She also has chronic atrial fibrillation         Relevant Orders    Ambulatory Referral to Pulmonary Rehabilitation    US kidney and bladder    Microalbumin / creatinine urine ratio    Protein / creatinine ratio, urine    Urinalysis with microscopic    Acute kidney injury superimposed on chronic kidney disease ([facility] [address] )     Lab Results   Component Value Date    EGFR 31 06/18/2021    EGFR 26 06/17/2021    EGFR 28 06/16/2021    CREATININE 1 63 (H) 06/18/2021    CREATININE 1 89 (H) 06/17/2021    CREATININE 1 78 (H) 06/16/2021   She had a recent rise in her creatinine from a previous level of 1 2-1 3 mg/dL to 1 6-1 7 mg/dL  I explained the meaning of creatinine and GFR  Etiology is most probably the use of Lasix in order to maintain euvolemia  She has been taking Lasix 80 mg daily  Other Visit Diagnoses     Severe obesity with body mass index (BMI) of 36 0 to 36 9 with serious comorbidity (HCC)                Subjective:      Patient ID: Carlos Enrique Jacques is a 77 y o  female      HPI was admitted to [address] [place] [address] 06/15/2021 with shortness of breath, orthopnea and lower extremity swelling  Chest x-ray demonstrated vascular congestion  An echocardiogram 04/05/2021 demonstrated normal left ventricular size wall thickness and preserved systolic function with an ejection fraction of 60% with a dilated right atrium of right ventricle  Her PA P was 70 2 mmHg  She was treated with IV Lasix and had improvement  She was transitioned to Lasix 80 mg daily  She takes  Lasix 80 mg p o  Daily  She started to weigh herself daily  She avoid salt and is on a fluid restriction  She has type 2 diabetes mellitus non-insulin requiring for 25 years and does not have retinopathy  Her last A1c was 7%  She has a permanent pacemaker and has had congestive failure/diastolic dysfunction  She has never had an MI  Her father and her younger  brother had renal cell cancer  She does not take  NSAID's    Her creatinine June 18th 2021 was 1 63 mg/dL (GFR 31)  In November of 2020 her creatinine was 1 25 (GFR 42)  In April of 2021 her creatinine was 1 3 mg/dL (39 4)    Of note she was 313 lbs 10 years ago and lost the weight with diet      The following portions of the patient's history were reviewed and updated as appropriate: allergies, current medications, past family history, past medical history, past social history, past surgical history and problem list     Review of Systems   Constitutional: Positive for activity change and fatigue  HENT: Negative for hearing loss  Eyes: Negative for visual disturbance  Respiratory: Negative for cough and shortness of breath  CARPENTER   Cardiovascular: Negative for chest pain, palpitations and leg swelling  Gastrointestinal: Positive for abdominal distention  Negative for abdominal pain, constipation and diarrhea         /////////////Swells in abdomen   Genitourinary: Negative for difficulty urinating, dysuria and hematuria  Musculoskeletal: Positive for arthralgias and myalgias     Neurological: Positive for weakness  Negative for dizziness and light-headedness  Hematological: Does not bruise/bleed easily  Psychiatric/Behavioral: Negative for dysphoric mood  Social History     Socioeconomic History    Marital status: /Civil Union     Spouse name: None    Number of children: None    Years of education: None    Highest education level: None   Occupational History    None   Tobacco Use    Smoking status: Never Smoker    Smokeless tobacco: Never Used   Vaping Use    Vaping Use: Never used   Substance and Sexual Activity    Alcohol use: Not Currently    Drug use: Not Currently    Sexual activity: Yes   Other Topics Concern    None   Social History Narrative    None     Social Determinants of Health     Financial Resource Strain:     Difficulty of Paying Living Expenses:    Food Insecurity:     Worried About Running Out of Food in the Last Year:     Ran Out of Food in the Last Year:    Transportation Needs:     Lack of Transportation (Medical):      Lack of Transportation (Non-Medical):    Physical Activity:     Days of Exercise per Week:     Minutes of Exercise per Session:    Stress:     Feeling of Stress :    Social Connections:     Frequency of Communication with Friends and Family:     Frequency of Social Gatherings with Friends and Family:     Attends Sikh Services:     Active Member of Clubs or Organizations:     Attends Club or Organization Meetings:     Marital Status:    Intimate Partner Violence:     Fear of Current or Ex-Partner:     Emotionally Abused:     Physically Abused:     Sexually Abused:      Past Medical History:   Diagnosis Date    Arthritis     [facility]congest[facility]) ([email or web address]     Diabetes mellitus ([facility] )     Disease of thyroid gland     Hypertension     Renal disorder      Past Surgical History:   Procedure Laterality Date    CARDIAC PACEMAKER PLACEMENT      CHOLECYSTECTOMY      JOINT REPLACEMENT      bilateral knee replacements    ORIF TIBIA & FIBULA FRACTURES Left 10/29/2020    Procedure: OPEN REDUCTION W/ INTERNAL FIXATION (ORIF) ANKLE;  Surgeon: Jitendra Bhatti;   Location: [facility];  Service: Orthopedics    TONSILLECTOMY      TUBAL LIGATION         Current Outpatient Medications:     alendronate (FOSAMAX) 70 mg tablet, Take 70 mg by mouth every 7 days, Disp: , Rfl:     Ascorbic Acid, Vitamin C, (VITAMIN C) 100 MG tablet, Take 500 mg by mouth, Disp: , Rfl:     Calcium Carb-Cholecalciferol (OSCAL-D) 500 mg-200 units per tablet, Take 1 tablet by mouth daily, Disp: , Rfl:     diltiazem (CARDIZEM CD) 300 mg 24 hr capsule, Take 300 mg by mouth daily , Disp: , Rfl:     famotidine (PEPCID) 40 MG tablet, Take 40 mg by mouth, Disp: , Rfl:     Ferrous Sulfate (IRON PO), Take by mouth, Disp: , Rfl:     furosemide (LASIX) 40 mg tablet, Take 80 mg by mouth daily , Disp: , Rfl:     gabapentin (NEURONTIN) 300 mg capsule, Take 300 mg by mouth 2 (two) times a day 1 tab qam, 2 tabs qhs, Disp: , Rfl:     glipiZIDE (GLUCOTROL) 5 mg tablet, Take 0 5 tablets (2 5 mg total) by mouth 2 (two) times a day before meals, Disp: 30 tablet, Rfl: 0    levothyroxine 150 mcg tablet, Take 150 mcg by mouth daily , Disp: , Rfl:     Multiple Vitamin (Multi-Day) TABS, Take 1 tablet by mouth daily , Disp: , Rfl:     potassium chloride (K-DUR,KLOR-CON) 20 mEq tablet, Take 1 tablet (20 mEq total) by mouth daily, Disp: 30 tablet, Rfl: 0    ranitidine (ZANTAC) 300 MG tablet, Take 300 mg by mouth daily as needed , Disp: , Rfl:     warfarin (COUMADIN) 5 mg tablet, Take 5 mg by mouth Sun, Wed, Friday- 1 tab Tues, Thursday, Saturday- 2 tab, Disp: , Rfl:     Lab Results   Component Value Date    SODIUM 141 06/18/2021    K 3 8 06/18/2021     06/18/2021    CO2 29 06/18/2021    AGAP 9 06/18/2021    BUN 45 (H) 06/18/2021    CREATININE 1 63 (H) 06/18/2021    GLUC 134 06/18/2021    CALCIUM 8 2 (L) 06/18/2021    AST 12 06/16/2021    ALT 14 06/16/2021 ALKPHOS 107 06/16/2021    TP 7 2 06/16/2021    TBILI 0 58 06/16/2021    EGFR 31 06/18/2021     Lab Results   Component Value Date    WBC 6 33 06/16/2021    HGB 10 5 (L) 06/16/2021    HCT 34 6 (L) 06/16/2021    MCV 80 (L) 06/16/2021     06/16/2021     Lab Results   Component Value Date    CHOLESTEROL 135 06/16/2021     Lab Results   Component Value Date    HDL 64 06/16/2021     Lab Results   Component Value Date    LDLCALC 59 06/16/2021     Lab Results   Component Value Date    TRIG 60 06/16/2021     No results found for: [place], [place]  Lab Results   Component Value Date    BRA5SUFJQSDR 5 834 (H) 06/15/2021     Lab Results   Component Value Date    CALCIUM 8 2 (L) 06/18/2021     No results found for: SPEP, UPEP  No results found for: MICROALBUR, [id number]        Objective:      /68 (BP Location: Right arm, Patient Position: Sitting)   Pulse 62          Physical Exam  Constitutional:       General: She is not in acute distress  Appearance: She is not toxic-appearing  HENT:      Head: Normocephalic and atraumatic  Right Ear: External ear normal       Left Ear: External ear normal    Eyes:      Extraocular Movements: Extraocular movements intact  Pupils: Pupils are equal, round, and reactive to light  Neck:      Vascular: No carotid bruit  Cardiovascular:      Rate and Rhythm: Rhythm irregular  Pulmonary:      Effort: Pulmonary effort is normal  No respiratory distress  Breath sounds: Normal breath sounds  No wheezing or rales  Abdominal:      General: Bowel sounds are normal  There is distension  Palpations: Abdomen is soft  Tenderness: There is no abdominal tenderness  Musculoskeletal:      Cervical back: Normal range of motion  Right lower leg: Edema present  Left lower leg: Edema present  Comments: Trace -1+   Lymphadenopathy:      Cervical: No cervical adenopathy  Skin:     General: Skin is warm and dry     Neurological:      General: No focal deficit present  Mental Status: She is alert        Gait: Gait abnormal

## 2021-06-25 NOTE — ASSESSMENT & PLAN NOTE
Lab Results   Component Value Date    EGFR 31 06/18/2021    EGFR 26 06/17/2021    EGFR 28 06/16/2021    CREATININE 1 63 (H) 06/18/2021    CREATININE 1 89 (H) 06/17/2021    CREATININE 1 78 (H) 06/16/2021   She had a recent rise in her creatinine from a previous level of 1 2-1 3 mg/dL to 1 6-1 7 mg/dL  I explained the meaning of creatinine and GFR  Etiology is most probably the use of Lasix in order to maintain euvolemia  She has been taking Lasix 80 mg daily

## 2021-06-25 NOTE — ASSESSMENT & PLAN NOTE
Lab Results   Component Value Date    EGFR 31 06/18/2021    EGFR 26 06/17/2021    EGFR 28 06/16/2021    CREATININE 1 63 (H) 06/18/2021    CREATININE 1 89 (H) 06/17/2021    CREATININE 1 78 (H) 06/16/2021   She has long history of diabetes greater than 25 years out with the retinopathy  However will check urinary protein studies as well as a kidney ultrasound  She may have underlying diabetic nephropathy  I suspect her chronic kidney disease is related to underlying diastolic congestive failure/pulmonary hypertension and volume overload    She also has chronic atrial fibrillation

## 2021-06-25 NOTE — ASSESSMENT & PLAN NOTE
Wt Readings from Last 3 Encounters:   06/18/21 94 6 kg (208 lb 9 6 oz)   05/27/21 93 kg (205 lb)   04/15/21 92 5 kg (204 lb)       Her echocardiogram was reviewed  She has preserved left ventricular systolic function but has diastolic dysfunction she has moderate to severe tricuspid regurgitation  And she has severe pulmonary hypertension with a pulmonary artery pressure of 72 mm  She was not a smoker but did work in a [employer]  I think she would benefit from a pulmonary he evaluation to see if medications could be used for her pulmonary hypertension such as sildenafil    She could see our Heart Failure specialist Dr Cherylene Cleaver  Her children are computer literate but she does not have a computer  Consider a telemedicine call

## 2021-06-28 ENCOUNTER — HOSPITAL ENCOUNTER (OUTPATIENT)
Dept: ULTRASOUND IMAGING | Facility: HOSPITAL | Age: 76
Discharge: HOME | End: 2021-06-28
Attending: INTERNAL MEDICINE
Payer: MEDICARE

## 2021-06-28 DIAGNOSIS — N18.32 STAGE 3B CHRONIC KIDNEY DISEASE (HCC): ICD-10-CM

## 2021-06-28 DIAGNOSIS — I50.33 ACUTE ON CHRONIC DIASTOLIC CHF (CONGESTIVE HEART FAILURE) (HCC): ICD-10-CM

## 2021-06-28 DIAGNOSIS — E11.40 TYPE 2 DIABETES MELLITUS WITH DIABETIC NEUROPATHY, UNSPECIFIED WHETHER LONG TERM INSULIN USE (HCC): ICD-10-CM

## 2021-06-28 PROCEDURE — 76770 US EXAM ABDO BACK WALL COMP: CPT

## 2021-07-09 ENCOUNTER — TELEPHONE (OUTPATIENT)
Dept: CARDIOLOGY CLINIC | Facility: CLINIC | Age: 76
End: 2021-07-09

## 2021-07-12 NOTE — TELEPHONE ENCOUNTER
I spoke with Elana Hernandez about her ultrasound today  Her PCP is ordering an abdominal ultrasound to look at ascites

## 2021-07-13 ENCOUNTER — HOSPITAL ENCOUNTER (OUTPATIENT)
Dept: CT IMAGING | Facility: HOSPITAL | Age: 76
Discharge: HOME | End: 2021-07-13
Payer: MEDICARE

## 2021-07-13 DIAGNOSIS — R18.8 OTHER ASCITES: ICD-10-CM

## 2021-07-13 PROCEDURE — 74150 CT ABDOMEN W/O CONTRAST: CPT

## 2021-07-19 ENCOUNTER — TELEPHONE (OUTPATIENT)
Dept: CARDIAC REHAB | Facility: HOSPITAL | Age: 76
End: 2021-07-19

## 2021-07-19 NOTE — TELEPHONE ENCOUNTER
Reviewed patient's schedule today  Patient registered for  CR w/dx of CHF  Patient's LVEF is 60% which exceeds the qualifying criteria for CR  Per Medicare guidelines the LVEF must be 35% or less  Patient had no other qualifying diagnoses  She will not be rescheduled

## 2021-07-26 ENCOUNTER — HOSPITAL ENCOUNTER (INPATIENT)
Facility: HOSPITAL | Age: 76
LOS: 3 days | Discharge: HOME HEALTH | DRG: 871 | End: 2021-07-29
Attending: EMERGENCY MEDICINE | Admitting: STUDENT IN AN ORGANIZED HEALTH CARE EDUCATION/TRAINING PROGRAM
Payer: MEDICARE

## 2021-07-26 ENCOUNTER — APPOINTMENT (EMERGENCY)
Dept: CT IMAGING | Facility: HOSPITAL | Age: 76
DRG: 871 | End: 2021-07-26
Payer: MEDICARE

## 2021-07-26 DIAGNOSIS — N17.9 AKI (ACUTE KIDNEY INJURY) (HCC): ICD-10-CM

## 2021-07-26 DIAGNOSIS — R00.1 BRADYCARDIA: Primary | ICD-10-CM

## 2021-07-26 DIAGNOSIS — J96.01 ACUTE RESPIRATORY FAILURE WITH HYPOXIA (HCC): ICD-10-CM

## 2021-07-26 DIAGNOSIS — I10 ESSENTIAL HYPERTENSION: ICD-10-CM

## 2021-07-26 DIAGNOSIS — R11.2 NAUSEA AND VOMITING, INTRACTABILITY OF VOMITING NOT SPECIFIED, UNSPECIFIED VOMITING TYPE: ICD-10-CM

## 2021-07-26 DIAGNOSIS — N39.0 URINARY TRACT INFECTION: ICD-10-CM

## 2021-07-26 DIAGNOSIS — I48.91 ATRIAL FIBRILLATION, UNSPECIFIED TYPE (HCC): ICD-10-CM

## 2021-07-26 PROBLEM — R65.20 SIRS DUE TO INFECTIOUS PROCESS WITH ACUTE ORGAN DYSFUNCTION (HCC): Status: ACTIVE | Noted: 2021-07-26

## 2021-07-26 PROBLEM — E11.21 TYPE 2 DIABETES MELLITUS WITH DIABETIC NEPHROPATHY (HCC): Status: ACTIVE | Noted: 2020-10-25

## 2021-07-26 PROBLEM — A41.9 SIRS DUE TO INFECTIOUS PROCESS WITH ACUTE ORGAN DYSFUNCTION (HCC): Status: ACTIVE | Noted: 2021-07-26

## 2021-07-26 PROBLEM — I49.5 SICK SINUS SYNDROME (HCC): Status: ACTIVE | Noted: 2021-07-26

## 2021-07-26 LAB
ALBUMIN SERPL BCP-MCNC: 3.9 G/DL (ref 3.5–5)
ALP SERPL-CCNC: 122 U/L (ref 46–116)
ALT SERPL W P-5'-P-CCNC: 19 U/L (ref 12–78)
ANION GAP SERPL CALCULATED.3IONS-SCNC: 12 MMOL/L (ref 4–13)
AST SERPL W P-5'-P-CCNC: 17 U/L (ref 5–45)
BACTERIA UR QL AUTO: ABNORMAL /HPF
BASOPHILS # BLD AUTO: 0.05 THOUSANDS/ΜL (ref 0–0.1)
BASOPHILS NFR BLD AUTO: 1 % (ref 0–1)
BILIRUB SERPL-MCNC: 0.58 MG/DL (ref 0.2–1)
BILIRUB UR QL STRIP: NEGATIVE
BUN SERPL-MCNC: 57 MG/DL (ref 5–25)
CALCIUM SERPL-MCNC: 8.9 MG/DL (ref 8.3–10.1)
CHLORIDE SERPL-SCNC: 102 MMOL/L (ref 100–108)
CLARITY UR: ABNORMAL
CO2 SERPL-SCNC: 28 MMOL/L (ref 21–32)
COLOR UR: YELLOW
CREAT SERPL-MCNC: 2.04 MG/DL (ref 0.6–1.3)
CREAT UR-MCNC: 36.6 MG/DL
EOSINOPHIL # BLD AUTO: 0.12 THOUSAND/ΜL (ref 0–0.61)
EOSINOPHIL NFR BLD AUTO: 2 % (ref 0–6)
ERYTHROCYTE [DISTWIDTH] IN BLOOD BY AUTOMATED COUNT: 17.9 % (ref 11.6–15.1)
GFR SERPL CREATININE-BSD FRML MDRD: 23 ML/MIN/1.73SQ M
GLUCOSE SERPL-MCNC: 141 MG/DL (ref 65–140)
GLUCOSE SERPL-MCNC: 150 MG/DL (ref 65–140)
GLUCOSE SERPL-MCNC: 214 MG/DL (ref 65–140)
GLUCOSE UR STRIP-MCNC: NEGATIVE MG/DL
HCT VFR BLD AUTO: 35.8 % (ref 34.8–46.1)
HGB BLD-MCNC: 11.4 G/DL (ref 11.5–15.4)
HGB UR QL STRIP.AUTO: NEGATIVE
IMM GRANULOCYTES # BLD AUTO: 0.03 THOUSAND/UL (ref 0–0.2)
IMM GRANULOCYTES NFR BLD AUTO: 1 % (ref 0–2)
INR PPP: 3.44 (ref 0.84–1.19)
KETONES UR STRIP-MCNC: NEGATIVE MG/DL
LACTATE SERPL-SCNC: 1.2 MMOL/L (ref 0.5–2)
LEUKOCYTE ESTERASE UR QL STRIP: ABNORMAL
LIPASE SERPL-CCNC: 201 U/L (ref 73–393)
LYMPHOCYTES # BLD AUTO: 0.84 THOUSANDS/ΜL (ref 0.6–4.47)
LYMPHOCYTES NFR BLD AUTO: 13 % (ref 14–44)
MAGNESIUM SERPL-MCNC: 2.5 MG/DL (ref 1.6–2.6)
MCH RBC QN AUTO: 24.9 PG (ref 26.8–34.3)
MCHC RBC AUTO-ENTMCNC: 31.8 G/DL (ref 31.4–37.4)
MCV RBC AUTO: 78 FL (ref 82–98)
MONOCYTES # BLD AUTO: 0.49 THOUSAND/ΜL (ref 0.17–1.22)
MONOCYTES NFR BLD AUTO: 8 % (ref 4–12)
NEUTROPHILS # BLD AUTO: 4.91 THOUSANDS/ΜL (ref 1.85–7.62)
NEUTS SEG NFR BLD AUTO: 75 % (ref 43–75)
NITRITE UR QL STRIP: NEGATIVE
NON-SQ EPI CELLS URNS QL MICRO: ABNORMAL /HPF
NRBC BLD AUTO-RTO: 0 /100 WBCS
NT-PROBNP SERPL-MCNC: 2165 PG/ML
PH UR STRIP.AUTO: 5.5 [PH]
PLATELET # BLD AUTO: 174 THOUSANDS/UL (ref 149–390)
PMV BLD AUTO: 10.4 FL (ref 8.9–12.7)
POTASSIUM SERPL-SCNC: 3.5 MMOL/L (ref 3.5–5.3)
PROT SERPL-MCNC: 7.8 G/DL (ref 6.4–8.2)
PROT UR STRIP-MCNC: NEGATIVE MG/DL
PROTHROMBIN TIME: 33.9 SECONDS (ref 11.6–14.5)
QRS AXIS: 99 DEGREES
QRSD INTERVAL: 132 MS
QT INTERVAL: 494 MS
QTC INTERVAL: 521 MS
RBC # BLD AUTO: 4.57 MILLION/UL (ref 3.81–5.12)
RBC #/AREA URNS AUTO: ABNORMAL /HPF
SARS-COV-2 RNA RESP QL NAA+PROBE: NEGATIVE
SODIUM SERPL-SCNC: 142 MMOL/L (ref 136–145)
SP GR UR STRIP.AUTO: 1.02 (ref 1–1.03)
T WAVE AXIS: 91 DEGREES
TROPONIN I SERPL-MCNC: <0.02 NG/ML
TSH SERPL DL<=0.05 MIU/L-ACNC: 1.96 UIU/ML (ref 0.36–3.74)
TSH SERPL DL<=0.05 MIU/L-ACNC: 3.04 UIU/ML (ref 0.36–3.74)
UROBILINOGEN UR QL STRIP.AUTO: 0.2 E.U./DL
UUN 24H UR-MCNC: 524 MG/DL
VENTRICULAR RATE: 67 BPM
WBC # BLD AUTO: 6.44 THOUSAND/UL (ref 4.31–10.16)
WBC #/AREA URNS AUTO: ABNORMAL /HPF

## 2021-07-26 PROCEDURE — 85025 COMPLETE CBC W/AUTO DIFF WBC: CPT | Performed by: EMERGENCY MEDICINE

## 2021-07-26 PROCEDURE — 99223 1ST HOSP IP/OBS HIGH 75: CPT | Performed by: STUDENT IN AN ORGANIZED HEALTH CARE EDUCATION/TRAINING PROGRAM

## 2021-07-26 PROCEDURE — 36415 COLL VENOUS BLD VENIPUNCTURE: CPT | Performed by: EMERGENCY MEDICINE

## 2021-07-26 PROCEDURE — U0003 INFECTIOUS AGENT DETECTION BY NUCLEIC ACID (DNA OR RNA); SEVERE ACUTE RESPIRATORY SYNDROME CORONAVIRUS 2 (SARS-COV-2) (CORONAVIRUS DISEASE [COVID-19]), AMPLIFIED PROBE TECHNIQUE, MAKING USE OF HIGH THROUGHPUT TECHNOLOGIES AS DESCRIBED BY CMS-2020-01-R: HCPCS | Performed by: EMERGENCY MEDICINE

## 2021-07-26 PROCEDURE — 84443 ASSAY THYROID STIM HORMONE: CPT | Performed by: STUDENT IN AN ORGANIZED HEALTH CARE EDUCATION/TRAINING PROGRAM

## 2021-07-26 PROCEDURE — 93005 ELECTROCARDIOGRAM TRACING: CPT

## 2021-07-26 PROCEDURE — 74176 CT ABD & PELVIS W/O CONTRAST: CPT

## 2021-07-26 PROCEDURE — 82948 REAGENT STRIP/BLOOD GLUCOSE: CPT

## 2021-07-26 PROCEDURE — 82570 ASSAY OF URINE CREATININE: CPT | Performed by: STUDENT IN AN ORGANIZED HEALTH CARE EDUCATION/TRAINING PROGRAM

## 2021-07-26 PROCEDURE — 87040 BLOOD CULTURE FOR BACTERIA: CPT | Performed by: EMERGENCY MEDICINE

## 2021-07-26 PROCEDURE — 84443 ASSAY THYROID STIM HORMONE: CPT | Performed by: EMERGENCY MEDICINE

## 2021-07-26 PROCEDURE — 99291 CRITICAL CARE FIRST HOUR: CPT | Performed by: EMERGENCY MEDICINE

## 2021-07-26 PROCEDURE — 70450 CT HEAD/BRAIN W/O DYE: CPT

## 2021-07-26 PROCEDURE — 81001 URINALYSIS AUTO W/SCOPE: CPT | Performed by: EMERGENCY MEDICINE

## 2021-07-26 PROCEDURE — 84484 ASSAY OF TROPONIN QUANT: CPT | Performed by: EMERGENCY MEDICINE

## 2021-07-26 PROCEDURE — 83605 ASSAY OF LACTIC ACID: CPT | Performed by: EMERGENCY MEDICINE

## 2021-07-26 PROCEDURE — 83735 ASSAY OF MAGNESIUM: CPT | Performed by: EMERGENCY MEDICINE

## 2021-07-26 PROCEDURE — 85610 PROTHROMBIN TIME: CPT | Performed by: EMERGENCY MEDICINE

## 2021-07-26 PROCEDURE — 83690 ASSAY OF LIPASE: CPT | Performed by: EMERGENCY MEDICINE

## 2021-07-26 PROCEDURE — 99285 EMERGENCY DEPT VISIT HI MDM: CPT

## 2021-07-26 PROCEDURE — 87086 URINE CULTURE/COLONY COUNT: CPT | Performed by: EMERGENCY MEDICINE

## 2021-07-26 PROCEDURE — 84540 ASSAY OF URINE/UREA-N: CPT | Performed by: STUDENT IN AN ORGANIZED HEALTH CARE EDUCATION/TRAINING PROGRAM

## 2021-07-26 PROCEDURE — 80053 COMPREHEN METABOLIC PANEL: CPT | Performed by: EMERGENCY MEDICINE

## 2021-07-26 PROCEDURE — 71250 CT THORAX DX C-: CPT

## 2021-07-26 PROCEDURE — U0005 INFEC AGEN DETEC AMPLI PROBE: HCPCS | Performed by: EMERGENCY MEDICINE

## 2021-07-26 PROCEDURE — 83880 ASSAY OF NATRIURETIC PEPTIDE: CPT | Performed by: EMERGENCY MEDICINE

## 2021-07-26 PROCEDURE — G1004 CDSM NDSC: HCPCS

## 2021-07-26 RX ORDER — POTASSIUM CHLORIDE 20 MEQ/1
20 TABLET, EXTENDED RELEASE ORAL DAILY
Status: DISCONTINUED | OUTPATIENT
Start: 2021-07-26 | End: 2021-07-26

## 2021-07-26 RX ORDER — B-COMPLEX WITH VITAMIN C
1 TABLET ORAL
Status: DISCONTINUED | OUTPATIENT
Start: 2021-07-27 | End: 2021-07-29 | Stop reason: HOSPADM

## 2021-07-26 RX ORDER — LEVOTHYROXINE SODIUM 0.15 MG/1
150 TABLET ORAL DAILY
Status: DISCONTINUED | OUTPATIENT
Start: 2021-07-26 | End: 2021-07-29 | Stop reason: HOSPADM

## 2021-07-26 RX ORDER — DOCUSATE SODIUM 100 MG/1
100 CAPSULE, LIQUID FILLED ORAL 2 TIMES DAILY
Status: DISCONTINUED | OUTPATIENT
Start: 2021-07-26 | End: 2021-07-29 | Stop reason: HOSPADM

## 2021-07-26 RX ORDER — DOCUSATE SODIUM 100 MG/1
100 CAPSULE, LIQUID FILLED ORAL 2 TIMES DAILY
COMMUNITY
End: 2022-07-08

## 2021-07-26 RX ORDER — FAMOTIDINE 20 MG/1
20 TABLET, FILM COATED ORAL DAILY
Status: DISCONTINUED | OUTPATIENT
Start: 2021-07-26 | End: 2021-07-29 | Stop reason: HOSPADM

## 2021-07-26 RX ORDER — FUROSEMIDE 10 MG/ML
60 INJECTION INTRAMUSCULAR; INTRAVENOUS
Status: DISCONTINUED | OUTPATIENT
Start: 2021-07-26 | End: 2021-07-29

## 2021-07-26 RX ORDER — FERROUS SULFATE 325(65) MG
325 TABLET ORAL DAILY
Status: DISCONTINUED | OUTPATIENT
Start: 2021-07-26 | End: 2021-07-29 | Stop reason: HOSPADM

## 2021-07-26 RX ORDER — ONDANSETRON 2 MG/ML
INJECTION INTRAMUSCULAR; INTRAVENOUS
Status: COMPLETED
Start: 2021-07-26 | End: 2021-07-26

## 2021-07-26 RX ORDER — AMLODIPINE BESYLATE 2.5 MG/1
2.5 TABLET ORAL DAILY
Status: DISCONTINUED | OUTPATIENT
Start: 2021-07-26 | End: 2021-07-29 | Stop reason: HOSPADM

## 2021-07-26 RX ORDER — ASCORBIC ACID 500 MG
500 TABLET ORAL DAILY
Status: DISCONTINUED | OUTPATIENT
Start: 2021-07-26 | End: 2021-07-29 | Stop reason: HOSPADM

## 2021-07-26 RX ORDER — GABAPENTIN 300 MG/1
300 CAPSULE ORAL 2 TIMES DAILY
Status: DISCONTINUED | OUTPATIENT
Start: 2021-07-26 | End: 2021-07-29 | Stop reason: HOSPADM

## 2021-07-26 RX ORDER — WARFARIN SODIUM 5 MG/1
5 TABLET ORAL
Status: DISCONTINUED | OUTPATIENT
Start: 2021-07-26 | End: 2021-07-26

## 2021-07-26 RX ORDER — CEFTRIAXONE 1 G/50ML
1000 INJECTION, SOLUTION INTRAVENOUS EVERY 24 HOURS
Status: DISCONTINUED | OUTPATIENT
Start: 2021-07-26 | End: 2021-07-29 | Stop reason: HOSPADM

## 2021-07-26 RX ORDER — FAMOTIDINE 20 MG/1
40 TABLET, FILM COATED ORAL DAILY
Status: DISCONTINUED | OUTPATIENT
Start: 2021-07-26 | End: 2021-07-26

## 2021-07-26 RX ORDER — POTASSIUM CHLORIDE 20 MEQ/1
40 TABLET, EXTENDED RELEASE ORAL DAILY
Status: DISCONTINUED | OUTPATIENT
Start: 2021-07-27 | End: 2021-07-29 | Stop reason: HOSPADM

## 2021-07-26 RX ORDER — FUROSEMIDE 80 MG
80 TABLET ORAL DAILY
Status: DISCONTINUED | OUTPATIENT
Start: 2021-07-26 | End: 2021-07-26

## 2021-07-26 RX ADMIN — FUROSEMIDE 60 MG: 10 INJECTION, SOLUTION INTRAMUSCULAR; INTRAVENOUS at 15:59

## 2021-07-26 RX ADMIN — GABAPENTIN 300 MG: 300 CAPSULE ORAL at 12:48

## 2021-07-26 RX ADMIN — OXYCODONE HYDROCHLORIDE AND ACETAMINOPHEN 500 MG: 500 TABLET ORAL at 12:49

## 2021-07-26 RX ADMIN — GABAPENTIN 300 MG: 300 CAPSULE ORAL at 17:56

## 2021-07-26 RX ADMIN — FERROUS SULFATE TAB 325 MG (65 MG ELEMENTAL FE) 325 MG: 325 (65 FE) TAB at 12:59

## 2021-07-26 RX ADMIN — CEFTRIAXONE 1000 MG: 1 INJECTION, SOLUTION INTRAVENOUS at 17:58

## 2021-07-26 RX ADMIN — FAMOTIDINE 20 MG: 20 TABLET ORAL at 12:50

## 2021-07-26 RX ADMIN — FUROSEMIDE 80 MG: 40 TABLET ORAL at 12:48

## 2021-07-26 RX ADMIN — POTASSIUM CHLORIDE 20 MEQ: 1500 TABLET, EXTENDED RELEASE ORAL at 12:52

## 2021-07-26 RX ADMIN — ONDANSETRON 4 MG: 2 INJECTION INTRAMUSCULAR; INTRAVENOUS at 08:51

## 2021-07-26 RX ADMIN — DOCUSATE SODIUM 100 MG: 100 CAPSULE, LIQUID FILLED ORAL at 17:56

## 2021-07-26 RX ADMIN — AMLODIPINE BESYLATE 2.5 MG: 2.5 TABLET ORAL at 15:55

## 2021-07-26 RX ADMIN — DOCUSATE SODIUM 100 MG: 100 CAPSULE, LIQUID FILLED ORAL at 12:50

## 2021-07-26 NOTE — ED PROVIDER NOTES
History  Chief Complaint   Patient presents with    Weakness - Generalized     starting last night generalized weakness/N/V, on EMS arrival pt basilio, unable to sit on own, pt does have pacemaker yet HR was 20-30     Patient with history of AFib, CHF, sick sinus syndrome, pacemaker in place, complains of generalized weakness with nausea and vomiting since yesterday  Called EMS today  EMS arrived to find the patient pale and somewhat lethargic  Brought patient to the emergency room for evaluation  Patient complains of generalized weakness with nausea and vomiting in the emergency room, continues to vomit upon arrival       History provided by:  Patient and EMS personnel   used: No    Fatigue  Severity:  Severe  Onset quality:  Gradual  Duration:  1 day  Timing:  Constant  Progression:  Unchanged  Chronicity:  New  Relieved by:  Nothing  Worsened by:  Nothing  Ineffective treatments:  None tried  Associated symptoms: lethargy, nausea and vomiting    Associated symptoms: no abdominal pain, no chest pain, no cough, no diarrhea, no dizziness, no dysuria, no numbness in extremities, no fever, no foul-smelling urine, no frequency, no headaches, no loss of consciousness, no seizures, no shortness of breath, no stroke symptoms and no syncope        Prior to Admission Medications   Prescriptions Last Dose Informant Patient Reported? Taking?    Ascorbic Acid, Vitamin C, (VITAMIN C) 100 MG tablet   Yes No   Sig: Take 500 mg by mouth   Calcium Carb-Cholecalciferol (OSCAL-D) 500 mg-200 units per tablet   Yes No   Sig: Take 1 tablet by mouth daily   Ferrous Sulfate (IRON PO)   Yes Yes   Sig: Take by mouth   Multiple Vitamin (Multi-Day) TABS   Yes Yes   Sig: Take 1 tablet by mouth daily    alendronate (FOSAMAX) 70 mg tablet   Yes Yes   Sig: Take 70 mg by mouth every 7 days   diltiazem (CARDIZEM CD) 300 mg 24 hr capsule   Yes Yes   Sig: Take 300 mg by mouth daily    docusate sodium (COLACE) 100 mg capsule   Yes Yes   Sig: Take 100 mg by mouth 2 (two) times a day   famotidine (PEPCID) 40 MG tablet   Yes Yes   Sig: Take 40 mg by mouth   furosemide (LASIX) 40 mg tablet   Yes Yes   Sig: Take 80 mg by mouth daily    gabapentin (NEURONTIN) 300 mg capsule   Yes Yes   Sig: Take 300 mg by mouth 2 (two) times a day 1 tab qam, 2 tabs qhs   glipiZIDE (GLUCOTROL) 5 mg tablet   No Yes   Sig: Take 0 5 tablets (2 5 mg total) by mouth 2 (two) times a day before meals   levothyroxine 150 mcg tablet   Yes Yes   Sig: Take 150 mcg by mouth daily    potassium chloride (K-DUR,KLOR-CON) 20 mEq tablet   No Yes   Sig: Take 1 tablet (20 mEq total) by mouth daily   ranitidine (ZANTAC) 300 MG tablet   Yes Yes   Sig: Take 300 mg by mouth daily as needed    warfarin (COUMADIN) 5 mg tablet   Yes Yes   Sig: Take 5 mg by mouth Sun, Wed, Friday- 1 tab  Tues, Thursday, Saturday- 2 tab      Facility-Administered Medications: None       Past Medical History:   Diagnosis Date    Arthritis     [facility] [address] [facility]     Diabetes mellitus [facility] [address] [facility]     Disease of thyroid gland     Hypertension     Pacemaker     Renal disorder        Past Surgical History:   Procedure Laterality Date    CARDIAC PACEMAKER PLACEMENT      CARDIAC PACEMAKER PLACEMENT      CHOLECYSTECTOMY      JOINT REPLACEMENT      bilateral knee replacements    ORIF TIBIA & FIBULA FRACTURES Left 10/29/2020    Procedure: OPEN REDUCTION W/ INTERNAL FIXATION (ORIF) ANKLE;  Surgeon: Zandra Larson; Location: [facility];  Service: Orthopedics    TONSILLECTOMY      TUBAL LIGATION         Family History   Problem Relation Age of Onset    Atrial fibrillation Mother     Cancer Father      I have reviewed and agree with the history as documented      E-Cigarette/Vaping    E-Cigarette Use Never User      E-Cigarette/Vaping Substances     Social History     Tobacco Use    Smoking status: Never Smoker    Smokeless tobacco: Never Used   Vaping Use    Vaping Use: Never used   Substance Use Topics    Alcohol use: Not Currently    Drug use: Not Currently       Review of Systems   Constitutional: Positive for fatigue  Negative for chills and fever  HENT: Negative for ear pain, hearing loss, sore throat, trouble swallowing and voice change  Eyes: Negative for pain and discharge  Respiratory: Negative for cough, shortness of breath and wheezing  Cardiovascular: Negative for chest pain, palpitations and syncope  Gastrointestinal: Positive for nausea and vomiting  Negative for abdominal pain, blood in stool, constipation and diarrhea  Genitourinary: Negative for dysuria, flank pain, frequency and hematuria  Musculoskeletal: Negative for joint swelling, neck pain and neck stiffness  Skin: Negative for rash and wound  Neurological: Negative for dizziness, seizures, loss of consciousness, syncope, facial asymmetry and headaches  Psychiatric/Behavioral: Negative for hallucinations, self-injury and suicidal ideas  All other systems reviewed and are negative  Physical Exam  Physical Exam  Vitals and nursing note reviewed  Constitutional:       General: She is not in acute distress  Appearance: She is well-developed  She is ill-appearing and toxic-appearing  HENT:      Head: Normocephalic and atraumatic  Right Ear: External ear normal       Left Ear: External ear normal    Eyes:      General: No scleral icterus  Right eye: No discharge  Left eye: No discharge  Extraocular Movements: Extraocular movements intact  Conjunctiva/sclera: Conjunctivae normal    Cardiovascular:      Rate and Rhythm: Regular rhythm  Bradycardia present  Heart sounds: Normal heart sounds  No murmur heard  Pulmonary:      Effort: Pulmonary effort is normal       Breath sounds: Wheezing and rhonchi present  No rales  Abdominal:      General: Bowel sounds are normal  There is no distension  Palpations: Abdomen is soft  Tenderness:  There is no abdominal tenderness  There is no guarding or rebound  Musculoskeletal:         General: No deformity  Normal range of motion  Cervical back: Normal range of motion and neck supple  Skin:     General: Skin is warm and dry  Findings: No rash  Neurological:      General: No focal deficit present  Mental Status: She is alert and oriented to person, place, and time  Cranial Nerves: No cranial nerve deficit  Psychiatric:         Mood and Affect: Mood normal          Behavior: Behavior normal          Thought Content: Thought content normal          Judgment: Judgment normal          Vital Signs  ED Triage Vitals   Temperature Pulse Respirations Blood Pressure SpO2   07/26/21 0823 07/26/21 0823 07/26/21 0823 07/26/21 0823 07/26/21 0817   (!) 95 9 °F (35 5 °C) (!) 52 (!) 28 (!) 177/77 92 %      Temp Source Heart Rate Source Patient Position - Orthostatic VS BP Location FiO2 (%)   07/26/21 0823 07/26/21 0823 07/26/21 0823 07/26/21 0823 --   Temporal Monitor Lying Right arm       Pain Score       --                  Vitals:    07/26/21 0845 07/26/21 0945 07/26/21 1000 07/26/21 1015   BP: 157/67 157/71 167/68 156/69   Pulse: (!) 45 (!) 49 (!) 46 (!) 49   Patient Position - Orthostatic VS: Lying Lying Lying          Visual Acuity  Visual Acuity      Most Recent Value   L Pupil Size (mm)  3   R Pupil Size (mm)  3          ED Medications  Medications   ondansetron (ZOFRAN) 4 mg/2 mL injection **ADS Override Pull** (4 mg  Given 7/26/21 0851)       Diagnostic Studies  Results Reviewed     Procedure Component Value Units Date/Time    Lactic acid, plasma [[id number]]  (Normal) Collected: 07/26/21 0832    Lab Status: Final result Specimen: Blood from Arm, Left Updated: 07/26/21 0911     LACTIC ACID 1 2 mmol/L     Narrative:      Result may be elevated if tourniquet was used during collection      Troponin I [[id number]]  (Normal) Collected: 07/26/21 0832    Lab Status: Final result Specimen: Blood from Arm, Left Updated: 07/26/21 0907     Troponin I <0 02 ng/mL     Lipase [[id number]]  (Normal) Collected: 07/26/21 0832    Lab Status: Final result Specimen: Blood from Arm, Left Updated: 07/26/21 0903     Lipase 201 u/L     Magnesium [[id number]]  (Normal) Collected: 07/26/21 0832    Lab Status: Final result Specimen: Blood from Arm, Left Updated: 07/26/21 0903     Magnesium 2 5 mg/dL     NT-BNP PRO [[id number]]  (Abnormal) Collected: 07/26/21 0832    Lab Status: Final result Specimen: Blood from Arm, Left Updated: 07/26/21 0903     NT-proBNP 2,165 pg/mL     TSH, 3rd generation with Free T4 reflex [[id number]]  (Normal) Collected: 07/26/21 0832    Lab Status: Final result Specimen: Blood from Arm, Left Updated: 07/26/21 0903     TSH 3RD GENERATON 3 038 uIU/mL     Narrative:      Patients undergoing fluorescein dye angiography may retain small amounts of fluorescein in the body for 48-72 hours post procedure  Samples containing fluorescein can produce falsely depressed TSH values  If the patient had this procedure,a specimen should be resubmitted post fluorescein clearance        Comprehensive metabolic panel [[id number]]  (Abnormal) Collected: 07/26/21 0832    Lab Status: Final result Specimen: Blood from Arm, Left Updated: 07/26/21 0901     Sodium 142 mmol/L      Potassium 3 5 mmol/L      Chloride 102 mmol/L      CO2 28 mmol/L      ANION GAP 12 mmol/L      BUN 57 mg/dL      Creatinine 2 04 mg/dL      Glucose 214 mg/dL      Calcium 8 9 mg/dL      AST 17 U/L      ALT 19 U/L      Alkaline Phosphatase 122 U/L      Total Protein 7 8 g/dL      Albumin 3 9 g/dL      Total Bilirubin 0 58 mg/dL      eGFR 23 ml/min/1 73sq m     Narrative:      El guidelines for Chronic Kidney Disease (CKD):     Stage 1 with normal or high GFR (GFR > 90 mL/min/1 73 square meters)    Stage 2 Mild CKD (GFR = 60-89 mL/min/1 73 square meters)    Stage 3A Moderate CKD (GFR = 45-59 mL/min/1 73 square meters)    Stage 3B Moderate CKD (GFR = 30-44 mL/min/1 73 square meters)    Stage 4 Severe CKD (GFR = 15-29 mL/min/1 73 square meters)    Stage 5 End Stage CKD (GFR <15 mL/min/1 73 square meters)  Note: GFR calculation is accurate only with a steady state creatinine    Protime-INR [[id number]]  (Abnormal) Collected: 07/26/21 0832    Lab Status: Final result Specimen: Blood from Arm, Left Updated: 07/26/21 0856     Protime 33 9 seconds      INR 3 44    CBC and differential [[id number]]  (Abnormal) Collected: 07/26/21 0832    Lab Status: Final result Specimen: Blood from Arm, Left Updated: 07/26/21 0839     WBC 6 44 Thousand/uL      RBC 4 57 Million/uL      Hemoglobin 11 4 g/dL      Hematocrit 35 8 %      MCV 78 fL      MCH 24 9 pg      MCHC 31 8 g/dL      RDW 17 9 %      MPV 10 4 fL      Platelets 577 Thousands/uL      nRBC 0 /100 WBCs      Neutrophils Relative 75 %      Immat GRANS % 1 %      Lymphocytes Relative 13 %      Monocytes Relative 8 %      Eosinophils Relative 2 %      Basophils Relative 1 %      Neutrophils Absolute 4 91 Thousands/µL      Immature Grans Absolute 0 03 Thousand/uL      Lymphocytes Absolute 0 84 Thousands/µL      Monocytes Absolute 0 49 Thousand/µL      Eosinophils Absolute 0 12 Thousand/µL      Basophils Absolute 0 05 Thousands/µL     Blood culture #1 [[id number]] Collected: 07/26/21 0832    Lab Status: In process Specimen: Blood from Arm, Left Updated: 07/26/21 0836    Blood culture #2 [[id number]] Collected: 07/26/21 0832    Lab Status: In process Specimen: Blood from Arm, Left Updated: 07/26/21 0836    UA w Reflex to Microscopic w Reflex to Culture [[id number]]     Lab Status: No result Specimen: Urine                  CT head wo contrast   Final Result by Kristopher Rondon MD (07/26 0484)      No acute intracranial abnormality                    Workstation performed: [id number]         CT chest abdomen pelvis wo contrast   Final Result by Kristopher Rondon MD (07/26 3255)      Mild interstitial pulmonary edema, with small right pleural effusion  Cardiomegaly  No evidence of acute pathology in the abdomen and pelvis  Small perihepatic ascites noted  Workstation performed: [id number]                    Procedures  ECG 12 Lead Documentation Only    Date/Time: 7/26/2021 8:24 AM  Performed by: Kathy Bridges MD  Authorized by: Kathy Bridges MD     ECG reviewed by me, the ED Provider: yes    Patient location:  ED  Previous ECG:     Previous ECG:  Unavailable  Interpretation:     Interpretation: abnormal    Rate:     ECG rate:  67 (although true ventricular rate is much lower likely 30s)  Rhythm:     Rhythm: atrial fibrillation    Ectopy:     Ectopy: bigeminy    QRS:     QRS axis:  Normal    QRS intervals:  Normal  Conduction:     Conduction: normal    ST segments:     ST segments:  Normal  T waves:     T waves: normal      CriticalCare Time  Performed by: Kathy Bridges MD  Authorized by: Kathy Bridges MD     Critical care provider statement:     Critical care time (minutes):  50    Critical care time was exclusive of:  Separately billable procedures and treating other patients    Critical care was necessary to treat or prevent imminent or life-threatening deterioration of the following conditions: CHF, bradycardia, hypoxia  Critical care was time spent personally by me on the following activities:  Obtaining history from patient or surrogate, discussions with consultants, evaluation of patient's response to treatment, examination of patient, ordering and performing treatments and interventions, ordering and review of laboratory studies, ordering and review of radiographic studies, re-evaluation of patient's condition and review of old charts    I assumed direction of critical care for this patient from another provider in my specialty: no               ED Course  ED Course as of Jul 26 1029   Mon Jul 26, 2021   5128 Discussed with Dr Jose E Sinclair, cardiology  Will discuss with [employer] rep   In the meantime recommends work up for n/v, increased vagal tone                                  SBIRT 20yo+      Most Recent Value   SBIRT (25 yo +)   In order to provide better care to our patients, we are screening all of our patients for alcohol and drug use  Would it be okay to ask you these screening questions? Yes Filed at: 07/26/2021 9167   Initial Alcohol Screen: US AUDIT-C    1  How often do you have a drink containing alcohol?  0 Filed at: 07/26/2021 0832   2  How many drinks containing alcohol do you have on a typical day you are drinking? 0 Filed at: 07/26/2021 0832   3a  Male UNDER 65: How often do you have five or more drinks on one occasion? 0 Filed at: 07/26/2021 0832   3b  FEMALE Any Age, or MALE 65+: How often do you have 4 or more drinks on one occassion? 0 Filed at: 07/26/2021 0787   Audit-C Score  0 Filed at: 07/26/2021 6210   DANIELA: How many times in the past year have you    Used an illegal drug or used a prescription medication for non-medical reasons? Never Filed at: 07/26/2021 7424                    MDM  Number of Diagnoses or Management Options  ALMA (acute kidney injury) ([facility] [address] )  Bradycardia  Nausea and vomiting, intractability of vomiting not specified, unspecified vomiting type  Diagnosis management comments: Discussed with Cardiology, [employer] rep will adjust pacer to higher rate  Based on patient's ALMA, bradycardia, will plan to admit for inpatient treatment         Amount and/or Complexity of Data Reviewed  Clinical lab tests: ordered and reviewed  Tests in the radiology section of CPT®: ordered and reviewed  Decide to obtain previous medical records or to obtain history from someone other than the patient: yes  Review and summarize past medical records: yes  Independent visualization of images, tracings, or specimens: yes        Disposition  Final diagnoses:   Bradycardia   Nausea and vomiting, intractability of vomiting not specified, unspecified vomiting type   ALMA (acute kidney injury) [facility])     Time reflects when diagnosis was documented in both MDM as applicable and the Disposition within this note     Time User Action Codes Description Comment    7/26/2021 10:26 AM Sukumar Rubalcava [R00 1] Bradycardia     7/26/2021 10:26 AM Sukumar Rubalcava [R11 2] Nausea and vomiting, intractability of vomiting not specified, unspecified vomiting type     7/26/2021 10:27 AM Sukumar Rubalcava [N17 9] ALMA (acute kidney injury) [facility])       ED Disposition     ED Disposition Condition Date/Time Comment    Admit Stable Mon Jul 26, 2021 10:26 AM Case was discussed with Dr Augutsina Sarmiento and the patient's admission status was agreed to be Admission Status: inpatient status to the service of Dr Hazel Tran          Follow-up Information    None         Patient's Medications   Discharge Prescriptions    No medications on file     No discharge procedures on file      PDMP Review     None          ED Provider  Electronically Signed by           Florina Zavala MD  07/26/21 2844

## 2021-07-26 NOTE — ASSESSMENT & PLAN NOTE
Patient presenting with chills, SIRS and positive UA  Will start patient on Ceftriaxone (7/26-_)  F/u UCx and BCx

## 2021-07-26 NOTE — ASSESSMENT & PLAN NOTE
Patient is currently on Coumadin 5 mg daily for permanent Afib  INR today currently at 3 4  Will hold Coumadin today and obtain daily INRs  Resume Coumadin as INR permits  No active signs of bleeding at this time  Monitor H/H

## 2021-07-26 NOTE — ASSESSMENT & PLAN NOTE
Patient presenting with chills, tachycardia, hypoxia and a positive UA and ALMA  Patient will be started on Ceftriaxone pending urine and blood cultures (7/26 - _)  CT chest/A/P showing no other sources of infection  Will continue to monitor fever curve, WBC count  F/u BCx and UCx  COVID negative

## 2021-07-26 NOTE — ASSESSMENT & PLAN NOTE
Patient desaturating to the low 80s on RA and very short of breath at rest  Currently requiring 4L of oxygen supplementation via nasal cannula  Likely secondary to Acute on chronic congestive heart failure (see rest of plan under Acute on chronic CHF)  Titrate and wean off oxygen, patient not on any oxygen at baseline  Respiratory protocol

## 2021-07-26 NOTE — ASSESSMENT & PLAN NOTE
Patient with sick sinus syndrome and bradycardia with pacemaker  Currently bradycardic  Monitor on tele for now  As per Cardiology, patient "needs eventual discussion with EP to evaluate for lead revision, she plans to go to [facility] in the future, referral already placed "

## 2021-07-26 NOTE — ASSESSMENT & PLAN NOTE
Patient presenting with creatinine of 2 04 with baseline creatinine of around 1 6-1 8  Likely in the setting of acute volume overload  UA also showing UTI  Will trend creatinine  Monitor I&O  Monitor daily weights  F/u urine lytes  F/u renal sono  Avoid nephrotoxins  Avoid hypotensive episodes

## 2021-07-26 NOTE — H&P
[address] 1945, 77 y o  female MRN: [id number]  Unit/Bed#: -01 Encounter: [id number]  Primary Care Provider: Power Salguero MD   Date and time admitted to hospital: 7/26/2021  8:17 AM    * Acute on chronic diastolic CHF (congestive heart failure) ([facility] [address] )  Assessment & Plan  Wt Readings from Last 3 Encounters:   07/26/21 50 4 kg (111 lb 1 8 oz)   06/18/21 94 6 kg (208 lb 9 6 oz)   05/27/21 93 kg (205 lb)     Patient presenting with volume overload and significant crackles on exam  BNP elevated to 2,165, pitting edema bilaterally  CT chest/A/P showing pleural effusion and mild interstitial pulm edema  Currently hypoxic needing 4L of oxygen supplementation via nasal cannula  Cardiology consulted on the case and recs appreciated  Diuresing patient with Lasix 60 mg IV q12h with K supplementation daily  Strict I&Os  Daily weights with standing scale (weights above don't seem accurate)        SIRS due to infectious process with acute organ dysfunction [facility])  Assessment & Plan  Patient presenting with chills, tachycardia, hypoxia and a positive UA and ALMA  Patient will be started on Ceftriaxone pending urine and blood cultures (7/26 - _)  CT chest/A/P showing no other sources of infection  Will continue to monitor fever curve, WBC count  F/u BCx and UCx  COVID negative    Acute respiratory failure with hypoxia (HCC)  Assessment & Plan  Patient desaturating to the low 80s on RA and very short of breath at rest  Currently requiring 4L of oxygen supplementation via nasal cannula  Likely secondary to Acute on chronic congestive heart failure (see rest of plan under Acute on chronic CHF)  Titrate and wean off oxygen, patient not on any oxygen at baseline  Respiratory protocol      Acute kidney injury [facility])  Assessment & Plan  Patient presenting with creatinine of 2 04 with baseline creatinine of around 1 6-1 8  Likely in the setting of acute volume overload  UA also showing UTI  Will trend creatinine  Monitor I&O  Monitor daily weights  F/u urine lytes  F/u renal sono  Avoid nephrotoxins  Avoid hypotensive episodes    Urinary tract infection  Assessment & Plan  Patient presenting with chills, SIRS and positive UA  Will start patient on Ceftriaxone (7/26-_)  F/u UCx and BCx      Sick sinus syndrome [facility])  Assessment & Plan  Patient with sick sinus syndrome and bradycardia with pacemaker  Currently bradycardic  Monitor on tele for now  As per Cardiology, patient "needs eventual discussion with EP to evaluate for lead revision, she plans to go to [facility] in the future, referral already placed "    Supratherapeutic INR  Assessment & Plan  Patient is currently on Coumadin 5 mg daily for permanent Afib  INR today currently at 3 4  Will hold Coumadin today and obtain daily INRs  Resume Coumadin as INR permits  No active signs of bleeding at this time  Monitor H/H    Essential hypertension  Assessment & Plan  BP at this time 620-725M systolic  Continue to monitor BP  Started patient on low-dose amlodipine 2 5 mg daily      Acquired hypothyroidism  Assessment & Plan  TSH 1 961  Continue levothyroxine    Type 2 diabetes mellitus with diabetic nephropathy (HCC)  Assessment & Plan  Lab Results   Component Value Date    HGBA1C 7 0 (H) 06/15/2021       Recent Labs     07/26/21  1603   POCGLU 150*       Blood Sugar Average: Last 72 hrs:  (P) 150     Placed patient on insulin sliding scale  Monitor fingersticks    Atrial fibrillation ([facility] [address] )  Assessment & Plan  Patient currently rate-controlled   Anticoagulated with Coumadin 5 mg daily - held for now as INR supratherapeutic  Monitor on telemetry for now    VTE Prophylaxis: Warfarin (Coumadin)  / sequential compression device   Code Status: FULL CODE  POLST: There is no POLST form on file for this patient (pre-hospital)  Discussion with family: with patient,  and daughter at bedside    Anticipated Length of Stay:  Patient will be admitted on an Inpatient basis with an anticipated length of stay of  More than 2 midnights  Justification for Hospital Stay: acute on chronic heart failure, acute hypoxic respiratory failure    Total Time for Visit, including Counseling / Coordination of Care: 45 minutes  Greater than 50% of this total time spent on direct patient counseling and coordination of care  Chief Complaint:   Worsening shortness of breath    History of Present Illness:    Karol Bynum is a 77 y o  female who presents with worsening shortness of breath that she noted was progressively worsening weakness and shortness of breath over the past 2-3 weeks at least however she stated that she could manage  She also endorsed associated bilateral lower extremity edema that improved on lasix  She felt she was managing fine up until yesterday when she suddenly started feeling very congested in the chest and started to have significantly worsening shortness of breath and felt as if she could not breath  She also endorsed associated chills and generalized weakness, nausea and vomiting, prompting her to seek consult in the ED  She denied any sick contacts  Denied recent travel  Denies cough, colds, abdominal pain, diarrhea  Review of Systems:    Review of Systems   Constitutional: Positive for activity change, appetite change, chills and fatigue  Negative for fever  HENT: Negative for ear pain and sore throat  Eyes: Negative for pain and visual disturbance  Respiratory: Positive for cough and shortness of breath  Cardiovascular: Negative for chest pain and palpitations  Gastrointestinal: Positive for abdominal pain, nausea and vomiting  Genitourinary: Negative for dysuria and hematuria  Musculoskeletal: Negative for arthralgias and back pain  Skin: Negative for color change and rash  Neurological: Negative for seizures and syncope  All other systems reviewed and are negative           Past Medical and Surgical History:     Past Medical History:   Diagnosis Date    Arthritis     CHF (congestive heart failure) ([facility] [address] )     Diabetes mellitus ([facility] [address] )     Disease of thyroid gland     Hypertension     Pacemaker     Renal disorder        Past Surgical History:   Procedure Laterality Date    CARDIAC PACEMAKER PLACEMENT      CARDIAC PACEMAKER PLACEMENT      CHOLECYSTECTOMY      JOINT REPLACEMENT      bilateral knee replacements    ORIF TIBIA & FIBULA FRACTURES Left 10/29/2020    Procedure: OPEN REDUCTION W/ INTERNAL FIXATION (ORIF) ANKLE;  Surgeon: Whitney Alcaraz; Location:  MAIN OR;  Service: Orthopedics    TONSILLECTOMY      TUBAL LIGATION         Meds/Allergies:    Prior to Admission medications    Medication Sig Start Date End Date Taking?  Authorizing Provider   alendronate (FOSAMAX) 70 mg tablet Take 70 mg by mouth every 7 days   Yes Historical Provider, MD   Ascorbic Acid, Vitamin C, (VITAMIN C) 100 MG tablet Take 500 mg by mouth daily    Yes Historical Provider, MD   Calcium Carb-Cholecalciferol (OSCAL-D) 500 mg-200 units per tablet Take 1 tablet by mouth daily   Yes Historical Provider, MD   diltiazem (CARDIZEM CD) 300 mg 24 hr capsule Take 300 mg by mouth daily  9/8/20  Yes Historical Provider, MD   docusate sodium (COLACE) 100 mg capsule Take 100 mg by mouth 2 (two) times a day   Yes Historical Provider, MD   famotidine (PEPCID) 40 MG tablet Take 40 mg by mouth daily  2/15/21  Yes Historical Provider, MD   Ferrous Sulfate (IRON PO) Take 325 mg by mouth daily    Yes Historical Provider, MD   furosemide (LASIX) 40 mg tablet Take 80 mg by mouth daily  9/8/20  Yes Historical Provider, MD   gabapentin (NEURONTIN) 300 mg capsule Take 300 mg by mouth 2 (two) times a day 1 tab qam, 2 tabs qhs 9/8/20  Yes Historical Provider, MD   glipiZIDE (GLUCOTROL) 5 mg tablet Take 0 5 tablets (2 5 mg total) by mouth 2 (two) times a day before meals 6/18/21 7/26/21 Yes Johnny Dallas MD   levothyroxine 150 mcg tablet Take 150 mcg by mouth daily 9/8/20  Yes Historical Provider, MD   Multiple Vitamin (Multi-Day) TABS Take 1 tablet by mouth daily    Yes Historical Provider, MD   potassium chloride (K-DUR,KLOR-CON) 20 mEq tablet Take 1 tablet (20 mEq total) by mouth daily 11/4/20 7/26/21 Yes SAM Kimbrough   warfarin (COUMADIN) 5 mg tablet Take 5 mg by mouth Sun, Wed, Friday- 1 tab  Tues, Thursday, Saturday- 2 tab 9/8/20  Yes Historical Provider, MD   ranitidine (ZANTAC) 300 MG tablet Take 300 mg by mouth daily as needed     Historical Provider, MD     I have reviewed home medications with patient personally  Allergies:    Allergies   Allergen Reactions    Rofecoxib Angioedema, Swelling, Hives and Other (See Comments)     swelling, sob  swelling, sob  Other reaction(s): Swelling / Edema  swelling, sob  Other reaction(s): SWELLING  Other reaction(s): Angioedema      Oxycodone-Acetaminophen GI Intolerance and Other (See Comments)     Unsure of rxn   Unsure of rxn   Unsure of rxn   Other reaction(s): "CAN'T BREATH"      Medical Tape Rash       Social History:     Marital Status: /Civil Union   Occupation: retired  Patient Pre-hospital Living Situation: lives with her   Patient Pre-hospital Level of Mobility: ambulates with a walker  Patient Pre-hospital Diet Restrictions: diabetic diet, fluid restriction  Substance Use History:   Social History     Substance and Sexual Activity   Alcohol Use Not Currently     Social History     Tobacco Use   Smoking Status Never Smoker   Smokeless Tobacco Never Used     Social History     Substance and Sexual Activity   Drug Use Not Currently       Family History:    diabetes - parents    Physical Exam:     Vitals:   Blood Pressure: 149/63 (07/26/21 1555)  Pulse: 85 (07/26/21 1422)  Temperature: 97 5 °F (36 4 °C) (07/26/21 1422)  Temp Source: Oral (07/26/21 1422)  Respirations: 22 (07/26/21 1422)  Height: 5' 2" (157 5 cm) (07/26/21 1422)  Weight - Scale: 50 4 kg (111 lb 1 8 oz) (07/26/21 1422)  SpO2: (!) 89 % (07/26/21 1422)    Physical Exam  Vitals and nursing note reviewed  Constitutional:       General: She is in acute distress  Appearance: She is well-developed  She is ill-appearing and toxic-appearing  Comments: Very lethargic and so weak that she was not able to speak/verbalize very well   HENT:      Head: Normocephalic and atraumatic  Eyes:      Conjunctiva/sclera: Conjunctivae normal    Cardiovascular:      Rate and Rhythm: Bradycardia present  Rhythm irregular  Heart sounds: No murmur heard  Pulmonary:      Effort: Respiratory distress present  Breath sounds: Rales present  Comments: Bilateral rales on auscultation  Could hear a lot of rattling in her throat as well as the patient breathed  Abdominal:      Palpations: Abdomen is soft  Tenderness: There is no abdominal tenderness  Musculoskeletal:      Cervical back: Neck supple  Skin:     General: Skin is warm and dry  Coloration: Skin is pale  Neurological:      General: No focal deficit present  Mental Status: She is alert and oriented to person, place, and time  Additional Data:     Lab Results: I have personally reviewed pertinent reports        Results from last 7 days   Lab Units 07/26/21  0832   WBC Thousand/uL 6 44   HEMOGLOBIN g/dL 11 4*   HEMATOCRIT % 35 8   PLATELETS Thousands/uL 174   NEUTROS PCT % 75   LYMPHS PCT % 13*   MONOS PCT % 8   EOS PCT % 2     Results from last 7 days   Lab Units 07/26/21  0832   SODIUM mmol/L 142   POTASSIUM mmol/L 3 5   CHLORIDE mmol/L 102   CO2 mmol/L 28   BUN mg/dL 57*   CREATININE mg/dL 2 04*   ANION GAP mmol/L 12   CALCIUM mg/dL 8 9   ALBUMIN g/dL 3 9   TOTAL BILIRUBIN mg/dL 0 58   ALK PHOS U/L 122*   ALT U/L 19   AST U/L 17   GLUCOSE RANDOM mg/dL 214*     Results from last 7 days   Lab Units 07/26/21  0832   INR  3 44*     Results from last 7 days   Lab Units 07/26/21  1603   POC GLUCOSE mg/dl 150*         Results from last 7 days   Lab Units 07/26/21  0832   LACTIC ACID mmol/L 1 2       Imaging: I have personally reviewed pertinent reports  CT head wo contrast   Final Result by Tarah Grove MD (07/26 5392)      No acute intracranial abnormality  Workstation performed: [id number]         CT chest abdomen pelvis wo contrast   Final Result by Tarah Grove MD (07/26 9200)      Mild interstitial pulmonary edema, with small right pleural effusion  Cardiomegaly  No evidence of acute pathology in the abdomen and pelvis  Small perihepatic ascites noted  Workstation performed: [id number]         US kidney and bladder    (Results Pending)       EKG, Pathology, and Other Studies Reviewed on Admission:   · EKG: afib    Allscripts / Epic Records Reviewed: Yes     ** Please Note: This note has been constructed using a voice recognition system   **

## 2021-07-26 NOTE — PLAN OF CARE
Problem: Potential for Falls  Goal: Patient will remain free of falls  Description: INTERVENTIONS:  - Educate patient/family on patient safety including physical limitations  - Instruct patient to call for assistance with activity   - Consult OT/PT to assist with strengthening/mobility   - Keep Call bell within reach  - Keep bed low and locked with side rails adjusted as appropriate  - Keep care items and personal belongings within reach  - Initiate and maintain comfort rounds  - Make Fall Risk Sign visible to staff  - Offer Toileting every   Hours, in advance of need  - Initiate/Maintain   alarm  - Obtain necessary fall risk management equipment:     - Apply yellow socks and bracelet for high fall risk patients  - Consider moving patient to room near nurses station  Outcome: Progressing     Problem: CARDIOVASCULAR - ADULT  Goal: Maintains optimal cardiac output and hemodynamic stability  Description: INTERVENTIONS:  - Monitor I/O, vital signs and rhythm  - Monitor for S/S and trends of decreased cardiac output  - Administer and titrate ordered vasoactive medications to optimize hemodynamic stability  - Assess quality of pulses, skin color and temperature  - Assess for signs of decreased coronary artery perfusion  - Instruct patient to report change in severity of symptoms  Outcome: Progressing  Goal: Absence of cardiac dysrhythmias or at baseline rhythm  Description: INTERVENTIONS:  - Continuous cardiac monitoring, vital signs, obtain 12 lead EKG if ordered  - Administer antiarrhythmic and heart rate control medications as ordered  - Monitor electrolytes and administer replacement therapy as ordered  Outcome: Progressing     Problem: RESPIRATORY - ADULT  Goal: Achieves optimal ventilation and oxygenation  Description: INTERVENTIONS:  - Assess for changes in respiratory status  - Assess for changes in mentation and behavior  - Position to facilitate oxygenation and minimize respiratory effort  - Oxygen administered by appropriate delivery if ordered  - Initiate smoking cessation education as indicated  - Encourage broncho-pulmonary hygiene including cough, deep breathe, Incentive Spirometry  - Assess the need for suctioning and aspirate as needed  - Assess and instruct to report SOB or any respiratory difficulty  - Respiratory Therapy support as indicated  Outcome: Progressing     Problem: METABOLIC, FLUID AND ELECTROLYTES - ADULT  Goal: Electrolytes maintained within normal limits  Description: INTERVENTIONS:  - Monitor labs and assess patient for signs and symptoms of electrolyte imbalances  - Administer electrolyte replacement as ordered  - Monitor response to electrolyte replacements, including repeat lab results as appropriate  - Instruct patient on fluid and nutrition as appropriate  Outcome: Progressing  Goal: Fluid balance maintained  Description: INTERVENTIONS:  - Monitor labs   - Monitor I/O and WT  - Instruct patient on fluid and nutrition as appropriate  - Assess for signs & symptoms of volume excess or deficit  Outcome: Progressing  Goal: Glucose maintained within target range  Description: INTERVENTIONS:  - Monitor Blood Glucose as ordered  - Assess for signs and symptoms of hyperglycemia and hypoglycemia  - Administer ordered medications to maintain glucose within target range  - Assess nutritional intake and initiate nutrition service referral as needed  Outcome: Progressing     Problem: SKIN/TISSUE INTEGRITY - ADULT  Goal: Skin Integrity remains intact(Skin Breakdown Prevention)  Description: Assess:  -Perform Kota assessment every    -Clean and moisturize skin every    -Inspect skin when repositioning, toileting, and assisting with ADLS  -Assess under medical devices such as   every    -Assess extremities for adequate circulation and sensation     Bed Management:  -Have minimal linens on bed & keep smooth, unwrinkled  -Change linens as needed when moist or perspiring  -Avoid sitting or lying in one position for more than   hours while in bed  -Keep HOB at  degrees     Toileting:  -Offer bedside commode  -Assess for incontinence every   -Use incontinent care products after each incontinent episode such as   Activity:  -Mobilize patient   times a day  -Encourage activity and walks on unit  -Encourage or provide ROM exercises   -Turn and reposition patient every   Hours  -Use appropriate equipment to lift or move patient in bed  -Instruct/ Assist with weight shifting every   when out of bed in chair  -Consider limitation of chair time   hour intervals    Skin Care:  -Avoid use of baby powder, tape, friction and shearing, hot water or constrictive clothing  -Relieve pressure over bony prominences using    -Do not massage red bony areas    Next Steps:  -Teach patient strategies to minimize risks such as     -Consider consults to  interdisciplinary teams such as   Outcome: Progressing  Goal: Incision(s), wounds(s) or drain site(s) healing without S/S of infection  Description: INTERVENTIONS  - Assess and document dressing, incision, wound bed, drain sites and surrounding tissue  - Provide patient and family education  - Perform skin care/dressing changes every   Larayne Chroman Outcome: Progressing  Goal: Pressure injury heals and does not worsen  Description: Interventions:  - Implement low air loss mattress or specialty surface (Criteria met)  - Apply silicone foam dressing  - Instruct/assist with weight shifting every   minutes when in chair   - Limit chair time to   hour intervals  - Use special pressure reducing interventions such as   when in chair   - Apply fecal or urinary incontinence containment device   - Perform passive or active ROM every   - Turn and reposition patient & offload bony prominences every    hours   - Utilize friction reducing device or surface for transfers   - Consider consults to  interdisciplinary teams such as    - Use incontinent care products after each incontinent episode such as    - Consider nutrition services referral as needed  Outcome: Progressing     Problem: MUSCULOSKELETAL - ADULT  Goal: Maintain or return mobility to safest level of function  Description: INTERVENTIONS:  - Assess patient's ability to carry out ADLs; assess patient's baseline for ADL function and identify physical deficits which impact ability to perform ADLs (bathing, care of mouth/teeth, toileting, grooming, dressing, etc )  - Assess/evaluate cause of self-care deficits   - Assess range of motion  - Assess patient's mobility  - Assess patient's need for assistive devices and provide as appropriate  - Encourage maximum independence but intervene and supervise when necessary  - Involve family in performance of ADLs  - Assess for home care needs following discharge   - Consider OT consult to assist with ADL evaluation and planning for discharge  - Provide patient education as appropriate  Outcome: Progressing  Goal: Maintain proper alignment of affected body part  Description: INTERVENTIONS:  - Support, maintain and protect limb and body alignment  - Provide patient/ family with appropriate education  Outcome: Progressing     Problem: SAFETY ADULT  Goal: Patient will remain free of falls  Description: INTERVENTIONS:  - Educate patient/family on patient safety including physical limitations  - Instruct patient to call for assistance with activity   - Consult OT/PT to assist with strengthening/mobility   - Keep Call bell within reach  - Keep bed low and locked with side rails adjusted as appropriate  - Keep care items and personal belongings within reach  - Initiate and maintain comfort rounds  - Make Fall Risk Sign visible to staff  - Offer Toileting every   Hours, in advance of need  - Initiate/Maintain   alarm  - Obtain necessary fall risk management equipment:     - Apply yellow socks and bracelet for high fall risk patients  - Consider moving patient to room near nurses station  Outcome: Progressing  Goal: Maintain or return to baseline ADL function  Description: INTERVENTIONS:  -  Assess patient's ability to carry out ADLs; assess patient's baseline for ADL function and identify physical deficits which impact ability to perform ADLs (bathing, care of mouth/teeth, toileting, grooming, dressing, etc )  - Assess/evaluate cause of self-care deficits   - Assess range of motion  - Assess patient's mobility; develop plan if impaired  - Assess patient's need for assistive devices and provide as appropriate  - Encourage maximum independence but intervene and supervise when necessary  - Involve family in performance of ADLs  - Assess for home care needs following discharge   - Consider OT consult to assist with ADL evaluation and planning for discharge  - Provide patient education as appropriate  Outcome: Progressing  Goal: Maintains/Returns to pre admission functional level  Description: INTERVENTIONS:  - Perform BMAT or MOVE assessment daily    - Set and communicate daily mobility goal to care team and patient/family/caregiver  - Collaborate with rehabilitation services on mobility goals if consulted  - Perform Range of Motion   times a day  - Reposition patient every   hours  - Dangle patient   times a day  - Stand patient   times a day  - Ambulate patient   times a day  - Out of bed to chair   times a day   - Out of bed for meals     times a day  - Out of bed for toileting  - Record patient progress and toleration of activity level   Outcome: Progressing     Problem: DISCHARGE PLANNING  Goal: Discharge to home or other facility with appropriate resources  Description: INTERVENTIONS:  - Identify barriers to discharge w/patient and caregiver  - Arrange for needed discharge resources and transportation as appropriate  - Identify discharge learning needs (meds, wound care, etc )  - Arrange for interpretive services to assist at discharge as needed  - Refer to Case Management Department for coordinating discharge planning if the patient needs post-hospital services based on physician/advanced practitioner order or complex needs related to functional status, cognitive ability, or social support system  Outcome: Progressing     Problem: Knowledge Deficit  Goal: Patient/family/caregiver demonstrates understanding of disease process, treatment plan, medications, and discharge instructions  Description: Complete learning assessment and assess knowledge base    Interventions:  - Provide teaching at level of understanding  - Provide teaching via preferred learning methods  Outcome: Progressing

## 2021-07-26 NOTE — ED NOTES
Pacemaker interrogated at this time, [employer] rep called and made aware     Vilma Lenz RN  07/26/21 8480

## 2021-07-26 NOTE — ASSESSMENT & PLAN NOTE
Patient currently rate-controlled   Anticoagulated with Coumadin 5 mg daily - held for now as INR supratherapeutic  Monitor on telemetry for now

## 2021-07-26 NOTE — ASSESSMENT & PLAN NOTE
Lab Results   Component Value Date    HGBA1C 7 0 (H) 06/15/2021       Recent Labs     07/26/21  1603   POCGLU 150*       Blood Sugar Average: Last 72 hrs:  (P) 150     Placed patient on insulin sliding scale  Monitor fingersticks

## 2021-07-26 NOTE — ASSESSMENT & PLAN NOTE
BP at this time 483-179P systolic  Continue to monitor BP  Started patient on low-dose amlodipine 2 5 mg daily

## 2021-07-26 NOTE — CONSULTS
Consultation - Cardiology   Maria E hCristine 77 y o  female MRN: [id number]  Unit/Bed#: ED 06 Encounter: [id number]    Assessment/Plan     Assessment:  Bradycardia   SSS sp PPM medtronic atrial lead programmed off for poor function and sensing  Acute on chronic HFpEF- appearing hypervolemic, elevated BNP, edema, orthopnea, weight gain  Persistent Afib on coumadin   pHTN    Plan:  1  Recommend diuresis with lasix 60 IV BID with kdur 40 meq daily  2  montior I and O, daily standing weights, monitor renal function and electrolytes  3  Needs eventual discussion with EP to evaluate for lead revision, she plans to go to [facility] in the future, referral already placed  4   Will start norvasc 2 5 mg PO daily for mod pHTN  5  Continue coumadin  6  If patient not improving would transfer for [facility] and to have EP evaluate for RA lead revision    History of Present Illness   Physician Requesting Consult: [facility] *  Reason for Consult / Principal Problem: bradycardia  HPI: Maria E Christine is a 77y o  year old female with history of SSS sp PPM preivoulsy followed by [facility] is here for bradycardia  She has a PPM and her RA lead is programmed off for poor function and sensing  She has persistent afib on coumadin  She otherwise has DM, CKD, mod pHTN and CHFpEF  She was seen in our clinic a few weeks ago and referred to EP to discuss a lead revision of her RA lead  She then came to the ED today for concerns with generalized weakness and nausea, vomiting since yesterday  She called EMS who found her pale and lethargic  She had a cxr that showed mild interstitial pulm edema with a small R pleural effusion  Her HR was in the 50s so medtronic rep came to the hospital  The medtronic rep increased her base rate to 70 and she was admitted  She reports that recently she has put on some more weight  Her legs are more swollen  She can no longer breath laying flat  She states she started coughing yesterday   She has been compliant with her diuretic  She takes lasix 80 mg PO daily at home  Inpatient consult to Cardiology  Consult performed by: Althea Hernandez PA-C  Consult ordered by: Herve Ca MD          Review of Systems   Constitutional: Positive for unexpected weight change  Negative for chills and fatigue  Respiratory: Positive for cough and shortness of breath  Negative for choking, chest tightness and wheezing  Cardiovascular: Positive for leg swelling  Negative for chest pain and palpitations  Gastrointestinal: Positive for nausea and vomiting  Genitourinary: Negative for difficulty urinating  Musculoskeletal: Negative for arthralgias  Skin: Negative for color change and pallor  Neurological: Negative for dizziness, syncope and light-headedness  Psychiatric/Behavioral: Negative for agitation, behavioral problems and confusion  Historical Information   Past Medical History:   Diagnosis Date    Arthritis     [facility]congestive [facility] failure[facility] )     Diabetes mellitus ([facility] )     Disease of thyroid gland     Hypertension     Pacemaker     Renal disorder      Past Surgical History:   Procedure Laterality Date    CARDIAC PACEMAKER PLACEMENT      CARDIAC PACEMAKER PLACEMENT      CHOLECYSTECTOMY      JOINT REPLACEMENT      bilateral knee replacements    ORIF TIBIA & FIBULA FRACTURES Left 10/29/2020    Procedure: OPEN REDUCTION W/ INTERNAL FIXATION (ORIF) ANKLE;  Surgeon: Chad Shoulder;   Location:  MAIN OR;  Service: Orthopedics    TONSILLECTOMY      TUBAL LIGATION       Social History     Substance and Sexual Activity   Alcohol Use Not Currently     Social History     Substance and Sexual Activity   Drug Use Not Currently     E-Cigarette/Vaping    E-Cigarette Use Never User      E-Cigarette/Vaping Substances     Social History     Tobacco Use   Smoking Status Never Smoker   Smokeless Tobacco Never Used     Family History: non-contributory    Meds/Allergies   all current active meds have been reviewed  Allergies   Allergen Reactions    Rofecoxib Angioedema, Swelling, Hives and Other (See Comments)     swelling, sob  swelling, sob  Other reaction(s): Swelling / Edema  swelling, sob  Other reaction(s): SWELLING  Other reaction(s): Angioedema      Oxycodone-Acetaminophen GI Intolerance and Other (See Comments)     Unsure of rxn   Unsure of rxn   Unsure of rxn   Other reaction(s): "CAN'T BREATH"      Medical Tape Rash       Objective   Vitals: Blood pressure 129/63, pulse 69, temperature (!) 96 1 °F (35 6 °C), temperature source Temporal, resp  rate 17, weight 50 4 kg (111 lb 1 8 oz), SpO2 92 %  Orthostatic Blood Pressures      Most Recent Value   Blood Pressure  129/63 filed at 07/26/2021 1345   Patient Position - Orthostatic VS  Lying filed at 07/26/2021 1345            Intake/Output Summary (Last 24 hours) at 7/26/2021 1357  Last data filed at 7/26/2021 1150  Gross per 24 hour   Intake 240 ml   Output --   Net 240 ml       Invasive Devices     Peripheral Intravenous Line            Peripheral IV 07/26/21 Left Antecubital <1 day    Peripheral IV 07/26/21 Right Forearm <1 day                Physical Exam  Constitutional:       General: She is in acute distress  Appearance: She is ill-appearing  HENT:      Head: Normocephalic and atraumatic  Cardiovascular:      Rate and Rhythm: Regular rhythm  Heart sounds: No murmur heard  No gallop  Pulmonary:      Effort: Respiratory distress present  Breath sounds: Rhonchi present  Musculoskeletal:         General: Swelling present  Skin:     General: Skin is warm  Capillary Refill: Capillary refill takes less than 2 seconds  Neurological:      General: No focal deficit present  Mental Status: She is alert and oriented to person, place, and time     Psychiatric:         Mood and Affect: Mood normal          Lab Results:   I have personally reviewed pertinent lab results  CBC with diff:   Results from last 7 days   Lab Units 07/26/21  0832   WBC Thousand/uL 6 44   RBC Million/uL 4 57   HEMOGLOBIN g/dL 11 4*   HEMATOCRIT % 35 8   MCV fL 78*   MCH pg 24 9*   MCHC g/dL 31 8   RDW % 17 9*   MPV fL 10 4   PLATELETS Thousands/uL 174     CMP:   Results from last 7 days   Lab Units 07/26/21  0832   SODIUM mmol/L 142   POTASSIUM mmol/L 3 5   CHLORIDE mmol/L 102   CO2 mmol/L 28   BUN mg/dL 57*   CREATININE mg/dL 2 04*   CALCIUM mg/dL 8 9   AST U/L 17   ALT U/L 19   ALK PHOS U/L 122*   EGFR ml/min/1 73sq m 23     Troponin:   0   Lab Value Date/Time    TROPONINI <0 02 07/26/2021 0832    TROPONINI <0 02 06/15/2021 0955    TROPONINI <0 02 10/25/2020 1803    TROPONINI <0 02 10/25/2020 1532    TROPONINI <0 02 10/25/2020 1144     BNP:   Results from last 7 days   Lab Units 07/26/21  0832   POTASSIUM mmol/L 3 5   CHLORIDE mmol/L 102   CO2 mmol/L 28   BUN mg/dL 57*   CREATININE mg/dL 2 04*   CALCIUM mg/dL 8 9   EGFR ml/min/1 73sq m 23     Coags:   Results from last 7 days   Lab Units 07/26/21  0832   INR  3 44*     TSH:   Results from last 7 days   Lab Units 07/26/21  1253   TSH 3RD GENERATON uIU/mL 1 961     Magnesium:   Results from last 7 days   Lab Units 07/26/21  0832   MAGNESIUM mg/dL 2 5     Imaging: I have personally reviewed pertinent reports         EKG:   Narrative & Impression   Underlying AF  with frequent and consecutive Premature ventricular complexes  Right bundle branch block  Abnormal ECG  When compared with ECG of 15-FLORY-2021 09:37,  Previous ECG has undetermined rhythm, needs review  Confirmed by Coty Dunaway ([id number]) on 7/26/2021 9:14:15 AM

## 2021-07-26 NOTE — ASSESSMENT & PLAN NOTE
Wt Readings from Last 3 Encounters:   07/26/21 50 4 kg (111 lb 1 8 oz)   06/18/21 94 6 kg (208 lb 9 6 oz)   05/27/21 93 kg (205 lb)     Patient presenting with volume overload and significant crackles on exam  BNP elevated to 2,165, pitting edema bilaterally  CT chest/A/P showing pleural effusion and mild interstitial pulm edema  Currently hypoxic needing 4L of oxygen supplementation via nasal cannula  Cardiology consulted on the case and recs appreciated  Diuresing patient with Lasix 60 mg IV q12h with K supplementation daily  Strict I&Os  Daily weights with standing scale (weights above don't seem accurate)

## 2021-07-27 ENCOUNTER — APPOINTMENT (INPATIENT)
Dept: ULTRASOUND IMAGING | Facility: HOSPITAL | Age: 76
DRG: 871 | End: 2021-07-27
Payer: MEDICARE

## 2021-07-27 LAB
ALBUMIN SERPL BCP-MCNC: 3.3 G/DL (ref 3.5–5)
ALP SERPL-CCNC: 98 U/L (ref 46–116)
ALT SERPL W P-5'-P-CCNC: 19 U/L (ref 12–78)
ANION GAP SERPL CALCULATED.3IONS-SCNC: 8 MMOL/L (ref 4–13)
AST SERPL W P-5'-P-CCNC: 14 U/L (ref 5–45)
BACTERIA UR CULT: NORMAL
BASOPHILS # BLD AUTO: 0.06 THOUSANDS/ΜL (ref 0–0.1)
BASOPHILS NFR BLD AUTO: 1 % (ref 0–1)
BILIRUB SERPL-MCNC: 0.84 MG/DL (ref 0.2–1)
BUN SERPL-MCNC: 46 MG/DL (ref 5–25)
CALCIUM ALBUM COR SERPL-MCNC: 8.8 MG/DL (ref 8.3–10.1)
CALCIUM SERPL-MCNC: 8.2 MG/DL (ref 8.3–10.1)
CHLORIDE SERPL-SCNC: 104 MMOL/L (ref 100–108)
CO2 SERPL-SCNC: 28 MMOL/L (ref 21–32)
CREAT SERPL-MCNC: 1.69 MG/DL (ref 0.6–1.3)
EOSINOPHIL # BLD AUTO: 0.13 THOUSAND/ΜL (ref 0–0.61)
EOSINOPHIL NFR BLD AUTO: 1 % (ref 0–6)
ERYTHROCYTE [DISTWIDTH] IN BLOOD BY AUTOMATED COUNT: 17.6 % (ref 11.6–15.1)
GFR SERPL CREATININE-BSD FRML MDRD: 29 ML/MIN/1.73SQ M
GLUCOSE SERPL-MCNC: 119 MG/DL (ref 65–140)
GLUCOSE SERPL-MCNC: 129 MG/DL (ref 65–140)
GLUCOSE SERPL-MCNC: 134 MG/DL (ref 65–140)
GLUCOSE SERPL-MCNC: 169 MG/DL (ref 65–140)
GLUCOSE SERPL-MCNC: 178 MG/DL (ref 65–140)
HCT VFR BLD AUTO: 33.4 % (ref 34.8–46.1)
HGB BLD-MCNC: 10.4 G/DL (ref 11.5–15.4)
IMM GRANULOCYTES # BLD AUTO: 0.05 THOUSAND/UL (ref 0–0.2)
IMM GRANULOCYTES NFR BLD AUTO: 1 % (ref 0–2)
INR PPP: 3.71 (ref 0.84–1.19)
LYMPHOCYTES # BLD AUTO: 0.63 THOUSANDS/ΜL (ref 0.6–4.47)
LYMPHOCYTES NFR BLD AUTO: 7 % (ref 14–44)
MAGNESIUM SERPL-MCNC: 2.4 MG/DL (ref 1.6–2.6)
MCH RBC QN AUTO: 24.6 PG (ref 26.8–34.3)
MCHC RBC AUTO-ENTMCNC: 31.1 G/DL (ref 31.4–37.4)
MCV RBC AUTO: 79 FL (ref 82–98)
MONOCYTES # BLD AUTO: 0.65 THOUSAND/ΜL (ref 0.17–1.22)
MONOCYTES NFR BLD AUTO: 7 % (ref 4–12)
NEUTROPHILS # BLD AUTO: 7.71 THOUSANDS/ΜL (ref 1.85–7.62)
NEUTS SEG NFR BLD AUTO: 83 % (ref 43–75)
NRBC BLD AUTO-RTO: 0 /100 WBCS
PLATELET # BLD AUTO: 153 THOUSANDS/UL (ref 149–390)
PMV BLD AUTO: 9.8 FL (ref 8.9–12.7)
POTASSIUM SERPL-SCNC: 3.7 MMOL/L (ref 3.5–5.3)
PROT SERPL-MCNC: 7 G/DL (ref 6.4–8.2)
PROTHROMBIN TIME: 35.9 SECONDS (ref 11.6–14.5)
RBC # BLD AUTO: 4.22 MILLION/UL (ref 3.81–5.12)
SODIUM SERPL-SCNC: 140 MMOL/L (ref 136–145)
WBC # BLD AUTO: 9.23 THOUSAND/UL (ref 4.31–10.16)

## 2021-07-27 PROCEDURE — 85025 COMPLETE CBC W/AUTO DIFF WBC: CPT | Performed by: STUDENT IN AN ORGANIZED HEALTH CARE EDUCATION/TRAINING PROGRAM

## 2021-07-27 PROCEDURE — 76770 US EXAM ABDO BACK WALL COMP: CPT

## 2021-07-27 PROCEDURE — 94760 N-INVAS EAR/PLS OXIMETRY 1: CPT

## 2021-07-27 PROCEDURE — 82948 REAGENT STRIP/BLOOD GLUCOSE: CPT

## 2021-07-27 PROCEDURE — 99232 SBSQ HOSP IP/OBS MODERATE 35: CPT | Performed by: FAMILY MEDICINE

## 2021-07-27 PROCEDURE — 83735 ASSAY OF MAGNESIUM: CPT | Performed by: STUDENT IN AN ORGANIZED HEALTH CARE EDUCATION/TRAINING PROGRAM

## 2021-07-27 PROCEDURE — 80053 COMPREHEN METABOLIC PANEL: CPT | Performed by: STUDENT IN AN ORGANIZED HEALTH CARE EDUCATION/TRAINING PROGRAM

## 2021-07-27 PROCEDURE — 85610 PROTHROMBIN TIME: CPT | Performed by: STUDENT IN AN ORGANIZED HEALTH CARE EDUCATION/TRAINING PROGRAM

## 2021-07-27 RX ADMIN — DOCUSATE SODIUM 100 MG: 100 CAPSULE, LIQUID FILLED ORAL at 16:39

## 2021-07-27 RX ADMIN — LEVOTHYROXINE SODIUM 150 MCG: 150 TABLET ORAL at 08:57

## 2021-07-27 RX ADMIN — DOCUSATE SODIUM 100 MG: 100 CAPSULE, LIQUID FILLED ORAL at 08:57

## 2021-07-27 RX ADMIN — AMLODIPINE BESYLATE 2.5 MG: 2.5 TABLET ORAL at 08:57

## 2021-07-27 RX ADMIN — CEFTRIAXONE 1000 MG: 1 INJECTION, SOLUTION INTRAVENOUS at 16:55

## 2021-07-27 RX ADMIN — GABAPENTIN 300 MG: 300 CAPSULE ORAL at 16:39

## 2021-07-27 RX ADMIN — INSULIN LISPRO 1 UNITS: 100 INJECTION, SOLUTION INTRAVENOUS; SUBCUTANEOUS at 11:41

## 2021-07-27 RX ADMIN — Medication 1 TABLET: at 09:03

## 2021-07-27 RX ADMIN — INSULIN LISPRO 1 UNITS: 100 INJECTION, SOLUTION INTRAVENOUS; SUBCUTANEOUS at 16:40

## 2021-07-27 RX ADMIN — FUROSEMIDE 60 MG: 10 INJECTION, SOLUTION INTRAMUSCULAR; INTRAVENOUS at 16:39

## 2021-07-27 RX ADMIN — FUROSEMIDE 60 MG: 10 INJECTION, SOLUTION INTRAMUSCULAR; INTRAVENOUS at 08:57

## 2021-07-27 RX ADMIN — FAMOTIDINE 20 MG: 20 TABLET ORAL at 08:57

## 2021-07-27 RX ADMIN — GABAPENTIN 300 MG: 300 CAPSULE ORAL at 08:57

## 2021-07-27 RX ADMIN — OXYCODONE HYDROCHLORIDE AND ACETAMINOPHEN 500 MG: 500 TABLET ORAL at 08:57

## 2021-07-27 RX ADMIN — POTASSIUM CHLORIDE 40 MEQ: 1500 TABLET, EXTENDED RELEASE ORAL at 08:57

## 2021-07-27 RX ADMIN — FERROUS SULFATE TAB 325 MG (65 MG ELEMENTAL FE) 325 MG: 325 (65 FE) TAB at 08:57

## 2021-07-27 NOTE — PROGRESS NOTES
Progress Note - Cardiology   Marcia Godwin 77 y o  female MRN: [id number]  Unit/Bed#: -01 Encounter: [id number]    Assessment:  Bradycardia   SSS sp PPM medtronic atrial lead programmed off for poor function and sensing  Acute on chronic HFpEF- appearing hypervolemic, elevated BNP, edema, orthopnea, weight gain  Persistent Afib on coumadin   pHTN    Plan:  1  Plan for [address] eventually to assess degree pHTN  2  Plan for FU with EP at [facility] to discuss possible lead revision vs upgrade to BiV  3  Maintain I and O, daily standing weights, monitor renal function and electrolytes  4  Continue IV diuresis today plan to switch after morning IV dose tomorrow most likely pending course  Subjective/Objective     Subjective: pt reports feeling significantly better  Her breathing has returned almost to normal  She has no chest pain  She has no further concerns currently  Objective: pt lying comfortably in hospital bed with head of bed elevated at 45 degrees  Negative 1 5 L since yesterday  Weights unchanged  Renal function improved  Vitals: /64   Pulse 71   Temp 99 °F (37 2 °C)   Resp 18   Ht 5' 2" (1 575 m)   Wt 50 4 kg (111 lb 1 8 oz)   SpO2 96%   BMI 20 32 kg/m²   Vitals:    07/26/21 1422 07/27/21 0600   Weight: 50 4 kg (111 lb 1 8 oz) 50 4 kg (111 lb 1 8 oz)     Orthostatic Blood Pressures      Most Recent Value   Blood Pressure  144/64 filed at 07/27/2021 3560   Patient Position - Orthostatic VS  Lying filed at 07/26/2021 1422            Intake/Output Summary (Last 24 hours) at 7/27/2021 0905  Last data filed at 7/27/2021 0556  Gross per 24 hour   Intake 720 ml   Output 2211 ml   Net -1491 ml       Invasive Devices     Peripheral Intravenous Line            Peripheral IV 07/26/21 Right Forearm 1 day          Drain            External Urinary Catheter <1 day                Physical Exam:   Constitutional:       General: She is in no acute distress        Appearance: She is non toxic  HENT: Head: Normocephalic and atraumatic  Cardiovascular:      Rate and Rhythm: Regular rhythm  Heart sounds: No murmur heard  No gallop  Pulmonary:      Effort: normal respiratory effort     Breath sounds: rhonchi improved  Musculoskeletal:         General: Swelling present  Skin:     General: Skin is warm  Capillary Refill: Capillary refill takes less than 2 seconds  Neurological:      General: No focal deficit present  Mental Status: She is alert and oriented to person, place, and time  Psychiatric:         Mood and Affect: Mood normal      Lab Results:   I have personally reviewed pertinent lab results  CBC with diff:   Results from last 7 days   Lab Units 07/27/21  0455   WBC Thousand/uL 9 23   RBC Million/uL 4 22   HEMOGLOBIN g/dL 10 4*   HEMATOCRIT % 33 4*   MCV fL 79*   MCH pg 24 6*   MCHC g/dL 31 1*   RDW % 17 6*   MPV fL 9 8   PLATELETS Thousands/uL 153     CMP:   Results from last 7 days   Lab Units 07/27/21  0455   SODIUM mmol/L 140   POTASSIUM mmol/L 3 7   CHLORIDE mmol/L 104   CO2 mmol/L 28   BUN mg/dL 46*   CREATININE mg/dL 1 69*   CALCIUM mg/dL 8 2*   AST U/L 14   ALT U/L 19   ALK PHOS U/L 98   EGFR ml/min/1 73sq m 29     Troponin:   0   Lab Value Date/Time    TROPONINI <0 02 07/26/2021 0832    TROPONINI <0 02 06/15/2021 0955    TROPONINI <0 02 10/25/2020 1803    TROPONINI <0 02 10/25/2020 1532    TROPONINI <0 02 10/25/2020 1144     BNP:   Results from last 7 days   Lab Units 07/27/21  0455   POTASSIUM mmol/L 3 7   CHLORIDE mmol/L 104   CO2 mmol/L 28   BUN mg/dL 46*   CREATININE mg/dL 1 69*   CALCIUM mg/dL 8 2*   EGFR ml/min/1 73sq m 29     Coags:   Results from last 7 days   Lab Units 07/27/21  0455   INR  3 71*     TSH:   Results from last 7 days   Lab Units 07/26/21  1253   TSH 3RD GENERATON uIU/mL 1 961     Magnesium:   Results from last 7 days   Lab Units 07/27/21  0455   MAGNESIUM mg/dL 2 4     Imaging: I have personally reviewed pertinent reports         EKG: V pacing

## 2021-07-27 NOTE — ASSESSMENT & PLAN NOTE
Patient currently rate-controlled   Anticoagulated with Coumadin 5 mg daily - held for now as INR supratherapeutic

## 2021-07-27 NOTE — ASSESSMENT & PLAN NOTE
Present on admission met 2 seizures criteria with the respiratory rate of greater than 20 and temperature less than 96 8 secondary to acute cystitis no fluids given secondary to volume overload  Sepsis has resolved  Continue ceftriaxone await urine culture

## 2021-07-27 NOTE — PROGRESS NOTES
[address]  Progress Note - Hector Sinclair 1945, 77 y o  female MRN: [id number]  Unit/Bed#: -[place] Encounter: [id number]  Primary Care Provider: Lori Santiago MD   Date and time admitted to hospital: 7/26/2021  8:17 AM    Sick sinus syndrome [facility])  Assessment & Plan  Patient with sick sinus syndrome and bradycardia with pacemaker  Bradycardia has resolved rate has been adjusted to 70  Monitor on tele for now  As per Cardiology, patient "needs eventual discussion with EP to evaluate for lead revision, she plans to go to [facility] in the future, referral already placed "    Urinary tract infection  Assessment & Plan  Urine culture pending  Will start patient on Ceftriaxone (7/26-_)  F/u UCx and BCx      Sepsis ([facility] )  Assessment & Plan  Present on admission met 2 seizures criteria with the respiratory rate of greater than 20 and temperature less than 96 8 secondary to acute cystitis no fluids given secondary to volume overload  Sepsis has resolved  Continue ceftriaxone await urine culture      Acute respiratory failure with hypoxia (HCC)  Assessment & Plan  · Patient desaturating to the low 80s on RA and very short of breath at rest  Currently requiring 4L of oxygen supplementation via nasal cannula  · secondary to Acute on chronic congestive heart failure (see rest of plan under Acute on chronic CHF)  Titrate and wean off oxygen, patient not on any oxygen at baseline  Respiratory protocol  Improved down to 2 L with diuresis      Supratherapeutic INR  Assessment & Plan  Patient is currently on Coumadin 5 mg daily for permanent Afib  INR today currently at 3 7  Will hold Coumadin today and obtain daily INRs  Resume Coumadin as INR permits  No active signs of bleeding at this time  Monitor H/H    Acute kidney injury [facility])  Assessment & Plan  Patient presenting with creatinine of 2 04 with baseline creatinine of around 1 3-1 5 out of hospital setting down to almost baseline patient has CKD stage 3 after diuresis  Ultrasound of the kidney and bladder negative for acute DVT allergy of any Ishmael I  Continue to treat acute cystitis  Essential hypertension  Assessment & Plan  BP at this time [id number] systolic  Continue to monitor BP  Started patient on low-dose amlodipine 2 5 mg daily      Acquired hypothyroidism  Assessment & Plan  TSH 1 961  Continue levothyroxine    Type 2 diabetes mellitus with diabetic nephropathy [facility])  Assessment & Plan  Lab Results   Component Value Date    HGBA1C 7 0 (H) 06/15/2021       Recent Labs     07/26/21  1603 07/26/21  2059 07/27/21  0753 07/27/21  1051   POCGLU 150* 141* 129 169*       Blood Sugar Average: Last 72 hrs:  (P) 147 25     Placed patient on insulin sliding scale  Monitor fingersticks    Atrial fibrillation (HCC)  Assessment & Plan  Patient currently rate-controlled   Anticoagulated with Coumadin 5 mg daily - held for now as INR supratherapeutic      * Acute on chronic diastolic CHF (congestive heart failure) (HCC)  Assessment & Plan  Wt Readings from Last 3 Encounters:   07/27/21 50 3 kg (111 lb)   06/18/21 94 6 kg (208 lb 9 6 oz)   05/27/21 93 kg (205 lb)     · Patient presenting with volume overload and significant crackles on exam  BNP elevated to 2,165, pitting edema bilaterally  · CT chest/A/P showing pleural effusion and mild interstitial pulm edema  · Currently hypoxic needing 4L of oxygen supplementation via nasal cannula SHE IS DOWN TO 2 L THE BED WEIGHT IS QUESTIONABLE ACCURACY SHE DID PUT OUT GREATER THAN 2 L OF FLUID she is feeling much better discussed with Cardiology will continue Lasix 60 mg IV b i d  Discussed with nursing and placed a message to only do standing weight patient can stand on scale and measure    Creatinine has improved almost down to baseline  · Cardiology consulted on the case and recs appreciated  · Diuresing patient with Lasix 60 mg IV q12h with K supplementation daily  Strict I&Os  Daily weights with standing scale (weights above don't seem accurate)  · Echocardiogram: 2021: Normal ventricle size, wall thickness with preserved systolic function  Ejection fraction 60%  Dilated right ventricle  Leads in the right heart  Dilated left and right atrium  Trace mitral regurgitation  Moderate to severe tricuspid regurgitation with estimated right ventricular systolic pressure of 72 mm of mercury  VTE Pharmacologic Prophylaxis:   contraindications secondary to supratherapeutic INR continue SCD    Patient Centered Rounds: I performed bedside rounds with nursing staff today  Discussions with Specialists or Other Care Team Provider:  Cardiology    Education and Discussions with Family / Patient: Attempted to update  () via phone  Left voicemail  Time Spent for Care: 30 minutes  More than 50% of total time spent on counseling and coordination of care as described above  Current Length of Stay: 1 day(s)  Current Patient Status: Inpatient   Certification Statement: The patient will continue to require additional inpatient hospital stay due to CHF  Discharge Plan: Anticipate discharge in 48 hrs to discharge location to be determined pending rehab evaluations  Code Status: Level 1 - Full Code    Subjective:   Patient seen and examined feeling much better no chest pain shortness of breath has improved    Objective:     Vitals:   Temp (24hrs), Av 5 °F (36 9 °C), Min:97 9 °F (36 6 °C), Max:99 °F (37 2 °C)    Temp:  [97 9 °F (36 6 °C)-99 °F (37 2 °C)] 97 9 °F (36 6 °C)  HR:  [70-90] 70  Resp:  [18-20] 18  BP: (144-149)/(63-64) 144/64  SpO2:  [92 %-96 %] 96 %  Body mass index is 20 3 kg/m²  Input and Output Summary (last 24 hours): Intake/Output Summary (Last 24 hours) at 2021 1446  Last data filed at 2021 1412  Gross per 24 hour   Intake 960 ml   Output 2761 ml   Net -1801 ml       Physical Exam:   Physical Exam  Vitals and nursing note reviewed     Constitutional: General: She is not in acute distress  Appearance: She is well-developed  HENT:      Head: Normocephalic and atraumatic  Eyes:      Conjunctiva/sclera: Conjunctivae normal    Cardiovascular:      Rate and Rhythm: Normal rate  Rhythm irregular  Heart sounds: No murmur heard  Pulmonary:      Effort: Pulmonary effort is normal  No respiratory distress  Breath sounds: Rales present  Abdominal:      General: There is no distension  Palpations: Abdomen is soft  Tenderness: There is no abdominal tenderness  Musculoskeletal:         General: Swelling (Mild) present  Cervical back: Neck supple  Skin:     General: Skin is warm and dry  Neurological:      General: No focal deficit present  Mental Status: She is alert and oriented to person, place, and time     Psychiatric:         Mood and Affect: Mood normal          Additional Data:     Labs:  Results from last 7 days   Lab Units 07/27/21  0455   WBC Thousand/uL 9 23   HEMOGLOBIN g/dL 10 4*   HEMATOCRIT % 33 4*   PLATELETS Thousands/uL 153   NEUTROS PCT % 83*   LYMPHS PCT % 7*   MONOS PCT % 7   EOS PCT % 1     Results from last 7 days   Lab Units 07/27/21  0455   SODIUM mmol/L 140   POTASSIUM mmol/L 3 7   CHLORIDE mmol/L 104   CO2 mmol/L 28   BUN mg/dL 46*   CREATININE mg/dL 1 69*   ANION GAP mmol/L 8   CALCIUM mg/dL 8 2*   ALBUMIN g/dL 3 3*   TOTAL BILIRUBIN mg/dL 0 84   ALK PHOS U/L 98   ALT U/L 19   AST U/L 14   GLUCOSE RANDOM mg/dL 119     Results from last 7 days   Lab Units 07/27/21  0455   INR  3 71*     Results from last 7 days   Lab Units 07/27/21  1051 07/27/21  0753 07/26/21  2059 07/26/21  1603   POC GLUCOSE mg/dl 169* 129 141* 150*         Results from last 7 days   Lab Units 07/26/21  0832   LACTIC ACID mmol/L 1 2       Lines/Drains:  Invasive Devices     Peripheral Intravenous Line            Peripheral IV 07/26/21 Right Forearm 1 day          Drain            External Urinary Catheter <1 day Telemetry:  Telemetry Orders (From admission, onward)             48 Hour Telemetry Monitoring  Continuous x 48 hours     Question:  Reason for 48 Hour Telemetry  Answer:  Acute Decompensated CHF (continuous diuretic infusion or total diuretic dose > 200 mg daily, associated electrolyte derangement, ionotropic drip, history of ventricular arrhythmia, or new EF <35%)                 Telemetry Reviewed: Atrial fibrillation  HR averaging 80  Indication for Continued Telemetry Use: Arrthymias requiring medical therapy           Imaging: Reviewed radiology reports from this admission including: chest xray    Recent Cultures (last 7 days):   Results from last 7 days   Lab Units 07/26/21  0832   BLOOD CULTURE  No Growth at 24 hrs  No Growth at 24 hrs         Last 24 Hours Medication List:   Current Facility-Administered Medications   Medication Dose Route Frequency Provider Last Rate    amLODIPine  2 5 mg Oral Daily [place], [place]      Ascorbic Acid (Vitamin C)  500 mg Oral Daily Benito Travis MD      calcium carbonate-vitamin D  1 tablet Oral Daily With Breakfast Benito Dent MD      cefTRIAXone  1,000 mg Intravenous Q24H Benito Dent MD 1,000 mg (07/26/21 1758)    docusate sodium  100 mg Oral BID Benito Travis MD      famotidine  20 mg Oral Daily Benito Travis MD      ferrous sulfate  325 mg Oral Daily Benito Dent MD      furosemide  60 mg Intravenous BID (diuretic) [place]      gabapentin  300 mg Oral BID Benito Dent MD      insulin lispro  1-5 Units Subcutaneous TID [facility] Benito Travis MD      levothyroxine  150 mcg Oral Daily Benito Travis MD      potassium chloride  40 mEq Oral Daily Anaid Lynn PA-C          Today, Patient Was Seen By: Ellie Cook MD    **Please Note: This note may have been constructed using a voice recognition system  **

## 2021-07-27 NOTE — RESPIRATORY THERAPY NOTE
RT Protocol Note  Beryl Spencer 77 y o  female MRN: [id number]  Unit/Bed#: -01 Encounter: [id number]    Assessment    Principal Problem:    Acute on chronic diastolic CHF (congestive heart failure) ([place] [address] )  Active Problems:    Atrial fibrillation (HCC)    Type 2 diabetes mellitus with diabetic nephropathy (HCC)    Acquired hypothyroidism    Essential hypertension    Acute kidney injury ([facility] )    Supratherapeutic INR    Acute respiratory failure with hypoxia (HCC)    SIRS due to infectious process with acute organ dysfunction (HCC)    Urinary tract infection    Sick sinus syndrome ([facility] )      Home Pulmonary Medications:         Past Medical History:   Diagnosis Date    Arthritis     CHF (congestive heart failure) ([place] [address] )     Diabetes mellitus ([place] [address] )     Disease of thyroid gland     Hypertension     Pacemaker     Renal disorder      Social History     Socioeconomic History    Marital status: /Civil Union     Spouse name: None    Number of children: None    Years of education: None    Highest education level: None   Occupational History    None   Tobacco Use    Smoking status: Never Smoker    Smokeless tobacco: Never Used   Vaping Use    Vaping Use: Never used   Substance and Sexual Activity    Alcohol use: Not Currently    Drug use: Not Currently    Sexual activity: Yes   Other Topics Concern    None   Social History Narrative    None     Social Determinants of Health     Financial Resource Strain:     Difficulty of Paying Living Expenses:    Food Insecurity:     Worried About Running Out of Food in the Last Year:     Ran Out of Food in the Last Year:    Transportation Needs:     Lack of Transportation (Medical):      Lack of Transportation (Non-Medical):    Physical Activity:     Days of Exercise per Week:     Minutes of Exercise per Session:    Stress:     Feeling of Stress :    Social Connections:     Frequency of Communication with Friends and Family:     Frequency of Social Gatherings with Friends and Family:     Attends Yazdanism Services:     Active Member of Clubs or Organizations:     Attends Club or Organization Meetings:     Marital Status:    Intimate Partner Violence:     Fear of Current or Ex-Partner:     Emotionally Abused:     Physically Abused:     Sexually Abused:        Subjective         Objective    Physical Exam:   Assessment Type: Assess only  General Appearance: Awake, Alert  Respiratory Pattern: Normal  Chest Assessment: Chest expansion symmetrical  Bilateral Breath Sounds: Diminished, Crackles  Cough: Moist  O2 Device: 4L NC    Vitals:  Blood pressure 147/64, pulse 78, temperature 98 7 °F (37 1 °C), resp  rate 18, height 5' 2" (1 575 m), weight 50 4 kg (111 lb 1 8 oz), SpO2 95 %  Imaging and other studies: I have personally reviewed pertinent reports  O2 Device: 4L NC     Plan    Respiratory Plan: No distress/Pulmonary history        Resp Comments: Pt admited for bradycardia/nausea and vomiting/ALMA  Pt hx - A-fib (with pacemaker), CHF  No significant pulmonary Hx  Pt stable on 4L NC  BS dim/mild crackles  Pt with ni dyspnea

## 2021-07-27 NOTE — ASSESSMENT & PLAN NOTE
Patient with sick sinus syndrome and bradycardia with pacemaker  Bradycardia has resolved rate has been adjusted to 70  Monitor on tele for now  As per Cardiology, patient "needs eventual discussion with EP to evaluate for lead revision, she plans to go to [facility] in the future, referral already placed "

## 2021-07-27 NOTE — ASSESSMENT & PLAN NOTE
Wt Readings from Last 3 Encounters:   07/27/21 50 3 kg (111 lb)   06/18/21 94 6 kg (208 lb 9 6 oz)   05/27/21 93 kg (205 lb)     · Patient presenting with volume overload and significant crackles on exam  BNP elevated to 2,165, pitting edema bilaterally  · CT chest/A/P showing pleural effusion and mild interstitial pulm edema  · Currently hypoxic needing 4L of oxygen supplementation via nasal cannula SHE IS DOWN TO 2 L THE BED WEIGHT IS QUESTIONABLE ACCURACY SHE DID PUT OUT GREATER THAN 2 L OF FLUID she is feeling much better discussed with Cardiology will continue Lasix 60 mg IV b i d  Discussed with nursing and placed a message to only do standing weight patient can stand on scale and measure  Creatinine has improved almost down to baseline  · Cardiology consulted on the case and recs appreciated  · Diuresing patient with Lasix 60 mg IV q12h with K supplementation daily  Strict I&Os  Daily weights with standing scale (weights above don't seem accurate)  · Echocardiogram: 04/05/2021: Normal ventricle size, wall thickness with preserved systolic function  Ejection fraction 60%  Dilated right ventricle  Leads in the right heart  Dilated left and right atrium  Trace mitral regurgitation  Moderate to severe tricuspid regurgitation with estimated right ventricular systolic pressure of 72 mm of mercury

## 2021-07-27 NOTE — ASSESSMENT & PLAN NOTE
Patient is currently on Coumadin 5 mg daily for permanent Afib  INR today currently at 3 7  Will hold Coumadin today and obtain daily INRs  Resume Coumadin as INR permits  No active signs of bleeding at this time  Monitor H/H

## 2021-07-27 NOTE — ASSESSMENT & PLAN NOTE
Lab Results   Component Value Date    HGBA1C 7 0 (H) 06/15/2021       Recent Labs     07/26/21  1603 07/26/21  2059 07/27/21  0753 07/27/21  1051   POCGLU 150* 141* 129 169*       Blood Sugar Average: Last 72 hrs:  (P) 147 25     Placed patient on insulin sliding scale  Monitor fingersticks

## 2021-07-27 NOTE — ASSESSMENT & PLAN NOTE
Patient presenting with creatinine of 2 04 with baseline creatinine of around 1 3-1 5 out of hospital setting down to almost baseline patient has CKD stage 3 after diuresis  Ultrasound of the kidney and bladder negative for acute DVT allergy of any Ishmael I  Continue to treat acute cystitis

## 2021-07-27 NOTE — PLAN OF CARE
Problem: Potential for Falls  Goal: Patient will remain free of falls  Description: INTERVENTIONS:  - Educate patient/family on patient safety including physical limitations  - Instruct patient to call for assistance with activity   - Consult OT/PT to assist with strengthening/mobility   - Keep Call bell within reach  - Keep bed low and locked with side rails adjusted as appropriate  - Keep care items and personal belongings within reach  - Initiate and maintain comfort rounds  - Make Fall Risk Sign visible to staff  - Apply yellow socks and bracelet for high fall risk patients  - Consider moving patient to room near nurses station  Outcome: Progressing     Problem: CARDIOVASCULAR - ADULT  Goal: Maintains optimal cardiac output and hemodynamic stability  Description: INTERVENTIONS:  - Monitor I/O, vital signs and rhythm  - Monitor for S/S and trends of decreased cardiac output  - Administer and titrate ordered vasoactive medications to optimize hemodynamic stability  - Assess quality of pulses, skin color and temperature  - Assess for signs of decreased coronary artery perfusion  - Instruct patient to report change in severity of symptoms  Outcome: Progressing  Goal: Absence of cardiac dysrhythmias or at baseline rhythm  Description: INTERVENTIONS:  - Continuous cardiac monitoring, vital signs, obtain 12 lead EKG if ordered  - Administer antiarrhythmic and heart rate control medications as ordered  - Monitor electrolytes and administer replacement therapy as ordered  Outcome: Progressing     Problem: RESPIRATORY - ADULT  Goal: Achieves optimal ventilation and oxygenation  Description: INTERVENTIONS:  - Assess for changes in respiratory status  - Assess for changes in mentation and behavior  - Position to facilitate oxygenation and minimize respiratory effort  - Oxygen administered by appropriate delivery if ordered  - Initiate smoking cessation education as indicated  - Encourage broncho-pulmonary hygiene including cough, deep breathe, Incentive Spirometry  - Assess the need for suctioning and aspirate as needed  - Assess and instruct to report SOB or any respiratory difficulty  - Respiratory Therapy support as indicated  Outcome: Progressing     Problem: METABOLIC, FLUID AND ELECTROLYTES - ADULT  Goal: Electrolytes maintained within normal limits  Description: INTERVENTIONS:  - Monitor labs and assess patient for signs and symptoms of electrolyte imbalances  - Administer electrolyte replacement as ordered  - Monitor response to electrolyte replacements, including repeat lab results as appropriate  - Instruct patient on fluid and nutrition as appropriate  Outcome: Progressing  Goal: Fluid balance maintained  Description: INTERVENTIONS:  - Monitor labs   - Monitor I/O and WT  - Instruct patient on fluid and nutrition as appropriate  - Assess for signs & symptoms of volume excess or deficit  Outcome: Progressing  Goal: Glucose maintained within target range  Description: INTERVENTIONS:  - Monitor Blood Glucose as ordered  - Assess for signs and symptoms of hyperglycemia and hypoglycemia  - Administer ordered medications to maintain glucose within target range  - Assess nutritional intake and initiate nutrition service referral as needed  Outcome: Progressing     Problem: SKIN/TISSUE INTEGRITY - ADULT  Goal: Skin Integrity remains intact(Skin Breakdown Prevention)  Description: Assess:  -Assess extremities for adequate circulation and sensation     Bed Management:  -Have minimal linens on bed & keep smooth, unwrinkled  -Change linens as needed when moist or perspiring  Toileting:  -Offer bedside commode    Activity:    Skin Care:  -Avoid use of baby powder, tape, friction and shearing, hot water or constrictive clothing  Outcome: Progressing  Goal: Incision(s), wounds(s) or drain site(s) healing without S/S of infection  Description: INTERVENTIONS  - Assess and document dressing, incision, wound bed, drain sites and surrounding tissue  - Provide patient and family education  Outcome: Progressing  Goal: Pressure injury heals and does not worsen  Description: Interventions:  - Implement low air loss mattress or specialty surface (Criteria met)  - Apply silicone foam dressing  - Consider nutrition services referral as needed  Outcome: Progressing     Problem: MUSCULOSKELETAL - ADULT  Goal: Maintain or return mobility to safest level of function  Description: INTERVENTIONS:  - Assess patient's ability to carry out ADLs; assess patient's baseline for ADL function and identify physical deficits which impact ability to perform ADLs (bathing, care of mouth/teeth, toileting, grooming, dressing, etc )  - Assess/evaluate cause of self-care deficits   - Assess range of motion  - Assess patient's mobility  - Assess patient's need for assistive devices and provide as appropriate  - Encourage maximum independence but intervene and supervise when necessary  - Involve family in performance of ADLs  - Assess for home care needs following discharge   - Consider OT consult to assist with ADL evaluation and planning for discharge  - Provide patient education as appropriate  Outcome: Progressing  Goal: Maintain proper alignment of affected body part  Description: INTERVENTIONS:  - Support, maintain and protect limb and body alignment  - Provide patient/ family with appropriate education  Outcome: Progressing     Problem: SAFETY ADULT  Goal: Patient will remain free of falls  Description: INTERVENTIONS:  - Educate patient/family on patient safety including physical limitations  - Instruct patient to call for assistance with activity   - Consult OT/PT to assist with strengthening/mobility   - Keep Call bell within reach  - Keep bed low and locked with side rails adjusted as appropriate  - Keep care items and personal belongings within reach  - Initiate and maintain comfort rounds  - Make Fall Risk Sign visible to staff  - Apply yellow socks and bracelet for high fall risk patients  - Consider moving patient to room near nurses station  Outcome: Progressing  Goal: Maintain or return to baseline ADL function  Description: INTERVENTIONS:  -  Assess patient's ability to carry out ADLs; assess patient's baseline for ADL function and identify physical deficits which impact ability to perform ADLs (bathing, care of mouth/teeth, toileting, grooming, dressing, etc )  - Assess/evaluate cause of self-care deficits   - Assess range of motion  - Assess patient's mobility; develop plan if impaired  - Assess patient's need for assistive devices and provide as appropriate  - Encourage maximum independence but intervene and supervise when necessary  - Involve family in performance of ADLs  - Assess for home care needs following discharge   - Consider OT consult to assist with ADL evaluation and planning for discharge  - Provide patient education as appropriate  Outcome: Progressing  Goal: Maintains/Returns to pre admission functional level  Description: INTERVENTIONS:  - Perform BMAT or MOVE assessment daily    - Set and communicate daily mobility goal to care team and patient/family/caregiver     - Collaborate with rehabilitation services on mobility goals if consulted  - Out of bed for toileting  - Record patient progress and toleration of activity level   Outcome: Progressing     Problem: DISCHARGE PLANNING  Goal: Discharge to home or other facility with appropriate resources  Description: INTERVENTIONS:  - Identify barriers to discharge w/patient and caregiver  - Arrange for needed discharge resources and transportation as appropriate  - Identify discharge learning needs (meds, wound care, etc )  - Arrange for interpretive services to assist at discharge as needed  - Refer to Case Management Department for coordinating discharge planning if the patient needs post-hospital services based on physician/advanced practitioner order or complex needs related to functional status, cognitive ability, or social support system  Outcome: Progressing     Problem: Knowledge Deficit  Goal: Patient/family/caregiver demonstrates understanding of disease process, treatment plan, medications, and discharge instructions  Description: Complete learning assessment and assess knowledge base  Interventions:  - Provide teaching at level of understanding  - Provide teaching via preferred learning methods  Outcome: Progressing     Problem: MOBILITY - ADULT  Goal: Maintain or return to baseline ADL function  Description: INTERVENTIONS:  -  Assess patient's ability to carry out ADLs; assess patient's baseline for ADL function and identify physical deficits which impact ability to perform ADLs (bathing, care of mouth/teeth, toileting, grooming, dressing, etc )  - Assess/evaluate cause of self-care deficits   - Assess range of motion  - Assess patient's mobility; develop plan if impaired  - Assess patient's need for assistive devices and provide as appropriate  - Encourage maximum independence but intervene and supervise when necessary  - Involve family in performance of ADLs  - Assess for home care needs following discharge   - Consider OT consult to assist with ADL evaluation and planning for discharge  - Provide patient education as appropriate  Outcome: Progressing  Goal: Maintains/Returns to pre admission functional level  Description: INTERVENTIONS:  - Perform BMAT or MOVE assessment daily    - Set and communicate daily mobility goal to care team and patient/family/caregiver     - Collaborate with rehabilitation services on mobility goals if consulted  - Out of bed for toileting  - Record patient progress and toleration of activity level   Outcome: Progressing     Problem: Prexisting or High Potential for Compromised Skin Integrity  Goal: Skin integrity is maintained or improved  Description: INTERVENTIONS:  - Identify patients at risk for skin breakdown  - Assess and monitor skin integrity  - Assess and monitor nutrition and hydration status  - Monitor labs   - Assess for incontinence   - Turn and reposition patient  - Assist with mobility/ambulation  - Relieve pressure over bony prominences  - Avoid friction and shearing  - Provide appropriate hygiene as needed including keeping skin clean and dry  - Evaluate need for skin moisturizer/barrier cream  - Collaborate with interdisciplinary team   - Patient/family teaching  - Consider wound care consult   Outcome: Progressing

## 2021-07-27 NOTE — ASSESSMENT & PLAN NOTE
BP at this time [phone] systolic  Continue to monitor BP  Started patient on low-dose amlodipine 2 5 mg daily

## 2021-07-27 NOTE — ASSESSMENT & PLAN NOTE
· Patient desaturating to the low 80s on RA and very short of breath at rest  Currently requiring 4L of oxygen supplementation via nasal cannula  · secondary to Acute on chronic congestive heart failure (see rest of plan under Acute on chronic CHF)  Titrate and wean off oxygen, patient not on any oxygen at baseline  Respiratory protocol  Improved down to 2 L with diuresis

## 2021-07-28 ENCOUNTER — APPOINTMENT (INPATIENT)
Dept: RADIOLOGY | Facility: HOSPITAL | Age: 76
DRG: 871 | End: 2021-07-28
Payer: MEDICARE

## 2021-07-28 LAB
ANION GAP SERPL CALCULATED.3IONS-SCNC: 10 MMOL/L (ref 4–13)
BASOPHILS # BLD AUTO: 0.07 THOUSANDS/ΜL (ref 0–0.1)
BASOPHILS NFR BLD AUTO: 1 % (ref 0–1)
BUN SERPL-MCNC: 44 MG/DL (ref 5–25)
CALCIUM SERPL-MCNC: 8.2 MG/DL (ref 8.3–10.1)
CHLORIDE SERPL-SCNC: 101 MMOL/L (ref 100–108)
CO2 SERPL-SCNC: 30 MMOL/L (ref 21–32)
CREAT SERPL-MCNC: 1.49 MG/DL (ref 0.6–1.3)
EOSINOPHIL # BLD AUTO: 0.22 THOUSAND/ΜL (ref 0–0.61)
EOSINOPHIL NFR BLD AUTO: 2 % (ref 0–6)
ERYTHROCYTE [DISTWIDTH] IN BLOOD BY AUTOMATED COUNT: 17.1 % (ref 11.6–15.1)
GFR SERPL CREATININE-BSD FRML MDRD: 34 ML/MIN/1.73SQ M
GLUCOSE SERPL-MCNC: 118 MG/DL (ref 65–140)
GLUCOSE SERPL-MCNC: 130 MG/DL (ref 65–140)
GLUCOSE SERPL-MCNC: 143 MG/DL (ref 65–140)
GLUCOSE SERPL-MCNC: 162 MG/DL (ref 65–140)
GLUCOSE SERPL-MCNC: 209 MG/DL (ref 65–140)
HCT VFR BLD AUTO: 34 % (ref 34.8–46.1)
HGB BLD-MCNC: 10.7 G/DL (ref 11.5–15.4)
IMM GRANULOCYTES # BLD AUTO: 0.04 THOUSAND/UL (ref 0–0.2)
IMM GRANULOCYTES NFR BLD AUTO: 0 % (ref 0–2)
INR PPP: 2.75 (ref 0.84–1.19)
LYMPHOCYTES # BLD AUTO: 0.75 THOUSANDS/ΜL (ref 0.6–4.47)
LYMPHOCYTES NFR BLD AUTO: 8 % (ref 14–44)
MCH RBC QN AUTO: 24.5 PG (ref 26.8–34.3)
MCHC RBC AUTO-ENTMCNC: 31.5 G/DL (ref 31.4–37.4)
MCV RBC AUTO: 78 FL (ref 82–98)
MONOCYTES # BLD AUTO: 0.96 THOUSAND/ΜL (ref 0.17–1.22)
MONOCYTES NFR BLD AUTO: 10 % (ref 4–12)
NEUTROPHILS # BLD AUTO: 7.19 THOUSANDS/ΜL (ref 1.85–7.62)
NEUTS SEG NFR BLD AUTO: 79 % (ref 43–75)
NRBC BLD AUTO-RTO: 0 /100 WBCS
PLATELET # BLD AUTO: 171 THOUSANDS/UL (ref 149–390)
PMV BLD AUTO: 9.9 FL (ref 8.9–12.7)
POTASSIUM SERPL-SCNC: 3.9 MMOL/L (ref 3.5–5.3)
PROTHROMBIN TIME: 28.5 SECONDS (ref 11.6–14.5)
RBC # BLD AUTO: 4.37 MILLION/UL (ref 3.81–5.12)
SODIUM SERPL-SCNC: 141 MMOL/L (ref 136–145)
WBC # BLD AUTO: 9.23 THOUSAND/UL (ref 4.31–10.16)

## 2021-07-28 PROCEDURE — 99232 SBSQ HOSP IP/OBS MODERATE 35: CPT | Performed by: FAMILY MEDICINE

## 2021-07-28 PROCEDURE — 85025 COMPLETE CBC W/AUTO DIFF WBC: CPT | Performed by: FAMILY MEDICINE

## 2021-07-28 PROCEDURE — 82948 REAGENT STRIP/BLOOD GLUCOSE: CPT

## 2021-07-28 PROCEDURE — 85610 PROTHROMBIN TIME: CPT | Performed by: FAMILY MEDICINE

## 2021-07-28 PROCEDURE — 97167 OT EVAL HIGH COMPLEX 60 MIN: CPT

## 2021-07-28 PROCEDURE — 97116 GAIT TRAINING THERAPY: CPT

## 2021-07-28 PROCEDURE — 97535 SELF CARE MNGMENT TRAINING: CPT

## 2021-07-28 PROCEDURE — 80048 BASIC METABOLIC PNL TOTAL CA: CPT | Performed by: FAMILY MEDICINE

## 2021-07-28 PROCEDURE — 97163 PT EVAL HIGH COMPLEX 45 MIN: CPT

## 2021-07-28 PROCEDURE — 71046 X-RAY EXAM CHEST 2 VIEWS: CPT

## 2021-07-28 RX ORDER — WARFARIN SODIUM 5 MG/1
5 TABLET ORAL
Status: COMPLETED | OUTPATIENT
Start: 2021-07-28 | End: 2021-07-28

## 2021-07-28 RX ADMIN — AMLODIPINE BESYLATE 2.5 MG: 2.5 TABLET ORAL at 08:04

## 2021-07-28 RX ADMIN — CEFTRIAXONE 1000 MG: 1 INJECTION, SOLUTION INTRAVENOUS at 17:35

## 2021-07-28 RX ADMIN — Medication 1 TABLET: at 08:05

## 2021-07-28 RX ADMIN — OXYCODONE HYDROCHLORIDE AND ACETAMINOPHEN 500 MG: 500 TABLET ORAL at 08:05

## 2021-07-28 RX ADMIN — FERROUS SULFATE TAB 325 MG (65 MG ELEMENTAL FE) 325 MG: 325 (65 FE) TAB at 08:04

## 2021-07-28 RX ADMIN — FAMOTIDINE 20 MG: 20 TABLET ORAL at 08:05

## 2021-07-28 RX ADMIN — FUROSEMIDE 60 MG: 10 INJECTION, SOLUTION INTRAMUSCULAR; INTRAVENOUS at 17:35

## 2021-07-28 RX ADMIN — FUROSEMIDE 60 MG: 10 INJECTION, SOLUTION INTRAMUSCULAR; INTRAVENOUS at 08:05

## 2021-07-28 RX ADMIN — GABAPENTIN 300 MG: 300 CAPSULE ORAL at 08:05

## 2021-07-28 RX ADMIN — GABAPENTIN 300 MG: 300 CAPSULE ORAL at 17:35

## 2021-07-28 RX ADMIN — INSULIN LISPRO 1 UNITS: 100 INJECTION, SOLUTION INTRAVENOUS; SUBCUTANEOUS at 12:13

## 2021-07-28 RX ADMIN — LEVOTHYROXINE SODIUM 150 MCG: 150 TABLET ORAL at 08:05

## 2021-07-28 RX ADMIN — WARFARIN SODIUM 5 MG: 5 TABLET ORAL at 17:35

## 2021-07-28 RX ADMIN — POTASSIUM CHLORIDE 40 MEQ: 1500 TABLET, EXTENDED RELEASE ORAL at 08:04

## 2021-07-28 NOTE — PLAN OF CARE
Problem: PHYSICAL THERAPY ADULT  Goal: Performs mobility at highest level of function for planned discharge setting  See evaluation for individualized goals  Description: Treatment/Interventions: Functional transfer training, LE strengthening/ROM, Therapeutic exercise, Endurance training, Patient/family training, Equipment eval/education, Bed mobility, Gait training, Compensatory technique education, Spoke to nursing, Spoke to case management, OT  Equipment Recommended:  (walker-pt has)       See flowsheet documentation for full assessment, interventions and recommendations  Note: Prognosis: Good  Problem List: Decreased strength, Decreased endurance, Impaired balance, Decreased range of motion, Decreased mobility, Decreased safety awareness, Obesity, Decreased skin integrity  Assessment:  Pt is a 77 y o  female seen for PT evaluation s/p admission to [address] [place] [address] on 7/26/2021 with Acute on chronic diastolic CHF (congest[facility] )  Order placed for PT services    Upon evaluation: Pt is presenting with impaired functional mobility due to pain, decreased strength, decreased ROM, decreased endurance, impaired balance, gait deviations, altered sensation, decreased safety awareness, fall risk and impaired skin integrity requiring supervision assistance for bed mobility, supervision to stand by assistance for transfers and stand by assistance for ambulation with RW  Pt's clinical presentation is currently unpredictable given the functional mobility deficits above, especially weakness, decreased ROM, decreased skin integrity, decreased endurance, gait deviations, decreased activity tolerance, decreased functional mobility tolerance and decreased safety awareness, coupled with fall risks as indicated by AM-PAC 6-Clicks: 16/93 as well as impaired balance, polypharmacy, decreased safety awareness and limited sensation/neuropathy and combined with medical complications of abnormal renal lab values, abnormal H&H, abnormal blood sugars and multiple readmissions, ALMA, UTI, supratherapeutic INR  Pt's PMHx and comorbidities that may affect physical performance and progress include: CHF, DM and HTN, hypothyroidism, diabetic nephropathy, Afib, h/o SSS s/p pacemaker, arthritis  Personal factors affecting pt at time of IE include: inability to perform ADLs, inability to navigate level surfaces without external assistance and inability to ambulate household distances  Pt will benefit from continued skilled PT services to address deficits as defined above and to maximize level of functional mobility to facilitate return toward PLOF and improved QOL  From PT/mobility standpoint, recommendation at time of d/c would be Home PT, home with family support and with walker pending progress in order to reduce fall risk and maximize pt's functional independence and consistency with mobility in order to facilitate return to PLOF  Recommend ther ex next 1-2 sessions  Barriers to Discharge: None  Barriers to Discharge Comments: pt has support from family prn     PT Discharge Recommendation: Home with home health rehabilitation     PT - OK to Discharge:  (when medically cleared)    See flowsheet documentation for full assessment

## 2021-07-28 NOTE — PLAN OF CARE
Problem: CARDIOVASCULAR - ADULT  Goal: Maintains optimal cardiac output and hemodynamic stability  Description: INTERVENTIONS:  - Monitor I/O, vital signs and rhythm  - Monitor for S/S and trends of decreased cardiac output  - Administer and titrate ordered vasoactive medications to optimize hemodynamic stability  - Assess quality of pulses, skin color and temperature  - Assess for signs of decreased coronary artery perfusion  - Instruct patient to report change in severity of symptoms  Outcome: Progressing     Problem: CARDIOVASCULAR - ADULT  Goal: Absence of cardiac dysrhythmias or at baseline rhythm  Description: INTERVENTIONS:  - Continuous cardiac monitoring, vital signs, obtain 12 lead EKG if ordered  - Administer antiarrhythmic and heart rate control medications as ordered  - Monitor electrolytes and administer replacement therapy as ordered  Outcome: Progressing

## 2021-07-28 NOTE — ASSESSMENT & PLAN NOTE
Lab Results   Component Value Date    HGBA1C 7 0 (H) 06/15/2021       Recent Labs     07/27/21  1624 07/27/21  2127 07/28/21  0750 07/28/21  1124   POCGLU 178* 134 143* 209*       Blood Sugar Average: Last 72 hrs:  (P) 156 625     Placed patient on insulin sliding scale  Monitor fingersticks

## 2021-07-28 NOTE — ASSESSMENT & PLAN NOTE
Patient is currently on Coumadin 5 mg daily for permanent Afib  INR today currently at 3 7  Resolved resume Coumadin

## 2021-07-28 NOTE — DISCHARGE INSTR - OTHER ORDERS
Skin care plans:  1-Hydraguard to bilateral sacrum, buttock and right  heel BID and PRN  2-Elevate heels to offload pressure  3-Ehob cushion in chair when out of bed  4-Moisturize skin daily with skin nourishing cream   5-Turn/reposition q2h or when medically stable for pressure re-distribution on skin  6- Apply 3M skin barrier then Allevyn foam to stage one pressure injury of  left heel  Elevate to offload pressure  7- Cleanse plantar aspect of left and right great toe diabetic foot ulcers with NSS  Apply small piece of Maxorb AG to wound bed, Cover with a 2x2 dsd and wrap with edward , Change every other day and prn drainage

## 2021-07-28 NOTE — WOUND OSTOMY CARE
Consult Note - Wound   Maria E Means 77 y o  female MRN: [id number]  Unit/Bed#: -01 Encounter: [id number]        History and Present Illness:Pt seen for intial wound consult  Admitted with Chf Pt has a h/o type 2 DM Acute kidney injury   Seated oob in recliner  Spouse visiting  Alert and oriented x4  Pt states she sees a podiatrist every few months for diabetic foot ulcers  She states she has been seen in a local wound clinic in the past for an ulcer on her left heel  which now presents as a Stage one pressure injury of left heel POA  Left great toe has chronic diabetic foot ulcer, pt is treating at home with assistance of spouse  Assessment Findings:   1) Left heel stage one POA, non blanchable erythema  No induration or maceration  Heel is slightly boggy  #m protectant barrier applied then Allevyn heel foam for protectant, heels offloaded  2) Left great toe has chronic diabetic foot ulcer on plantar aspect  Wound is circular in shape with periwound dry hard and callous  Wound bed is 100% red with scant dry bloody drainage on dressing  3) Plantar aspect of right great toe presents as chronic diabetic foot ulcer present on admission  Wound bed is 100% red moist  periwound is dry hard yellow tan callous  Both toes wounds cleansed with NSS> Maxorb AG applied to wound bed, 2x2 gauze placed over followed by edward wrap  Heels offloaded  Skin care plans:  1-Hydraguard to bilateral sacrum, buttock and right  heel BID and PRN  2-Elevate heels to offload pressure  3-Ehob cushion in chair when out of bed  4-Moisturize skin daily with skin nourishing cream   5-Turn/reposition q2h or when medically stable for pressure re-distribution on skin  6- Apply 3M skin barrier then Allevyn foam to stage one pressure injury of  left heel  Elevate to offload pressure  7- Cleanse plantar aspect of left and right great toe diabetic foot ulcers with NSS   Apply small piece of Maxorb AG to wound bed, Cover with a 2x2 dsd and wrap with edward , Change every other day and prn drainage  Wounds:  Wound 10/29/20 Mouth (Active)       Wound 06/15/21 Pressure Injury Heel Left (Active)   Wound Image   07/28/21 1444   Wound Description Dry; Intact;Fragile; Non-blanchable erythema;Pink 07/28/21 1444   Pressure Injury Stage 1 07/28/21 1444   Chantell-wound Assessment Clean;Dry; Intact 07/28/21 1444   Wound Width (cm) 1 cm 07/28/21 1444   Drainage Amount None 07/28/21 1444   Treatments Cleansed;Site care;Elevated 07/28/21 1444   Dressing Foam, Silicon (eg  Allevyn, etc); Protective barrier 07/28/21 1444   Dressing Changed New 07/28/21 1444   Patient Tolerance Tolerated well 07/28/21 1444   Dressing Status Clean;Dry; Intact 07/28/21 1444       Wound 07/26/21 Toe (Comment  which one) Left;Posterior (Active)   Wound Image   07/28/21 1440   Wound Description Intact; Beefy red;Bleeding;Dark edges;Fragile; Yellow 07/28/21 1440   Chantell-wound Assessment Callus;Fragile;Scaly 07/28/21 1440   Wound Length (cm) 0 5 cm 07/28/21 1440   Wound Width (cm) 0 5 cm 07/28/21 1440   Wound Surface Area (cm^2) 0 25 cm^2 07/28/21 1440   Drainage Amount Scant 07/28/21 1440   Drainage Description Bloody 07/28/21 1440   Non-staged Wound Description Full thickness 07/28/21 1440   Treatments Cleansed;Site care;Elevated 07/28/21 1440   Dressing Calcium Alginate with Silver;Dry dressing 07/28/21 1440   Wound packed? No 07/26/21 1700   Dressing Changed New 07/28/21 1440   Patient Tolerance Tolerated well 07/28/21 1440   Dressing Status Clean;Dry; Intact 07/28/21 1440       Wound 07/26/21 Toe (Comment  which one) Anterior;Right (Active)   Wound Image   07/26/21 1600   Wound Description DANNI 07/28/21 0156   Chantell-wound Assessment Clean;Dry; Intact 07/26/21 2128   Drainage Amount Small 07/26/21 1700   Drainage Description Serous 07/26/21 1700   Treatments Site care 07/26/21 1700   Dressing Dry dressing 07/26/21 2128   Dressing Changed Changed 07/26/21 2128   Patient Tolerance Tolerated well 07/26/21 2128   Dressing Status Clean;Dry; Intact 07/28/21 0156       Wound 07/28/21 Toe (Comment  which one) Right;Posterior (Active)   Wound Image   07/28/21 1446   Wound Description Intact; Beefy red;Dark edges;Fragile 07/28/21 1446   Chantell-wound Assessment Intact;Callus 07/28/21 1446   Wound Length (cm) 0 8 cm 07/28/21 1446   Drainage Amount Scant 07/28/21 1446   Drainage Description Bloody 07/28/21 1446   Non-staged Wound Description Full thickness 07/28/21 1446   Treatments Cleansed;Site care;Elevated 07/28/21 1446   Dressing Calcium Alginate with Silver;Dry dressing 07/28/21 1446   Dressing Changed New 07/28/21 1446   Patient Tolerance Tolerated well 07/28/21 1446   Dressing Status Clean;Dry; Intact 07/28/21 1446   Call or tigertext with any questions  Wound Care will continue to follow    Yomaira Luis RN

## 2021-07-28 NOTE — PHYSICAL THERAPY NOTE
PHYSICAL THERAPY EVALUATION  NAME:  Nicky Colin  DATE: 07/28/21    AGE:   77 y o  Mrn:   [id number]  ADMIT DX:  Bradycardia [R00 1]  ALMA (acute kidney injury) ([facility] ) [N17 9]  Generalized weakness [R53 1]  Nausea and vomiting, intractability of vomiting not specified, unspecified vomiting type [R11 2]    Past Medical History:   Diagnosis Date    Arthritis     CHF (congestive heart failure) ([facility] )     Diabetes mellitus ([place] [address] )     Disease of thyroid gland     Hypertension     Pacemaker     Renal disorder      Length Of Stay: 2  Performed at least 2 patient identifiers during session: Name and Birthday  PHYSICAL THERAPY EVALUATION :      07/28/21 1033   PT Last Visit   PT Visit Date 07/28/21   Note Type   Note type Evaluation   Pain Assessment   Pain Assessment Tool 0-10   Pain Score No Pain   Home Living   Type of Home House  (no FERNANDO)   Home Layout One level;Performs ADLs on one level; Able to live on main level with bedroom/bathroom   Bathroom Shower/Tub Tub/shower unit   [employer] Raised   Bathroom Equipment Tub transfer bench;Grab bars in shower;Grab bars around toilet   [address] [facility]; Wheelchair-manual   Additional Comments Reports living in a [facility] with no FERNANDO and uses RW PTA  Has wheelchair, but wasn't using it  Prior Function   Level of [place] Independent with ADLs and functional mobility   Lives With Spouse   Receives Help From Family   ADL Assistance Independent   IADLs Needs assistance  ("my and my  do it together")   Falls in the last 6 months 0   Vocational Retired   Comments Reports being independent PTA with RW  independent for ADLs, but has assistance for IADLs  Restrictions/Precautions   Braces or Orthoses MAFO;Other (Comment)  (B LEs, not present here)   Other Precautions Chair Alarm; Bed Alarm; Fall Risk;Telemetry  (O2 at 1 lpm initially, trialed RA)   Cognition   Orientation Level Oriented X4   Following Commands Follows one step commands without difficulty   RLE Assessment   RLE Assessment WFL  (except )   RLE Overall AROM   R Ankle Dorsiflexion no AROM   Strength RLE   RLE Overall Strength 3+/5   LLE Overall AROM   L Knee Extension -30 degrees AROM   L Ankle Dorsiflexion no AROM   Light Touch   RLE Light Touch Impaired   RLE Light Touch Comments absent B mid legs and down, diminished B mid leg and up   LLE Light Touch Impaired   LLE Light Touch Comments absent B mid legs and down, diminished B mid leg and up   Bed Mobility   Supine to Sit 5  Supervision   Additional items Increased time required;Verbal cues   Additional Comments HOB flat without bedrail  increased time wiht min verbal cues for technique  Transfers   Sit to Stand 5  Supervision   Additional items Increased time required;Verbal cues   Stand to Sit 5  Supervision   Additional items Increased time required;Verbal cues   Stand pivot   (stand by assistance)   Additional items Increased time required;Verbal cues   Additional Comments use of RW  sit<>stand with supervision with min cues for hand placement  increased time to complete  spt with RW with stand by assistance for balance and safety  steppage pattern to complete  Ambulation/Elevation   Gait pattern Wide LEIF;Steppage; Excessively slow; Short stride   Gait Assistance   (stand by assistance)   Additional items Assist x 1;Verbal cues   Assistive Device Rolling walker   Distance 14'x1 with RW with stand by assistance with wide LEIF, slow antonella, decreased step length, steppage pattern for LE foot clearance due to B footdrop  pt without MAFOs at this time  Balance   Static Sitting Good   Dynamic Sitting Fair +   Static Standing Fair   Dynamic Standing Fair -   Ambulatory Fair -   Endurance Deficit   Endurance Deficit Yes   Endurance Deficit Description SpO2 at rest on 1 lpm 96-97%  with mobility decreased to 90-91%      Activity Tolerance   Activity Tolerance Patient limited by fatigue   Medical Staff Made Aware Braxton AYERS   Nurse Made Aware Nelida ALTMAN Assessment   Prognosis Good   Problem List Decreased strength;Decreased endurance; Impaired balance;Decreased range of motion;Decreased mobility; Decreased safety awareness; Obesity; Decreased skin integrity   Barriers to Discharge None   Barriers to Discharge Comments pt has support from family prn   Goals   Patient Goals "Go home"   STG Expiration Date 08/07/21   PT Treatment Day 1   Plan   Treatment/Interventions Functional transfer training;LE strengthening/ROM; Therapeutic exercise; Endurance training;Patient/family training;Equipment eval/education; Bed mobility;Gait training; Compensatory technique education;Spoke to nursing;Spoke to case management;OT   PT Frequency Other (Comment)  (3-5x/week)   Recommendation   PT Discharge Recommendation Home with home health rehabilitation   Equipment Recommended   (walker-pt has)   PT - OK to Discharge   (when medically cleared)   Additional Comments pt sitting in recliner chair at end of session with all needs wihtin reach and posey alarm on   AM-PAC Basic Mobility Inpatient   Turning in Bed Without Bedrails 3   Lying on Back to Sitting on Edge of Flat Bed 3   Moving Bed to Chair 3   Standing Up From Chair 3   Walk in Room 3   Climb 3-5 Stairs 2   Basic Mobility Inpatient Raw Score 17   Basic Mobility Standardized Score 39 67     (Please find full objective findings from PT assessment regarding body systems outlined above)  Assessment: Pt is a 77 y o  female seen for PT evaluation s/p admission to [address] [place] [address] on 7/26/2021 with Acute on chronic diastolic CHF (congestive heart failure) ([facility] )  Order placed for PT services    Upon evaluation: Pt is presenting with impaired functional mobility due to pain, decreased strength, decreased ROM, decreased endurance, impaired balance, gait deviations, altered sensation, decreased safety awareness, fall risk and impaired skin integrity requiring supervision assistance for bed mobility, supervision to stand by assistance for transfers and stand by assistance for ambulation with RW  Pt's clinical presentation is currently unpredictable given the functional mobility deficits above, especially weakness, decreased ROM, decreased skin integrity, decreased endurance, gait deviations, decreased activity tolerance, decreased functional mobility tolerance and decreased safety awareness, coupled with fall risks as indicated by -PAC 6-Clicks: 86/85 as well as impaired balance, polypharmacy, decreased safety awareness and limited sensation/neuropathy and combined with medical complications of abnormal renal lab values, abnormal H&H, abnormal blood sugars and multiple readmissions, ALMA, UTI, supratherapeutic INR  Pt's PMHx and comorbidities that may affect physical performance and progress include: CHF, DM and HTN, hypothyroidism, diabetic nephropathy, Afib, h/o SSS s/p pacemaker, arthritis  Personal factors affecting pt at time of IE include: inability to perform ADLs, inability to navigate level surfaces without external assistance and inability to ambulate household distances  Pt will benefit from continued skilled PT services to address deficits as defined above and to maximize level of functional mobility to facilitate return toward PLOF and improved QOL  From PT/mobility standpoint, recommendation at time of d/c would be Home PT, home with family support and with walker pending progress in order to reduce fall risk and maximize pt's functional independence and consistency with mobility in order to facilitate return to PLOF  Recommend ther ex next 1-2 sessions  The [facility]'s [facility] Basic Mobility Inpatient Short Form Raw Score is 17, Standardized Score is 39 67  A standardized score less than 42 9 suggests the patient may benefit from discharge to post-acute rehabilitation services  Please also refer to the recommendation of the Physical Therapist for safe discharge planning  Anticipate progression of mobility   Has support form family  Goals: Pt will: Perform bed mobility tasks with modified I to reposition in bed and prepare for transfers  Pt will perform transfers with modified I to decrease burden of care and decrease risk for falls and prepare for ambulation  Pt will ambulate with RW for >/= 100' with  modified I  to decrease burden of care, decrease risk for falls and improve activity tolerance and to access home environment  Pt will participate in objective balance assessment to determine baseline fall risk  Pt will increase B LE strength >/= 1/2 MMT grade to facilitate functional mobility  Jaren Allison PT,DPT     PHYSICAL THERAPY TREATMENT NOTE  Time In:1045  Time [id number]:[id number]  Total Time: 5'      S:  "I haven't really walked since Sunday "  O:   Use of RW  Sit<>stand with RW with supervision  Spt with RW with supervision  Min cues for turning completely prior to sitting  Dynamic standing balance reaching outside LEIF anteriorly with stand by assistance    Ambulated 50'x1 with RW with supervision with slow antonella, steppage pattern, decreased step length  Pt aware of needing to increase hip flexion for foot clearance  SpO2 90% on room air  Increased to >93% within 2' sitting rest break  A:  Pt requires increased time to complete mobility  She requires min cues for technique  She is was able to complete transfers and ambulation with decreased assistance and ambulate increased distance  She was able to tolerate mobility on room air with desaturation to as low as 90%  She is limited by decreased strength, balance, endurance  She will continue to benefit from PT services to maximize LOF  P:  Recommend LE strengthening, dynamic standing balance, endurance training, gait training and transfer training with RW      Jaren Allison PT, DPT

## 2021-07-28 NOTE — ASSESSMENT & PLAN NOTE
BP at this time [phone] systolic  Continue to monitor BP  Started patient on low-dose amlodipine 2 5 mg daily

## 2021-07-28 NOTE — ASSESSMENT & PLAN NOTE
Present on admission met 2 seizures criteria with the respiratory rate of greater than 20 and temperature less than 96 8 secondary to acute cystitis no fluids given secondary to volume overload  Sepsis has resolved    Continue ceftriaxone urine culture is showing mixed contaminants but she had significant pyuria with chills low-grade fever will continue antibiotics for total of 7 days on discharge switched to Keflex

## 2021-07-28 NOTE — PROGRESS NOTES
[address]  Progress Note - Filiberto Merritt 1945, 77 y o  female MRN: [id number]  Unit/Bed#: -01 Encounter: [id number]  Primary Care Provider: Karoline Combs MD   Date and time admitted to hospital: 7/26/2021  8:17 AM    Sick sinus syndrome [facility])  Assessment & Plan  Patient with sick sinus syndrome and bradycardia with pacemaker  Bradycardia has resolved rate has been adjusted to 70  Monitor on tele for now  As per Cardiology, patient "needs eventual discussion with EP to evaluate for lead revision, she plans to go to [facility] in the future, referral already placed "    Urinary tract infection  Assessment & Plan  Urine culture is showing mixed contaminant for significant leukocytosis with pyuria had some chills some low-grade fever  Will treat with 7 days continue Rocephin and discharge placed on Keflex  Will start patient on Ceftriaxone (7/26-_)  F/u UCx and BCx      Sepsis ([facility] [address] )  Assessment & Plan  Present on admission met 2 seizures criteria with the respiratory rate of greater than 20 and temperature less than 96 8 secondary to acute cystitis no fluids given secondary to volume overload  Sepsis has resolved    Continue ceftriaxone urine culture is showing mixed contaminants but she had significant pyuria with chills low-grade fever will continue antibiotics for total of 7 days on discharge switched to Keflex    Acute respiratory failure with hypoxia (HCC)  Assessment & Plan  · Patient desaturating to the low 80s on RA and very short of breath at rest  Currently requiring 4L of oxygen supplementation via nasal cannula  · secondary to Acute on chronic congestive heart failure (see rest of plan under Acute on chronic CHF)  Titrate and wean off oxygen, patient not on any oxygen at baseline  Respiratory protocol  Resolved with diuresis she was 97% on room air today      Supratherapeutic INR  Assessment & Plan  Patient is currently on Coumadin 5 mg daily for permanent Afib  INR today currently at 3 7  Resolved resume Coumadin    Acute kidney injury [facility])  Assessment & Plan  Patient presenting with creatinine of 2 04 with baseline creatinine of around 1 3-1 5 out of hospital setting down to almost baseline patient has CKD stage 3 after diuresis  Ultrasound of the kidney and bladder negative for acute DVT allergy of any Ishmael I  Continue to treat acute cystitis  Essential hypertension  Assessment & Plan  BP at this time [phone] systolic  Continue to monitor BP  Started patient on low-dose amlodipine 2 5 mg daily      Acquired hypothyroidism  Assessment & Plan  TSH 1 961  Continue levothyroxine    Type 2 diabetes mellitus with diabetic nephropathy [facility])  Assessment & Plan  Lab Results   Component Value Date    HGBA1C 7 0 (H) 06/15/2021       Recent Labs     07/27/21  1624 07/27/21  2127 07/28/21  0750 07/28/21  1124   POCGLU 178* 134 143* 209*       Blood Sugar Average: Last 72 hrs:  (P) 156 625     Placed patient on insulin sliding scale  Monitor fingersticks    Atrial fibrillation (HCC)  Assessment & Plan  Patient currently rate-controlled   Anticoagulated with Coumadin 5 mg daily - INR therapeutic will place on the Coumadin 5      * Acute on chronic diastolic CHF (congestive heart failure) (HCC)  Assessment & Plan  Wt Readings from Last 3 Encounters:   07/28/21 90 3 kg (199 lb)   06/18/21 94 6 kg (208 lb 9 6 oz)   05/27/21 93 kg (205 lb)     · Patient presenting with volume overload and significant crackles on exam  BNP elevated to 2,165, pitting edema bilaterally  · CT chest/A/P showing pleural effusion and mild interstitial pulm edema    · Cardiology consulted on the case and recs appreciated  Strict I&Os  Daily weights with standing scale (weights above don't seem accurate)  · Echocardiogram: 04/05/2021: Normal ventricle size, wall thickness with preserved systolic function  Ejection fraction 60%  Dilated right ventricle  Leads in the right heart  Dilated left and right atrium   Trace mitral regurgitation  Moderate to severe tricuspid regurgitation with estimated right ventricular systolic pressure of 72 mm of mercury  · Patient is diuresing very well she dropped from 209 lb to [phone] out no evidence of leg edema she does have some crackles will evaluate with a chest x-ray PA and lateral as she only had some mild pulmonary edema on admission  Continue diuresis with IV for another day as discussed with Cardiology as well she is not responding to outpatient Lasix 80 mg daily will switch her to torsemide 60 mg tomorrow will recheck her BMP tomorrow as well she is satting 97% on room air            VTE Pharmacologic Prophylaxis:   Moderate Risk (Score 3-4) - Pharmacological DVT Prophylaxis Ordered: warfarin (Coumadin)  Patient Centered Rounds: I performed bedside rounds with nursing staff today  Discussions with Specialists or Other Care Team Provider:  Discussed with Cardiology    Education and Discussions with Family / Patient: Updated  () via phone  Time Spent for Care: 30 minutes  More than 50% of total time spent on counseling and coordination of care as described above  Current Length of Stay: 2 day(s)  Current Patient Status: Inpatient   Certification Statement: The patient will continue to require additional inpatient hospital stay due to Secondary to CHF  Discharge Plan: Anticipate discharge tomorrow to home  Code Status: Level 1 - Full Code    Subjective:   Patient seen and examined denies any chest pain just some fatigue shortness of breath significantly improved    Objective:     Vitals:   Temp (24hrs), Av °F (37 2 °C), Min:98 4 °F (36 9 °C), Max:100 4 °F (38 °C)    Temp:  [98 4 °F (36 9 °C)-100 4 °F (38 °C)] 98 6 °F (37 °C)  HR:  [69-92] 69  Resp:  [18] 18  BP: (143-170)/(62-88) 151/67  SpO2:  [91 %-98 %] 91 %  Body mass index is 36 4 kg/m²  Input and Output Summary (last 24 hours):      Intake/Output Summary (Last 24 hours) at 2021 Silvano filed at 7/28/2021 0556  Gross per 24 hour   Intake 480 ml   Output 3755 ml   Net -3275 ml       Physical Exam:   Physical Exam  Vitals and nursing note reviewed  Constitutional:       General: She is not in acute distress  Appearance: She is well-developed  HENT:      Head: Normocephalic and atraumatic  Eyes:      Conjunctiva/sclera: Conjunctivae normal    Cardiovascular:      Rate and Rhythm: Normal rate and regular rhythm  Heart sounds: No murmur heard  Pulmonary:      Effort: Pulmonary effort is normal  No respiratory distress  Breath sounds: Rales present  Abdominal:      Palpations: Abdomen is soft  Tenderness: There is no abdominal tenderness  Musculoskeletal:         General: No swelling  Cervical back: Neck supple  Skin:     General: Skin is warm and dry  Neurological:      General: No focal deficit present  Mental Status: She is alert and oriented to person, place, and time     Psychiatric:         Mood and Affect: Mood normal          Additional Data:     Labs:  Results from last 7 days   Lab Units 07/28/21  0606   WBC Thousand/uL 9 23   HEMOGLOBIN g/dL 10 7*   HEMATOCRIT % 34 0*   PLATELETS Thousands/uL 171   NEUTROS PCT % 79*   LYMPHS PCT % 8*   MONOS PCT % 10   EOS PCT % 2     Results from last 7 days   Lab Units 07/28/21  0606 07/27/21  0455   SODIUM mmol/L 141 140   POTASSIUM mmol/L 3 9 3 7   CHLORIDE mmol/L 101 104   CO2 mmol/L 30 28   BUN mg/dL 44* 46*   CREATININE mg/dL 1 49* 1 69*   ANION GAP mmol/L 10 8   CALCIUM mg/dL 8 2* 8 2*   ALBUMIN g/dL  --  3 3*   TOTAL BILIRUBIN mg/dL  --  0 84   ALK PHOS U/L  --  98   ALT U/L  --  19   AST U/L  --  14   GLUCOSE RANDOM mg/dL 130 119     Results from last 7 days   Lab Units 07/28/21  0606   INR  2 75*     Results from last 7 days   Lab Units 07/28/21  1124 07/28/21  0750 07/27/21  2127 07/27/21  1624 07/27/21  1051 07/27/21  0753 07/26/21  2059 07/26/21  1603   POC GLUCOSE mg/dl 209* 143* 134 178* 169* 129 141* 150*         Results from last 7 days   Lab Units 07/26/21  0832   LACTIC ACID mmol/L 1 2       Lines/Drains:  Invasive Devices     Peripheral Intravenous Line            Peripheral IV 07/26/21 Right Forearm 2 days          Drain            External Urinary Catheter 1 day                      Imaging: Reviewed radiology reports from this admission including: chest xray and chest CT scan    Recent Cultures (last 7 days):   Results from last 7 days   Lab Units 07/26/21  1107 07/26/21  0832   BLOOD CULTURE   --  No Growth at 24 hrs  No Growth at 24 hrs     URINE CULTURE  50,000-59,000 cfu/ml   --        Last 24 Hours Medication List:   Current Facility-Administered Medications   Medication Dose Route Frequency Provider Last Rate    amLODIPine  2 5 mg Oral Daily [place], [place]      Ascorbic Acid (Vitamin C)  500 mg Oral Daily Ambar Lopez MD      calcium carbonate-vitamin D  1 tablet Oral Daily With Breakfast Ambar Dent MD      cefTRIAXone  1,000 mg Intravenous Q24H Ambar Dent MD 1,000 mg (07/27/21 1655)    docusate sodium  100 mg Oral BID Ambar Lopez MD      famotidine  20 mg Oral Daily Ambar Lopez MD      ferrous sulfate  325 mg Oral Daily Ambar Dent MD      furosemide  60 mg Intravenous BID (diuretic) Geovany Park PA-C      gabapentin  300 mg Oral BID Ambar Dent MD      insulin lispro  1-5 Units Subcutaneous TID [facility] Ambar Lopez MD      levothyroxine  150 mcg Oral Daily Ambar Dent MD      potassium chloride  40 mEq Oral Daily Geovany Park PA-C      warfarin  5 mg Oral Once (warfarin) Tamra Richey MD          Today, Patient Was Seen By: Tamra Richey MD    **Please Note: This note may have been constructed using a voice recognition system  **

## 2021-07-28 NOTE — OCCUPATIONAL THERAPY NOTE
Occupational Therapy Evaluation     Evaluation: [id number]  Treatment: 5210-2632    Patient Name: Janelle Chiang  KRLRJ'[id number] Date: 7/28/2021  Problem List  Principal Problem:    Acute on chronic diastolic CHF (congestive heart failure) (HCC)  Active Problems:    Atrial fibrillation (HCC)    Type 2 diabetes mellitus with diabetic nephropathy (HCC)    Acquired hypothyroidism    Essential hypertension    Acute kidney injury ([address] )    Supratherapeutic INR    Acute respiratory failure with hypoxia (HCC)    Sepsis ([facility] [address] )    Urinary tract infection    Sick sinus syndrome ([facility] [address] )    Past Medical History  Past Medical History:   Diagnosis Date    Arthritis     CHF (congestive heart failure) ([facility] [address] )     Diabetes mellitus ([facility] [address] )     Disease of thyroid gland     Hypertension     Pacemaker     Renal disorder      Past Surgical History  Past Surgical History:   Procedure Laterality Date    CARDIAC PACEMAKER PLACEMENT      CARDIAC PACEMAKER PLACEMENT      CHOLECYSTECTOMY      JOINT REPLACEMENT      bilateral knee replacements    ORIF TIBIA & FIBULA FRACTURES Left 10/29/2020    Procedure: OPEN REDUCTION W/ INTERNAL FIXATION (ORIF) ANKLE;  Surgeon: Gissel Mancera; Location: [facility];  Service: Orthopedics    TONSILLECTOMY      TUBAL LIGATION             07/28/21 1032   Note Type   Note type Evaluation   Restrictions/Precautions   Other Precautions Chair Alarm; Bed Alarm; Fall Risk  (1L at start of session  RA at end of session)   Pain Assessment   Pain Assessment Tool 0-10   Pain Score No Pain   Home Living   Type of [address] [place] [address] One level;Performs ADLs on one level; Able to live on main level with bedroom/bathroom   Bathroom Shower/Tub Tub/shower unit   [employer] Raised   Bathroom Equipment Tub transfer bench;Grab bars in shower;Grab bars around toilet   [address] [facility] [facility]; Wheelchair-manual   Additional Comments Pt reports living in a 1 story home with her  with 0 FERNANDO   Pt ambulates with RW at baseline  Pt has a w/c from the past although reports "I haven't needed it in a while now"   Prior Function   Level of [place] Independent with ADLs and functional mobility   Lives With Spouse   Receives Help From Family   ADL Assistance Independent   IADLs Needs assistance  ("my and my  do it together")   Falls in the last 6 months 0   Comments Pt reports completing ADLs, light IADLs, and functional ambulation @ MOd I with RW  Pt has assistance for IADLs and community mobility from family   ADL   Where Assessed Edge of bed   Eating Assistance 7  Independent   Eating Deficit Setup   Grooming Assistance 5  Supervision/Setup  (standing at sinkside)   Grooming Deficit Setup   UB Dressing Assistance 7  Independent   UB Dressing Deficit Setup   LB Dressing Assistance 4  Minimal Assistance   LB [address]; Requires assistive device for steadying;Verbal cueing;Supervision/safety; Increased time to complete; Don/doff R sock; Don/doff L sock; Thread RLE into pants; Thread LLE into pants;Pull up over hips   Toileting Assistance    (SBA)   Toileting Deficit Verbal cueing;Supervison/safety; Increased time to complete;Grab bar use;Clothing management up;Clothing management down;Perineal hygiene   Additional Comments Pt completing ADL tasks while seated at EOB  UB Dressing tasks @ I after set-up  LB Dressing @ Min A for donning socks (Pt reports having assistance for sock donning at home)  Pt demonstrating ability to koby pants around feet @ SBA with increased time and effort  CM around waist while standing at RW @ SBA with increased time  Please refer to treatment note for performance during toileting adn grooming tasks      Bed Mobility   Supine to Sit 5  Supervision   Additional items Increased time required   Additional Comments HOB flat, no bedrails   Transfers   Sit to Stand 5  Supervision   Additional items Increased time required;Verbal cues   Stand to Sit 5  Supervision   Additional items Increased time required;Verbal cues   Stand pivot   (SBA)   Additional items Increased time required;Verbal cues   Additional Comments Pt ocmpleting functional transfers with use of RW for UB support  S for STS  SBA for SPT with incresed time and vc'ing for safety/tehcnique   Balance   Static Sitting Good   Dynamic Sitting Fair +   Static Standing Fair   Dynamic Standing Fair -   Activity Tolerance   Activity Tolerance Patient limited by fatigue;Patient tolerated treatment well   Medical Staff Made Aware Spoke with PT, Tiffany Winter   Nurse Made Aware Spoke with RN, Jimbo Infante Assessment   RUE Assessment WFL   LUE Assessment   LUE Assessment WFL   Hand Function   Gross Motor Coordination Functional   Fine Motor Coordination Functional   Sensation   Light Touch No apparent deficits   Cognition   Overall Cognitive Status WFL   Arousal/Participation Alert; Responsive; Cooperative   Attention Attends with cues to redirect   Orientation Level Oriented X4   Memory Within functional limits   Following Commands Follows one step commands without difficulty   Assessment   Limitation Decreased ADL status; Decreased UE strength;Decreased Safe judgement during ADL;Decreased endurance;Decreased self-care trans;Decreased high-level ADLs   Prognosis Good   Assessment Pt is a 78 y/o F admitted to [facility] 7/26/2021 d/t experiencing increased SOB and weakness  Dx: acute on chronic diastolic CHF  Pt is a 60 day re-admit  Pt with PMHx impacting performance during functional tasks including: arthritis, CHF, DM, HTN, PACEMAKER, a-fib, hypothyroidism  Pt reports living in a 1 story home with 0 FERNANDO  Pt reports completing ADLs and functional ambulation @ Mod I  Pt has assistance fo heavy IADLs and community mobility  On evaluation, Pt A&Ox4  Pt initially on 1L of O2 at start of session  Pt trialed on RA  Pt briefly dropping to 88% although quickly recovering to 95% with rest  Pt with minimal SOB noted  Pt left of RA at end of session with no further concerns   Pt completing supine>sit @ S  UB Dressing @ I  LB Dressing @ Min A  Pt completing STS @ S and SPT @ SBA with use of RW  Pt BUE ROM and MS WLF  Pt's barriers to d/c include: decrease activity tolerance, decrease standing balance, decrease performance during ADL tasks, decrease UB MS, decrease generalized strength, decrease activity engagement and decrease performance during functional transfers  Pt would benefit from continued acute OT services to address deficits as well as no further OT needs upon d/c from [address] [place] [address]  Plan   Treatment Interventions ADL retraining;Functional transfer training;UE strengthening/ROM; Endurance training;Patient/family training;Equipment evaluation/education; Neuromuscular reeducation; Compensatory technique education;Continued evaluation; Energy conservation; Activityengagement   Goal Expiration Date 08/07/21   OT Treatment Day 1   OT Frequency 2-3x/wk   Additional Treatment Session   Start Time 6064   End Time 1056   Treatment Assessment Pt seen for treatment session #1 this date  Pt alert and agreeable to participate at this time  Pt completing short distance ambulation into restroom @ SBA wiht increased time  Pt with B foot drop noted, Pt reports having braces at home although they are not currently present  Pt then completing toileting tasks @ SBA wit increased time adn UB supported from grab bars PRN  Pt completing CM and pericare with no physical assistance required and Good safety awareness  pt hten standing at sinkside to complete hand hygiene @ S with increased time  Pt returning to recliner chair @ SBA with RW   Pt setaed OOB at end of session with call bell inr each, chair alarm intact and all needs met at this time    Additional Treatment Day 1   Recommendation   OT Discharge Recommendation No rehabilitation needs   AM-PAC Daily Activity Inpatient   Lower Body Dressing 3   Bathing 3   Toileting 3   Upper Body Dressing 4   Grooming 3   Eating 4   Daily Activity Raw Score 20   Daily Activity Standardized Score (Calc for Raw Score >=11) 42 03   AM-PAC Applied Cognition Inpatient   Following a Speech/Presentation 4   Understanding Ordinary Conversation 4   Taking Medications 4   Remembering Where Things Are Placed or Put Away 4   Remembering List of 4-5 Errands 4   Taking Care of Complicated Tasks 4   Applied Cognition Raw Score 24   Applied Cognition Standardized Score 62 21     The patient's raw score on the AM-PAC Daily Activity inpatient short form is 20, standardized score is 42 03, greater than 39 4  Patients at this level are likely to benefit from DC to home  Please refer to the recommendation of the Occupational Therapist for safe DC planning  Pt goals to be met by 8/7/2021    1  Pt will demonstrate ability to complete LB dressing @ Mod i in order to increase safety and independence during meaningful tasks  2  Pt will demonstrate ability to complete toileting tasks including CM and pericare @ Mod i in order to increase safety and independence during meaningful tasks  3  Pt will demonstrate ability to complete EOB, chair, toilet/commode transfers @ Mod I in order to increase performance and participation during functional tasks  4  Pt will demonstrate ability to stand for 6-7 minutes while maintaining G balance with use of RW for UB support PRN  5  Pt will demonstrate ability to tolerate 30-35 minute OT session with no vc'ing for deep breathing or use of energy conservation techniques in order to increase activity tolerance during functional tasks  6  Pt will demonstrate Good carryover of use of energy conservation/compensatory strategies during ADLs and functional tasks in order to increase safety and reduce risk for falls  7  Pt will demonstrate Good attention and participation in continued evaluation of functional ambulation house hold distances in order to assist with safe d/c planning    8  Pt will attend to continued cognitive assessments 100% of the time in order to provide most appropriate d/c recommendations  9  Pt will follow 100% simple 2-step commands and be A&O x4 consistently with environmental cues to increase participation in functional activities  10  Pt will identify 3 areas of interest/hobbies and 1 intervention on how to incorporate into daily life in order to increase interaction with environment and peers as well as increase participation in meaningful tasks  11  Pt will demonstrate 100% carryover of BUE HEP in order to increase BUE MS and increase performance during functional tasks upon d/c home      Kuldeep Sawyer OTR/L

## 2021-07-28 NOTE — PLAN OF CARE
Problem: PHYSICAL THERAPY ADULT  Goal: Performs mobility at highest level of function for planned discharge setting  See evaluation for individualized goals  Description: Treatment/Interventions: Functional transfer training, LE strengthening/ROM, Therapeutic exercise, Endurance training, Patient/family training, Equipment eval/education, Bed mobility, Gait training, Compensatory technique education, Spoke to nursing, Spoke to case management, OT  Equipment Recommended:  (walker-pt has)       See flowsheet documentation for full assessment, interventions and recommendations  4/27/8577 [id number] by Byron Urrutia PT  Note: Prognosis: Good  Problem List: Decreased strength, Decreased endurance, Impaired balance, Decreased range of motion, Decreased mobility, Decreased safety awareness, Obesity, Decreased skin integrity  Pt requires increased time to complete mobility  She requires min cues for technique  She is was able to complete transfers and ambulation with decreased assistance and ambulate increased distance  She was able to tolerate mobility on room air with desaturation to as low as 90%  She is limited by decreased strength, balance, endurance  She will continue to benefit from PT services to maximize LOF  Barriers to Discharge: None  Barriers to Discharge Comments: pt has support from family prn     PT Discharge Recommendation: Home with home health rehabilitation     PT - OK to Discharge:  (when medically cleared)    See flowsheet documentation for full assessment

## 2021-07-28 NOTE — PROGRESS NOTES
Pt reports decreased appetite at this time, was eating 1/2 egg salad sandwich during time of visit  Says decreased for a couple weeks, smaller portions at 2 meals daily  Does report salt shaker avoidance though reported intake of high sodium foods  Provided diet ed for low sodium diet, will add 2 gm Na restriction  HF Nutrition handout and RD contact information provided  Fluid restriction per MD if medically appropriate  Will monitor intake and consider supplement at follow up

## 2021-07-28 NOTE — ASSESSMENT & PLAN NOTE
· Patient desaturating to the low 80s on RA and very short of breath at rest  Currently requiring 4L of oxygen supplementation via nasal cannula  · secondary to Acute on chronic congestive heart failure (see rest of plan under Acute on chronic CHF)  Titrate and wean off oxygen, patient not on any oxygen at baseline  Respiratory protocol  Resolved with diuresis she was 97% on room air today

## 2021-07-28 NOTE — PROGRESS NOTES
Progress Note - Cardiology   Briidiane Godwin 77 y o  female MRN: [id number]  Unit/Bed#: -[place] En[place]: [id number]    Assessment:  Bradycardia   SSS sp PPM medtronic atrial lead programmed off for poor function and sensing  Acute on chronic HFpEF- appearing closer to euvolemic, elevated BNP, edema, orthopnea, weight gain  Persistent Afib on coumadin   pHTN    Plan:  1  Plan for [address] eventually to assess degree pHTN  2  Plan for FU with EP at [facility] to discuss possible lead revision vs upgrade to BiV  3  Maintain I and O, daily standing weights, monitor renal function and electrolytes  4  Continue IV diuresis today plan to switch to torsemide 50 mg po daily starting tomorrow    Subjective/Objective     Subjective: pt reports feeling significantly better  She has no complaints  Urinating frequently  Almost back to baseline    Objective: pt sitting comfortably in hospital chair  Negative 3 5 L since yesterday  Weights inaccurate  Renal function improved  Vitals: /74   Pulse 69   Temp 98 4 °F (36 9 °C)   Resp 18   Ht 5' 2" (1 575 m)   Wt 90 3 kg (199 lb)   SpO2 96%   BMI 36 40 kg/m²   Vitals:    07/27/21 1301 07/28/21 0554   Weight: 50 3 kg (111 lb) 90 3 kg (199 lb)     Orthostatic Blood Pressures      Most Recent Value   Blood Pressure  170/74 filed at 07/28/2021 0369   Patient Position - Orthostatic VS  Lying filed at 07/26/2021 1422            Intake/Output Summary (Last 24 hours) at 7/28/2021 8634  Last data filed at 7/28/2021 0556  Gross per 24 hour   Intake 720 ml   Output 4305 ml   Net -3585 ml       Invasive Devices     Peripheral Intravenous Line            Peripheral IV 07/26/21 Right Forearm 1 day          Drain            External Urinary Catheter 1 day                Physical Exam:   Constitutional:       General: She is in no acute distress  Appearance: She is non toxic  HENT:      Head: Normocephalic and atraumatic  Cardiovascular:      Rate and Rhythm: Regular rhythm        Heart sounds: No murmur heard  No gallop  Pulmonary:      Effort: normal respiratory effort     Breath sounds: rhonchi improved  Musculoskeletal:         General: Swelling is absent   Skin:     General: Skin is warm  Capillary Refill: Capillary refill takes less than 2 seconds  Neurological:      General: No focal deficit present  Mental Status: She is alert and oriented to person, place, and time  Psychiatric:         Mood and Affect: Mood normal      Lab Results:   I have personally reviewed pertinent lab results  CBC with diff:   Results from last 7 days   Lab Units 07/28/21  0606   WBC Thousand/uL 9 23   RBC Million/uL 4 37   HEMOGLOBIN g/dL 10 7*   HEMATOCRIT % 34 0*   MCV fL 78*   MCH pg 24 5*   MCHC g/dL 31 5   RDW % 17 1*   MPV fL 9 9   PLATELETS Thousands/uL 171     CMP:   Results from last 7 days   Lab Units 07/28/21  0606 07/27/21  0455   SODIUM mmol/L 141 140   POTASSIUM mmol/L 3 9 3 7   CHLORIDE mmol/L 101 104   CO2 mmol/L 30 28   BUN mg/dL 44* 46*   CREATININE mg/dL 1 49* 1 69*   CALCIUM mg/dL 8 2* 8 2*   AST U/L  --  14   ALT U/L  --  19   ALK PHOS U/L  --  98   EGFR ml/min/1 73sq m 34 29     Troponin:   0   Lab Value Date/Time    TROPONINI <0 02 07/26/2021 0832    TROPONINI <0 02 06/15/2021 0955    TROPONINI <0 02 10/25/2020 1803    TROPONINI <0 02 10/25/2020 1532    TROPONINI <0 02 10/25/2020 1144     BNP:   Results from last 7 days   Lab Units 07/28/21  0606   POTASSIUM mmol/L 3 9   CHLORIDE mmol/L 101   CO2 mmol/L 30   BUN mg/dL 44*   CREATININE mg/dL 1 49*   CALCIUM mg/dL 8 2*   EGFR ml/min/1 73sq m 34     Coags:   Results from last 7 days   Lab Units 07/28/21  0606   INR  2 75*     TSH:   Results from last 7 days   Lab Units 07/26/21  1253   TSH 3RD GENERATON uIU/mL 1 961     Magnesium:   Results from last 7 days   Lab Units 07/27/21  0455   MAGNESIUM mg/dL 2 4     Imaging: I have personally reviewed pertinent reports         EKG: V pacing

## 2021-07-28 NOTE — ASSESSMENT & PLAN NOTE
Patient with sick sinus syndrome and bradycardia with pacemaker  Bradycardia has resolved rate has been adjusted to 70  Monitor on tele for now  As per Cardiology, patient "needs eventual discussion with EP to evaluate for lead revision, she plans to go to [facility] in the future, referral already placed "

## 2021-07-28 NOTE — PLAN OF CARE
Problem: Potential for Falls  Goal: Patient will remain free of falls  Description: INTERVENTIONS:  - Educate patient/family on patient safety including physical limitations  - Instruct patient to call for assistance with activity   - Consult OT/PT to assist with strengthening/mobility   - Keep Call bell within reach  - Keep bed low and locked with side rails adjusted as appropriate  - Keep care items and personal belongings within reach  - Initiate and maintain comfort rounds  - Make Fall Risk Sign visible to staff  - Apply yellow socks and bracelet for high fall risk patients  - Consider moving patient to room near nurses station  Outcome: Progressing     Problem: CARDIOVASCULAR - ADULT  Goal: Maintains optimal cardiac output and hemodynamic stability  Description: INTERVENTIONS:  - Monitor I/O, vital signs and rhythm  - Monitor for S/S and trends of decreased cardiac output  - Administer and titrate ordered vasoactive medications to optimize hemodynamic stability  - Assess quality of pulses, skin color and temperature  - Assess for signs of decreased coronary artery perfusion  - Instruct patient to report change in severity of symptoms  Outcome: Progressing  Goal: Absence of cardiac dysrhythmias or at baseline rhythm  Description: INTERVENTIONS:  - Continuous cardiac monitoring, vital signs, obtain 12 lead EKG if ordered  - Administer antiarrhythmic and heart rate control medications as ordered  - Monitor electrolytes and administer replacement therapy as ordered  Outcome: Progressing     Problem: RESPIRATORY - ADULT  Goal: Achieves optimal ventilation and oxygenation  Description: INTERVENTIONS:  - Assess for changes in respiratory status  - Assess for changes in mentation and behavior  - Position to facilitate oxygenation and minimize respiratory effort  - Oxygen administered by appropriate delivery if ordered  - Initiate smoking cessation education as indicated  - Encourage broncho-pulmonary hygiene including cough, deep breathe, Incentive Spirometry  - Assess the need for suctioning and aspirate as needed  - Assess and instruct to report SOB or any respiratory difficulty  - Respiratory Therapy support as indicated  Outcome: Progressing     Problem: METABOLIC, FLUID AND ELECTROLYTES - ADULT  Goal: Electrolytes maintained within normal limits  Description: INTERVENTIONS:  - Monitor labs and assess patient for signs and symptoms of electrolyte imbalances  - Administer electrolyte replacement as ordered  - Monitor response to electrolyte replacements, including repeat lab results as appropriate  - Instruct patient on fluid and nutrition as appropriate  Outcome: Progressing  Goal: Fluid balance maintained  Description: INTERVENTIONS:  - Monitor labs   - Monitor I/O and WT  - Instruct patient on fluid and nutrition as appropriate  - Assess for signs & symptoms of volume excess or deficit  Outcome: Progressing  Goal: Glucose maintained within target range  Description: INTERVENTIONS:  - Monitor Blood Glucose as ordered  - Assess for signs and symptoms of hyperglycemia and hypoglycemia  - Administer ordered medications to maintain glucose within target range  - Assess nutritional intake and initiate nutrition service referral as needed  Outcome: Progressing     Problem: SKIN/TISSUE INTEGRITY - ADULT  Goal: Skin Integrity remains intact(Skin Breakdown Prevention)  Description: Assess  -Assess extremities for adequate circulation and sensation     Bed Management    Activity:      Skin Care:  -Avoid use of baby powder, tape, friction and shearing, hot water or constrictive clothing  -Do not massage red bony areas    Next Steps:    Outcome: Progressing  Goal: Incision(s), wounds(s) or drain site(s) healing without S/S of infection  Description: INTERVENTIONS  - Assess and document dressing, incision, wound bed, drain sites and surrounding tissue  - Provide patient and family education  Outcome: Progressing  Goal: Pressure injury heals and does not worsen  Description: Interventions:  - Implement low air loss mattress or specialty surface (Criteria met)  - Apply silicone foam dressing  - Consider nutrition services referral as needed  Outcome: Progressing     Problem: MUSCULOSKELETAL - ADULT  Goal: Maintain or return mobility to safest level of function  Description: INTERVENTIONS:  - Assess patient's ability to carry out ADLs; assess patient's baseline for ADL function and identify physical deficits which impact ability to perform ADLs (bathing, care of mouth/teeth, toileting, grooming, dressing, etc )  - Assess/evaluate cause of self-care deficits   - Assess range of motion  - Assess patient's mobility  - Assess patient's need for assistive devices and provide as appropriate  - Encourage maximum independence but intervene and supervise when necessary  - Involve family in performance of ADLs  - Assess for home care needs following discharge   - Consider OT consult to assist with ADL evaluation and planning for discharge  - Provide patient education as appropriate  Outcome: Progressing  Goal: Maintain proper alignment of affected body part  Description: INTERVENTIONS:  - Support, maintain and protect limb and body alignment  - Provide patient/ family with appropriate education  Outcome: Progressing     Problem: SAFETY ADULT  Goal: Patient will remain free of falls  Description: INTERVENTIONS:  - Educate patient/family on patient safety including physical limitations  - Instruct patient to call for assistance with activity   - Consult OT/PT to assist with strengthening/mobility   - Keep Call bell within reach  - Keep bed low and locked with side rails adjusted as appropriate  - Keep care items and personal belongings within reach  - Initiate and maintain comfort rounds  - Apply yellow socks and bracelet for high fall risk patients  - Consider moving patient to room near nurses station  Outcome: Progressing  Goal: Maintain or return to baseline ADL function  Description: INTERVENTIONS:  -  Assess patient's ability to carry out ADLs; assess patient's baseline for ADL function and identify physical deficits which impact ability to perform ADLs (bathing, care of mouth/teeth, toileting, grooming, dressing, etc )  - Assess/evaluate cause of self-care deficits   - Assess range of motion  - Assess patient's mobility; develop plan if impaired  - Assess patient's need for assistive devices and provide as appropriate  - Encourage maximum independence but intervene and supervise when necessary  - Involve family in performance of ADLs  - Assess for home care needs following discharge   - Consider OT consult to assist with ADL evaluation and planning for discharge  - Provide patient education as appropriate  Outcome: Progressing  Goal: Maintains/Returns to pre admission functional level  Description: INTERVENTIONS:  - Perform BMAT or MOVE assessment daily    - Set and communicate daily mobility goal to care team and patient/family/caregiver     - Collaborate with rehabilitation services on mobility goals if consulted  - Out of bed for toileting  - Record patient progress and toleration of activity level   Outcome: Progressing     Problem: DISCHARGE PLANNING  Goal: Discharge to home or other facility with appropriate resources  Description: INTERVENTIONS:  - Identify barriers to discharge w/patient and caregiver  - Arrange for needed discharge resources and transportation as appropriate  - Identify discharge learning needs (meds, wound care, etc )  - Arrange for interpretive services to assist at discharge as needed  - Refer to Case Management Department for coordinating discharge planning if the patient needs post-hospital services based on physician/advanced practitioner order or complex needs related to functional status, cognitive ability, or social support system  Outcome: Progressing     Problem: Knowledge Deficit  Goal: Patient/family/caregiver demonstrates understanding of disease process, treatment plan, medications, and discharge instructions  Description: Complete learning assessment and assess knowledge base  Interventions:  - Provide teaching at level of understanding  - Provide teaching via preferred learning methods  Outcome: Progressing     Problem: MOBILITY - ADULT  Goal: Maintain or return to baseline ADL function  Description: INTERVENTIONS:  -  Assess patient's ability to carry out ADLs; assess patient's baseline for ADL function and identify physical deficits which impact ability to perform ADLs (bathing, care of mouth/teeth, toileting, grooming, dressing, etc )  - Assess/evaluate cause of self-care deficits   - Assess range of motion  - Assess patient's mobility; develop plan if impaired  - Assess patient's need for assistive devices and provide as appropriate  - Encourage maximum independence but intervene and supervise when necessary  - Involve family in performance of ADLs  - Assess for home care needs following discharge   - Consider OT consult to assist with ADL evaluation and planning for discharge  - Provide patient education as appropriate  Outcome: Progressing  Goal: Maintains/Returns to pre admission functional level  Description: INTERVENTIONS:  - Perform BMAT or MOVE assessment daily    - Set and communicate daily mobility goal to care team and patient/family/caregiver     - Collaborate with rehabilitation services on mobility goals if consulted  - Out of bed for toileting  - Record patient progress and toleration of activity level   Outcome: Progressing     Problem: Prexisting or High Potential for Compromised Skin Integrity  Goal: Skin integrity is maintained or improved  Description: INTERVENTIONS:  - Identify patients at risk for skin breakdown  - Assess and monitor skin integrity  - Assess and monitor nutrition and hydration status  - Monitor labs   - Assess for incontinence   - Turn and reposition patient  - Assist with mobility/ambulation  - Relieve pressure over bony prominences  - Avoid friction and shearing  - Provide appropriate hygiene as needed including keeping skin clean and dry  - Evaluate need for skin moisturizer/barrier cream  - Collaborate with interdisciplinary team   - Patient/family teaching  - Consider wound care consult   Outcome: Progressing     Problem: Nutrition/Hydration-ADULT  Goal: Nutrient/Hydration intake appropriate for improving, restoring or maintaining nutritional needs  Description: Monitor and assess patient's nutrition/hydration status for malnutrition  Collaborate with interdisciplinary team and initiate plan and interventions as ordered  Monitor patient's weight and dietary intake as ordered or per policy  Utilize nutrition screening tool and intervene as necessary  Determine patient's food preferences and provide high-protein, high-caloric foods as appropriate       INTERVENTIONS:  - Monitor oral intake, urinary output, labs, and treatment plans  - Assess nutrition and hydration status and recommend course of action  - Evaluate amount of meals eaten  - Assist patient with eating if necessary   - Allow adequate time for meals  - Recommend/ encourage appropriate diets, oral nutritional supplements, and vitamin/mineral supplements  - Order, calculate, and assess calorie counts as needed  - Recommend, monitor, and adjust tube feedings and TPN/PPN based on assessed needs  - Assess need for intravenous fluids  - Provide specific nutrition/hydration education as appropriate  - Include patient/family/caregiver in decisions related to nutrition  Outcome: Progressing

## 2021-07-28 NOTE — PLAN OF CARE
Problem: OCCUPATIONAL THERAPY ADULT  Goal: Performs self-care activities at highest level of function for planned discharge setting  See evaluation for individualized goals  Description: Treatment Interventions: ADL retraining, Functional transfer training, UE strengthening/ROM, Endurance training, Patient/family training, Equipment evaluation/education, Neuromuscular reeducation, Compensatory technique education, Continued evaluation, Energy conservation, Activityengagement          See flowsheet documentation for full assessment, interventions and recommendations  Note: Limitation: Decreased ADL status, Decreased UE strength, Decreased Safe judgement during ADL, Decreased endurance, Decreased self-care trans, Decreased high-level ADLs  Prognosis: Good  Assessment: Pt is a 78 y/o F admitted to [address] [place] [address] 7/26/2021 d/t experiencing increased SOB and weakness  Dx: acute on chronic diastolic CHF  Pt is a 60 day re-admit  Pt with PMHx impacting performance during functional tasks including: arthritis, CHF, DM, HTN, PACEMAKER, a-fib, hypothyroidism  Pt reports living in a 1 story home with 0 FERNANDO  Pt reports completing ADLs and functional ambulation @ Mod I  Pt has assistance fo heavy IADLs and community mobility  On evaluation, Pt A&Ox4  Pt initially on 1L of O2 at start of session  Pt trialed on RA  Pt briefly dropping to 88% although quickly recovering to 95% with rest  Pt with minimal SOB noted  Pt left of RA at end of session with no further concerns  Pt completing supine>sit @ S  UB Dressing @ I  LB Dressing @ Min A  Pt completing STS @ S and SPT @ SBA with use of RW  Pt BUE ROM and MS WLF  Pt's barriers to d/c include: decrease activity tolerance, decrease standing balance, decrease performance during ADL tasks, decrease UB MS, decrease generalized strength, decrease activity engagement and decrease performance during functional transfers   Pt would benefit from continued acute OT services to address deficits as well as no further OT needs upon d/c from [address] [place] [address]       OT Discharge Recommendation: No rehabilitation needs

## 2021-07-28 NOTE — ASSESSMENT & PLAN NOTE
Patient currently rate-controlled   Anticoagulated with Coumadin 5 mg daily - INR therapeutic will place on the Coumadin 5

## 2021-07-28 NOTE — ASSESSMENT & PLAN NOTE
Wt Readings from Last 3 Encounters:   07/28/21 90 3 kg (199 lb)   06/18/21 94 6 kg (208 lb 9 6 oz)   05/27/21 93 kg (205 lb)     · Patient presenting with volume overload and significant crackles on exam  BNP elevated to 2,165, pitting edema bilaterally  · CT chest/A/P showing pleural effusion and mild interstitial pulm edema    · Cardiology consulted on the case and recs appreciated  Strict I&Os  Daily weights with standing scale (weights above don't seem accurate)  · Echocardiogram: 04/05/2021: Normal ventricle size, wall thickness with preserved systolic function  Ejection fraction 60%  Dilated right ventricle  Leads in the right heart  Dilated left and right atrium  Trace mitral regurgitation  Moderate to severe tricuspid regurgitation with estimated right ventricular systolic pressure of 72 mm of mercury  · Patient is diuresing very well she dropped from 209 lb to [phone] out no evidence of leg edema she does have some crackles will evaluate with a chest x-ray PA and lateral as she only had some mild pulmonary edema on admission    Continue diuresis with IV for another day as discussed with Cardiology as well she is not responding to outpatient Lasix 80 mg daily will switch her to torsemide 60 mg tomorrow will recheck her BMP tomorrow as well she is satting 97% on room air

## 2021-07-28 NOTE — ASSESSMENT & PLAN NOTE
Urine culture is showing mixed contaminant for significant leukocytosis with pyuria had some chills some low-grade fever    Will treat with 7 days continue Rocephin and discharge placed on Keflex  Will start patient on Ceftriaxone (7/26-_)  F/u UCx and BCx

## 2021-07-29 VITALS
OXYGEN SATURATION: 93 % | WEIGHT: 196.4 LBS | SYSTOLIC BLOOD PRESSURE: 146 MMHG | HEART RATE: 87 BPM | TEMPERATURE: 97.8 F | HEIGHT: 62 IN | DIASTOLIC BLOOD PRESSURE: 71 MMHG | BODY MASS INDEX: 36.14 KG/M2 | RESPIRATION RATE: 17 BRPM

## 2021-07-29 LAB
ANION GAP SERPL CALCULATED.3IONS-SCNC: 8 MMOL/L (ref 4–13)
BUN SERPL-MCNC: 48 MG/DL (ref 5–25)
CALCIUM SERPL-MCNC: 8.6 MG/DL (ref 8.3–10.1)
CHLORIDE SERPL-SCNC: 102 MMOL/L (ref 100–108)
CO2 SERPL-SCNC: 32 MMOL/L (ref 21–32)
CREAT SERPL-MCNC: 1.51 MG/DL (ref 0.6–1.3)
GFR SERPL CREATININE-BSD FRML MDRD: 34 ML/MIN/1.73SQ M
GLUCOSE SERPL-MCNC: 130 MG/DL (ref 65–140)
GLUCOSE SERPL-MCNC: 132 MG/DL (ref 65–140)
GLUCOSE SERPL-MCNC: 190 MG/DL (ref 65–140)
INR PPP: 2.09 (ref 0.84–1.19)
POTASSIUM SERPL-SCNC: 4 MMOL/L (ref 3.5–5.3)
PROTHROMBIN TIME: 23 SECONDS (ref 11.6–14.5)
SODIUM SERPL-SCNC: 142 MMOL/L (ref 136–145)

## 2021-07-29 PROCEDURE — 80048 BASIC METABOLIC PNL TOTAL CA: CPT | Performed by: FAMILY MEDICINE

## 2021-07-29 PROCEDURE — 82948 REAGENT STRIP/BLOOD GLUCOSE: CPT

## 2021-07-29 PROCEDURE — 99239 HOSP IP/OBS DSCHRG MGMT >30: CPT | Performed by: FAMILY MEDICINE

## 2021-07-29 PROCEDURE — 85610 PROTHROMBIN TIME: CPT | Performed by: FAMILY MEDICINE

## 2021-07-29 RX ORDER — TORSEMIDE 100 MG/1
50 TABLET ORAL DAILY
Qty: 15 TABLET | Refills: 0 | Status: SHIPPED | OUTPATIENT
Start: 2021-07-29 | End: 2022-06-07

## 2021-07-29 RX ORDER — CEPHALEXIN 500 MG/1
500 CAPSULE ORAL EVERY 8 HOURS SCHEDULED
Qty: 9 CAPSULE | Refills: 0 | Status: SHIPPED | OUTPATIENT
Start: 2021-07-29 | End: 2021-07-31 | Stop reason: HOSPADM

## 2021-07-29 RX ORDER — AMLODIPINE BESYLATE 2.5 MG/1
2.5 TABLET ORAL DAILY
Qty: 30 TABLET | Refills: 0 | Status: SHIPPED | OUTPATIENT
Start: 2021-07-29

## 2021-07-29 RX ADMIN — AMLODIPINE BESYLATE 2.5 MG: 2.5 TABLET ORAL at 07:42

## 2021-07-29 RX ADMIN — INSULIN LISPRO 1 UNITS: 100 INJECTION, SOLUTION INTRAVENOUS; SUBCUTANEOUS at 12:01

## 2021-07-29 RX ADMIN — FAMOTIDINE 20 MG: 20 TABLET ORAL at 07:42

## 2021-07-29 RX ADMIN — GABAPENTIN 300 MG: 300 CAPSULE ORAL at 07:44

## 2021-07-29 RX ADMIN — DOCUSATE SODIUM 100 MG: 100 CAPSULE, LIQUID FILLED ORAL at 07:45

## 2021-07-29 RX ADMIN — FUROSEMIDE 60 MG: 10 INJECTION, SOLUTION INTRAMUSCULAR; INTRAVENOUS at 07:45

## 2021-07-29 RX ADMIN — OXYCODONE HYDROCHLORIDE AND ACETAMINOPHEN 500 MG: 500 TABLET ORAL at 07:44

## 2021-07-29 RX ADMIN — Medication 1 TABLET: at 07:44

## 2021-07-29 RX ADMIN — POTASSIUM CHLORIDE 40 MEQ: 1500 TABLET, EXTENDED RELEASE ORAL at 07:41

## 2021-07-29 RX ADMIN — LEVOTHYROXINE SODIUM 150 MCG: 150 TABLET ORAL at 07:41

## 2021-07-29 RX ADMIN — FERROUS SULFATE TAB 325 MG (65 MG ELEMENTAL FE) 325 MG: 325 (65 FE) TAB at 07:42

## 2021-07-29 NOTE — ASSESSMENT & PLAN NOTE
Wt Readings from Last 3 Encounters:   07/29/21 89 1 kg (196 lb 6 4 oz)   06/18/21 94 6 kg (208 lb 9 6 oz)   05/27/21 93 kg (205 lb)     · Patient presenting with volume overload and significant crackles on exam  BNP elevated to 2,165, pitting edema bilaterally  · CT chest/A/P showing pleural effusion and mild interstitial pulm edema    · Cardiology consulted on the case and recs appreciated  Strict I&Os  Daily weights with standing scale (weights above don't seem accurate)  · Echocardiogram: 04/05/2021: Normal ventricle size, wall thickness with preserved systolic function  Ejection fraction 60%  Dilated right ventricle  Leads in the right heart  Dilated left and right atrium  Trace mitral regurgitation  Moderate to severe tricuspid regurgitation with estimated right ventricular systolic pressure of 72 mm of mercury  Chest x-ray checked on 07/28 she still had some vascular congestion but compared on admission significant improvement today after losing more weight lungs are clear no edema she is on room air satting well without any shortness of breath  Total weight loss from 209-196 lb  Discussed with Cardiology as well patient is going to follow-up with them for the right heart catheterization to evaluate her pulmonary hypertension she will be discharged on torsemide 50 mg daily she is already on supplementation of potassium  Continue 2 L of fluid restriction 2 g sodium diet

## 2021-07-29 NOTE — NURSING NOTE
Reviewed discharge instructions, med rec, follow up appointment, information printed and provided to patient on fluid restriction, coumadin, and heart failure verbally understood

## 2021-07-29 NOTE — ASSESSMENT & PLAN NOTE
Patient currently rate-controlled   Patient INR is 2 0 she just restarted her 5 mg on 07/28  Will continue patient's regimen at 5 mg Sunday Wednesday Friday and 10 mg on Tuesday Thursday Saturday    Will check an INR as patient gets and done at home somebody comes on Saturday to ensure INR stability

## 2021-07-29 NOTE — PLAN OF CARE
Problem: Potential for Falls  Goal: Patient will remain free of falls  Description: INTERVENTIONS:  - Educate patient/family on patient safety including physical limitations  - Instruct patient to call for assistance with activity   - Consult OT/PT to assist with strengthening/mobility   - Keep Call bell within reach  - Keep bed low and locked with side rails adjusted as appropriate  - Keep care items and personal belongings within reach  - Initiate and maintain comfort rounds  - Make Fall Risk Sign visible to staff  Problem: CARDIOVASCULAR - ADULT  Goal: Maintains optimal cardiac output and hemodynamic stability  Description: INTERVENTIONS:  - Monitor I/O, vital signs and rhythm  - Monitor for S/S and trends of decreased cardiac output  - Administer and titrate ordered vasoactive medications to optimize hemodynamic stability  - Assess quality of pulses, skin color and temperature  - Assess for signs of decreased coronary artery perfusion  - Instruct patient to report change in severity of symptoms  Outcome: Progressing     Problem: METABOLIC, FLUID AND ELECTROLYTES - ADULT  Goal: Fluid balance maintained  Description: INTERVENTIONS:  - Monitor labs   - Monitor I/O and WT  - Instruct patient on fluid and nutrition as appropriate  - Assess for signs & symptoms of volume excess or deficit  Outcome: Progressing     Problem: Knowledge Deficit  Goal: Patient/family/caregiver demonstrates understanding of disease process, treatment plan, medications, and discharge instructions  Description: Complete learning assessment and assess knowledge base    Interventions:  - Provide teaching at level of understanding  - Provide teaching via preferred learning methods  Outcome: Progressing     Problem: DISCHARGE PLANNING  Goal: Discharge to home or other facility with appropriate resources  Description: INTERVENTIONS:  - Identify barriers to discharge w/patient and caregiver  - Arrange for needed discharge resources and transportation as appropriate  - Identify discharge learning needs (meds, wound care, etc )  - Arrange for interpretive services to assist at discharge as needed  - Refer to Case Management Department for coordinating discharge planning if the patient needs post-hospital services based on physician/advanced practitioner order or complex needs related to functional status, cognitive ability, or social support system  Outcome: Progressing     - Apply yellow socks and bracelet for high fall risk patients  - Consider moving patient to room near nurses station  Outcome: Progressing

## 2021-07-29 NOTE — ASSESSMENT & PLAN NOTE
· Patient desaturating to the low 80s on RA and very short of breath at rest  Currently requiring 4L of oxygen supplementation via nasal cannula  · secondary to Acute on chronic congestive heart failure (see rest of plan under Acute on chronic CHF)  Titrate and wean off oxygen, patient not on any oxygen at baseline  Respiratory protocol  Resolved she is anywhere 93-96% on room air

## 2021-07-29 NOTE — PROGRESS NOTES
Progress Note - Cardiology   Melchor Rubio 77 y o  female MRN: [id number]  Unit/Bed#: -01 Encounter: [id number]    Assessment:  Bradycardia   SSS sp PPM medtronic atrial lead programmed off for poor function and sensing  Acute on chronic HFpEF- now appearing euvolemic  Persistent Afib on coumadin   pHTN    Plan:  1  Plan for RHC with possible LHC as OP   2  Plan for FU with EP at [facility] to discuss possible lead revision vs upgrade to BiV  3  To start oral torsemide 50 mg daily and fu as OP  Will sign off     Subjective/Objective     Subjective: pt reports feeling significantly better  She has no complaints  Back to baseline    Objective: pt sitting comfortably in hospital chair  Renal function stable  To start oral diuretic today    Vitals: /67   Pulse 81   Temp 97 6 °F (36 4 °C)   Resp 17   Ht 5' 2" (1 575 m)   Wt 89 1 kg (196 lb 6 4 oz)   SpO2 93%   BMI 35 92 kg/m²   Vitals:    07/28/21 0554 07/29/21 0539   Weight: 90 3 kg (199 lb) 89 1 kg (196 lb 6 4 oz)     Orthostatic Blood Pressures      Most Recent Value   Blood Pressure  142/67 filed at 07/29/2021 0701   Patient Position - Orthostatic VS  Lying filed at 07/26/2021 1422            Intake/Output Summary (Last 24 hours) at 7/29/2021 6136  Last data filed at 7/29/2021 0701  Gross per 24 hour   Intake --   Output 1050 ml   Net -1050 ml       Invasive Devices     Peripheral Intravenous Line            Peripheral IV 07/26/21 Right Forearm 3 days                Physical Exam:   Constitutional:       General: She is in no acute distress  Appearance: She is non toxic  HENT:      Head: Normocephalic and atraumatic  Cardiovascular:      Rate and Rhythm: Regular rhythm  Heart sounds: No murmur heard  No gallop  Pulmonary:      Effort: normal respiratory effort     Breath sounds: rhonchi improved  Musculoskeletal:         General: Swelling is absent   Skin:     General: Skin is warm        Capillary Refill: Capillary refill takes less than 2 seconds  Neurological:      General: No focal deficit present  Mental Status: She is alert and oriented to person, place, and time  Psychiatric:         Mood and Affect: Mood normal      Lab Results:   I have personally reviewed pertinent lab results  CBC with diff:   Results from last 7 days   Lab Units 07/28/21  0606   WBC Thousand/uL 9 23   RBC Million/uL 4 37   HEMOGLOBIN g/dL 10 7*   HEMATOCRIT % 34 0*   MCV fL 78*   MCH pg 24 5*   MCHC g/dL 31 5   RDW % 17 1*   MPV fL 9 9   PLATELETS Thousands/uL 171     CMP:   Results from last 7 days   Lab Units 07/29/21  0517 07/27/21  0455   SODIUM mmol/L 142 140   POTASSIUM mmol/L 4 0 3 7   CHLORIDE mmol/L 102 104   CO2 mmol/L 32 28   BUN mg/dL 48* 46*   CREATININE mg/dL 1 51* 1 69*   CALCIUM mg/dL 8 6 8 2*   AST U/L  --  14   ALT U/L  --  19   ALK PHOS U/L  --  98   EGFR ml/min/1 73sq m 34 29     Troponin:   0   Lab Value Date/Time    TROPONINI <0 02 07/26/2021 0832    TROPONINI <0 02 06/15/2021 0955    TROPONINI <0 02 10/25/2020 1803    TROPONINI <0 02 10/25/2020 1532    TROPONINI <0 02 10/25/2020 1144     BNP:   Results from last 7 days   Lab Units 07/29/21  0517   POTASSIUM mmol/L 4 0   CHLORIDE mmol/L 102   CO2 mmol/L 32   BUN mg/dL 48*   CREATININE mg/dL 1 51*   CALCIUM mg/dL 8 6   EGFR ml/min/1 73sq m 34     Coags:   Results from last 7 days   Lab Units 07/29/21  0517   INR  2 09*     TSH:   Results from last 7 days   Lab Units 07/26/21  1253   TSH 3RD GENERATON uIU/mL 1 961     Magnesium:   Results from last 7 days   Lab Units 07/27/21  0455   MAGNESIUM mg/dL 2 4     Imaging: I have personally reviewed pertinent reports         EKG: V pacing

## 2021-07-29 NOTE — ASSESSMENT & PLAN NOTE
Resume outpatient regimen as stated above INR to be done on Saturday  INR today currently at 3 7  Resolved resume Coumadin

## 2021-07-29 NOTE — ASSESSMENT & PLAN NOTE
Urine culture is showing mixed contaminant for significant leukocytosis with pyuria had some chills some low-grade fever    Will treat with 7 days continue Rocephin and discharge placed on Keflex 3 more days  Will start patient on Ceftriaxone (7/26-_)  F/u UCx and BCx

## 2021-07-29 NOTE — ASSESSMENT & PLAN NOTE
BP at this time 308-488A systolic  Continue to monitor BP  Started patient on low-dose amlodipine 2 5 mg daily

## 2021-07-29 NOTE — ASSESSMENT & PLAN NOTE
Lab Results   Component Value Date    HGBA1C 7 0 (H) 06/15/2021       Recent Labs     07/28/21  1641 07/28/21  2130 07/29/21  0703 07/29/21  1134   POCGLU 118 162* 132 190*       Blood Sugar Average: Last 72 hrs:  (P) 926 [id number]     Resume outpatient medications

## 2021-07-29 NOTE — ASSESSMENT & PLAN NOTE
Patient with sick sinus syndrome and bradycardia with pacemaker  Bradycardia has resolved rate has been adjusted to 70  Monitor on tele for now  As per Cardiology, patient "needs eventual discussion with EP to evaluate for lead revision, she plans to go to [facility] in the future, referral already placed "

## 2021-07-29 NOTE — DISCHARGE SUMMARY
[address]  Discharge- Brii Godwin 1945, 77 y o  female MRN: [id number]  Unit/Bed#: -01 Encounter: [id number]  Primary Care Provider: Jazmyne Rodriguez MD   Date and time admitted to hospital: 7/26/2021  8:17 AM    Sick sinus syndrome [facility])  Assessment & Plan  Patient with sick sinus syndrome and bradycardia with pacemaker  Bradycardia has resolved rate has been adjusted to 70  Monitor on tele for now  As per Cardiology, patient "needs eventual discussion with EP to evaluate for lead revision, she plans to go to [facility] in the future, referral already placed "    Urinary tract infection  Assessment & Plan  Urine culture is showing mixed contaminant for significant leukocytosis with pyuria had some chills some low-grade fever  Will treat with 7 days continue Rocephin and discharge placed on Keflex 3 more days  Will start patient on Ceftriaxone (7/26-_)  F/u UCx and BCx      Sepsis ([facility] [address] )  Assessment & Plan  Present on admission met 2 seizures criteria with the respiratory rate of greater than 20 and temperature less than 96 8 secondary to acute cystitis no fluids given secondary to volume overload  Sepsis has resolved    Continue ceftriaxone urine culture is showing mixed contaminants but she had significant pyuria with chills low-grade fever will continue antibiotics for total of 7 days on discharge switched to Keflex    Acute respiratory failure with hypoxia ([facility])  Assessment & Plan  · Patient desaturating to the low 80s on RA and very short of breath at rest  Currently requiring 4L of oxygen supplementation via nasal cannula  · secondary to Acute on chronic congestive heart failure (see rest of plan under Acute on chronic CHF)  Titrate and wean off oxygen, patient not on any oxygen at baseline  Respiratory protocol  Resolved she is anywhere 93-96% on room air      Supratherapeutic INR  Assessment & Plan  Resume outpatient regimen as stated above INR to be done on Saturday  INR today currently at 3 7  Resolved resume Coumadin    Acute kidney injury [facility])  Assessment & Plan  Patient presenting with creatinine of 2 04 with baseline creatinine of around 1 3-1 5 out of hospital setting down to almost baseline patient has CKD stage 3 after diuresis  Ultrasound of the kidney and bladder negative for acute DVT allergy of any Ishmael I  Continue to treat acute cystitis  Essential hypertension  Assessment & Plan  BP at this time [phone] systolic  Continue to monitor BP  Started patient on low-dose amlodipine 2 5 mg daily      Acquired hypothyroidism  Assessment & Plan  TSH 1 961  Continue levothyroxine    Type 2 diabetes mellitus with diabetic nephropathy [facility])  Assessment & Plan  Lab Results   Component Value Date    HGBA1C 7 0 (H) 06/15/2021       Recent Labs     07/28/21  1641 07/28/21  2130 07/29/21  0703 07/29/21  1134   POCGLU 118 162* 132 190*       Blood Sugar Average: Last 72 hrs:  (P) 400 [id number]     Resume outpatient medications    Atrial fibrillation [facility])  Assessment & Plan  Patient currently rate-controlled   Patient INR is 2 0 she just restarted her 5 mg on 07/28  Will continue patient's regimen at 5 mg Sunday Wednesday Friday and 10 mg on Tuesday Thursday Saturday    Will check an INR as patient gets and done at home somebody comes on Saturday to ensure INR stability      * Acute on chronic diastolic CHF (congestive heart failure) ([facility] [address] )  Assessment & Plan  Wt Readings from Last 3 Encounters:   07/29/21 89 1 kg (196 lb 6 4 oz)   06/18/21 94 6 kg (208 lb 9 6 oz)   05/27/21 93 kg (205 lb)     · Patient presenting with volume overload and significant crackles on exam  BNP elevated to 2,165, pitting edema bilaterally  · CT chest/A/P showing pleural effusion and mild interstitial pulm edema    · Cardiology consulted on the case and recs appreciated  Strict I&Os  Daily weights with standing scale (weights above don't seem accurate)  · Echocardiogram: 04/05/2021: Normal ventricle size, wall thickness with preserved systolic function  Ejection fraction 60%  Dilated right ventricle  Leads in the right heart  Dilated left and right atrium  Trace mitral regurgitation  Moderate to severe tricuspid regurgitation with estimated right ventricular systolic pressure of 72 mm of mercury  Chest x-ray checked on 07/28 she still had some vascular congestion but compared on admission significant improvement today after losing more weight lungs are clear no edema she is on room air satting well without any shortness of breath  Total weight loss from 209-196 lb  Discussed with Cardiology as well patient is going to follow-up with them for the right heart catheterization to evaluate her pulmonary hypertension she will be discharged on torsemide 50 mg daily she is already on supplementation of potassium  Continue 2 L of fluid restriction 2 g sodium diet  Medical Problems     Resolved Problems  Date Reviewed: 7/29/2021    None              Discharging Physician / Practitioner: Laverne Rico MD  PCP: Angi Pierre MD  Admission Date:   Admission Orders (From admission, onward)     Ordered        07/26/21 1027  Inpatient Admission  Once                   Discharge Date: 07/29/21    Consultations During Hospital Stay:  · Cardiology    Procedures Performed:   · None    Significant Findings / Test Results:   · CT head negative  · CT chest abdomen and pelvis- Mild interstitial pulmonary edema, with small right pleural effusion  Cardiomegaly      No evidence of acute pathology in the abdomen and pelvis  Small perihepatic ascites noted  · Ultrasound of the kidney and bladder normal  Chest x-ray PA and lateral done on 07/28-Mild pulmonary venous congestion with small subpulmonic right pleural effusion      · Pulmonary artery enlargement which can be seen with pulmonary hypertension    Incidental Findings:   · None   Test Results Pending at Discharge (will require follow up): · none     Outpatient Tests Requested:  · Inr 0/70    Complications:  none    Reason for Admission:     Hospital Course:   Beau Zaragoza is a 77 y o  female patient who originally presented to the hospital on 7/26/2021 due to worsening shortness of breath and pulmonary edema  Leg edema  Found to be in acute CHF exacerbation started on diuresis of Lasix 60 mg IV b i d  consultation to Cardiology done as she was also sinus bradycardia that her pacemaker was not pacing it was interrogated and the rate was set at 70 but there is a lead issue which will need to be addressed as an outpatient as discussed with Cardiology and also she does have some pulmonary hypertension that outpatient she will need a right heart catheterization to see if this is all secondary to a pulmonary hypertension with fluid overload  Patient diuresed very well with leg edema resolution in low pulmonary edema resolution with lungs being clear she was able to get off oxygen satting 93-97% on room air  Supratherapeutic INR on admission which has resolved restart her on home regimen as she was doing okay on it as she states with her INR being okay will have her repeat INR on 07/31 to ensure stability  She will be placed on torsemide 50 mg daily and follow-up with cardiology as scheduled medically clear to be discharged home will treat UTI for additional Keflex 3 days she was symptomatic with admitted for sepsis although urine culture was negative there was significant pyuria  Please see above list of diagnoses and related plan for additional information       Condition at Discharge: stable    Discharge Day Visit / Exam:   Subjective:  Patient seen and examined denies any chest pain or shortness of breath  Vitals: Blood Pressure: 146/71 (07/29/21 1114)  Pulse: 87 (07/29/21 1114)  Temperature: 97 8 °F (36 6 °C) (07/29/21 1114)  Temp Source: Oral (07/26/21 1422)  Respirations: 17 (07/29/21 1114)  Height: 5' 2" (157 5 cm) (07/27/21 1301)  Weight - Scale: 89 1 kg (196 lb 6 4 oz) (07/29/21 3537)  SpO2: 93 % (07/29/21 1114)  Exam:   Physical Exam  Vitals and nursing note reviewed  Constitutional:       General: She is not in acute distress  Appearance: She is well-developed  HENT:      Head: Normocephalic and atraumatic  Eyes:      Conjunctiva/sclera: Conjunctivae normal    Cardiovascular:      Rate and Rhythm: Normal rate and regular rhythm  Heart sounds: No murmur heard  Pulmonary:      Effort: Pulmonary effort is normal  No respiratory distress  Breath sounds: Normal breath sounds  No wheezing or rales  Abdominal:      General: There is no distension  Palpations: Abdomen is soft  Tenderness: There is no abdominal tenderness  Musculoskeletal:         General: No swelling  Cervical back: Neck supple  Skin:     General: Skin is warm and dry  Neurological:      General: No focal deficit present  Mental Status: She is alert and oriented to person, place, and time  Mental status is at baseline  Psychiatric:         Mood and Affect: Mood normal          Discussion with Family: Attempted to update  () via phone  Unable to contact  Discharge instructions/Information to patient and family:   See after visit summary for information provided to patient and family  Provisions for Follow-Up Care:  See after visit summary for information related to follow-up care and any pertinent home health orders  Disposition:   Home with VNA Services (Reminder: Complete face to face encounter)    Planned Readmission: no     Discharge Statement:  I spent >35 minutes discharging the patient  This time was spent on the day of discharge  I had direct contact with the patient on the day of discharge   Greater than 50% of the total time was spent examining patient, answering all patient questions, arranging and discussing plan of care with patient as well as directly providing post-discharge instructions  Additional time then spent on discharge activities  Discharge Medications:  See after visit summary for reconciled discharge medications provided to patient and/or family        **Please Note: This note may have been constructed using a voice recognition system**

## 2021-07-29 NOTE — CASE MANAGEMENT
LOS 3 Days  Pt is not a Bundle  Pt is not a 30 day readmission  Unplanned readmission score is 19 (Green, Low Risk)  Pt admitted due to CHF  CM met with pt at bedside  Pt alert and oriented  CM name and role reviewed  Pt reported that she lives in a two story home with one step to enter through the back  Pt's bedroom and bathroom are on the second floor  She has a chair lift to the second floor  She said that her house is handicap accessible with grab bars  Pt uses a RW at home  She also has a transport wc  She ADL independent  She did mention that her  helps her when needed  Her daughters, Marco Pickens and Chele Romeo live close by and are support  Her daughters are POAs  Pt said that she uses [employer]  She said that she takes her meds on her own but her  does assist also  Pt said that she is active with [facility] and would like to resume services  Pt's PCP is Jayce Arthur  Pt does not drive  Pt's , Kecia Chu provides transportation  CM reviewed discharge planning process including the following: identifying help at home, patient preference for discharge planning needs, pharmacy preference, and availability of treatment team to discuss questions or concerns patient and/or family may have regarding understanding medications and recognizing signs and symptoms once discharged  CM also encouraged patient to follow up with all recommended appointments after discharge  Patient advised of importance for patient and family to participate in managing patients medical well being  CM sent NATA referral to [facility] for SN, PT and OT

## 2021-07-30 ENCOUNTER — APPOINTMENT (EMERGENCY)
Dept: RADIOLOGY | Facility: HOSPITAL | Age: 76
End: 2021-07-30
Payer: MEDICARE

## 2021-07-30 ENCOUNTER — APPOINTMENT (EMERGENCY)
Dept: CT IMAGING | Facility: HOSPITAL | Age: 76
End: 2021-07-30
Payer: MEDICARE

## 2021-07-30 ENCOUNTER — HOSPITAL ENCOUNTER (OUTPATIENT)
Facility: HOSPITAL | Age: 76
Setting detail: OBSERVATION
Discharge: HOME | End: 2021-07-31
Attending: EMERGENCY MEDICINE | Admitting: FAMILY MEDICINE
Payer: MEDICARE

## 2021-07-30 ENCOUNTER — APPOINTMENT (OUTPATIENT)
Dept: RADIOLOGY | Facility: HOSPITAL | Age: 76
End: 2021-07-30
Payer: MEDICARE

## 2021-07-30 DIAGNOSIS — I49.5 SICK SINUS SYNDROME (HCC): ICD-10-CM

## 2021-07-30 DIAGNOSIS — R11.2 NAUSEA AND VOMITING: Primary | ICD-10-CM

## 2021-07-30 DIAGNOSIS — N17.9 ACUTE KIDNEY INJURY (HCC): ICD-10-CM

## 2021-07-30 DIAGNOSIS — I48.19 PERSISTENT ATRIAL FIBRILLATION (HCC): ICD-10-CM

## 2021-07-30 PROBLEM — I50.30 DIASTOLIC CONGESTIVE HEART FAILURE (HCC): Status: ACTIVE | Noted: 2020-10-25

## 2021-07-30 LAB
ALBUMIN SERPL BCP-MCNC: 3.7 G/DL (ref 3.5–5)
ALP SERPL-CCNC: 138 U/L (ref 46–116)
ALT SERPL W P-5'-P-CCNC: 13 U/L (ref 12–78)
ANION GAP SERPL CALCULATED.3IONS-SCNC: 14 MMOL/L (ref 4–13)
AST SERPL W P-5'-P-CCNC: 16 U/L (ref 5–45)
ATRIAL RATE: 117 BPM
ATRIAL RATE: 63 BPM
BASOPHILS # BLD AUTO: 0.07 THOUSANDS/ΜL (ref 0–0.1)
BASOPHILS NFR BLD AUTO: 1 % (ref 0–1)
BILIRUB SERPL-MCNC: 0.97 MG/DL (ref 0.2–1)
BILIRUB UR QL STRIP: NEGATIVE
BUN SERPL-MCNC: 54 MG/DL (ref 5–25)
CALCIUM SERPL-MCNC: 9.6 MG/DL (ref 8.3–10.1)
CHLORIDE SERPL-SCNC: 99 MMOL/L (ref 100–108)
CLARITY UR: CLEAR
CO2 SERPL-SCNC: 28 MMOL/L (ref 21–32)
COLOR UR: YELLOW
CREAT SERPL-MCNC: 1.61 MG/DL (ref 0.6–1.3)
EOSINOPHIL # BLD AUTO: 0.33 THOUSAND/ΜL (ref 0–0.61)
EOSINOPHIL NFR BLD AUTO: 4 % (ref 0–6)
ERYTHROCYTE [DISTWIDTH] IN BLOOD BY AUTOMATED COUNT: 16.9 % (ref 11.6–15.1)
GFR SERPL CREATININE-BSD FRML MDRD: 31 ML/MIN/1.73SQ M
GLUCOSE SERPL-MCNC: 133 MG/DL (ref 65–140)
GLUCOSE SERPL-MCNC: 144 MG/DL (ref 65–140)
GLUCOSE SERPL-MCNC: 164 MG/DL (ref 65–140)
GLUCOSE SERPL-MCNC: 183 MG/DL (ref 65–140)
GLUCOSE UR STRIP-MCNC: NEGATIVE MG/DL
HCT VFR BLD AUTO: 39.3 % (ref 34.8–46.1)
HGB BLD-MCNC: 12.5 G/DL (ref 11.5–15.4)
HGB UR QL STRIP.AUTO: NEGATIVE
IMM GRANULOCYTES # BLD AUTO: 0.02 THOUSAND/UL (ref 0–0.2)
IMM GRANULOCYTES NFR BLD AUTO: 0 % (ref 0–2)
INR PPP: 1.65 (ref 0.84–1.19)
KETONES UR STRIP-MCNC: NEGATIVE MG/DL
LEUKOCYTE ESTERASE UR QL STRIP: NEGATIVE
LIPASE SERPL-CCNC: 299 U/L (ref 73–393)
LYMPHOCYTES # BLD AUTO: 0.83 THOUSANDS/ΜL (ref 0.6–4.47)
LYMPHOCYTES NFR BLD AUTO: 11 % (ref 14–44)
MAGNESIUM SERPL-MCNC: 2.6 MG/DL (ref 1.6–2.6)
MCH RBC QN AUTO: 24.8 PG (ref 26.8–34.3)
MCHC RBC AUTO-ENTMCNC: 31.8 G/DL (ref 31.4–37.4)
MCV RBC AUTO: 78 FL (ref 82–98)
MONOCYTES # BLD AUTO: 0.65 THOUSAND/ΜL (ref 0.17–1.22)
MONOCYTES NFR BLD AUTO: 8 % (ref 4–12)
NEUTROPHILS # BLD AUTO: 5.81 THOUSANDS/ΜL (ref 1.85–7.62)
NEUTS SEG NFR BLD AUTO: 76 % (ref 43–75)
NITRITE UR QL STRIP: NEGATIVE
NRBC BLD AUTO-RTO: 0 /100 WBCS
NT-PROBNP SERPL-MCNC: 2091 PG/ML
PH UR STRIP.AUTO: 6 [PH]
PLATELET # BLD AUTO: 217 THOUSANDS/UL (ref 149–390)
PMV BLD AUTO: 9.9 FL (ref 8.9–12.7)
POTASSIUM SERPL-SCNC: 4 MMOL/L (ref 3.5–5.3)
PROT SERPL-MCNC: 8.5 G/DL (ref 6.4–8.2)
PROT UR STRIP-MCNC: NEGATIVE MG/DL
PROTHROMBIN TIME: 19.2 SECONDS (ref 11.6–14.5)
QRS AXIS: -82 DEGREES
QRS AXIS: 120 DEGREES
QRSD INTERVAL: 110 MS
QRSD INTERVAL: 198 MS
QT INTERVAL: 376 MS
QT INTERVAL: 492 MS
QTC INTERVAL: 522 MS
QTC INTERVAL: 534 MS
RBC # BLD AUTO: 5.04 MILLION/UL (ref 3.81–5.12)
SODIUM SERPL-SCNC: 141 MMOL/L (ref 136–145)
SP GR UR STRIP.AUTO: 1.01 (ref 1–1.03)
T WAVE AXIS: -53 DEGREES
T WAVE AXIS: 95 DEGREES
TROPONIN I SERPL-MCNC: <0.02 NG/ML
UROBILINOGEN UR QL STRIP.AUTO: 0.2 E.U./DL
VENTRICULAR RATE: 116 BPM
VENTRICULAR RATE: 71 BPM
WBC # BLD AUTO: 7.71 THOUSAND/UL (ref 4.31–10.16)

## 2021-07-30 PROCEDURE — 96365 THER/PROPH/DIAG IV INF INIT: CPT

## 2021-07-30 PROCEDURE — 93005 ELECTROCARDIOGRAM TRACING: CPT

## 2021-07-30 PROCEDURE — 83690 ASSAY OF LIPASE: CPT | Performed by: EMERGENCY MEDICINE

## 2021-07-30 PROCEDURE — 96375 TX/PRO/DX INJ NEW DRUG ADDON: CPT

## 2021-07-30 PROCEDURE — 71045 X-RAY EXAM CHEST 1 VIEW: CPT

## 2021-07-30 PROCEDURE — 83735 ASSAY OF MAGNESIUM: CPT | Performed by: EMERGENCY MEDICINE

## 2021-07-30 PROCEDURE — 82948 REAGENT STRIP/BLOOD GLUCOSE: CPT

## 2021-07-30 PROCEDURE — 84484 ASSAY OF TROPONIN QUANT: CPT | Performed by: EMERGENCY MEDICINE

## 2021-07-30 PROCEDURE — 99285 EMERGENCY DEPT VISIT HI MDM: CPT

## 2021-07-30 PROCEDURE — 1124F ACP DISCUSS-NO DSCNMKR DOCD: CPT | Performed by: EMERGENCY MEDICINE

## 2021-07-30 PROCEDURE — 81003 URINALYSIS AUTO W/O SCOPE: CPT | Performed by: EMERGENCY MEDICINE

## 2021-07-30 PROCEDURE — 99285 EMERGENCY DEPT VISIT HI MDM: CPT | Performed by: EMERGENCY MEDICINE

## 2021-07-30 PROCEDURE — 36415 COLL VENOUS BLD VENIPUNCTURE: CPT | Performed by: EMERGENCY MEDICINE

## 2021-07-30 PROCEDURE — 74022 RADEX COMPL AQT ABD SERIES: CPT

## 2021-07-30 PROCEDURE — 85025 COMPLETE CBC W/AUTO DIFF WBC: CPT | Performed by: EMERGENCY MEDICINE

## 2021-07-30 PROCEDURE — 70450 CT HEAD/BRAIN W/O DYE: CPT

## 2021-07-30 PROCEDURE — 99220 PR INITIAL OBSERVATION CARE/DAY 70 MINUTES: CPT | Performed by: FAMILY MEDICINE

## 2021-07-30 PROCEDURE — 80053 COMPREHEN METABOLIC PANEL: CPT | Performed by: EMERGENCY MEDICINE

## 2021-07-30 PROCEDURE — 85610 PROTHROMBIN TIME: CPT | Performed by: FAMILY MEDICINE

## 2021-07-30 PROCEDURE — 83880 ASSAY OF NATRIURETIC PEPTIDE: CPT | Performed by: EMERGENCY MEDICINE

## 2021-07-30 RX ORDER — LEVOTHYROXINE SODIUM 0.15 MG/1
150 TABLET ORAL
Status: DISCONTINUED | OUTPATIENT
Start: 2021-07-30 | End: 2021-07-31 | Stop reason: HOSPADM

## 2021-07-30 RX ORDER — DOCUSATE SODIUM 100 MG/1
100 CAPSULE, LIQUID FILLED ORAL ONCE
Status: COMPLETED | OUTPATIENT
Start: 2021-07-30 | End: 2021-07-30

## 2021-07-30 RX ORDER — ONDANSETRON 2 MG/ML
4 INJECTION INTRAMUSCULAR; INTRAVENOUS EVERY 6 HOURS PRN
Status: DISCONTINUED | OUTPATIENT
Start: 2021-07-30 | End: 2021-07-31 | Stop reason: HOSPADM

## 2021-07-30 RX ORDER — WARFARIN SODIUM 5 MG/1
5 TABLET ORAL
Status: DISCONTINUED | OUTPATIENT
Start: 2021-07-30 | End: 2021-07-30

## 2021-07-30 RX ORDER — WARFARIN SODIUM 7.5 MG/1
7.5 TABLET ORAL
Status: COMPLETED | OUTPATIENT
Start: 2021-07-30 | End: 2021-07-30

## 2021-07-30 RX ORDER — AMLODIPINE BESYLATE 2.5 MG/1
2.5 TABLET ORAL DAILY
Status: DISCONTINUED | OUTPATIENT
Start: 2021-07-30 | End: 2021-07-31 | Stop reason: HOSPADM

## 2021-07-30 RX ORDER — ASCORBIC ACID 500 MG
500 TABLET ORAL DAILY
Status: DISCONTINUED | OUTPATIENT
Start: 2021-07-30 | End: 2021-07-31 | Stop reason: HOSPADM

## 2021-07-30 RX ORDER — MECLIZINE HYDROCHLORIDE 25 MG/1
25 TABLET ORAL ONCE
Status: COMPLETED | OUTPATIENT
Start: 2021-07-30 | End: 2021-07-30

## 2021-07-30 RX ORDER — MAGNESIUM SULFATE HEPTAHYDRATE 40 MG/ML
2 INJECTION, SOLUTION INTRAVENOUS ONCE
Status: COMPLETED | OUTPATIENT
Start: 2021-07-30 | End: 2021-07-30

## 2021-07-30 RX ORDER — ONDANSETRON 2 MG/ML
4 INJECTION INTRAMUSCULAR; INTRAVENOUS ONCE
Status: COMPLETED | OUTPATIENT
Start: 2021-07-30 | End: 2021-07-30

## 2021-07-30 RX ADMIN — MAGNESIUM SULFATE HEPTAHYDRATE 2 G: 40 INJECTION, SOLUTION INTRAVENOUS at 01:40

## 2021-07-30 RX ADMIN — MECLIZINE HYDROCHLORIDE 25 MG: 25 TABLET ORAL at 08:26

## 2021-07-30 RX ADMIN — DOCUSATE SODIUM 100 MG: 100 CAPSULE, LIQUID FILLED ORAL at 11:27

## 2021-07-30 RX ADMIN — AMLODIPINE BESYLATE 2.5 MG: 2.5 TABLET ORAL at 11:27

## 2021-07-30 RX ADMIN — SODIUM CHLORIDE 1000 ML: 0.9 INJECTION, SOLUTION INTRAVENOUS at 01:31

## 2021-07-30 RX ADMIN — INSULIN LISPRO 1 UNITS: 100 INJECTION, SOLUTION INTRAVENOUS; SUBCUTANEOUS at 17:20

## 2021-07-30 RX ADMIN — LEVOTHYROXINE SODIUM 150 MCG: 150 TABLET ORAL at 11:27

## 2021-07-30 RX ADMIN — WARFARIN SODIUM 7.5 MG: 7.5 TABLET ORAL at 17:21

## 2021-07-30 RX ADMIN — FAMOTIDINE 20 MG: 10 INJECTION, SOLUTION INTRAVENOUS at 11:28

## 2021-07-30 RX ADMIN — ONDANSETRON 4 MG: 2 INJECTION INTRAMUSCULAR; INTRAVENOUS at 01:28

## 2021-07-30 RX ADMIN — OXYCODONE HYDROCHLORIDE AND ACETAMINOPHEN 500 MG: 500 TABLET ORAL at 11:26

## 2021-07-30 RX ADMIN — BISACODYL 10 MG: 5 TABLET, COATED ORAL at 11:26

## 2021-07-30 NOTE — ED PROVIDER NOTES
History  Chief Complaint   Patient presents with    Vomiting     Pt dischagred today, sleeping at home and awoke with nausea and vomiting, denies any pain     Patient is a 20-year-old female brought to the emergency department for complaints of nausea and vomiting that woke her from sleep, patient discharged from this facility around 3:00 p m  Yesterday, was feeling relatively well until waking up with nausea and vomiting, reports some epigastric abdominal pain as well, denies any fever or chills, no chest pain, EMS reports pulse ox was 89% on room air while she was moving around, there for the placed her on 2 L nasal cannula and brought her to the emergency department          Prior to Admission Medications   Prescriptions Last Dose Informant Patient Reported? Taking?    Ascorbic Acid, Vitamin C, (VITAMIN C) 100 MG tablet 7/29/2021 at Unknown time  Yes Yes   Sig: Take 500 mg by mouth daily    Calcium Carb-Cholecalciferol (OSCAL-D) 500 mg-200 units per tablet 7/29/2021 at Unknown time  Yes Yes   Sig: Take 1 tablet by mouth daily   Ferrous Sulfate (IRON PO) 7/29/2021 at Unknown time  Yes Yes   Sig: Take 325 mg by mouth daily    Multiple Vitamin (Multi-Day) TABS 7/29/2021 at Unknown time  Yes Yes   Sig: Take 1 tablet by mouth daily    alendronate (FOSAMAX) 70 mg tablet 7/29/2021 at Unknown time  Yes Yes   Sig: Take 70 mg by mouth every 7 days   amLODIPine (NORVASC) 2 5 mg tablet 7/29/2021 at Unknown time  No Yes   Sig: Take 1 tablet (2 5 mg total) by mouth daily   cephalexin (KEFLEX) 500 mg capsule 7/29/2021 at Unknown time  No Yes   Sig: Take 1 capsule (500 mg total) by mouth every 8 (eight) hours for 3 days   docusate sodium (COLACE) 100 mg capsule 7/29/2021 at Unknown time  Yes Yes   Sig: Take 100 mg by mouth 2 (two) times a day   gabapentin (NEURONTIN) 300 mg capsule 7/29/2021 at Unknown time  Yes Yes   Sig: Take 300 mg by mouth 2 (two) times a day 1 tab qam, 2 tabs qhs   glipiZIDE (GLUCOTROL) 5 mg tablet   No No Sig: Take 0 5 tablets (2 5 mg total) by mouth 2 (two) times a day before meals   levothyroxine 150 mcg tablet 7/29/2021 at Unknown time  Yes Yes   Sig: Take 150 mcg by mouth daily    potassium chloride (K-DUR,KLOR-CON) 20 mEq tablet   No No   Sig: Take 1 tablet (20 mEq total) by mouth daily   ranitidine (ZANTAC) 300 MG tablet 7/29/2021 at Unknown time  Yes Yes   Sig: Take 300 mg by mouth daily as needed    torsemide (DEMADEX) 100 mg tablet 7/29/2021 at Unknown time  No Yes   Sig: Take 0 5 tablets (50 mg total) by mouth daily   warfarin (COUMADIN) 5 mg tablet 7/29/2021 at Unknown time  Yes Yes   Sig: Take 5 mg by mouth Sun, Wed, Friday- 1 tab  Tues, Thursday, Saturday- 2 tab      Facility-Administered Medications: None       Past Medical History:   Diagnosis Date    Arthritis     [facility]est[facility]     Diabetes mellitus ([facility] )     Disease of thyroid gland     Hypertension     Pacemaker     Renal disorder        Past Surgical History:   Procedure Laterality Date    CARDIAC PACEMAKER PLACEMENT      CARDIAC PACEMAKER PLACEMENT      CHOLECYSTECTOMY      JOINT REPLACEMENT      bilateral knee replacements    ORIF TIBIA & FIBULA FRACTURES Left 10/29/2020    Procedure: OPEN REDUCTION W/ INTERNAL FIXATION (ORIF) ANKLE;  Surgeon: Lee Jerome; Location: [facility];  Service: Orthopedics    TONSILLECTOMY      TUBAL LIGATION         Family History   Problem Relation Age of Onset    Atrial fibrillation Mother     Cancer Father      I have reviewed and agree with the history as documented  E-Cigarette/Vaping    E-Cigarette Use Never User      E-Cigarette/Vaping Substances     Social History     Tobacco Use    Smoking status: Never Smoker    Smokeless tobacco: Never Used   Vaping Use    Vaping Use: Never used   Substance Use Topics    Alcohol use: Not Currently    Drug use: Not Currently       Review of Systems   Constitutional: Negative  HENT: Negative  Eyes: Negative  Respiratory: Positive for shortness of breath  Cardiovascular: Negative  Gastrointestinal: Positive for abdominal pain, nausea and vomiting  Endocrine: Negative  Genitourinary: Negative  Musculoskeletal: Negative  Skin: Negative  Allergic/Immunologic: Negative  Neurological: Negative  Hematological: Negative  Psychiatric/Behavioral: Negative  Physical Exam  Physical Exam  Constitutional:       Appearance: She is well-developed  She is ill-appearing  HENT:      Head: Normocephalic and atraumatic  Nose: Nose normal       Mouth/Throat:      Mouth: Mucous membranes are dry  Eyes:      Conjunctiva/sclera: Conjunctivae normal       Pupils: Pupils are equal, round, and reactive to light  Cardiovascular:      Rate and Rhythm: Tachycardia present  Rhythm irregular  Pulmonary:      Effort: Pulmonary effort is normal    Abdominal:      Palpations: Abdomen is soft  Tenderness: There is generalized abdominal tenderness  Musculoskeletal:         General: Normal range of motion  Cervical back: Normal range of motion and neck supple  Skin:     General: Skin is warm and dry  Neurological:      Mental Status: She is alert and oriented to person, place, and time           Vital Signs  ED Triage Vitals   Temperature Pulse Respirations Blood Pressure SpO2   07/30/21 0122 07/30/21 0122 07/30/21 0122 07/30/21 0122 07/30/21 0122   98 2 °F (36 8 °C) (!) 115 20 154/91 94 %      Temp Source Heart Rate Source Patient Position - Orthostatic VS BP Location FiO2 (%)   07/30/21 0122 07/30/21 0122 07/30/21 0145 07/30/21 0145 --   Temporal Monitor Lying Right arm       Pain Score       07/30/21 0122       No Pain           Vitals:    07/30/21 0530 07/30/21 0545 07/30/21 0600 07/30/21 0630   BP: 137/64 130/63 136/65 141/65   Pulse: 69 69 69 70   Patient Position - Orthostatic VS: Lying Lying Lying Lying         Visual Acuity      ED Medications  Medications   ondansetron (ZOFRAN) injection 4 mg (4 mg Intravenous Given 7/30/21 0128)   magnesium sulfate 2 g/50 mL IVPB (premix) 2 g (0 g Intravenous Stopped 7/30/21 0251)       Diagnostic Studies  Results Reviewed     Procedure Component Value Units Date/Time    NT-BNP PRO [[id number]]  (Abnormal) Collected: 07/30/21 0128    Lab Status: Final result Specimen: Blood from Arm, Left Updated: 07/30/21 0317     NT-proBNP 2,091 pg/mL     UA w Reflex to Microscopic w Reflex to Culture [[id number]] Collected: 07/30/21 0147    Lab Status: Final result Specimen: Urine, Straight Cath Updated: 07/30/21 0215     Color, UA Yellow     Clarity, UA Clear     Specific Gravity, UA 1 010     pH, UA 6 0     Leukocytes, UA Negative     Nitrite, UA Negative     Protein, UA Negative mg/dl      Glucose, UA Negative mg/dl      Ketones, UA Negative mg/dl      Urobilinogen, UA 0 2 E U /dl      Bilirubin, UA Negative     Blood, UA Negative    CMP [[id number]]  (Abnormal) Collected: 07/30/21 0128    Lab Status: Final result Specimen: Blood from Arm, Left Updated: 07/30/21 0157     Sodium 141 mmol/L      Potassium 4 0 mmol/L      Chloride 99 mmol/L      CO2 28 mmol/L      ANION GAP 14 mmol/L      BUN 54 mg/dL      Creatinine 1 61 mg/dL      Glucose 183 mg/dL      Calcium 9 6 mg/dL      AST 16 U/L      ALT 13 U/L      Alkaline Phosphatase 138 U/L      Total Protein 8 5 g/dL      Albumin 3 7 g/dL      Total Bilirubin 0 97 mg/dL      eGFR 31 ml/min/1 73sq m     Narrative:      National Kidney Disease Foundation guidelines for Chronic Kidney Disease (CKD):     Stage 1 with normal or high GFR (GFR > 90 mL/min/1 73 square meters)    Stage 2 Mild CKD (GFR = 60-89 mL/min/1 73 square meters)    Stage 3A Moderate CKD (GFR = 45-59 mL/min/1 73 square meters)    Stage 3B Moderate CKD (GFR = 30-44 mL/min/1 73 square meters)    Stage 4 Severe CKD (GFR = 15-29 mL/min/1 73 square meters)    Stage 5 End Stage CKD (GFR <15 mL/min/1 73 square meters)  Note: GFR calculation is accurate only with a steady state creatinine    Lipase [[id number]]  (Normal) Collected: 07/30/21 0128    Lab Status: Final result Specimen: Blood from Arm, Left Updated: 07/30/21 0157     Lipase 299 u/L     Magnesium [[id number]]  (Normal) Collected: 07/30/21 0128    Lab Status: Final result Specimen: Blood from Arm, Left Updated: 07/30/21 0157     Magnesium 2 6 mg/dL     Troponin I [[id number]]  (Normal) Collected: 07/30/21 0133    Lab Status: Final result Specimen: Blood from Arm, Left Updated: 07/30/21 0157     Troponin I <0 02 ng/mL     CBC and differential [[id number]]  (Abnormal) Collected: 07/30/21 0128    Lab Status: Final result Specimen: Blood from Arm, Left Updated: 07/30/21 0137     WBC 7 71 Thousand/uL      RBC 5 04 Million/uL      Hemoglobin 12 5 g/dL      Hematocrit 39 3 %      MCV 78 fL      MCH 24 8 pg      MCHC 31 8 g/dL      RDW 16 9 %      MPV 9 9 fL      Platelets 251 Thousands/uL      nRBC 0 /100 WBCs      Neutrophils Relative 76 %      Immat GRANS % 0 %      Lymphocytes Relative 11 %      Monocytes Relative 8 %      Eosinophils Relative 4 %      Basophils Relative 1 %      Neutrophils Absolute 5 81 Thousands/µL      Immature Grans Absolute 0 02 Thousand/uL      Lymphocytes Absolute 0 83 Thousands/µL      Monocytes Absolute 0 65 Thousand/µL      Eosinophils Absolute 0 33 Thousand/µL      Basophils Absolute 0 07 Thousands/µL                  XR chest portable    (Results Pending)              Procedures  ECG 12 Lead Documentation Only    Date/Time: 7/30/2021 1:36 AM  Performed by: Laurie Huynh DO  Authorized by: Laurie Huynh DO     Indications / Diagnosis:  Shortness breath  ECG reviewed by me, the ED Provider: yes    Patient location:  ED  Previous ECG:     Previous ECG:  Compared to current    Comparison to cardiac monitor: Yes    Interpretation:     Interpretation: abnormal    Rate:     ECG rate:  116    ECG rate assessment: tachycardic    Ectopy:     Ectopy: PVCs    QRS:     QRS intervals: Wide  Conduction:     Conduction: abnormal    ST segments:     ST segments:  Normal  T waves:     T waves: normal               ED Course  ED Course as of Jul 30 0640   Fri Jul 30, 2021   0231 TT to [facility] cardiology Dr Bebeto Garvin- requested recomendations      5830 Call to Dr Bebeto Garvin, left  requesting response      0330 Had a discussion with patient and family regarding likely need for transfer to tertiary care center as she is having repeated symptoms with change in cardiac rhythm, pacemaker was placed in [place] area approximately 20 years ago but patient's  does not wish for her to go back there at this time, they have been following with Vanessa Porras Cardiology PA with [facility], at [employer] office, they would prefer to stay within the [facility], initially there was a plan for patient to follow-up with EP at [facility], however family would prefer she be seen at [facility] if possible as it is slightly closer to their home      0335 Patient discussed with Dr Akil De Oliveira, Cardiology at [facility], accepts patient for transfer, however no beds available at this time, requests that patient be re-evaluated by cardiology here wall awaiting transfer, as he is unsure why patient patient would need pacemaker revision or replacement if it is working                                SBIRT 20yo+      Most Recent Value   SBIRT (23 yo +)   In order to provide better care to our patients, we are screening all of our patients for alcohol and drug use  Would it be okay to ask you these screening questions? Yes Filed at: 07/30/2021 0157   Initial Alcohol Screen: US AUDIT-C    1  How often do you have a drink containing alcohol?  0 Filed at: 07/30/2021 0157   2  How many drinks containing alcohol do you have on a typical day you are drinking? 0 Filed at: 07/30/2021 0157   3b  FEMALE Any Age, or MALE 65+: How often do you have 4 or more drinks on one occassion?   0 Filed at: 07/30/2021 0157   Audit-C Score  0 Filed at: 07/30/2021 0157   DANIELA: How many times in the past year have you    Used an illegal drug or used a prescription medication for non-medical reasons? Never Filed at: 07/30/2021 0157                      Disposition  Final diagnoses:   Nausea and vomiting   Sick sinus syndrome ([facility] )   Persistent atrial fibrillation ([facility] )     Time reflects when diagnosis was documented in both MDM as applicable and the Disposition within this note     Time User Action Codes Description Comment    7/30/2021  4:11 AM David Huff Add [R11 2] Nausea and vomiting     7/30/2021  4:11 AM Mari Cain Add [I49 5] Sick sinus syndrome ([facility] )     7/30/2021  4:11 AM Emanuel Cain Add [I48 19] Persistent atrial fibrillation [facility])       ED Disposition     ED Disposition Condition Date/Time Comment    Transfer to Another [address] [place] [address]  Fri Jul 30, 2021  4:11 AM Filiberto Merritt should be transferred out to [facility]          MD Documentation      Most Recent Value   Patient Condition  The patient has been stabilized such that within reasonable medical probability, no material deterioration of the patient condition or the condition of the unborn child(porter) is likely to result from the transfer   Reason for Transfer  Level of Care needed not available at this facility   Benefits of Transfer  Specialized equipment and/or services available at the receiving facility (Include comment)________________________   Risks of Transfer  Potential for delay in receiving treatment, Potential deterioration of medical condition, Loss of IV, Increased discomfort during transfer   Accepting Physician  Dr Fredirick Halsted Name, [place] , [place], [place]    (Name & Tel number)  Lillian Abbott   Sending MD Leana Maradiaga,    Provider Certification  General risk, such as traffic hazards, adverse weather conditions, rough terrain or turbulence, possible failure of equipment (including vehicle or aircraft), or consequences of actions of persons outside the control of the transport personnel, Unanticipated needs of medical equipment and personnel during transport, Risk of worsening condition, The possibility of a transport vehicle being unavailable      RN Documentation      Most [facility] Name, [place] [address], [place], [place]    (Name & Tel number)  Meredith Kam      Follow-up Information    None         Patient's Medications   Discharge Prescriptions    No medications on file     No discharge procedures on file      PDMP Review     None          ED Provider  Electronically Signed by           Verner Skipper, DO  07/30/21 0022

## 2021-07-30 NOTE — OCCUPATIONAL THERAPY NOTE
OccupationalTherapy Cancel Note     Patient Name: Selvin Bolaños  SJQGI'M Date: 7/30/2021  Problem List  Principal Problem:    Nausea and vomiting  Active Problems:    Atrial fibrillation (HCC)    Diastolic congestive heart failure ([facility] )    Type 2 diabetes mellitus with diabetic nephropathy (HCC)    Stage 3 chronic kidney disease ([facility] )    Acquired hypothyroidism    Essential hypertension       07/30/21 1135   Note Type   Note type Evaluation   Cancel Reasons   (Pt refusal)     OT orders received  Chart review completed  Patient admitted to [address] [place] [address] on 7/30/2021 with Dx: nausea and vomiting  Attempted to see pt this AM  Pt declined despite max encouragement reporting significant nausea  Will continue to follow case and re-attempt to see pt as appropriate and as time allows      BASIL Howard/NAYANA

## 2021-07-30 NOTE — ED NOTES
Irwin Devon (daughter): [phone]    Irwin Osorio is primary contact  She will be in contact with rest of family       Monse Brunson  07/30/21 8774

## 2021-07-30 NOTE — ASSESSMENT & PLAN NOTE
Lab Results   Component Value Date    EGFR 31 07/30/2021    EGFR 34 07/29/2021    EGFR 34 07/28/2021    CREATININE 1 61 (H) 07/30/2021    CREATININE 1 51 (H) 07/29/2021    CREATININE 1 49 (H) 07/28/2021   · Creatinine baseline is 1 3-1 5 he has CKD stage 3  She had nausea and vomiting creatinine slightly above 1 6 but did not constitute Ishmael I   Will hold torsemide for 1 day as she is clinically in terms of heart failure doing well

## 2021-07-30 NOTE — ASSESSMENT & PLAN NOTE
Wt Readings from Last 3 Encounters:   07/30/21 91 5 kg (201 lb 11 5 oz)   07/29/21 89 1 kg (196 lb 6 4 oz)   06/18/21 94 6 kg (208 lb 9 6 oz)     · Clinically she is euvolemic no edema lungs are actually clear she is 98% on room air the whole time in the ER chest x-ray I reviewed she does have still persistent mild vascular congestion although it is not causing any shortness of breath right now and clinically she does not look overloaded proBNP is decreased  Secondary to nausea and vomiting will not proceed with any extra doses of Lasix I will hold torsemide for today if no GI symptoms tomorrow will restart her torsemide 50 mg daily  · She is going to need an outpatient schedule of catheterization to evaluate her pulmonary hypertension  · Echocardiogram: 04/05/2021: Normal ventricle size, wall thickness with preserved systolic function  Ejection fraction 60%  Dilated right ventricle  Leads in the right heart  Dilated left and right atrium  Trace mitral regurgitation  Moderate to severe tricuspid regurgitation with estimated right ventricular systolic pressure of 72 mm of mercury

## 2021-07-30 NOTE — PLAN OF CARE
Problem: Potential for Falls  Goal: Patient will remain free of falls  Description: INTERVENTIONS:  - Educate patient/family on patient safety including physical limitations  - Instruct patient to call for assistance with activity   - Consult OT/PT to assist with strengthening/mobility   - Keep Call bell within reach  - Keep bed low and locked with side rails adjusted as appropriate  - Keep care items and personal belongings within reach  - Initiate and maintain comfort rounds  - Make Fall Risk Sign visible to staff  - Offer Toileting every 2 Hours, in advance of need  - Initiate/Maintain bed alarm  - Obtain necessary fall risk management equipment: walker  - Apply yellow socks and bracelet for high fall risk patients  - Consider moving patient to room near nurses station  Outcome: Progressing     Problem: PAIN - ADULT  Goal: Verbalizes/displays adequate comfort level or baseline comfort level  Description: Interventions:  - Encourage patient to monitor pain and request assistance  - Assess pain using appropriate pain scale  - Administer analgesics based on type and severity of pain and evaluate response  - Implement non-pharmacological measures as appropriate and evaluate response  - Consider cultural and social influences on pain and pain management  - Notify physician/advanced practitioner if interventions unsuccessful or patient reports new pain  Outcome: Progressing     Problem: INFECTION - ADULT  Goal: Absence or prevention of progression during hospitalization  Description: INTERVENTIONS:  - Assess and monitor for signs and symptoms of infection  - Monitor lab/diagnostic results  - Monitor all insertion sites, i e  indwelling lines, tubes, and drains  - Monitor endotracheal if appropriate and nasal secretions for changes in amount and color  - [place] appropriate cooling/warming therapies per order  - Administer medications as ordered  - Instruct and encourage patient and family to use good hand hygiene technique  - Identify and instruct in appropriate isolation precautions for identified infection/condition  Outcome: Progressing  Goal: Absence of fever/infection during neutropenic period  Description: INTERVENTIONS:  - Monitor WBC    Outcome: Progressing     Problem: SAFETY ADULT  Goal: Patient will remain free of falls  Description: INTERVENTIONS:  - Educate patient/family on patient safety including physical limitations  - Instruct patient to call for assistance with activity   - Consult OT/PT to assist with strengthening/mobility   - Keep Call bell within reach  - Keep bed low and locked with side rails adjusted as appropriate  - Keep care items and personal belongings within reach  - Initiate and maintain comfort rounds  - Make Fall Risk Sign visible to staff  - Offer Toileting every 2 Hours, in advance of need  - Initiate/Maintain bed alarm  - Obtain necessary fall risk management equipment: walker  - Apply yellow socks and bracelet for high fall risk patients  - Consider moving patient to room near nurses station  Outcome: Progressing  Goal: Maintain or return to baseline ADL function  Description: INTERVENTIONS:  -  Assess patient's ability to carry out ADLs; assess patient's baseline for ADL function and identify physical deficits which impact ability to perform ADLs (bathing, care of mouth/teeth, toileting, grooming, dressing, etc )  - Assess/evaluate cause of self-care deficits   - Assess range of motion  - Assess patient's mobility; develop plan if impaired  - Assess patient's need for assistive devices and provide as appropriate  - Encourage maximum independence but intervene and supervise when necessary  - Involve family in performance of ADLs  - Assess for home care needs following discharge   - Consider OT consult to assist with ADL evaluation and planning for discharge  - Provide patient education as appropriate  Outcome: Progressing  Goal: Maintains/Returns to pre admission functional level  Description: INTERVENTIONS:  - Perform BMAT or MOVE assessment daily    - Set and communicate daily mobility goal to care team and patient/family/caregiver  - Collaborate with rehabilitation services on mobility goals if consulted  - Reposition patient every 2 hours  - Out of bed for toileting  - Record patient progress and toleration of activity level   Outcome: Progressing     Problem: DISCHARGE PLANNING  Goal: Discharge to home or other facility with appropriate resources  Description: INTERVENTIONS:  - Identify barriers to discharge w/patient and caregiver  - Arrange for needed discharge resources and transportation as appropriate  - Identify discharge learning needs (meds, wound care, etc )  - Arrange for interpretive services to assist at discharge as needed  - Refer to Case Management Department for coordinating discharge planning if the patient needs post-hospital services based on physician/advanced practitioner order or complex needs related to functional status, cognitive ability, or social support system  Outcome: Progressing     Problem: Knowledge Deficit  Goal: Patient/family/caregiver demonstrates understanding of disease process, treatment plan, medications, and discharge instructions  Description: Complete learning assessment and assess knowledge base    Interventions:  - Provide teaching at level of understanding  - Provide teaching via preferred learning methods  Outcome: Progressing     Problem: GASTROINTESTINAL - ADULT  Goal: Minimal or absence of nausea and/or vomiting  Description: INTERVENTIONS:  - Administer IV fluids if ordered to ensure adequate hydration  - Maintain NPO status until nausea and vomiting are resolved  - Nasogastric tube if ordered  - Administer ordered antiemetic medications as needed  - Provide nonpharmacologic comfort measures as appropriate  - Advance diet as tolerated, if ordered  - Consider nutrition services referral to assist patient with adequate nutrition and appropriate food choices  Outcome: Progressing  Goal: Maintains or returns to baseline bowel function  Description: INTERVENTIONS:  - Assess bowel function  - Encourage oral fluids to ensure adequate hydration  - Administer IV fluids if ordered to ensure adequate hydration  - Administer ordered medications as needed  - Encourage mobilization and activity  - Consider nutritional services referral to assist patient with adequate nutrition and appropriate food choices  Outcome: Progressing     Problem: METABOLIC, FLUID AND ELECTROLYTES - ADULT  Goal: Glucose maintained within target range  Description: INTERVENTIONS:  - Monitor Blood Glucose as ordered  - Assess for signs and symptoms of hyperglycemia and hypoglycemia  - Administer ordered medications to maintain glucose within target range  - Assess nutritional intake and initiate nutrition service referral as needed  Outcome: Progressing     Problem: SKIN/TISSUE INTEGRITY - ADULT  Goal: Skin Integrity remains intact(Skin Breakdown Prevention)  Description: Assess:  -Assess extremities for adequate circulation and sensation     Bed Management:  -Have minimal linens on bed & keep smooth, unwrinkled  -Change linens as needed when moist or perspiring    Activity:  -Mobilize patient  -Encourage activity and walks on unit  -Encourage or provide ROM exercises   -Turn and reposition patient every 2 Hours  -Use appropriate equipment to lift or move patient in bed  -Instruct/ Assist with weight shifting every 2 hours when out of bed in chair    Skin Care:  -Avoid use of baby powder, tape, friction and shearing, hot water or constrictive clothing  -Relieve pressure over bony prominences using pressure relieving devices like wedges and pillows, heel pads  -Do not massage red bony areas    Next Steps:  -Consider consults to  interdisciplinary teams such as wound care  Outcome: Progressing  Goal: Incision(s), wounds(s) or drain site(s) healing without S/S of infection  Description: INTERVENTIONS  - Assess and document dressing, incision, wound bed, drain sites and surrounding tissue  - Provide patient and family education  - Perform skin care/dressing changes every day  Outcome: Progressing  Goal: Pressure injury heals and does not worsen  Description: Interventions:  - Implement low air loss mattress or specialty surface (Criteria met)  - Apply silicone foam dressing  - Instruct/assist with weight shifting every 120 minutes when in chair   - Limit chair time to 4 hour intervals  - Use special pressure reducing interventions such as wedges and pillows when in chair   - Apply fecal or urinary incontinence containment device   - Turn and reposition patient & offload bony prominences every 2 hours   - Utilize friction reducing device or surface for transfers   - Consider consults to  interdisciplinary teams such as wound care   - Use incontinent care products after each incontinent episode such as pads  - Consider nutrition services referral as needed  Outcome: Progressing

## 2021-07-30 NOTE — PHYSICAL THERAPY NOTE
PHYSICAL THERAPY NOTE          Patient Name: Janelle Chiang  [id number] Date: 7/30/2021 07/30/21 1136   Note Type   Note type Evaluation   Cancel Reasons   (pt refusal)       Received order for PT consult  Chart reviewed  Pt discharged from this facility on 7/29/2021  Pt readmitted under observation with nausea and vomiting  Attempted to see patient to complete evaluation, however patient reports she is "not feeling well", "I'm nauseous" and declined therapy consult at this time  Will continue to follow and see patient as medically appropriate at a later time       Caroline Birch, PT,DPT

## 2021-07-30 NOTE — ASSESSMENT & PLAN NOTE
Lab Results   Component Value Date    HGBA1C 7 0 (H) 06/15/2021       Recent Labs     07/28/21  2130 07/29/21  0703 07/29/21  1134 07/30/21  1117   POCGLU 162* 132 190* 133   · Patient is on clears with had some GI issues will hold her glipizide  Place on a sliding scale      Blood Sugar Average: Last 72 hrs:  (P) 133

## 2021-07-30 NOTE — ASSESSMENT & PLAN NOTE
· Will check an INR resume her Coumadin she alternates certain days today she is due for Coumadin 5 mg prior to obtaining Coumadin will check an INR  She is status post pacemakerSSS sp PPM- RA lead off due to poor sensing and function planning for EP evaluation in future for RA lead revision vs His bundle pacing if able have pacemakers working after evaluation by Cardiology today no urgent need to replace a pacemaker  This could be all done as an outpatient  · The patient was previously on Cardizem CD but it was stopped asking Cardiology why and if that could be restarted again    At that time will discontinue her Norvasc which was started last admission

## 2021-07-30 NOTE — ED NOTES
Cardiology Yasser at bedside to eval and speak with pt        Mariya Prakash, AGAPITO  07/30/21 3000

## 2021-07-30 NOTE — H&P
[address] 1945, 77 y o  female MRN: [id number]  Unit/Bed#: -01 Encounter: [id number]  Primary Care Provider: Lori Santiago MD   Date and time admitted to hospital: 7/30/2021  1:18 AM    * Nausea and vomiting  Assessment & Plan  · Occurred last night nausea and vomiting at 10:00 p m  Braden Caballeroluisito She was doing fine at dinner time she did not eat much she had a bowel movement x2 yesterday  She also is complaining of some abdominal discomfort  Denies any vertigo type of symptoms started prior  Evaluated by Cardiology as her pacemaker was firing when she was going in and out of AFib downstairs she was feeling it  That was suspect secondary to the vasovagal bradycardia and she does not need any pacemaker urgently replaced at this time as it is working fine  · Id to obstruction series there is some evidence of stool burden will place on clears pay place her on Pepcid IV and give her some Dulcolax and Colace see if that improves will hold iron Man sterilely as well patient did not take Keflex last night  · UA is negative UTI actually has cleared  · CT of the brain is negative for any acute findings  · Zofran p r n  Essential hypertension  Assessment & Plan  · Resume Norvasc    Acquired hypothyroidism  Assessment & Plan  · Resume Synthroid    Stage 3 chronic kidney disease [facility])  Assessment & Plan  Lab Results   Component Value Date    EGFR 31 07/30/2021    EGFR 34 07/29/2021    EGFR 34 07/28/2021    CREATININE 1 61 (H) 07/30/2021    CREATININE 1 51 (H) 07/29/2021    CREATININE 1 49 (H) 07/28/2021   · Creatinine baseline is 1 3-1 5 he has CKD stage 3  She had nausea and vomiting creatinine slightly above 1 6 but did not constitute Ishmael I   Will hold torsemide for 1 day as she is clinically in terms of heart failure doing well    Type 2 diabetes mellitus with diabetic nephropathy [facility])  Assessment & Plan  Lab Results   Component Value Date    HGBA1C 7 0 (H) 06/15/2021       Recent Labs     07/28/21  2130 07/29/21  0703 07/29/21  1134 07/30/21  1117   POCGLU 162* 132 190* 133   · Patient is on clears with had some GI issues will hold her glipizide  Place on a sliding scale  Blood Sugar Average: Last 72 hrs:  (P) 084    Diastolic congestive heart failure (HCC)  Assessment & Plan  Wt Readings from Last 3 Encounters:   07/30/21 91 5 kg (201 lb 11 5 oz)   07/29/21 89 1 kg (196 lb 6 4 oz)   06/18/21 94 6 kg (208 lb 9 6 oz)     · Clinically she is euvolemic no edema lungs are actually clear she is 98% on room air the whole time in the ER chest x-ray I reviewed she does have still persistent mild vascular congestion although it is not causing any shortness of breath right now and clinically she does not look overloaded proBNP is decreased  Secondary to nausea and vomiting will not proceed with any extra doses of Lasix I will hold torsemide for today if no GI symptoms tomorrow will restart her torsemide 50 mg daily  · She is going to need an outpatient schedule of catheterization to evaluate her pulmonary hypertension  · Echocardiogram: 04/05/2021: Normal ventricle size, wall thickness with preserved systolic function  Ejection fraction 60%  Dilated right ventricle  Leads in the right heart  Dilated left and right atrium  Trace mitral regurgitation  Moderate to severe tricuspid regurgitation with estimated right ventricular systolic pressure of 72 mm of mercury  Atrial fibrillation ([address] )  Assessment & Plan  · Will check an INR resume her Coumadin she alternates certain days today she is due for Coumadin 5 mg prior to obtaining Coumadin will check an INR  She is status post pacemakerSSS sp PPM- RA lead off due to poor sensing and function planning for EP evaluation in future for RA lead revision vs His bundle pacing if able have pacemakers working after evaluation by Cardiology today no urgent need to replace a pacemaker    This could be all done as an outpatient  · The patient was previously on Cardizem CD but it was stopped asking Cardiology why and if that could be restarted again  At that time will discontinue her Norvasc which was started last admission      VTE Pharmacologic Prophylaxis: VTE Score: 3 Moderate Risk (Score 3-4) - Pharmacological DVT Prophylaxis Ordered: warfarin (Coumadin)  Code Status: Level 1 - Full Code   Discussion with family: Updated  (daughter) via phone  Anticipated Length of Stay: Patient will be admitted on an observation basis with an anticipated length of stay of less than 2 midnights secondary to Nausea vomiting  Total Time for Visit, including Counseling / Coordination of Care: 60 minutes Greater than 50% of this total time spent on direct patient counseling and coordination of care  Chief Complaint:  Abrupt nausea vomiting    History of Present Illness:  Nasrin Nava is a 77 y o  female with a PMH of sick sinus syndrome status post pacemaker who presents with acute onset of nausea and vomiting at 10:00 p m  She was doing fine she was discharged yesterday all day at 6:00 p m  Ate dinner will not much went up and then all of a sudden had nausea vomiting discussed with the daughter no vertigo like symptoms prior  She did have 2 bowel movements yesterday she did not eat anything out of the ordinary  In the ER she was going in and out of AFib and her pacemaker was being shocked and she was feeling it  She also was complaining of some abdominal discomfort     When she set up for me to examine she did feel some lightheadedness  She denies any chest pain or shortness of breath  No fevers  Review of Systems:  Review of Systems   Constitutional: Negative for chills and fever  HENT: Negative for ear pain and sore throat  Eyes: Negative for pain and visual disturbance  Respiratory: Negative for cough and shortness of breath  Cardiovascular: Negative for chest pain and palpitations  Gastrointestinal: Positive for abdominal pain, nausea and vomiting  Genitourinary: Negative for dysuria and hematuria  Musculoskeletal: Negative for arthralgias and back pain  Skin: Negative for color change and rash  Neurological: Negative for seizures and syncope  All other systems reviewed and are negative  Past Medical and Surgical History:   Past Medical History:   Diagnosis Date    Arthritis     CHF (congestive heart failure) ([facility] )     Diabetes mellitus ([facility] )     Disease of thyroid gland     Hypertension     Pacemaker     Renal disorder        Past Surgical History:   Procedure Laterality Date    CARDIAC PACEMAKER PLACEMENT      CARDIAC PACEMAKER PLACEMENT      CHOLECYSTECTOMY      JOINT REPLACEMENT      bilateral knee replacements    ORIF TIBIA & FIBULA FRACTURES Left 10/29/2020    Procedure: OPEN REDUCTION W/ INTERNAL FIXATION (ORIF) ANKLE;  Surgeon: Marilu De La Rosa; Location:  MAIN OR;  Service: Orthopedics    TONSILLECTOMY      TUBAL LIGATION         Meds/Allergies:  Prior to Admission medications    Medication Sig Start Date End Date Taking?  Authorizing Provider   alendronate (FOSAMAX) 70 mg tablet Take 70 mg by mouth every 7 days   Yes Historical Provider, MD   amLODIPine (NORVASC) 2 5 mg tablet Take 1 tablet (2 5 mg total) by mouth daily 7/29/21  Yes Esthela Meneses MD   Ascorbic Acid, Vitamin C, (VITAMIN C) 100 MG tablet Take 500 mg by mouth daily    Yes Historical Provider, MD   Calcium Carb-Cholecalciferol (OSCAL-D) 500 mg-200 units per tablet Take 1 tablet by mouth daily   Yes Historical Provider, MD   cephalexin (KEFLEX) 500 mg capsule Take 1 capsule (500 mg total) by mouth every 8 (eight) hours for 3 days 7/29/21 8/1/21 Yes Esthela Meneses MD   docusate sodium (COLACE) 100 mg capsule Take 100 mg by mouth 2 (two) times a day   Yes Historical Provider, MD   Ferrous Sulfate (IRON PO) Take 325 mg by mouth daily    Yes Historical Provider, MD   gabapentin (NEURONTIN) 300 mg capsule Take 300 mg by mouth 2 (two) times a day 1 tab qam, 2 tabs qhs 9/8/20  Yes Historical Provider, MD   levothyroxine 150 mcg tablet Take 150 mcg by mouth daily  9/8/20  Yes Historical Provider, MD   Multiple Vitamin (Multi-Day) TABS Take 1 tablet by mouth daily    Yes Historical Provider, MD   ranitidine (ZANTAC) 300 MG tablet Take 300 mg by mouth daily as needed    Yes Historical Provider, MD   torsemide (DEMADEX) 100 mg tablet Take 0 5 tablets (50 mg total) by mouth daily 7/29/21 8/28/21 Yes Silke Doe MD   warfarin (COUMADIN) 5 mg tablet Take 5 mg by mouth Sun, Wed, Friday- 1 tab  Tues, Thursday, Saturday- 2 tab 9/8/20  Yes Historical Provider, MD   glipiZIDE (GLUCOTROL) 5 mg tablet Take 0 5 tablets (2 5 mg total) by mouth 2 (two) times a day before meals 6/18/21 7/26/21  Hossein Laughlin MD   potassium chloride (K-DUR,KLOR-CON) 20 mEq tablet Take 1 tablet (20 mEq total) by mouth daily 11/4/20 7/26/21  SAM Penn     I have reviewed home medications using recent Epic encounter  Allergies:    Allergies   Allergen Reactions    Rofecoxib Angioedema, Swelling, Hives and Other (See Comments)     swelling, sob  swelling, sob  Other reaction(s): Swelling / Edema  swelling, sob  Other reaction(s): SWELLING  Other reaction(s): Angioedema      Oxycodone-Acetaminophen GI Intolerance and Other (See Comments)     Unsure of rxn   Unsure of rxn   Unsure of rxn   Other reaction(s): "CAN'T BREATH"      Medical Tape Rash       Social History:  Marital Status: /Civil Union     Substance Use History:   Social History     Substance and Sexual Activity   Alcohol Use Not Currently     Social History     Tobacco Use   Smoking Status Never Smoker   Smokeless Tobacco Never Used     Social History     Substance and Sexual Activity   Drug Use Not Currently       Family History:  Family History   Problem Relation Age of Onset    Atrial fibrillation Mother     Cancer Father        Physical Exam: Vitals:   Blood Pressure: 153/72 (07/30/21 1009)  Pulse: 86 (07/30/21 1009)  Temperature: 98 5 °F (36 9 °C) (07/30/21 1009)  Temp Source: Temporal (07/30/21 0600)  Respirations: 20 (07/30/21 1009)  Height: 5' 2" (157 5 cm) (07/30/21 1009)  Weight - Scale: 91 5 kg (201 lb 11 5 oz) (07/30/21 0122)  SpO2: 96 % (07/30/21 1009)    Physical Exam  Vitals and nursing note reviewed  Constitutional:       General: She is not in acute distress  Appearance: She is well-developed  She is ill-appearing  HENT:      Head: Normocephalic and atraumatic  Eyes:      Conjunctiva/sclera: Conjunctivae normal    Cardiovascular:      Rate and Rhythm: Normal rate and regular rhythm  Heart sounds: No murmur heard  Pulmonary:      Effort: Pulmonary effort is normal  No respiratory distress  Breath sounds: Normal breath sounds  No wheezing or rales  Abdominal:      Palpations: Abdomen is soft  Tenderness: There is abdominal tenderness (Some discomfort around the right-sided abdomen)  Musculoskeletal:         General: No swelling  Cervical back: Neck supple  Skin:     General: Skin is warm and dry  Neurological:      General: No focal deficit present  Mental Status: She is alert and oriented to person, place, and time            Additional Data:     Lab Results:  Results from last 7 days   Lab Units 07/30/21  0128   WBC Thousand/uL 7 71   HEMOGLOBIN g/dL 12 5   HEMATOCRIT % 39 3   PLATELETS Thousands/uL 217   NEUTROS PCT % 76*   LYMPHS PCT % 11*   MONOS PCT % 8   EOS PCT % 4     Results from last 7 days   Lab Units 07/30/21  0128   SODIUM mmol/L 141   POTASSIUM mmol/L 4 0   CHLORIDE mmol/L 99*   CO2 mmol/L 28   BUN mg/dL 54*   CREATININE mg/dL 1 61*   ANION GAP mmol/L 14*   CALCIUM mg/dL 9 6   ALBUMIN g/dL 3 7   TOTAL BILIRUBIN mg/dL 0 97   ALK PHOS U/L 138*   ALT U/L 13   AST U/L 16   GLUCOSE RANDOM mg/dL 183*     Results from last 7 days   Lab Units 07/29/21  0517   INR  2 09*     Results from last 7 days   Lab Units 07/30/21  1117 07/29/21  1134 07/29/21  0703 07/28/21  2130 07/28/21  1641 07/28/21  1124 07/28/21  0750 07/27/21  2127 07/27/21  1624 07/27/21  1051 07/27/21  0753 07/26/21  2059   POC GLUCOSE mg/dl 133 190* 132 162* 118 209* 143* 134 178* 169* 129 141*         Results from last 7 days   Lab Units 07/26/21  0832   LACTIC ACID mmol/L 1 2       Imaging: Reviewed radiology reports from this admission including: chest xray  CT head without contrast   Final Result by Carl Ayoub DO (07/30 9550)      No acute intracranial abnormality  Stable microangiopathic changes within the brain  Workstation performed: [id number]         XR chest portable   Final Result by Claudette Ibarra MD (07/30 1057)   Persistent mild vascular congestion      Stable exam         Workstation performed: [id number]         XR abdomen obstruction series    (Results Pending)       EKG and Other Studies Reviewed on Admission:   · EKG: Atrial fibrillation  HR 90     ** Please Note: This note has been constructed using a voice recognition system   **

## 2021-07-30 NOTE — ASSESSMENT & PLAN NOTE
· Occurred last night nausea and vomiting at 10:00 p m  Anushka Cui She was doing fine at dinner time she did not eat much she had a bowel movement x2 yesterday  She also is complaining of some abdominal discomfort  Denies any vertigo type of symptoms started prior  Evaluated by Cardiology as her pacemaker was firing when she was going in and out of AFib downstairs she was feeling it  That was suspect secondary to the vasovagal bradycardia and she does not need any pacemaker urgently replaced at this time as it is working fine  · Id to obstruction series there is some evidence of stool burden will place on clears pay place her on Pepcid IV and give her some Dulcolax and Colace see if that improves will hold iron Man sterilely as well patient did not take Keflex last night  · UA is negative UTI actually has cleared  · CT of the brain is negative for any acute findings  · Zofran p r n

## 2021-07-30 NOTE — CONSULTS
Consultation - Cardiology   Shmuel Blankenship 77 y o  female MRN: [id number]  Unit/Bed#: ED 02 Encounter: [id number]    Assessment/Plan     Assessment:  Nausea and Vomiting with abdominal discomfort  HFpEF- euvolemic  SSS sp PPM- RA lead off due to poor sensing and function planning for EP evaluation in future for RA lead revision vs His bundle pacing if able   --> lead has been off since 2012  pHTN  Persistent atrial fibrillation- anticoagulated    Plan:  1  It is very likely that patient has vasovagal symptoms triggering nausea and vomiting which drops her HR and allows her pacemaker to capture  Does not require EP evaluation urgently at this time as pacemaker is working appropriately  2  No further change in regimen is indicated at this time  3  Outpatient follow up is arranged  History of Present Illness   Physician Requesting Consult: No att  providers found  Reason for Consult / Principal Problem: Persistent afib   HPI: Shmuel Blankenship is a 77y o  year old female with presented last night for nausea and vomiting  She was admitted earlier this week for bradycardia and HFpEF  Her capture rate was up-titrated to 70 and she required IV diuresis  She diuresed well and felt better, was discharged yesterday  She represented last night after feeling like the room was spinning and made her get nausea and subsequently vomit  She has felt weak since  She states her stomach doesn't feel right  She has no worsening breathing or chest pain  When she is vomiting it makes her feel like her heart is racing  Monitor showing when she feels symptomatic she is in a paced rhythm  During admission previously pt had no symptoms during paced rhythm  Inpatient consult to Cardiology  Consult performed by: Cisco Ronquillo PA-C  Consult ordered by: Jeri Jung MD          Review of Systems   Constitutional: Positive for fatigue  Negative for chills and unexpected weight change     Respiratory: Negative for chest tightness, shortness of breath and wheezing  Cardiovascular: Positive for palpitations  Negative for chest pain and leg swelling  Gastrointestinal: Positive for abdominal pain, nausea and vomiting  Genitourinary: Negative for difficulty urinating  Musculoskeletal: Negative for arthralgias  Skin: Negative for color change, pallor and rash  Neurological: Positive for dizziness  Negative for syncope and light-headedness  Psychiatric/Behavioral: Negative for agitation  Historical Information   Past Medical History:   Diagnosis Date    Arthritis     CHF [facility]congestive [facility] failure) ([facility]     Diabetes mellitus ([facility] )     Disease of thyroid gland     Hypertension     Pacemaker     Renal disorder      Past Surgical History:   Procedure Laterality Date    CARDIAC PACEMAKER PLACEMENT      CARDIAC PACEMAKER PLACEMENT      CHOLECYSTECTOMY      JOINT REPLACEMENT      bilateral knee replacements    ORIF TIBIA & FIBULA FRACTURES Left 10/29/2020    Procedure: OPEN REDUCTION W/ INTERNAL FIXATION (ORIF) ANKLE;  Surgeon: Ming Baird;   Location:  MAIN OR;  Service: Orthopedics    TONSILLECTOMY      TUBAL LIGATION       Social History     Substance and Sexual Activity   Alcohol Use Not Currently     Social History     Substance and Sexual Activity   Drug Use Not Currently     E-Cigarette/Vaping    E-Cigarette Use Never User      E-Cigarette/Vaping Substances     Social History     Tobacco Use   Smoking Status Never Smoker   Smokeless Tobacco Never Used     Family History: non-contributory    Meds/Allergies   all current active meds have been reviewed  Allergies   Allergen Reactions    Rofecoxib Angioedema, Swelling, Hives and Other (See Comments)     swelling, sob  swelling, sob  Other reaction(s): Swelling / Edema  swelling, sob  Other reaction(s): SWELLING  Other reaction(s): Angioedema      Oxycodone-Acetaminophen GI Intolerance and Other (See Comments)     Unsure of rxn   Unsure of rxn Unsure of rxn   Other reaction(s): "CAN'T BREATH"      Medical Tape Rash       Objective   Vitals: Blood pressure 132/63, pulse 69, temperature (!) 97 °F (36 1 °C), temperature source Temporal, resp  rate 16, weight 91 5 kg (201 lb 11 5 oz), SpO2 94 %  Orthostatic Blood Pressures      Most Recent Value   Blood Pressure  132/63 filed at 07/30/2021 8322   Patient Position - Orthostatic VS  Lying filed at 07/30/2021 0630            Intake/Output Summary (Last 24 hours) at 7/30/2021 0749  Last data filed at 7/30/2021 0251  Gross per 24 hour   Intake 1050 ml   Output --   Net 1050 ml       Invasive Devices     Peripheral Intravenous Line            Peripheral IV 07/30/21 Left Antecubital <1 day                Physical Exam  Constitutional:       Appearance: Normal appearance  HENT:      Head: Normocephalic and atraumatic  Neck:      Comments: -JVD  Cardiovascular:      Rate and Rhythm: Rhythm irregular  Heart sounds: No murmur heard  No gallop  Pulmonary:      Effort: Pulmonary effort is normal       Breath sounds: Normal breath sounds  Abdominal:      Tenderness: There is abdominal tenderness  Musculoskeletal:         General: No swelling  Skin:     General: Skin is warm and dry  Capillary Refill: Capillary refill takes less than 2 seconds  Neurological:      General: No focal deficit present  Mental Status: She is alert and oriented to person, place, and time  Psychiatric:         Mood and Affect: Mood normal          Thought Content: Thought content normal          Lab Results:   I have personally reviewed pertinent lab results      CBC with diff:   Results from last 7 days   Lab Units 07/30/21  0128   WBC Thousand/uL 7 71   RBC Million/uL 5 04   HEMOGLOBIN g/dL 12 5   HEMATOCRIT % 39 3   MCV fL 78*   MCH pg 24 8*   MCHC g/dL 31 8   RDW % 16 9*   MPV fL 9 9   PLATELETS Thousands/uL 217     CMP:   Results from last 7 days   Lab Units 07/30/21  0128   SODIUM mmol/L 141   POTASSIUM mmol/L 4 0   CHLORIDE mmol/L 99*   CO2 mmol/L 28   BUN mg/dL 54*   CREATININE mg/dL 1 61*   CALCIUM mg/dL 9 6   AST U/L 16   ALT U/L 13   ALK PHOS U/L 138*   EGFR ml/min/1 73sq m 31     Troponin:   0   Lab Value Date/Time    TROPONINI <0 02 07/30/2021 0133    TROPONINI <0 02 07/26/2021 0832    TROPONINI <0 02 06/15/2021 0955    TROPONINI <0 02 10/25/2020 1803    TROPONINI <0 02 10/25/2020 1532    TROPONINI <0 02 10/25/2020 1144     BNP:   Results from last 7 days   Lab Units 07/30/21  0128   POTASSIUM mmol/L 4 0   CHLORIDE mmol/L 99*   CO2 mmol/L 28   BUN mg/dL 54*   CREATININE mg/dL 1 61*   CALCIUM mg/dL 9 6   EGFR ml/min/1 73sq m 31     Coags:   Results from last 7 days   Lab Units 07/29/21  0517   INR  2 09*     TSH:   Results from last 7 days   Lab Units 07/26/21  1253   TSH 3RD GENERATON uIU/mL 1 961     Magnesium:   Results from last 7 days   Lab Units 07/30/21  0128   MAGNESIUM mg/dL 2 6     Imaging: I have personally reviewed pertinent reports  Echocardiogram: 04/05/2021: Normal ventricle size, wall thickness with preserved systolic function  Ejection fraction 60%  Dilated right ventricle  Leads in the right heart  Dilated left and right atrium  Trace mitral regurgitation  Moderate to severe tricuspid regurgitation with estimated right ventricular systolic pressure of 72 mm of mercury       EKG: afib

## 2021-07-30 NOTE — EMTALA/ACUTE CARE TRANSFER
[address] [place], [place] [address]  [facility] [address]  Dept: [phone]      EMTALA TRANSFER CONSENT    NAME Maricarmen Lockhart                                         1945                              MRN [id number]    I have been informed of my rights regarding examination, treatment, and transfer   by Dr Aruna Brown DO    Benefits: Specialized equipment and/or services available at the receiving facility (Include comment)________________________    Risks: Potential for delay in receiving treatment, Potential deterioration of medical condition, Loss of IV, Increased discomfort during transfer      Consent for Transfer:  I acknowledge that my medical condition has been evaluated and explained to me by the emergency department physician or other qualified medical person and/or my attending physician, who has recommended that I be transferred to the service of  Accepting Physician: Dr Ernesto Stone at [address] [facility] [address], [address] : [facility], [place], [place]  The above potential benefits of such transfer, the potential risks associated with such transfer, and the probable risks of not being transferred have been explained to me, and I fully understand them  The doctor has explained that, in my case, the benefits of transfer outweigh the risks  I agree to be transferred  I authorize the performance of emergency medical procedures and treatments upon me in both transit and upon arrival at the receiving facility  Additionally, I authorize the release of any and all medical records to the receiving facility and request they be transported with me, if possible  I understand that the safest mode of transportation during a medical emergency is an ambulance and that the Hospital advocates the use of this mode of transport   Risks of traveling to the receiving facility by car, including absence of medical control, life sustaining equipment, such as oxygen, and medical personnel has been explained to me and I fully understand them  (AVI CORRECT BOX BELOW)  [  ]  I consent to the stated transfer and to be transported by ambulance/helicopter  [  ]  I consent to the stated transfer, but refuse transportation by ambulance and accept full responsibility for my transportation by car  I understand the risks of non-ambulance transfers and I exonerate the Hospital and its staff from any deterioration in my condition that results from this refusal     X___________________________________________    DATE  21  TIME________  Signature of patient or legally responsible individual signing on patient behalf           RELATIONSHIP TO PATIENT_________________________          Provider Certification    NAME Maricarmen Lockhart                                         1945                              MRN [id number]    A medical screening exam was performed on the above named patient  Based on the examination:    Condition Necessitating Transfer The primary encounter diagnosis was Nausea and vomiting  Diagnoses of Sick sinus syndrome (HCC) and Persistent atrial fibrillation ([facility] [address] ) were also pertinent to this visit      Patient Condition: The patient has been stabilized such that within reasonable medical probability, no material deterioration of the patient condition or the condition of the unborn child(porter) is likely to result from the transfer    Reason for Transfer: Level of Care needed not available at this facility    Transfer Requirements: [facility], [place], [place]   · Space available and qualified personnel available for treatment as acknowledged by Berta Tejeda  · Agreed to accept transfer and to provide appropriate medical treatment as acknowledged by       Dr Ernesto Stone  · Appropriate medical records of the examination and treatment of the patient are provided at the time of transfer   [address] _______  · Transfer will be performed by qualified personnel from    and appropriate transfer equipment as required, including the use of necessary and appropriate life support measures  Provider Certification: I have examined the patient and explained the following risks and benefits of being transferred/refusing transfer to the patient/family:  General risk, such as traffic hazards, adverse weather conditions, rough terrain or turbulence, possible failure of equipment (including vehicle or aircraft), or consequences of actions of persons outside the control of the transport personnel, Unanticipated needs of medical equipment and personnel during transport, Risk of worsening condition, The possibility of a transport vehicle being unavailable      Based on these reasonable risks and benefits to the patient and/or the unborn child(porter), and based upon the information available at the time of the patients examination, I certify that the medical benefits reasonably to be expected from the provision of appropriate medical treatments at another medical facility outweigh the increasing risks, if any, to the individuals medical condition, and in the case of labor to the unborn child, from effecting the transfer      X____________________________________________ DATE 07/30/21        TIME_______      ORIGINAL - SEND TO MEDICAL RECORDS   COPY - SEND WITH PATIENT DURING TRANSFER

## 2021-07-31 VITALS
HEART RATE: 78 BPM | BODY MASS INDEX: 37.12 KG/M2 | RESPIRATION RATE: 18 BRPM | DIASTOLIC BLOOD PRESSURE: 62 MMHG | WEIGHT: 201.72 LBS | HEIGHT: 62 IN | OXYGEN SATURATION: 95 % | TEMPERATURE: 98.1 F | SYSTOLIC BLOOD PRESSURE: 140 MMHG

## 2021-07-31 LAB
ALBUMIN SERPL BCP-MCNC: 3.2 G/DL (ref 3.5–5)
ALP SERPL-CCNC: 123 U/L (ref 46–116)
ALT SERPL W P-5'-P-CCNC: 19 U/L (ref 12–78)
ANION GAP SERPL CALCULATED.3IONS-SCNC: 9 MMOL/L (ref 4–13)
AST SERPL W P-5'-P-CCNC: 15 U/L (ref 5–45)
BACTERIA BLD CULT: NORMAL
BACTERIA BLD CULT: NORMAL
BILIRUB SERPL-MCNC: 0.79 MG/DL (ref 0.2–1)
BUN SERPL-MCNC: 45 MG/DL (ref 5–25)
CALCIUM ALBUM COR SERPL-MCNC: 9.3 MG/DL (ref 8.3–10.1)
CALCIUM SERPL-MCNC: 8.7 MG/DL (ref 8.3–10.1)
CHLORIDE SERPL-SCNC: 103 MMOL/L (ref 100–108)
CO2 SERPL-SCNC: 31 MMOL/L (ref 21–32)
CREAT SERPL-MCNC: 1.36 MG/DL (ref 0.6–1.3)
GFR SERPL CREATININE-BSD FRML MDRD: 38 ML/MIN/1.73SQ M
GLUCOSE P FAST SERPL-MCNC: 124 MG/DL (ref 65–99)
GLUCOSE SERPL-MCNC: 118 MG/DL (ref 65–140)
GLUCOSE SERPL-MCNC: 124 MG/DL (ref 65–140)
GLUCOSE SERPL-MCNC: 160 MG/DL (ref 65–140)
INR PPP: 1.69 (ref 0.84–1.19)
POTASSIUM SERPL-SCNC: 3.9 MMOL/L (ref 3.5–5.3)
PROT SERPL-MCNC: 7.4 G/DL (ref 6.4–8.2)
PROTHROMBIN TIME: 19.6 SECONDS (ref 11.6–14.5)
SODIUM SERPL-SCNC: 143 MMOL/L (ref 136–145)

## 2021-07-31 PROCEDURE — 80053 COMPREHEN METABOLIC PANEL: CPT | Performed by: FAMILY MEDICINE

## 2021-07-31 PROCEDURE — 85610 PROTHROMBIN TIME: CPT | Performed by: FAMILY MEDICINE

## 2021-07-31 PROCEDURE — 82948 REAGENT STRIP/BLOOD GLUCOSE: CPT

## 2021-07-31 PROCEDURE — 99217 PR OBSERVATION CARE DISCHARGE MANAGEMENT: CPT | Performed by: FAMILY MEDICINE

## 2021-07-31 RX ADMIN — FAMOTIDINE 20 MG: 10 INJECTION, SOLUTION INTRAVENOUS at 08:00

## 2021-07-31 RX ADMIN — INSULIN LISPRO 1 UNITS: 100 INJECTION, SOLUTION INTRAVENOUS; SUBCUTANEOUS at 12:25

## 2021-07-31 RX ADMIN — AMLODIPINE BESYLATE 2.5 MG: 2.5 TABLET ORAL at 08:00

## 2021-07-31 RX ADMIN — OXYCODONE HYDROCHLORIDE AND ACETAMINOPHEN 500 MG: 500 TABLET ORAL at 08:00

## 2021-07-31 RX ADMIN — LEVOTHYROXINE SODIUM 150 MCG: 150 TABLET ORAL at 05:05

## 2021-07-31 NOTE — ASSESSMENT & PLAN NOTE
· On admission nausea and vomiting at 10:00 p m  Amalia Kendrick She was doing fine at dinner time she did not eat much she had a bowel movement x2 yesterday  She also is complaining of some abdominal discomfort  Denies any vertigo type of symptoms   Evaluated by Cardiology as her pacemaker was firing when she was going in and out of AFib downstairs she was feeling it  Needs outpatient follow-up with Cardiology to further address the pacemaker issues  · Probably had mild viral gastroenteritis which is now resolved  Advance diet and observe    Possible discharge home today

## 2021-07-31 NOTE — ASSESSMENT & PLAN NOTE
Lab Results   Component Value Date    HGBA1C 7 0 (H) 06/15/2021       Recent Labs     07/30/21  1117 07/30/21  1547 07/30/21  2107 07/31/21  0753   POCGLU 133 164* 144* 118   ·   Blood sugars are well controlled    Continue soft diabetic diet  Blood Sugar Average: Last 72 hrs:  (P) 139 75

## 2021-07-31 NOTE — CASE MANAGEMENT
Case Management Progress Note    Patient name Nadia Sorto  Location [address] 320/-01 MRN [id number]  : 1945 Date 2021       LOS (days): 0  Geometric Mean LOS (GMLOS) (days):   Days to GMLOS:        PROGRESS NOTE:    Pts has a follow up PCP appointment on 8/3/21 at 02 [phone], AVS updated

## 2021-07-31 NOTE — DISCHARGE SUMMARY
[address]  Discharge- Jason Barajas 1945, 77 y o  female MRN: [id number]  Unit/Bed#: -[place] Encounter: [id number]  Primary Care Provider: Jose Angulo MD   Date and time admitted to hospital: 7/30/2021  1:18 AM    * Nausea and vomiting  Assessment & Plan  · On admission nausea and vomiting at 10:00 p m  Carl Thapa She was doing fine at dinner time she did not eat much she had a bowel movement x2 yesterday  She also is complaining of some abdominal discomfort  Denies any vertigo type of symptoms   Evaluated by Cardiology as her pacemaker was firing when she was going in and out of AFib downstairs she was feeling it  Needs outpatient follow-up with Cardiology to further address the pacemaker issues  · Probably had mild viral gastroenteritis which is now resolved  Advance diet and observe  Possible discharge home today    Essential hypertension  Assessment & Plan  · Resume Norvasc    Acquired hypothyroidism  Assessment & Plan  · Resume Synthroid    Stage 3 chronic kidney disease [facility])  Assessment & Plan  Lab Results   Component Value Date    EGFR 38 07/31/2021    EGFR 31 07/30/2021    EGFR 34 07/29/2021    CREATININE 1 36 (H) 07/31/2021    CREATININE 1 61 (H) 07/30/2021    CREATININE 1 51 (H) 07/29/2021   · Creatinine baseline is 1 3-1 5 he has CKD stage 3  She had nausea and vomiting creatinine slightly above 1 6 but did not constitute Ishmael on admission  Creatinine is back down to baseline of 1 3  Restart torsemide    Type 2 diabetes mellitus with diabetic nephropathy [facility])  Assessment & Plan  Lab Results   Component Value Date    HGBA1C 7 0 (H) 06/15/2021       Recent Labs     07/30/21  1117 07/30/21  1547 07/30/21  2107 07/31/21  0753   POCGLU 133 164* 144* 118   ·   Blood sugars are well controlled    Continue soft diabetic diet  Blood Sugar Average: Last 72 hrs:  (P) 728 77    Diastolic congestive heart failure (HCC)  Assessment & Plan  Wt Readings from Last 3 Encounters: 07/30/21 91 5 kg (201 lb 11 5 oz)   07/29/21 89 1 kg (196 lb 6 4 oz)   06/18/21 94 6 kg (208 lb 9 6 oz)     · Clinically she is euvolemic no edema lungs are actually clear she is 98% on room air the whole time in the ER chest x-ray I reviewed she does have still persistent mild vascular congestion although it is not causing any shortness of breath right now and clinically she does not look overloaded proBNP is decreased  Continue home regimen of torsemide  · She is going to need an outpatient schedule of catheterization to evaluate her pulmonary hypertension  · Echocardiogram: 04/05/2021: Normal ventricle size, wall thickness with preserved systolic function  Ejection fraction 60%  Dilated right ventricle  Leads in the right heart  Dilated left and right atrium  Trace mitral regurgitation  Moderate to severe tricuspid regurgitation with estimated right ventricular systolic pressure of 72 mm of mercury  Atrial fibrillation ([address] )  Assessment & Plan  · Continue Coumadin for anticoagulation  INR is low at 1 69  She is status post pacemakerSSS sp PPM- RA lead off due to poor sensing and function planning for EP evaluation in future for RA lead revision vs His bundle pacing if able have pacemakers working after evaluation by Cardiology today no urgent need to replace a pacemaker  This could be all done as an outpatient  · The patient was previously on Cardizem CD but it was stopped     Continue Norvasc for now and Cardizem has been discontinued      Discharging Physician / Practitioner: Elly Gale MD  PCP: Francisco Goetz MD  Admission Date:   Admission Orders (From admission, onward)     Ordered        07/30/21 0924  Place in Observation  Once                   Discharge Date: 07/31/21    Medical Problems     Resolved Problems  Date Reviewed: 7/31/2021    None                Consultations During Hospital Stay:  · Cardiology    Procedures Performed:   · None    Significant Findings / Test Results:   XR chest portable    Result Date: 7/30/2021  Impression: Persistent mild vascular congestion Stable exam Workstation performed: [id number]     XR abdomen obstruction series    Result Date: 7/30/2021  Impression: Nonobstructive bowel gas pattern  Moderate colonic stool  Mild pulmonary venous congestion without overt pulmonary edema  Workstation performed: [id number]     CT head without contrast    Result Date: 7/30/2021  Impression: No acute intracranial abnormality  Stable microangiopathic changes within the brain  Workstation performed: [id number]     Incidental Findings:   · None     Test Results Pending at Discharge (will require follow up):   · CBC and BMP in 1 week outpatient     Outpatient Tests Requested:  · Outpatient follow-up with Cardiology    Complications:  None    Reason for Admission:  Nausea vomiting    Hospital Course:     Marisol Mena is a 77 y o  female patient who originally presented to the hospital on 7/30/2021 due to nausea vomiting and mild dehydration  Appear to be mild viral gastroenteritis  Held torsemide for 1 day is now clinically improving advance diet and will discharge home today  Needs outpatient follow-up with Cardiology due to pacemaker issue  Please see above list of diagnoses and related plan for additional information  Condition at Discharge: good     Discharge Day Visit / Exam:     Subjective:  Patient denies any chest pain or shortness of breath or abdominal pain  No nausea vomiting or diarrhea reported no abdominal pain reported  Vitals: Blood Pressure: 140/62 (07/31/21 0749)  Pulse: 78 (07/31/21 0749)  Temperature: 98 1 °F (36 7 °C) (07/31/21 0749)  Temp Source: Temporal (07/30/21 0600)  Respirations: 18 (07/31/21 0749)  Height: 5' 2" (157 5 cm) (07/30/21 1009)  Weight - Scale: 91 5 kg (201 lb 11 5 oz) (07/30/21 0122)  SpO2: 95 % (07/31/21 0749)  Exam:   Physical Exam  Vitals and nursing note reviewed     Constitutional:       Appearance: Normal appearance  HENT:      Head: Normocephalic and atraumatic  Right Ear: External ear normal       Left Ear: External ear normal       Nose: Nose normal       Mouth/Throat:      Pharynx: Oropharynx is clear  Cardiovascular:      Rate and Rhythm: Normal rate and regular rhythm  Heart sounds: Normal heart sounds  Pulmonary:      Effort: Pulmonary effort is normal       Breath sounds: Normal breath sounds  Abdominal:      General: Bowel sounds are normal       Palpations: Abdomen is soft  Tenderness: There is no abdominal tenderness  Musculoskeletal:         General: Normal range of motion  Cervical back: Normal range of motion and neck supple  Skin:     General: Skin is warm and dry  Capillary Refill: Capillary refill takes less than 2 seconds  Neurological:      General: No focal deficit present  Mental Status: She is alert and oriented to person, place, and time  Psychiatric:         Mood and Affect: Mood normal          Discussion with Family:  Will discuss with family    Discharge instructions/Information to patient and family:   See after visit summary for information provided to patient and family  Provisions for Follow-Up Care:  See after visit summary for information related to follow-up care and any pertinent home health orders  Disposition:     Home    For Discharges to [facility] SNF:   · Not Applicable to this Patient - Not Applicable to this Patient    Planned Readmission:  None     Discharge Statement:  I spent 35 minutes discharging the patient  This time was spent on the day of discharge  I had direct contact with the patient on the day of discharge  Greater than 50% of the total time was spent examining patient, answering all patient questions, arranging and discussing plan of care with patient as well as directly providing post-discharge instructions  Additional time then spent on discharge activities      Discharge Medications:  See after visit summary for reconciled discharge medications provided to patient and family        ** Please Note: This note has been constructed using a voice recognition system **

## 2021-07-31 NOTE — ASSESSMENT & PLAN NOTE
Lab Results   Component Value Date    EGFR 38 07/31/2021    EGFR 31 07/30/2021    EGFR 34 07/29/2021    CREATININE 1 36 (H) 07/31/2021    CREATININE 1 61 (H) 07/30/2021    CREATININE 1 51 (H) 07/29/2021   · Creatinine baseline is 1 3-1 5 he has CKD stage 3  She had nausea and vomiting creatinine slightly above 1 6 but did not constitute Ishmael on admission  Creatinine is back down to baseline of 1 3    Restart torsemide

## 2021-07-31 NOTE — PLAN OF CARE
Problem: Potential for Falls  Goal: Patient will remain free of falls  Description: INTERVENTIONS:  - Educate patient/family on patient safety including physical limitations  - Instruct patient to call for assistance with activity   - Consult OT/PT to assist with strengthening/mobility   - Keep Call bell within reach  - Keep bed low and locked with side rails adjusted as appropriate  - Keep care items and personal belongings within reach  - Initiate and maintain comfort rounds  - Make Fall Risk Sign visible to staff  - Offer Toileting every 2 Hours, in advance of need  - Initiate/Maintain bed alarm  - Obtain necessary fall risk management equipment: walker  - Apply yellow socks and bracelet for high fall risk patients  - Consider moving patient to room near nurses station  Outcome: Progressing     Problem: PAIN - ADULT  Goal: Verbalizes/displays adequate comfort level or baseline comfort level  Description: Interventions:  - Encourage patient to monitor pain and request assistance  - Assess pain using appropriate pain scale  - Administer analgesics based on type and severity of pain and evaluate response  - Implement non-pharmacological measures as appropriate and evaluate response  - Consider cultural and social influences on pain and pain management  - Notify physician/advanced practitioner if interventions unsuccessful or patient reports new pain  Outcome: Progressing     Problem: INFECTION - ADULT  Goal: Absence or prevention of progression during hospitalization  Description: INTERVENTIONS:  - Assess and monitor for signs and symptoms of infection  - Monitor lab/diagnostic results  - Monitor all insertion sites, i e  indwelling lines, tubes, and drains  - Monitor endotracheal if appropriate and nasal secretions for changes in amount and color  - Band[place] appropriate cooling/warming therapies per order  - Administer medications as ordered  - Instruct and encourage patient and family to use good hand hygiene technique  - Identify and instruct in appropriate isolation precautions for identified infection/condition  Outcome: Progressing  Goal: Absence of fever/infection during neutropenic period  Description: INTERVENTIONS:  - Monitor WBC    Outcome: Progressing     Problem: SAFETY ADULT  Goal: Patient will remain free of falls  Description: INTERVENTIONS:  - Educate patient/family on patient safety including physical limitations  - Instruct patient to call for assistance with activity   - Consult OT/PT to assist with strengthening/mobility   - Keep Call bell within reach  - Keep bed low and locked with side rails adjusted as appropriate  - Keep care items and personal belongings within reach  - Initiate and maintain comfort rounds  - Make Fall Risk Sign visible to staff  - Offer Toileting every 2 Hours, in advance of need  - Initiate/Maintain bed alarm  - Obtain necessary fall risk management equipment: walker  - Apply yellow socks and bracelet for high fall risk patients  - Consider moving patient to room near nurses station  Outcome: Progressing  Goal: Maintain or return to baseline ADL function  Description: INTERVENTIONS:  -  Assess patient's ability to carry out ADLs; assess patient's baseline for ADL function and identify physical deficits which impact ability to perform ADLs (bathing, care of mouth/teeth, toileting, grooming, dressing, etc )  - Assess/evaluate cause of self-care deficits   - Assess range of motion  - Assess patient's mobility; develop plan if impaired  - Assess patient's need for assistive devices and provide as appropriate  - Encourage maximum independence but intervene and supervise when necessary  - Involve family in performance of ADLs  - Assess for home care needs following discharge   - Consider OT consult to assist with ADL evaluation and planning for discharge  - Provide patient education as appropriate  Outcome: Progressing  Goal: Maintains/Returns to pre admission functional level  Description: INTERVENTIONS:  - Perform BMAT or MOVE assessment daily    - Set and communicate daily mobility goal to care team and patient/family/caregiver  - Collaborate with rehabilitation services on mobility goals if consulted  - Reposition patient every 2 hours  - Out of bed for toileting  - Record patient progress and toleration of activity level   Outcome: Progressing     Problem: DISCHARGE PLANNING  Goal: Discharge to home or other facility with appropriate resources  Description: INTERVENTIONS:  - Identify barriers to discharge w/patient and caregiver  - Arrange for needed discharge resources and transportation as appropriate  - Identify discharge learning needs (meds, wound care, etc )  - Arrange for interpretive services to assist at discharge as needed  - Refer to Case Management Department for coordinating discharge planning if the patient needs post-hospital services based on physician/advanced practitioner order or complex needs related to functional status, cognitive ability, or social support system  Outcome: Progressing     Problem: Knowledge Deficit  Goal: Patient/family/caregiver demonstrates understanding of disease process, treatment plan, medications, and discharge instructions  Description: Complete learning assessment and assess knowledge base    Interventions:  - Provide teaching at level of understanding  - Provide teaching via preferred learning methods  Outcome: Progressing     Problem: GASTROINTESTINAL - ADULT  Goal: Minimal or absence of nausea and/or vomiting  Description: INTERVENTIONS:  - Administer IV fluids if ordered to ensure adequate hydration  - Maintain NPO status until nausea and vomiting are resolved  - Nasogastric tube if ordered  - Administer ordered antiemetic medications as needed  - Provide nonpharmacologic comfort measures as appropriate  - Advance diet as tolerated, if ordered  - Consider nutrition services referral to assist patient with adequate nutrition and appropriate food choices  Outcome: Progressing  Goal: Maintains or returns to baseline bowel function  Description: INTERVENTIONS:  - Assess bowel function  - Encourage oral fluids to ensure adequate hydration  - Administer IV fluids if ordered to ensure adequate hydration  - Administer ordered medications as needed  - Encourage mobilization and activity  - Consider nutritional services referral to assist patient with adequate nutrition and appropriate food choices  Outcome: Progressing     Problem: METABOLIC, FLUID AND ELECTROLYTES - ADULT  Goal: Glucose maintained within target range  Description: INTERVENTIONS:  - Monitor Blood Glucose as ordered  - Assess for signs and symptoms of hyperglycemia and hypoglycemia  - Administer ordered medications to maintain glucose within target range  - Assess nutritional intake and initiate nutrition service referral as needed  Outcome: Progressing     Problem: Nutrition/Hydration-ADULT  Goal: Nutrient/Hydration intake appropriate for improving, restoring or maintaining nutritional needs  Description: Monitor and assess patient's nutrition/hydration status for malnutrition  Collaborate with interdisciplinary team and initiate plan and interventions as ordered  Monitor patient's weight and dietary intake as ordered or per policy  Utilize nutrition screening tool and intervene as necessary  Determine patient's food preferences and provide high-protein, high-caloric foods as appropriate       INTERVENTIONS:  - Monitor oral intake, urinary output, labs, and treatment plans  - Assess nutrition and hydration status and recommend course of action  - Evaluate amount of meals eaten  - Assist patient with eating if necessary   - Allow adequate time for meals  - Recommend/ encourage appropriate diets, oral nutritional supplements, and vitamin/mineral supplements  - Order, calculate, and assess calorie counts as needed  - Recommend, monitor, and adjust tube feedings and TPN/PPN based on assessed needs  - Assess need for intravenous fluids  - Provide specific nutrition/hydration education as appropriate  - Include patient/family/caregiver in decisions related to nutrition  Outcome: Progressing

## 2021-07-31 NOTE — ASSESSMENT & PLAN NOTE
· Continue Coumadin for anticoagulation  INR is low at 1 69  She is status post pacemakerSSS sp PPM- RA lead off due to poor sensing and function planning for EP evaluation in future for RA lead revision vs His bundle pacing if able have pacemakers working after evaluation by Cardiology today no urgent need to replace a pacemaker  This could be all done as an outpatient  · The patient was previously on Cardizem CD but it was stopped     Continue Norvasc for now and Cardizem has been discontinued

## 2021-07-31 NOTE — NURSING NOTE
Peripheral IV removed  AVS printed and reviewed with pt  Belongings sent with pt   Accompanied off unit in w/c

## 2021-07-31 NOTE — ASSESSMENT & PLAN NOTE
Wt Readings from Last 3 Encounters:   07/30/21 91 5 kg (201 lb 11 5 oz)   07/29/21 89 1 kg (196 lb 6 4 oz)   06/18/21 94 6 kg (208 lb 9 6 oz)     · Clinically she is euvolemic no edema lungs are actually clear she is 98% on room air the whole time in the ER chest x-ray I reviewed she does have still persistent mild vascular congestion although it is not causing any shortness of breath right now and clinically she does not look overloaded proBNP is decreased  Continue home regimen of torsemide  · She is going to need an outpatient schedule of catheterization to evaluate her pulmonary hypertension  · Echocardiogram: 04/05/2021: Normal ventricle size, wall thickness with preserved systolic function  Ejection fraction 60%  Dilated right ventricle  Leads in the right heart  Dilated left and right atrium  Trace mitral regurgitation  Moderate to severe tricuspid regurgitation with estimated right ventricular systolic pressure of 72 mm of mercury

## 2021-09-21 ENCOUNTER — HOSPITAL ENCOUNTER (OUTPATIENT)
Dept: NON INVASIVE DIAGNOSTICS | Facility: HOSPITAL | Age: 76
Discharge: HOME | End: 2021-09-21
Payer: MEDICARE

## 2021-09-21 ENCOUNTER — HOSPITAL ENCOUNTER (OUTPATIENT)
Dept: RADIOLOGY | Facility: CLINIC | Age: 76
Discharge: HOME | End: 2021-09-21
Payer: MEDICARE

## 2021-09-21 VITALS — WEIGHT: 201 LBS | HEIGHT: 62 IN | BODY MASS INDEX: 36.99 KG/M2

## 2021-09-21 DIAGNOSIS — I48.19 PERSISTENT ATRIAL FIBRILLATION (HCC): ICD-10-CM

## 2021-09-21 DIAGNOSIS — Z12.31 SCREENING MAMMOGRAM, ENCOUNTER FOR: ICD-10-CM

## 2021-09-21 PROCEDURE — 77063 BREAST TOMOSYNTHESIS BI: CPT

## 2021-09-21 PROCEDURE — 93306 TTE W/DOPPLER COMPLETE: CPT

## 2021-09-21 PROCEDURE — 77067 SCR MAMMO BI INCL CAD: CPT

## 2021-10-13 ENCOUNTER — OFFICE VISIT (OUTPATIENT)
Dept: OBGYN CLINIC | Facility: CLINIC | Age: 76
End: 2021-10-13
Payer: MEDICARE

## 2021-10-13 VITALS
WEIGHT: 208.5 LBS | SYSTOLIC BLOOD PRESSURE: 130 MMHG | TEMPERATURE: 97.7 F | DIASTOLIC BLOOD PRESSURE: 60 MMHG | BODY MASS INDEX: 38.37 KG/M2 | HEIGHT: 62 IN | HEART RATE: 77 BPM

## 2021-10-13 DIAGNOSIS — G89.29 CHRONIC PAIN OF LEFT KNEE: Primary | ICD-10-CM

## 2021-10-13 DIAGNOSIS — Z96.652 PRESENCE OF ARTIFICIAL KNEE JOINT, LEFT: ICD-10-CM

## 2021-10-13 DIAGNOSIS — M25.562 CHRONIC PAIN OF LEFT KNEE: Primary | ICD-10-CM

## 2021-10-13 PROCEDURE — 99214 OFFICE O/P EST MOD 30 MIN: CPT | Performed by: ORTHOPAEDIC SURGERY

## 2022-06-07 ENCOUNTER — HOSPITAL ENCOUNTER (OUTPATIENT)
Dept: RADIOLOGY | Facility: CLINIC | Age: 77
Discharge: HOME | End: 2022-06-07
Payer: COMMERCIAL

## 2022-06-07 ENCOUNTER — OFFICE VISIT (OUTPATIENT)
Dept: OBGYN CLINIC | Facility: CLINIC | Age: 77
End: 2022-06-07
Payer: COMMERCIAL

## 2022-06-07 VITALS
BODY MASS INDEX: 39.79 KG/M2 | TEMPERATURE: 97.7 F | HEART RATE: 73 BPM | DIASTOLIC BLOOD PRESSURE: 68 MMHG | WEIGHT: 216.2 LBS | HEIGHT: 62 IN | SYSTOLIC BLOOD PRESSURE: 120 MMHG

## 2022-06-07 DIAGNOSIS — E66.01 SEVERE OBESITY WITH BODY MASS INDEX (BMI) OF 36.0 TO 36.9 WITH SERIOUS COMORBIDITY (HCC): ICD-10-CM

## 2022-06-07 DIAGNOSIS — M47.26 OTHER SPONDYLOSIS WITH RADICULOPATHY, LUMBAR REGION: ICD-10-CM

## 2022-06-07 DIAGNOSIS — M47.26 OTHER SPONDYLOSIS WITH RADICULOPATHY, LUMBAR REGION: Primary | ICD-10-CM

## 2022-06-07 DIAGNOSIS — M25.552 PAIN IN LEFT HIP: ICD-10-CM

## 2022-06-07 DIAGNOSIS — M16.0 PRIMARY OSTEOARTHRITIS OF BOTH HIPS: ICD-10-CM

## 2022-06-07 PROCEDURE — 99215 OFFICE O/P EST HI 40 MIN: CPT | Performed by: ORTHOPAEDIC SURGERY

## 2022-06-07 PROCEDURE — 73502 X-RAY EXAM HIP UNI 2-3 VIEWS: CPT

## 2022-06-07 PROCEDURE — 72100 X-RAY EXAM L-S SPINE 2/3 VWS: CPT

## 2022-06-07 NOTE — PROGRESS NOTES
ASSESSMENT/PLAN:    Diagnoses and all orders for this visit:    Other spondylosis with radiculopathy, lumbar region  -     XR spine lumbar 2 or 3 views injury; Future  -     Ambulatory Referral to Physical Therapy; Future    Pain in left hip  -     XR hip/pelv 2-3 vws left if performed; Future    Severe obesity with body mass index (BMI) of 36 0 to 36 9 with serious comorbidity (HCC)    Primary osteoarthritis of both hips        Plan:  I would recommend a trial of physical therapy and I have recommended follow-up with Dr Saima Lawton  Her clinical symptoms are most consistent with lumbar spondylosis with radiculopathy or claudication  X-rays do not show advanced degenerative disease as she was told at [facility]  In fact, the left hip does not look significantly different than the right  I will see her back as needed  If Dr Saima Lawton feels further re-evaluation is needed by Orthopedics I would be happy to see her  Return for 4-6 weeks with Dr Saima Lawton       _____________________________________________________  CHIEF COMPLAINT:  Chief Complaint   Patient presents with    Left Hip - Pain         SUBJECTIVE:  Reinaldo Golden is a 69y o  year old female who presents for evaluation of left lower extremity complaints  She complains of pain from her knee proximally to the buttock, indicating anterior thigh distal thigh pain radiating laterally into the buttock  She denies groin or medial thigh pain  She notes that she has been ambulating with a walker for at least 3 years after undergoing revision arthroplasty of her left knee for a periprosthetic fracture  She was recently seen by a [facility] orthopedic surgeon, x-rays of her hip were obtained and she was told she had advanced degenerative disease  She presents today for a 2nd opinion and to try to seek treatment closer to home  She has a minimal right lower extremity complaints  She notes minimal back pain    She does have a bilateral lower extremity neuropathy  She wears bilateral ankle-foot orthoses and uses a walker for ambulation  She notes that the left lower extremity feels weak and she generally needs assistance getting out of bed in the morning secondary to pain and weakness  She cannot stand or walk for more than 10-15 minutes without having to sit and rest due to significant left lower extremity weakness and a feeling of pain diffusely throughout the left lower extremity, especially from the buttock to the knee  She has had no treatment  PAST MEDICAL HISTORY:  Past Medical History:   Diagnosis Date    Arthritis     [facility]est[facility] [address] [facility]     [facility] [place]     Disease of thyroid gland     Hypertension     Pacemaker     Renal disorder        PAST SURGICAL HISTORY:  Past Surgical History:   Procedure Laterality Date    CARDIAC PACEMAKER PLACEMENT      CHOLECYSTECTOMY      JOINT REPLACEMENT      bilateral knee replacements    ORIF TIBIA & FIBULA FRACTURES Left 10/29/2020    Procedure: OPEN REDUCTION W/ INTERNAL FIXATION (ORIF) ANKLE;  Surgeon: Elvis Tellez;   [facility];  Service: Orthopedics    TONSILLECTOMY      TUBAL LIGATION         FAMILY HISTORY:  Family History   Problem Relation Age of Onset    Atrial fibrillation Mother     Kidney cancer Father     Breast cancer Sister         early 46s   Willena Rand No Known Problems Daughter     No Known Problems Maternal Grandmother     No Known Problems Maternal Grandfather     No Known Problems Paternal Grandmother     No Known Problems Paternal Grandfather     Breast cancer Cousin         early 46s    Kidney cancer Brother     No Known Problems Maternal Aunt     No Known Problems Maternal Aunt     No Known Problems Maternal Aunt     No Known Problems Paternal Aunt     No Known Problems Sister     No Known Problems Daughter     No Known Problems Daughter     No Known Problems Daughter     Breast cancer additional onset Neg Hx     Colon cancer Neg Hx     Endometrial cancer Neg Hx     Ovarian cancer Neg Hx     BRCA 1/2 Neg Hx     BRCA2 Positive Neg Hx     BRCA2 Negative Neg Hx     BRCA1 Positive Neg Hx     BRCA1 Negative Neg Hx        SOCIAL HISTORY:  Social History     Tobacco Use    Smoking status: Never Smoker    Smokeless tobacco: Never Used   Vaping Use    Vaping Use: Never used   Substance Use Topics    Alcohol use: Not Currently    Drug use: Not Currently       MEDICATIONS:    Current Outpatient Medications:     amLODIPine (NORVASC) 2 5 mg tablet, Take 1 tablet (2 5 mg total) by mouth daily, Disp: 30 tablet, Rfl: 0    Ascorbic Acid, Vitamin C, (VITAMIN C) 100 MG tablet, Take 500 mg by mouth daily , Disp: , Rfl:     Ferrous Sulfate (IRON PO), Take 325 mg by mouth daily , Disp: , Rfl:     gabapentin (NEURONTIN) 300 mg capsule, Take 300 mg by mouth 2 (two) times a day 1 tab qam, 2 tabs qhs, Disp: , Rfl:     levothyroxine 150 mcg tablet, Take 150 mcg by mouth daily , Disp: , Rfl:     Multiple Vitamin (Multi-Day) TABS, Take 1 tablet by mouth daily , Disp: , Rfl:     torsemide (DEMADEX) 100 mg tablet, Take 0 5 tablets (50 mg total) by mouth daily, Disp: 15 tablet, Rfl: 0    warfarin (COUMADIN) 5 mg tablet, Take 4 mg by mouth Sun, Wed, Thursday,Friday- 1 tab Tues, Saturday- 2 tab, Disp: , Rfl:     alendronate (FOSAMAX) 70 mg tablet, Take 70 mg by mouth every 7 days (Patient not taking: Reported on 6/7/2022), Disp: , Rfl:     Calcium Carb-Cholecalciferol (OSCAL-D) 500 mg-200 units per tablet, Take 1 tablet by mouth daily (Patient not taking: Reported on 6/7/2022), Disp: , Rfl:     docusate sodium (COLACE) 100 mg capsule, Take 100 mg by mouth 2 (two) times a day (Patient not taking: Reported on 6/7/2022), Disp: , Rfl:     glipiZIDE (GLUCOTROL) 5 mg tablet, Take 0 5 tablets (2 5 mg total) by mouth 2 (two) times a day before meals (Patient not taking: Reported on 6/7/2022), Disp: 30 tablet, Rfl: 0    potassium chloride (K-DUR,KLOR-CON) 20 mEq tablet, Take 1 tablet (20 mEq total) by mouth daily (Patient not taking: Reported on 6/7/2022), Disp: 30 tablet, Rfl: 0    ranitidine (ZANTAC) 300 MG tablet, Take 300 mg by mouth daily as needed  (Patient not taking: Reported on 6/7/2022), Disp: , Rfl:     ALLERGIES:  Allergies   Allergen Reactions    Rofecoxib Angioedema, Swelling, Hives and Other (See Comments)     swelling, sob  swelling, sob  Other reaction(s): Swelling / Edema  swelling, sob  Other reaction(s): SWELLING  Other reaction(s): Angioedema      Oxycodone-Acetaminophen GI Intolerance and Other (See Comments)     Unsure of rxn   Unsure of rxn   Unsure of rxn   Other reaction(s): "CAN'T BREATH"      Medical Tape Rash       Review of systems:   Constitutional: Negative for fatigue, fever or loss of apetite  HENT: Negative  Respiratory: Negative for shortness of breath, dyspnea  Cardiovascular: Negative for chest pain/tightness  Gastrointestinal: Negative for abdominal pain, N/V  Endocrine: Negative for cold/heat intolerance, unexplained weight loss/gain  Genitourinary: Negative for flank pain, dysuria, hematuria  Musculoskeletal:  Positive as in the HPI   Skin: Negative for rash  Neurological:  Positive as in the HPI  Psychiatric/Behavioral: Negative for agitation  _____________________________________________________  PHYSICAL EXAMINATION:    Blood pressure 120/68, pulse 73, temperature 97 7 °F (36 5 °C), temperature source Temporal, height 5' 2" (1 575 m), weight 98 1 kg (216 lb 3 2 oz)  General: well developed and well nourished, alert, oriented times 3 and appears comfortable at rest   She ambulates with a walker with moderate difficulty  Psychiatric: Normal  HEENT: Benign  Cardiovascular: Regular    Pulmonary: No wheezing or stridor  Abdomen: Soft, Nontender  Skin: No masses, erythema, lacerations, fluctation, ulcerations  Neurovascular:   Motor exam of the right lower extremity demonstrates good strength through hip flexion, abduction, adduction, rotation, knee flexion and extension; ankle strength not assessed at this time due to her brace is  Motor exam of the left lower extremity demonstrates weakness of knee extension at 4/5, 5/5 knee flexion, 3+ over 5 hip flexion, 4/5 hip extension, 5/5 hip abduction and adduction  Sensation decreased diffusely through the lower extremities from the knee distally  Pulses are difficult to palpate and dysvascular changes are noted  Good color and capillary refill is noted, however  MUSCULOSKELETAL EXAMINATION:    The left hip exam was performed with the patient in a seated position as she is unable to comfortably get onto exam table  She denies any complaints with knee flexion and extension during gentle, slow range of motion  She is able to extend her knee only to approximately 20° short of full extension actively but denies pain  I am able to passively extend the knee to within 5° of full extension  She has flexion to approximately 120° without complaints  Mild valgus deformity is noted  A well-healed incision is present consistent with her past surgical history  Her hip has no internal rotation from the neutral position with complaints of pain  She has about 30° of external rotation with complaints of pain  Abduction and adduction could not be assessed secondary to her inability to tolerate supine testing  She has flexion from the 90 degree position with minimal pain  The lumbar spine is generally nontender  She has minimal tenderness over the soft tissues of the lateral thigh from the greater trochanteric and buttock area distally to the mid femur  She has no tenderness over the anterior, posterior medial soft tissues        _____________________________________________________  STUDIES REVIEWED:  X-rays of the pelvis and left hip demonstrated degenerative disease of the bilateral hips with some narrowing of the joint space but preservation of joint space with minimal osteophyte formation  X-rays of the lumbar spine demonstrate advanced degenerative disease of the facets, disc space narrowing, syndesmophytes and scoliosis with loss of lumbar lordosis        Josefina Bhatti

## 2022-06-20 NOTE — PROGRESS NOTES
PT Evaluation     Today's date: 2022  Patient name: Mike Eden  : 1945  MRN: [id number]  Referring provider: Leeanna Burkitt, DO  Dx:   Encounter Diagnosis     ICD-10-CM    1  Gait abnormality  R26 9    2  Other spondylosis with radiculopathy, lumbar region  M47 26 Ambulatory Referral to Physical Therapy                  Assessment  Assessment details: PT notes the patient with decrease ROM and strength t/o the lumbar spine as well as the bilateral hip and LE with demonstration of impaired gait pattern and balance leading to increase pain levels and functional limitations with need for course of skilled therapy for 8 weeks with focus on gait/balance, lumbar stabilization, strengthening, manual therapy, analgesic modalities, and HEP  PT notes if symptoms do not improve with POC then PT will refer patient back to MD for further evaluation and treatment  Patient is scheduled for IE with pain management MD on 22 for further evaluation and treatment  Impairments: abnormal gait, abnormal or restricted ROM, activity intolerance, impaired balance, impaired physical strength, lacks appropriate home exercise program, pain with function and poor posture   Understanding of Dx/Px/POC: fair   Prognosis: fair    Goals  ST  Initiate HEP  2  Decrease symptoms by 25-50%   3  Increase ROM by 25-50%   4  Increase strength by 1-2 mm grades  LT   Maintain gait pattern at present functional limitations with no regression to lower level   2  Maintain tolerance to standing and walking  3  Improve transfer status to /S   4   Maintain bed mobility status within findings of IE   5  Transition to HEP    Plan  Plan details: PT notes review of POC and findings with patient who is in agreement with PT recommendations of course of OPPT     Patient would benefit from: PT eval and skilled physical therapy  Planned therapy interventions: manual therapy, neuromuscular re-education, patient education, postural training, self care, strengthening, stretching, therapeutic exercise, home exercise program, graded exercise and balance  Frequency: 2x week  Duration in weeks: 8  Treatment plan discussed with: patient        Subjective Evaluation    History of Present Illness  Mechanism of injury: Patient reports decline in walking for 3+ years secondary to extensive injuries to the left LE  Patient reports she visited several MD about the issues and found to have lumbar radiculopathy  Patient was referred to OPPT for evaluation and treatment of gait dysfunction as well referral to pain management for symptom control  Patient reports she fractured her right femur during a fall with damage to the left TKA  Patient underwent several surgeries to the right femur and ankle for repair  Patient reports difficulty with standing, walking, stairs, transfers, ADL, bed mobility, balance, and sleep  Patient reports she is retired with significant PMH of DM, JOS, Pacemaker, CHF, Afib, and HTN  Patient reports she has utilizes a RW the majority of the time with ambulation as well as bilateral MAFO  Pain  Current pain ratin  At best pain ratin  At worst pain ratin  Location: Left thigh   Quality: dull ache and discomfort  Aggravating factors: walking, stair climbing, standing and lifting    Treatments  Current treatment: medication and physical therapy  Patient Goals  Patient goals for therapy: decreased pain, improved balance, increased motion, increased strength and independence with ADLs/IADLs          Objective     Postural Observations  Seated posture: poor  Standing posture: poor    Additional Postural Observation Details  PT notes FB of trunk with all standing positions    Palpation   Left   Muscle spasm in the erector spinae, lumbar paraspinals, piriformis, rectus femoris and TFL  Tenderness of the erector spinae, lumbar paraspinals, piriformis, rectus femoris and TFL       Right   Muscle spasm in the erector spinae and lumbar paraspinals  Tenderness of the erector spinae and lumbar paraspinals  Tenderness     Left Hip   Tenderness in the greater trochanter and iliac crest  No tenderness in the PSIS and sacroiliac joint  Right Hip   No tenderness in the PSIS       Neurological Testing     Sensation     Lumbar   Left   Diminished: light touch    Right   Diminished: light touch    Reflexes   Left   Patellar (L4): absent (0)  Achilles (S1): absent (0)    Right   Patellar (L4): absent (0)  Achilles (S1): absent (0)    Active Range of Motion     Lumbar   Flexion: 25 degrees   Extension: -7 degrees   Left lateral flexion: 10 degrees       Right lateral flexion: 10 degrees   Left rotation: 5 degrees   Right rotation: 5 degrees   Left Hip   Flexion: 5 degrees with pain  Extension: -5 degrees with pain  Abduction: 10 degrees with pain  External rotation (90/90): 10 degrees with pain  Internal rotation (90/90): 0 degrees with pain    Right Hip   Flexion: 60 degrees   Extension: 0 degrees   Abduction: 15 degrees   External rotation (90/90): 20 degrees   Internal rotation (90/90): 5 degrees     Additional Active Range of Motion Details  PT notes decrease ROM and strength t/o the lumbar spine with trunk/core weakness with abdominals of 2/5 and paraspinals of 2/5   PT notes decrease ROM and strength t/o the bilateral hip and LE   PT notes WFL t/o the bilateral knees    Passive Range of Motion   Left Hip   Flexion: 65 degrees   Extension: 0 degrees   Abduction: 20 degrees   External rotation (90/90): 20 degrees   Internal rotation (90/90): 5 degrees     Strength/Myotome Testing     Left Hip   Planes of Motion   Flexion: 3-  Extension: 3  Abduction: 3-  Adduction: 4-  External rotation: 3  Internal rotation: 3    Right Hip   Planes of Motion   Flexion: 4-  Extension: 4-  Abduction: 4-  Adduction: 4  External rotation: 4-  Internal rotation: 4-    Left Knee   Flexion: 3+  Extension: 3  Quadriceps contraction: poor    Right Knee Flexion: 4  Extension: 4  Quadriceps contraction: good    Left Ankle/Foot   Dorsiflexion: 2  Plantar flexion: 3+  Inversion: 3  Eversion: 3    Right Ankle/Foot   Dorsiflexion: 2+  Plantar flexion: 3+  Inversion: 3+  Eversion: 3+    Ambulation   Weight-Bearing Status   Assistive device used: wheeled walker    Observational Gait   Gait: antalgic   Decreased walking speed and stride length  Additional Observational Gait Details  PT notes the patient is MI with ambulation with RW but demonstration of antalgic gait pattern with decrease hip and knee flex, decrease step length, decrease push off, decrease toe clearance, decrease antonella and FB of trunk with rating of fair- with static and dynamic activities      Neuro Exam:     Functional outcomes   TU 95 (seconds)    Amputee   Gait comment: Patient unable to complete the sit to stand test              Precautions:  PACEMAKER, PMH DM, JOS, CHF, Afib, HTN, Left ankle and femoral fracture repairs        Manuals                                        Neuro Re-Ed        Foot onto step-Front and Lateral         Cone toe taps        NBOS stance on foam         Stand lumbar extension against bar         Monster walk         Side step walk         Backwards walk         Ther Ex        Nu-step         Seated hip march         Seated LAQ         Stand hip march, ABD, and Ext         Bridge/glute set                         HEP update/review         Ther Activity                        Gait Training                        Modalities        MHP to the left hip or/and LB PRN

## 2022-06-21 ENCOUNTER — EVALUATION (OUTPATIENT)
Dept: PHYSICAL THERAPY | Facility: CLINIC | Age: 77
End: 2022-06-21
Payer: COMMERCIAL

## 2022-06-21 DIAGNOSIS — M47.26 OTHER SPONDYLOSIS WITH RADICULOPATHY, LUMBAR REGION: ICD-10-CM

## 2022-06-21 DIAGNOSIS — R26.9 GAIT ABNORMALITY: Primary | ICD-10-CM

## 2022-06-21 PROCEDURE — 97163 PT EVAL HIGH COMPLEX 45 MIN: CPT | Performed by: PHYSICAL THERAPIST

## 2022-06-23 ENCOUNTER — OFFICE VISIT (OUTPATIENT)
Dept: PHYSICAL THERAPY | Facility: CLINIC | Age: 77
End: 2022-06-23
Payer: COMMERCIAL

## 2022-06-23 DIAGNOSIS — R26.9 GAIT ABNORMALITY: ICD-10-CM

## 2022-06-23 DIAGNOSIS — M47.26 OTHER SPONDYLOSIS WITH RADICULOPATHY, LUMBAR REGION: Primary | ICD-10-CM

## 2022-06-23 PROCEDURE — 97110 THERAPEUTIC EXERCISES: CPT

## 2022-06-23 PROCEDURE — 97112 NEUROMUSCULAR REEDUCATION: CPT

## 2022-06-28 ENCOUNTER — OFFICE VISIT (OUTPATIENT)
Dept: PHYSICAL THERAPY | Facility: CLINIC | Age: 77
End: 2022-06-28
Payer: COMMERCIAL

## 2022-06-28 DIAGNOSIS — R26.9 GAIT ABNORMALITY: ICD-10-CM

## 2022-06-28 DIAGNOSIS — M47.26 OTHER SPONDYLOSIS WITH RADICULOPATHY, LUMBAR REGION: Primary | ICD-10-CM

## 2022-06-28 PROCEDURE — 97112 NEUROMUSCULAR REEDUCATION: CPT

## 2022-06-28 PROCEDURE — 97110 THERAPEUTIC EXERCISES: CPT

## 2022-06-28 NOTE — PROGRESS NOTES
Daily Note     Today's date: 2022  Patient name: Mis Guzman  : 1945  MRN: [id number]  Referring provider: Brandt Whaley DO  Dx:   Encounter Diagnosis     ICD-10-CM    1  Other spondylosis with radiculopathy, lumbar region  M47 26    2  Gait abnormality  R26 9                   Subjective: No new c/o pain today  Patient is enthusiastic about following through with exs at home  Objective: See treatment diary below      Assessment: Tolerated treatment well  Patient exhibited good technique with therapeutic exercises and would benefit from continued PT to increase lumbar ROM/strength and endurance to improve mobility  Plan: Continue per plan of care        Precautions:  PACEMAKER, PMH DM, JOS, CHF, Afib, HTN, Left ankle and femoral fracture repairs        Manuals                                      Neuro Re-Ed        Foot onto step-Front and Lateral         Cone toe taps        NBOS stance on foam         Stand lumbar extension against bar         Monster walk   6x10' no band 6x10'   no band     Side step walk   6x10' no band 6x10'   no band     Backwards walk         Ther Ex        Nu-step   10'  L2 10' L2     Seated hip march   2x10 2x10     Seated LAQ   2x10 2x10     Stand hip march, ABD, and Ext   2x10 2x10     Bridge/glute set   2x10 2x10                                     HEP update/review         Ther Activity                        Gait Training                        Modalities        MHP to the left hip or/and LB PRN  Seated MHP low back 15' 15' to LB/L hip Seated

## 2022-06-29 NOTE — PROGRESS NOTES
Daily Note     Today's date: 2022  Patient name: Carly Hartman  : 1945  MRN: [id number]  Referring provider: Foreign Cleveland DO  Dx:   Encounter Diagnosis     ICD-10-CM    1  Gait abnormality  R26 9    2  Other spondylosis with radiculopathy, lumbar region  M47 26                   Subjective:  Patient reports her legs are sore today with continuation of weakness in the LE with difficulty with standing and walking activities  PT verbalizes understanding of HEP  Objective: See treatment diary below      Assessment: Tolerated treatment well  PT notes continuation of decrease ROM and strength t/o the bilateral hip and LE with demonstration of impaired gait pattern and balance with need for continuation of skilled therapy  Plan: Continue per plan of care        Precautions:  PACEMAKER, PMH DM, JOS, CHF, Afib, HTN, Left ankle and femoral fracture repairs        Manuals                                     Neuro Re-Ed        Foot onto step-Front and Lateral         Cone toe taps        NBOS stance on foam         Stand lumbar extension against bar         Monster walk   6x10' no band 6x10'   no band 6x10 Feet     Side step walk   6x10' no band 6x10'   no band 6x10 Feet     Backwards walk         Ther Ex        Nu-step   10'  L2 10' L2 10 min L3     Seated hip march   2x10 2x10 2x10 Bilat    Seated LAQ   2x10 2x10 2x10 Bilat     Stand hip march, ABD, and Ext   2x10 2x10 2x10 Bilat     Bridge/glute set   2x10 2x10 2x10     Seated HR/TR     2x10     Supine Tball LTR     10x Bilat                     HEP update/review     15 min     Ther Activity                        Gait Training                        Modalities        MHP to the left hip or/and LB PRN  Seated MHP low back 15' 15' to LB/L hip Seated 15 min MHP LB in seated

## 2022-06-30 ENCOUNTER — OFFICE VISIT (OUTPATIENT)
Dept: PHYSICAL THERAPY | Facility: CLINIC | Age: 77
End: 2022-06-30
Payer: COMMERCIAL

## 2022-06-30 DIAGNOSIS — M47.26 OTHER SPONDYLOSIS WITH RADICULOPATHY, LUMBAR REGION: ICD-10-CM

## 2022-06-30 DIAGNOSIS — R26.9 GAIT ABNORMALITY: Primary | ICD-10-CM

## 2022-06-30 PROCEDURE — 97112 NEUROMUSCULAR REEDUCATION: CPT | Performed by: PHYSICAL THERAPIST

## 2022-06-30 PROCEDURE — 97110 THERAPEUTIC EXERCISES: CPT | Performed by: PHYSICAL THERAPIST

## 2022-06-30 PROCEDURE — 97535 SELF CARE MNGMENT TRAINING: CPT | Performed by: PHYSICAL THERAPIST

## 2022-07-01 NOTE — PROGRESS NOTES
Daily Note     Today's date: 2022  Patient name: Clifton Epps  : 1945  MRN: [id number]  Referring provider: Stacia Pinedo DO  Dx:   Encounter Diagnosis     ICD-10-CM    1  Gait abnormality  R26 9    2  Other spondylosis with radiculopathy, lumbar region  M47 26                   Subjective:  Patient reports her legs feel tired today with overall soreness t/o the body  Patient reports she has f/u with pain management MD this week to further discuss case  Objective: See treatment diary below      Assessment: Tolerated treatment fair  PT notes continuation of decrease ROM and strength t/o the bilateral hip and LE with trunk/core weakness and demonstration of impaired gait pattern and balance with need for continuation of skilled therapy  PT notes modified treatment secondary to patient subjective comments but PT will continue to progress toward therapy goals as tolerated by patient  Plan: Continue per plan of care        Precautions:  PACEMAKER, PMH DM, JOS, CHF, Afib, HTN, Left ankle and femoral fracture repairs        Manuals                                    Neuro Re-Ed        Foot onto step-Front and Lateral         Cone toe taps        NBOS stance on foam      Supine Tball ABD crunch   10x3" Hold    Stand lumbar extension against bar         Monster walk   6x10' no band 6x10'   no band 6x10 Feet  4x10 Feet    Side step walk   6x10' no band 6x10'   no band 6x10 Feet  4x10 Feet    Backwards walk         Ther Ex        Nu-step   10'  L2 10' L2 10 min L3  10 min L3    Seated hip march   2x10 2x10 2x10 Bilat 2x10 Bilat   Seated LAQ   2x10 2x10 2x10 Bilat  2x10 Bilat   Stand hip march, ABD, and Ext   2x10 2x10 2x10 Bilat  10x Bilat   Bridge/glute set   2x10 2x10 2x10  2x10    Seated HR/TR     2x10  2x10    Supine Tball LTR     10x Bilat  10x5" Hold Bilat                    HEP update/review     15 min     Ther Activity                        Gait Training   6/28                     Modalities   6/28     MHP to the left hip or/and LB PRN  Seated MHP low back 15' 15' to LB/L hip Seated 15 min MHP LB in seated  Declined

## 2022-07-05 ENCOUNTER — OFFICE VISIT (OUTPATIENT)
Dept: PHYSICAL THERAPY | Facility: CLINIC | Age: 77
End: 2022-07-05
Payer: COMMERCIAL

## 2022-07-05 DIAGNOSIS — M47.26 OTHER SPONDYLOSIS WITH RADICULOPATHY, LUMBAR REGION: ICD-10-CM

## 2022-07-05 DIAGNOSIS — R26.9 GAIT ABNORMALITY: Primary | ICD-10-CM

## 2022-07-05 PROCEDURE — 97110 THERAPEUTIC EXERCISES: CPT | Performed by: PHYSICAL THERAPIST

## 2022-07-05 PROCEDURE — 97112 NEUROMUSCULAR REEDUCATION: CPT | Performed by: PHYSICAL THERAPIST

## 2022-07-06 NOTE — PROGRESS NOTES
Daily Note     Today's date: 2022  Patient name: Cora Main  : 1945  MRN: [id number]  Referring provider: Jeanna Tom DO  Dx:   Encounter Diagnosis     ICD-10-CM    1  Gait abnormality  R26 9    2  Other spondylosis with radiculopathy, lumbar region  M47 26                   Subjective:  Patient reports a LOB at home 2 days ago with fall in the bathroom  Patient reports her buttock is all bruised with feeling of weakness in the LE  Patient states 8/10 pain levels in the bilateral hips at the start of the session  Patient reports she has f/u with pain management MD tomorrow and is hoping they can help her symptoms  Patient reports relief of symptoms to 5/10 level with increase ease with walking  Objective: See treatment diary below      Assessment: Tolerated treatment fair  PT notes continuation of decrease ROM and strength t/o the bilateral hips and LE with demonstration of impaired gait pattern and balance with need for continuation of skilled therapy  PT notes modified treatment secondary to patient subjective comments but PT will continue to progress toward therapy goals and full POC as tolerated by patient  Plan: Continue per plan of care        Precautions:  PACEMAKER, PMH DM, JOS, CHF, Afib, HTN, Left ankle and femoral fracture repairs        Manuals                                    Neuro Re-Ed        Foot onto step-Front and Lateral         Cone toe taps     Sit to stand   5X    NBOS stance on foam     Supine Tball ABD crunch   10x3" Hold  15x3" Hold    Stand lumbar extension against bar         Monster walk  6x10' no band 6x10'   no band 6x10 Feet  4x10 Feet  NT    Side step walk  6x10' no band 6x10'   no band 6x10 Feet  4x10 Feet  NT    Backwards walk         Ther Ex        Nu-step  10'  L2 10' L2 10 min L3  10 min L3  10 min L3    Seated hip march  2x10 2x10 2x10 Bilat 2x10 Bilat 2x10 Bilat    Seated LAQ  2x10 2x10 2x10 Bilat  2x10 Bilat 2x10 Bilat    Stand hip march, ABD, and Ext  2x10 2x10 2x10 Bilat  10x Bilat NT   Bridge/glute set  2x10 2x10 2x10  2x10  2x10    Seated HR/TR    2x10  2x10  2x10    Supine Tball LTR    10x Bilat  10x5" Hold Bilat  10x5" Hold Bilat    Seated TB Hip Ext      15x Bilat   Red   Seated TB Knee Flex      15x Bilat   Red                    HEP update/review    15 min      Ther Activity  6/28                      Gait Training  6/28                      Modalities  6/28      MHP to the left hip or/and LB Seated MHP low back 15' 15' to LB/L hip Seated 15 min MHP LB in seated  Declined  Declined

## 2022-07-07 ENCOUNTER — OFFICE VISIT (OUTPATIENT)
Dept: PHYSICAL THERAPY | Facility: CLINIC | Age: 77
End: 2022-07-07
Payer: COMMERCIAL

## 2022-07-07 DIAGNOSIS — M47.26 OTHER SPONDYLOSIS WITH RADICULOPATHY, LUMBAR REGION: ICD-10-CM

## 2022-07-07 DIAGNOSIS — R26.9 GAIT ABNORMALITY: Primary | ICD-10-CM

## 2022-07-07 PROCEDURE — 97112 NEUROMUSCULAR REEDUCATION: CPT | Performed by: PHYSICAL THERAPIST

## 2022-07-07 PROCEDURE — 97110 THERAPEUTIC EXERCISES: CPT | Performed by: PHYSICAL THERAPIST

## 2022-07-08 ENCOUNTER — CONSULT (OUTPATIENT)
Dept: PAIN MEDICINE | Facility: CLINIC | Age: 77
End: 2022-07-08
Payer: COMMERCIAL

## 2022-07-08 VITALS
HEART RATE: 74 BPM | RESPIRATION RATE: 20 BRPM | TEMPERATURE: 97.8 F | BODY MASS INDEX: 40.56 KG/M2 | WEIGHT: 220.4 LBS | HEIGHT: 62 IN | DIASTOLIC BLOOD PRESSURE: 64 MMHG | SYSTOLIC BLOOD PRESSURE: 108 MMHG

## 2022-07-08 DIAGNOSIS — N18.32 CHRONIC KIDNEY DISEASE, STAGE 3B (HCC): ICD-10-CM

## 2022-07-08 DIAGNOSIS — M54.16 LUMBAR RADICULOPATHY: Primary | ICD-10-CM

## 2022-07-08 DIAGNOSIS — I73.9 PERIPHERAL VASCULAR DISEASE (HCC): ICD-10-CM

## 2022-07-08 PROCEDURE — 99204 OFFICE O/P NEW MOD 45 MIN: CPT | Performed by: ANESTHESIOLOGY

## 2022-07-08 RX ORDER — DULOXETIN HYDROCHLORIDE 30 MG/1
30 CAPSULE, DELAYED RELEASE ORAL DAILY
Qty: 30 CAPSULE | Refills: 0 | Status: SHIPPED | OUTPATIENT
Start: 2022-07-08 | End: 2022-09-02

## 2022-07-08 NOTE — PATIENT INSTRUCTIONS
Duloxetine (By mouth)   Duloxetine (doo-LOX-[id number])  Treats depression, anxiety, diabetic peripheral neuropathy, fibromyalgia, and chronic muscle or bone pain  This medicine is an SSNRI  Brand Name(s): Cymbalta, Drizalma Sprinkle, [place]   There may be other brand names for this medicine  When This Medicine Should Not Be Used: This medicine is not right for everyone  Do not use it if you had an allergic reaction to duloxetine  How to Use This Medicine:   Capsule, Delayed Release Capsule  Take your medicine as directed  Your dose may need to be changed several times to find what works best for you  Delayed-release capsule: Swallow the capsule whole  Do not crush, chew, break, or open it  Do not open the Cymbalta® delayed-release capsule and sprinkle the contents on food or in liquids  If you have trouble swallowing the [place] delayed release capsule: You may open the capsule and sprinkle the contents over one tablespoon (15 mL) of applesauce  Swallow the mixture right away and do not save any of the mixture to use later  You may open the capsule and pour the contents to an all plastic catheter tip syringe and add 50 mL of water  Do not use other liquids  Gently shake it for 10 seconds, and then use it through a nasogastric tube  Rinse with additional water (about 15 mL) if needed  This medicine should come with a Medication Guide  Ask your pharmacist for a copy if you do not have one  Missed dose: Take a dose as soon as you remember  If it is almost time for your next dose, wait until then and take a regular dose  Do not take extra medicine to make up for a missed dose  Store the medicine in a closed container at room temperature, away from heat, moisture, and direct light  Drugs and Foods to Avoid:   Ask your doctor or pharmacist before using any other medicine, including over-the-counter medicines, vitamins, and herbal products    Do not take duloxetine if you have used an MAO inhibitor (MAOI) within the past 14 days  Do not start taking an MAO inhibitor within 5 days of stopping duloxetine  Ask your doctor if you are not sure if you take an MAOI, including linezolid or methylene blue injection  Some medicines can affect how duloxetine works  Tell your doctor if you are using any of the following:  Buspirone, cimetidine, ciprofloxacin, enoxacin, fentanyl, fluvoxamine, lithium, Татьяна's wort, theophylline, tramadol, tryptophan, warfarin  Amphetamines  Blood pressure medicine  Diuretic (water pill)  Medicine for heart rhythm problems (including flecainide, propafenone, quinidine)  Medicine to treat migraine headaches (including triptans)  NSAID pain or arthritis medicine (including aspirin, celecoxib, diclofenac, ibuprofen, naproxen)  Other medicine to treat depression or mood disorders (including amitriptyline, desipramine, fluoxetine, imipramine, nortriptyline, paroxetine)  Phenothiazine medicine (including thioridazine)  Stomach medicine (including famotidine, antacids containing aluminum or magnesium, PPIs)  Tell your doctor if you use anything else that makes you sleepy  Some examples are allergy medicine, narcotic pain medicine, and alcohol  Do not drink alcohol while you are using this medicine  Warnings While Using This Medicine:   Tell your doctor if you are pregnant or breastfeeding, or if you have kidney disease, liver disease, bleeding problems, diabetes, digestion problems, glaucoma, heart disease, high or low blood pressure, or problems with urination  Tell your doctor if you smoke or you have a history of seizures, mental health problems (including bipolar disorder, helen), or drug or alcohol addiction    This medicine may cause the following problems:   Serious liver problems  Serotonin syndrome, when used with certain medicines  Increased risk of bleeding problems  Serious skin reactions, including erythema multiforme, Aguilar-Barney syndrome  Low sodium levels in the blood  Sexual problems  This medicine can increase thoughts of suicide  Tell your doctor right away if you start to feel depressed and have thoughts about hurting yourself  This medicine may cause blurred vision, dizziness, drowsiness, trouble with thinking, or trouble with controlling body movements, which may lead to falls, fractures, or other injuries  Do not drive or do anything that could be dangerous until you know how this medicine affects you  Stand up slowly to avoid falls  Do not stop using this medicine suddenly  Your doctor will need to slowly decrease your dose before you stop it completely  Your doctor will check your progress and the effects of this medicine at regular visits  Keep all appointments  Keep all medicine out of the reach of children  Never share your medicine with anyone  Possible Side Effects While Using This Medicine:   Call your doctor right away if you notice any of these side effects:   Allergic reaction: Itching or hives, swelling in your face or hands, swelling or tingling in your mouth or throat, chest tightness, trouble breathing  Anxiety, restlessness, fever, fast heartbeat, sweating, muscle spasms, diarrhea, seeing or hearing things that are not there  Blistering, peeling, red skin rash  Confusion, weakness, muscle twitching  Dark urine or pale stools, nausea, vomiting, loss of appetite, stomach pain, yellow skin or eyes  Decrease in how much or how often you urinate  Decrease in sexual ability, desire, drive, or performance  Eye pain, vision changes, seeing halos around lights  Feeling more energetic than usual  Lightheadedness, dizziness, fainting  Unusual moods or behaviors, worsening depression, thoughts about hurting yourself, trouble sleeping  Unusual bleeding or bruising  If you notice these less serious side effects, talk with your doctor:   Decrease in appetite or weight  Dry mouth, constipation, mild nausea  Headache  Unusual drowsiness, sleepiness, or tiredness  If you notice other side effects that you think are caused by this medicine, tell your doctor  Call your doctor for medical advice about side effects  You may report side effects to FDA at 4-461-LLH-5128    © Copyright [employer] 2022 Information is for End User's use only and may not be sold, redistributed or otherwise used for commercial purposes  The above information is an  only  It is not intended as medical advice for individual conditions or treatments  Talk to your doctor, nurse or pharmacist before following any medical regimen to see if it is safe and effective for you  Core Strengthening Exercises   WHAT YOU NEED TO KNOW:   Your core includes the muscles of your lower back, hip, pelvis, and abdomen  Core strengthening exercises help heal and strengthen these muscles  This helps prevent another injury, and keeps your pelvis, spine, and hips in the correct position  DISCHARGE INSTRUCTIONS:   Contact your healthcare provider if:   You have sharp or worsening pain during exercise or at rest     You have questions or concerns about your shoulder exercises  Safety tips:  Talk to your healthcare provider before you start an exercise program  A physical therapist can teach you how to do core strengthening exercises safely  Do the exercises on a mat or firm surface  A firm surface will support your spine and prevent low back pain  Do not do these exercises on a bed  Move slowly and smoothly  Avoid fast or jerky motions  Stop if you feel pain  Core exercises should not be painful  Stop if you feel pain  Breathe normally during core exercises  Do not hold your breath  This may cause an increase in blood pressure and prevent muscle strengthening  Your healthcare provider will tell you when to inhale and exhale during the exercise  Begin all of your exercises with abdominal bracing  Abdominal bracing helps warm up your core muscles   You can also practice abdominal bracing throughout the day  Lie on your back with your knees bent and feet flat on the floor  Place your arms in a relaxed position beside your body  Tighten your abdominal muscles  Pull your belly button in and up toward your spine  Hold for 5 seconds  Relax your muscles  Repeat 10 times  Core strengthening exercises: Your healthcare provider will tell you how often to do these exercises  The provider will also tell you how many repetitions of each exercise you should do  Hold each exercise for 5 seconds or as directed  As you get stronger, increase your hold to 10 to 15 seconds  You can do some of these exercises on a stability ball, or with a weight  Ask your healthcare provider how to use a stability ball or weight for these exercises:  Bridging:  Lie on your back with your knees bent and feet flat on the floor  Rest your arms at your side  Tighten your buttocks, and then lift your hips 1 inch off the floor  Hold for 5 seconds  When you can do this exercise without pain for 10 seconds, increase the distance you lift your hips  A good goal is to be able to lift your hips so that your shoulders, hips, and knees are in a straight line  Dead bug:  Lie on your back with your knees bent and feet flat on the floor  Place your arms in a relaxed position beside your body  Begin with abdominal bracing  Next, raise one leg, keeping your knee bent  Hold for 5 seconds  Repeat with the other leg  When you can do this exercise without pain for 10 to 15 seconds, you may raise one straight leg and hold  Repeat with the other leg  Quadruped:  Place your hands and knees on the floor  Keep your wrists directly below your shoulders and your knees directly below your hips  Pull your belly button in toward your spine  Do not flatten or arch your back  Tighten your abdominal muscles below your belly button  Hold for 5 seconds   When you can do this exercise without pain for 10 to 15 seconds, you may extend one arm and hold  Repeat on the other side  Side bridge exercises:      Standing side bridge:  Stand next to a wall and extend one arm toward the wall  Place your palm flat on the wall with your fingers pointing upward  Begin with abdominal bracing  Next, without moving your feet, slowly bend your arm to 90 degrees  Hold for 5 seconds  Repeat on the other side  When you can do this exercise without pain for 10 to 15 seconds, you may do the bent leg side bridge on the floor  Bent leg side bridge:  Lie on one side with your legs, hips, and shoulders in a straight line  Prop yourself up onto your forearm so your elbow is directly below your shoulder  Bend your knees back to 90 degrees  Begin with abdominal bracing  Next, lift your hips and balance yourself on your forearm and knees  Hold for 5 seconds  Repeat on the other side  When you can do this exercise without pain for 10 to 15 seconds, you may do the straight leg side bridge on the floor  Straight leg side bridge:  Lie on one side with your legs, hips, and shoulders in a straight line  Prop yourself up onto your forearm so your elbow is directly below your shoulder  Begin with abdominal bracing  Lift your hips off the floor and balance yourself on your forearm and the outside of your flexed foot  Do not let your ankle bend sideways  Hold for 5 seconds  Repeat on the other side  When you can do this exercise without pain for 10 to 15 seconds, ask your healthcare provider for more advanced exercises  Superman:  Lie on your stomach  Extend your arms forward on the floor  Tighten your abdominal muscles and lift your right hand and left leg off the floor  Hold this position  Slowly return to the starting position  Tighten your abdominal muscles and lift your left hand and right leg off the floor  Hold this position  Slowly return to the starting position  Clam:  Lie on your side with your knees bent   Put your bottom arm under your head to keep your neck in line  Put your top hand on your hip to keep your pelvis from moving  Put your heels together, and keep them together during this exercise  Slowly raise your top knee toward the ceiling  Then lower your leg so your knees are together  Repeat this exercise 10 times  Then switch sides and do the exercise 10 times with the other leg  Curl up:  Lie on your back with your knees bent and feet flat on the floor  Place your hands, palms down, underneath your lower back  Next, with your elbows on the floor, lift your shoulders and chest 2 to 3 inches off the floor  Keep your head in line with your shoulders  Hold this position  Slowly return to the starting position  Straight leg raises:  Lie on your back with one leg straight  Bend the other knee and place your foot flat on the floor  Tighten your abdominal muscles  Keep your leg straight and slowly lift it straight up 6 to 12 inches off the floor  Hold this position  Lower your leg slowly  Do as many repetitions as directed on this side  Repeat with the other leg  Plank:  Lie on your stomach  Bend your elbows and place your forearms flat on the floor  Lift your chest, stomach, and knees off the floor  Make sure your elbows are below your shoulders  Your body should be in a straight line  Do not let your hips or lower back sink to the ground  Squeeze your abdominal muscles together and hold for 15 seconds  To make this exercise harder, hold for 30 seconds or lift 1 leg at a time  Bicycles:  Lie on your back  Bend both knees and bring them toward your chest  Your calves should be parallel to the floor  Place the palms of your hands on the back of your head  Straighten your right leg and keep it lifted 2 inches off the floor  Raise your head and shoulders off the floor and twist towards your left  Keep your head and shoulders lifted  Bend your right knee while you straighten your left leg   Keep your left leg 2 inches off the floor  Twist your head and chest towards the left leg  Continue to straighten 1 leg at a time and twist        Follow up with your doctor as directed:  Write down your questions so you remember to ask them during your visits  © Copyright [employer] 2022 Information is for End User's use only and may not be sold, redistributed or otherwise used for commercial purposes  All illustrations and images included in CareNotes® are the copyrighted property of [employer]  or [employer]  The above information is an  only  It is not intended as medical advice for individual conditions or treatments  Talk to your doctor, nurse or pharmacist before following any medical regimen to see if it is safe and effective for you

## 2022-07-08 NOTE — PROGRESS NOTES
Assessment  1  Lumbar radiculopathy  -     CT lumbar spine without contrast; Future; Expected date: 07/08/2022    2  Peripheral vascular disease ([address] )    3  Chronic kidney disease, stage 3b (HCC)    Left greater than right-sided lumbar radicular pain in L5 and S1 dermatomal distribution accompanied by weakness numbness and paresthesias  Patient additionally reports drop foot bilaterally  Ambulates with a walker with debilitating pain  Has been participate with 3 weeks of physical therapy without significant benefit and exacerbation of pain  Multilevel severe degenerative disc disease with spondyloarthropathy noted on x-ray lumbar spine in mid and low back  Additionally, age-indeterminate mild compression deformities of T11 and T12 superior endplates which is noncontributory  Reasonable to proceed with multimodal pain therapy plan as noted below focusing on imaging to guide interventional therapies  Risks, benefits alternatives to epidural steroid injections thoroughly discussed with patient  Handouts provided and questions answered to patient's satisfaction  In addition, lifestyle modifications extensively discussed including diet, exercise and weight loss  Plan  -CT lumbar spine noncontrast; will f/u result with patient  -cymbalta 30mg/day rx; risks, benefits and alternatives reviewed; questions answered to patient satisfaction; handouts provided  -gabapentin 300 mg t i d  Ordered for patient; counseled regarding sedative effects of taking this medication and provided up titration calendar  Counseled not to take medication while driving or operating heavy machinery/using stairs  -continue formal physical therapy for lumbar spinal stenosis/lumbar radiculopathy; Physician directed home exercise plan as per [employer] demonstrated and handouts provided that patient plans to participate with for 1 hour, twice a week for the next 6 weeks       There are risks associated with opioid medications, including dependence, addiction and tolerance  The patient understands and agrees to use these medications only as prescribed  Potential side effects of the medications include, but are not limited to, constipation, drowsiness, addiction, impaired judgment and risk of fatal overdose if not taken as prescribed  The patient was warned against driving while taking sedation medications or operating heavy machinery  The patient voiced understanding  Sharing medications is a felony  At this point in time, the patient is showing no signs of addiction, abuse, diversion or suicidal ideation  [place] Prescription Drug Monitoring Program report was reviewed and was appropriate      Complete risks and benefits including bleeding, infection, tissue reaction, nerve injury and allergic reaction were discussed  The approach was demonstrated using models and literature was provided  Verbal and written consent was obtained  My impressions and treatment recommendations were discussed in detail with the patient who verbalized understanding and had no further questions  Discharge instructions were provided  I personally saw and examined the patient and I agree with the above discussed plan of care  My impressions and treatment recommendations were discussed in detail with the patient who verbalized understanding and had no further questions  Discharge instructions were provided  I personally saw and examined the patient and I agree with the above discussed plan of care  No orders of the defined types were placed in this encounter  History of Present Illness    Cora Main is a 69 y o  female  With pmhx of Afib on warfarin with pacemaker, CHF, DM-2, HTN, CKD, obesity, OA of both hips presenting with chronic lumbar radicular pain in the bilateral L5 and S1 dermatomal distribution accompanied by bilateral foot drop  Debilitating weakness numbness and paresthesias accompany the pain   The patient rates the pain at a 8/10 accompanied by electric shock-like shooting features and crampy burning pain in the aforementioned dermatomal distributions  The pain is worse in the mornings as well as the end of the day; exertion such as walking for long periods of time seems to exacerbate the pain  The patient ambulates with a walker and can hardly walk more than a few blocks without having debilitating pain  She tries to maintain an active lifestyle and finds that the current degree of pain seems to compromises her efforts  The pain significantly impacts independent activities of daily living and contributes to significant disability  She has attempted 3 weeks of physical therapy with exacerbation of the pain  He/She has taken naproxen as well as gabapentin with limited relief of the pain as well  She has never tried epidural steroid injections in the past  She denies any bowel or bladder dysfunction/incontinence saddle anesthesia; endorses antalgic gait  I have personally reviewed and/or updated the patient's past medical history, past surgical history, family history, social history, current medications, allergies, and vital signs today  Review of Systems   Constitutional: Positive for activity change  HENT: Negative  Eyes: Negative  Respiratory: Negative  Cardiovascular: Negative  Gastrointestinal: Negative  Endocrine: Negative  Genitourinary: Negative  Musculoskeletal: Positive for arthralgias, back pain, gait problem and myalgias  Skin: Negative  Allergic/Immunologic: Negative  Neurological: Positive for weakness and numbness  Hematological: Negative  Psychiatric/Behavioral: Negative  All other systems reviewed and are negative        Patient Active Problem List   Diagnosis    Ankle injury    Atrial fibrillation (HCC)    Diastolic congestive heart failure (HCC)    Type 2 diabetes mellitus with diabetic nephropathy (HCC)    JOS (obstructive sleep apnea)    Stage 3 chronic kidney disease ([place] [address] )    Pacemaker    Type 2 diabetes mellitus with diabetic neuropathy (HCC)    Severe obesity with body mass index (BMI) of 36 0 to 36 9 with serious comorbidity ([place] [address] )    Closed bimalleolar fracture of left ankle    Closed fracture of left ankle    Acquired hypothyroidism    Diabetes mellitus [facility] )    CHF [facility]congestive heart failure) ([facility] )    Essential hypertension    Renal disorder    Acute kidney injury ([facility] )    Supratherapeutic INR    Pulmonary hypertension (HCC)    [facility] respiratory failure with hypoxia [facility])    Sepsis ([place] [address] )    Urinary tract infection    Sick sinus syndrome ([facility])    Nausea and vomiting    Chronic pain of left knee    Presence of artificial knee joint, left    Primary osteoarthritis of both hips    Pain in left hip    Other spondylosis with radiculopathy, lumbar region    Peripheral vascular disease ([place] [address] )       Past Medical History:   Diagnosis Date    Arthritis     CHF [facility]congestive heart failure) ([facility] )     Diabetes mellitus ([place] [address] )     Disease of thyroid gland     Hypertension     Pacemaker     Renal disorder        Past Surgical History:   Procedure Laterality Date    CARDIAC PACEMAKER PLACEMENT      CHOLECYSTECTOMY      JOINT REPLACEMENT      bilateral knee replacements    ORIF TIBIA & FIBULA FRACTURES Left 10/29/2020    Procedure: OPEN REDUCTION W/ INTERNAL FIXATION (ORIF) ANKLE;  Surgeon: Deanna Alvarado;   Location:  MAIN OR;  Service: Orthopedics    TONSILLECTOMY      TUBAL LIGATION         Family History   Problem Relation Age of Onset    Atrial fibrillation Mother     Arthritis Father     Kidney cancer Father     Breast cancer Sister         early 46s   Clifm Popeye No Known Problems Sister     Kidney cancer Brother     No Known Problems Daughter     No Known Problems Daughter     No Known Problems Daughter     No Known Problems Daughter     No Known Problems Maternal Aunt     No Known Problems Maternal Aunt     No Known Problems Maternal Aunt  No Known Problems Paternal Aunt     No Known Problems Maternal Grandmother     No Known Problems Maternal Grandfather     No Known Problems Paternal Grandmother     No Known Problems Paternal Grandfather     Breast cancer Cousin         early 46s    Breast cancer additional onset Neg Hx     Colon cancer Neg Hx     Endometrial cancer Neg Hx     Ovarian cancer Neg Hx     BRCA 1/2 Neg Hx     BRCA2 Positive Neg Hx     BRCA2 Negative Neg Hx     BRCA1 Positive Neg Hx     BRCA1 Negative Neg Hx        Social History     Occupational History    Not on file   Tobacco Use    Smoking status: Never Smoker    Smokeless tobacco: Never Used   Vaping Use    Vaping Use: Never used   Substance and Sexual Activity    Alcohol use: Not Currently    Drug use: Not Currently    Sexual activity: Yes       Current Outpatient Medications on File Prior to Visit   Medication Sig    amLODIPine (NORVASC) 2 5 mg tablet Take 1 tablet (2 5 mg total) by mouth daily    Ascorbic Acid, Vitamin C, (VITAMIN C) 100 MG tablet Take 500 mg by mouth daily     Calcium Carb-Cholecalciferol (OSCAL-D) 500 mg-200 units per tablet Take 1 tablet by mouth daily    gabapentin (NEURONTIN) 300 mg capsule Take 300 mg by mouth 2 (two) times a day 1 tab qam, 2 tabs qhs    levothyroxine 150 mcg tablet Take 150 mcg by mouth daily     Multiple Vitamin (Multi-Day) TABS Take 1 tablet by mouth daily     warfarin (COUMADIN) 5 mg tablet Take 4 mg by mouth Sun, Wed, Thursday,Friday- 1 tab  Tues, Saturday- 2 tab    [DISCONTINUED] alendronate (FOSAMAX) 70 mg tablet Take 70 mg by mouth every 7 days    [DISCONTINUED] docusate sodium (COLACE) 100 mg capsule Take 100 mg by mouth 2 (two) times a day    torsemide (DEMADEX) 100 mg tablet Take 0 5 tablets (50 mg total) by mouth daily    [DISCONTINUED] Ferrous Sulfate (IRON PO) Take 325 mg by mouth daily     [DISCONTINUED] glipiZIDE (GLUCOTROL) 5 mg tablet Take 0 5 tablets (2 5 mg total) by mouth 2 (two) times a day before meals (Patient not taking: Reported on 6/7/2022)    [DISCONTINUED] potassium chloride (K-DUR,KLOR-CON) 20 mEq tablet Take 1 tablet (20 mEq total) by mouth daily (Patient not taking: Reported on 6/7/2022)    [DISCONTINUED] ranitidine (ZANTAC) 300 MG tablet Take 300 mg by mouth daily as needed  (Patient not taking: Reported on 6/7/2022)     No current facility-administered medications on file prior to visit  Allergies   Allergen Reactions    Rofecoxib Angioedema, Swelling, Hives and Other (See Comments)     swelling, sob  swelling, sob  Other reaction(s): Swelling / Edema  swelling, sob  Other reaction(s): SWELLING  Other reaction(s): Angioedema      Oxycodone-Acetaminophen GI Intolerance and Other (See Comments)     Unsure of rxn   Unsure of rxn   Unsure of rxn   Other reaction(s): "CAN'T BREATH"      Medical Tape Rash         Physical Exam    /64   Pulse 74   Temp 97 8 °F (36 6 °C)   Resp 20   Ht 5' 2" (1 575 m)   Wt 100 kg (220 lb 6 4 oz)   BMI 40 31 kg/m²     Constitutional: normal, well developed, well nourished, alert, in no distress and non-toxic and no overt pain behavior  and obese  Eyes: anicteric  HEENT: grossly intact  Neck: supple, symmetric, trachea midline and no masses   Pulmonary:even and unlabored  Cardiovascular:No edema or pitting edema present  Skin:Normal without rashes or lesions and well hydrated  Psychiatric:Mood and affect appropriate  Neurologic:Cranial Nerves II-XII grossly intact Sensation grossly intact; no clonus negative ma's  Reflexes 2+ and brisk  SLR negative bilaterally  Spurling's maneuver negative bilaterally  Musculoskeletal: antalgic gait  3-4/5 strength with b/l ankle dorsiflexion, otherwise 5/5 strength bilaterally with AROM in all extremities  Unable to perform normal heel toe and tip toe walking  No significant pain with lumbar facet loading bilaterally and with lateral spine rotation   minimal ttp over lumbar paraspinal muscles  Negative sri's test, negative gaenslen's negative SIJ loading bilaterally  Imaging    LUMBAR SPINE     INDICATION:   M47 26: Other spondylosis with radiculopathy, lumbar region      COMPARISON:  CT chest, abdomen and pelvis on 7/26/2021      VIEWS:  XR SPINE LUMBAR 2 OR 3 VIEWS INJURY        FINDINGS:     There are 5 non rib bearing lumbar vertebral bodies       Age-indeterminate mild compression deformities of T11 and T12 superior endplates, new since the prior CT      Slight lumbar dextroscoliosis  Mild grade 1 retrolisthesis of L3 on L4       Lumbar degenerative changes including multilevel severe disc space narrowing, vacuum phenomena, and osteophytic lipping noted    Bilateral facet arthropathy with associated foraminal narrowing, most severe in the mid to lower lumbar spine      The pedicles appear intact      Soft tissues are unremarkable      IMPRESSION:     Age-indeterminate mild compression deformities of T11 and T12 superior endplates        Degenerative changes as described      The study was marked in EPIC for significant notification         Workstation performed: [id number]

## 2022-07-11 NOTE — PROGRESS NOTES
PT Re-Evaluation     Today's date: 2022  Patient name: Heaven Lombardi  : 1945  MRN: [id number]  Referring provider: Esequiel John DO  Dx:   Encounter Diagnosis     ICD-10-CM    1  Gait abnormality  R26 9    2  Other spondylosis with radiculopathy, lumbar region  M47 26                   Assessment  Assessment details: PT notes the patient with decrease ROM and strength t/o the lumbar spine as well as the bilateral hip and LE with demonstration of impaired gait pattern and balance leading to increase pain levels and functional limitations with need for course of skilled therapy for 8 weeks with focus on gait/balance, lumbar stabilization, strengthening, manual therapy, analgesic modalities, and HEP  PT notes if symptoms do not improve with POC then PT will refer patient back to MD for further evaluation and treatment  Patient is scheduled for IE with pain management MD on 22 for further evaluation and treatment  Impairments: abnormal gait, abnormal or restricted ROM, activity intolerance, impaired balance, impaired physical strength, lacks appropriate home exercise program, pain with function and poor posture   Understanding of Dx/Px/POC: fair   Prognosis: fair    Goals  ST  Initiate HEP  2  Decrease symptoms by 25-50%   3  Increase ROM by 25-50%   4  Increase strength by 1-2 mm grades  LT   Maintain gait pattern at present functional limitations with no regression to lower level   2  Maintain tolerance to standing and walking  3  Improve transfer status to /S   4   Maintain bed mobility status within findings of IE   5  Transition to HEP    Plan  Plan details: PT notes review of POC and findings with patient who is in agreement with PT recommendations of course of OPPT     Patient would benefit from: PT eval and skilled physical therapy  Planned therapy interventions: manual therapy, neuromuscular re-education, patient education, postural training, self care, strengthening, stretching, therapeutic exercise, home exercise program, graded exercise and balance  Frequency: 2x week  Duration in weeks: 8  Treatment plan discussed with: patient        Subjective Evaluation    History of Present Illness  Mechanism of injury: Patient reports decline in walking for 3+ years secondary to extensive injuries to the left LE  Patient reports she visited several MD about the issues and found to have lumbar radiculopathy  Patient was referred to OPPT for evaluation and treatment of gait dysfunction as well referral to pain management for symptom control  Patient reports she fractured her right femur during a fall with damage to the left TKA  Patient underwent several surgeries to the right femur and ankle for repair  Patient reports difficulty with standing, walking, stairs, transfers, ADL, bed mobility, balance, and sleep  Patient reports she is retired with significant PMH of DM, JOS, Pacemaker, CHF, Afib, and HTN  Patient reports she has utilizes a RW the majority of the time with ambulation as well as bilateral MAFO  Presently the patient has attended 7 sessions of skilled therapy and feels   Pain  Current pain ratin  At best pain ratin  At worst pain ratin  Location: Left thigh   Quality: dull ache and discomfort  Aggravating factors: walking, stair climbing, standing and lifting    Treatments  Current treatment: medication and physical therapy  Patient Goals  Patient goals for therapy: decreased pain, improved balance, increased motion, increased strength and independence with ADLs/IADLs          Objective     Postural Observations  Seated posture: poor  Standing posture: poor    Additional Postural Observation Details  PT notes FB of trunk with all standing positions    Palpation   Left   Muscle spasm in the erector spinae, lumbar paraspinals, piriformis, rectus femoris and TFL  Tenderness of the erector spinae, lumbar paraspinals, piriformis, rectus femoris and TFL  Right   Muscle spasm in the erector spinae and lumbar paraspinals  Tenderness of the erector spinae and lumbar paraspinals  Tenderness     Left Hip   Tenderness in the greater trochanter and iliac crest  No tenderness in the PSIS and sacroiliac joint  Right Hip   No tenderness in the PSIS       Neurological Testing     Sensation     Lumbar   Left   Diminished: light touch    Right   Diminished: light touch    Reflexes   Left   Patellar (L4): absent (0)  Achilles (S1): absent (0)    Right   Patellar (L4): absent (0)  Achilles (S1): absent (0)    Active Range of Motion     Lumbar   Flexion: 25 degrees   Extension: -7 degrees   Left lateral flexion: 10 degrees       Right lateral flexion: 10 degrees   Left rotation: 5 degrees   Right rotation: 5 degrees   Left Hip   Flexion: 5 degrees with pain  Extension: -5 degrees with pain  Abduction: 10 degrees with pain  External rotation (90/90): 10 degrees with pain  Internal rotation (90/90): 0 degrees with pain    Right Hip   Flexion: 60 degrees   Extension: 0 degrees   Abduction: 15 degrees   External rotation (90/90): 20 degrees   Internal rotation (90/90): 5 degrees     Additional Active Range of Motion Details  PT notes decrease ROM and strength t/o the lumbar spine with trunk/core weakness with abdominals of 2/5 and paraspinals of 2/5   PT notes decrease ROM and strength t/o the bilateral hip and LE   PT notes WFL t/o the bilateral knees    Passive Range of Motion   Left Hip   Flexion: 65 degrees   Extension: 0 degrees   Abduction: 20 degrees   External rotation (90/90): 20 degrees   Internal rotation (90/90): 5 degrees     Strength/Myotome Testing     Left Hip   Planes of Motion   Flexion: 3-  Extension: 3  Abduction: 3-  Adduction: 4-  External rotation: 3  Internal rotation: 3    Right Hip   Planes of Motion   Flexion: 4-  Extension: 4-  Abduction: 4-  Adduction: 4  External rotation: 4-  Internal rotation: 4-    Left Knee   Flexion: 3+  Extension: 3  Quadriceps contraction: poor    Right Knee   Flexion: 4  Extension: 4  Quadriceps contraction: good    Left Ankle/Foot   Dorsiflexion: 2  Plantar flexion: 3+  Inversion: 3  Eversion: 3    Right Ankle/Foot   Dorsiflexion: 2+  Plantar flexion: 3+  Inversion: 3+  Eversion: 3+    Ambulation   Weight-Bearing Status   Assistive device used: wheeled walker    Observational Gait   Gait: antalgic   Decreased walking speed and stride length  Additional Observational Gait Details  PT notes the patient is MI with ambulation with RW but demonstration of antalgic gait pattern with decrease hip and knee flex, decrease step length, decrease push off, decrease toe clearance, decrease antonella and FB of trunk with rating of fair- with static and dynamic activities      Neuro Exam:     Functional outcomes   TU 95 (seconds)    Amputee   Gait comment: Patient unable to complete the sit to stand test              Precautions:  PACEMAKER, PMH DM, JOS, CHF, Afib, HTN, Left ankle and femoral fracture repairs [facility]

## 2022-07-12 ENCOUNTER — APPOINTMENT (OUTPATIENT)
Dept: PHYSICAL THERAPY | Facility: CLINIC | Age: 77
End: 2022-07-12
Payer: COMMERCIAL

## 2022-07-13 NOTE — PROGRESS NOTES
Daily Note     Today's date: 2022  Patient name: Jayme Badillo  : 1945  MRN: [id number]  Referring provider: Dimitry Mello DO  Dx:   Encounter Diagnosis     ICD-10-CM    1  Gait abnormality  R26 9    2  Other spondylosis with radiculopathy, lumbar region  M47 26                   Subjective: "I am weak, I'm so wiped out from not feeling well earlier in the week "      Objective: See treatment diary below      Assessment: Tolerated treatment well  Patient exhibited good technique with therapeutic exercises and would benefit from continued PT to increase ROM/strength and endurance to improve mobility and gait  Plan: Continue per plan of care        Precautions:  PACEMAKER, PMH DM, JOS, CHF, Afib, HTN, Left ankle and femoral fracture repairs        Manuals                                    Neuro Re-Ed    Foot onto step-Front and Lateral         Cone toe taps    Sit to stand   5X  1x   NBOS stance on foam    Supine Tball ABD crunch   10x3" Hold  15x3" Hold  15x3"   Stand lumbar extension against bar         Monster walk  6x10'   no band 6x10 Feet  4x10 Feet  NT     Side step walk  6x10'   no band 6x10 Feet  4x10 Feet  NT     Backwards walk         Ther Ex    Nu-step  10' L2 10 min L3  10 min L3  10 min L3  10' L1   Seated hip march  2x10 2x10 Bilat 2x10 Bilat 2x10 Bilat  2x10 bilat   Seated LAQ  2x10 2x10 Bilat  2x10 Bilat 2x10 Bilat  2x10 bilat   Stand hip march, ABD, and Ext  2x10 2x10 Bilat  10x Bilat NT    Bridge/glute set  2x10 2x10  2x10  2x10  2x10   Seated HR/TR   2x10  2x10  2x10  2x10   Supine Tball LTR   10x Bilat  10x5" Hold Bilat  10x5" Hold Bilat  10x5" bilat   Seated TB Hip Ext     15x Bilat   Red 2x10 bilat RED   Seated TB Knee Flex     15x Bilat   Red  2x10 bilat RED                   HEP update/review   15 min       Ther Activity                    Gait Training                    Modalities    [facility] to the left hip or/and LB 15' to LB/L hip Seated 15 min MHP LB in seated  Declined  Declined  15'MH

## 2022-07-14 ENCOUNTER — EVALUATION (OUTPATIENT)
Dept: PHYSICAL THERAPY | Facility: CLINIC | Age: 77
End: 2022-07-14
Payer: COMMERCIAL

## 2022-07-14 DIAGNOSIS — R26.9 GAIT ABNORMALITY: Primary | ICD-10-CM

## 2022-07-14 DIAGNOSIS — M47.26 OTHER SPONDYLOSIS WITH RADICULOPATHY, LUMBAR REGION: ICD-10-CM

## 2022-07-14 PROCEDURE — 97140 MANUAL THERAPY 1/> REGIONS: CPT | Performed by: PHYSICAL THERAPIST

## 2022-07-14 PROCEDURE — 97112 NEUROMUSCULAR REEDUCATION: CPT

## 2022-07-14 PROCEDURE — 97110 THERAPEUTIC EXERCISES: CPT

## 2022-07-14 NOTE — PROGRESS NOTES
PT Re-Evaluation     Today's date: 2022  Patient name: Iris Lopez  : 1945  MRN: [id number]  Referring provider: Gregory Flores DO  Dx:   Encounter Diagnosis     ICD-10-CM    1  Gait abnormality  R26 9    2  Other spondylosis with radiculopathy, lumbar region  M47 26                   Assessment  Assessment details: PT notes the patient with continuation of decrease ROM and strength t/o the lumbar spine as well as the bilateral hip and LE with demonstration of impaired gait pattern and balance  PT with decision to transition to HEP secondary to minimal change in status  PT is in agreement with MD decision of further evaluation and treatment of the LE and lumbar spine  Impairments: abnormal gait, abnormal or restricted ROM, activity intolerance, impaired balance, impaired physical strength, lacks appropriate home exercise program, pain with function and poor posture   Understanding of Dx/Px/POC: fair   Prognosis: fair    Goals  ST  Initiate HEP  2  Decrease symptoms by 25-50%   3  Increase ROM by 25-50%   4  Increase strength by 1-2 mm grades  LT   Maintain gait pattern at present functional limitations with no regression to lower level   2  Maintain tolerance to standing and walking  3  Improve transfer status to /S   4   Maintain bed mobility status within findings of IE   5  Transition to HEP    Plan  Plan details: Transition to HEP  Patient would benefit from: skilled physical therapy  Planned therapy interventions: manual therapy, neuromuscular re-education, patient education, postural training, self care, strengthening, stretching, therapeutic exercise, home exercise program, graded exercise and balance  Frequency: 2x week  Duration in weeks: 1  Treatment plan discussed with: patient        Subjective Evaluation    History of Present Illness  Mechanism of injury: Patient reports decline in walking for 3+ years secondary to extensive injuries to the left LE    Patient reports she visited several MD about the issues and found to have lumbar radiculopathy  Patient was referred to OPPT for evaluation and treatment of gait dysfunction as well referral to pain management for symptom control  Patient reports she fractured her right femur during a fall with damage to the left TKA  Patient underwent several surgeries to the right femur and ankle for repair  Patient reports difficulty with standing, walking, stairs, transfers, ADL, bed mobility, balance, and sleep  Patient reports she is retired with significant PMH of DM, JOS, Pacemaker, CHF, Afib, and HTN  Patient reports she has utilizes a RW the majority of the time with ambulation as well as bilateral MAFO  Presently the patient has attended 7 sessions of skilled therapy and feels minimal to no change in status  Patient reports her legs continue to feel weak with difficulty with standing, walking, transfers, ADL and stairs  Patient reports she is undergoing further testing of the lumbar spine and LE  Pain  Current pain ratin  At best pain ratin  At worst pain ratin  Location: Left thigh   Quality: dull ache and discomfort  Aggravating factors: walking, stair climbing, standing and lifting  Progression: no change    Treatments  Current treatment: medication and physical therapy  Patient Goals  Patient goals for therapy: decreased pain, improved balance, increased motion, increased strength and independence with ADLs/IADLs          Objective     Postural Observations  Seated posture: poor  Standing posture: poor    Additional Postural Observation Details  PT notes FB of trunk with all standing positions    Palpation   Left   Muscle spasm in the erector spinae, lumbar paraspinals, piriformis, rectus femoris and TFL  Tenderness of the erector spinae, lumbar paraspinals, piriformis, rectus femoris and TFL  Right   Muscle spasm in the erector spinae and lumbar paraspinals     Tenderness of the erector spinae and lumbar paraspinals  Tenderness     Left Hip   Tenderness in the greater trochanter and iliac crest  No tenderness in the PSIS and sacroiliac joint  Right Hip   No tenderness in the PSIS       Neurological Testing     Sensation     Lumbar   Left   Diminished: light touch    Right   Diminished: light touch    Reflexes   Left   Patellar (L4): absent (0)  Achilles (S1): absent (0)    Right   Patellar (L4): absent (0)  Achilles (S1): absent (0)    Active Range of Motion     Lumbar   Flexion: 25 degrees   Extension: -7 degrees   Left lateral flexion: 10 degrees       Right lateral flexion: 10 degrees   Left rotation: 5 degrees   Right rotation: 5 degrees   Left Hip   Flexion: 7 degrees with pain  Extension: -5 degrees with pain  Abduction: 10 degrees with pain  External rotation (90/90): 10 degrees with pain  Internal rotation (90/90): 0 degrees with pain    Right Hip   Flexion: 66 degrees   Extension: 2 degrees   Abduction: 19 degrees   External rotation (90/90): 20 degrees   Internal rotation (90/90): 5 degrees     Additional Active Range of Motion Details  PT notes decrease ROM and strength t/o the lumbar spine with trunk/core weakness with abdominals of 2/5 and paraspinals of 2/5   PT notes decrease ROM and strength t/o the bilateral hip and LE   PT notes WFL t/o the bilateral knees    Passive Range of Motion   Left Hip   Flexion: 61 degrees   Extension: 2 degrees   Abduction: 20 degrees   External rotation (90/90): 20 degrees   Internal rotation (90/90): 5 degrees     Strength/Myotome Testing     Left Hip   Planes of Motion   Flexion: 3-  Extension: 3+  Abduction: 3-  Adduction: 4-  External rotation: 3  Internal rotation: 3    Right Hip   Planes of Motion   Flexion: 4  Extension: 4  Abduction: 4  Adduction: 4  External rotation: 4-  Internal rotation: 4    Left Knee   Flexion: 4-  Extension: 3+  Quadriceps contraction: poor    Right Knee   Flexion: 4+  Extension: 4  Quadriceps contraction: good    Left Ankle/Foot   Dorsiflexion: 2  Plantar flexion: 3+  Inversion: 3  Eversion: 3    Right Ankle/Foot   Dorsiflexion: 2+  Plantar flexion: 3+  Inversion: 3+  Eversion: 3+    Ambulation   Weight-Bearing Status   Assistive device used: wheeled walker    Observational Gait   Gait: antalgic   Decreased walking speed and stride length  Additional Observational Gait Details  PT notes the patient is MI with ambulation with RW but demonstration of antalgic gait pattern with decrease hip and knee flex, decrease step length, decrease push off, decrease toe clearance, decrease antonella and FB of trunk with rating of fair- with static and dynamic activities      Neuro Exam:     Functional outcomes   TU 95 (seconds)    Amputee   Gait comment: Patient unable to complete the sit to stand test              Precautions:  PACEMAKER, PMH DM, JOS, CHF, Afib, HTN, Left ankle and femoral fracture repairs

## 2022-07-19 ENCOUNTER — OFFICE VISIT (OUTPATIENT)
Dept: PHYSICAL THERAPY | Facility: CLINIC | Age: 77
End: 2022-07-19
Payer: COMMERCIAL

## 2022-07-19 DIAGNOSIS — M47.26 OTHER SPONDYLOSIS WITH RADICULOPATHY, LUMBAR REGION: ICD-10-CM

## 2022-07-19 DIAGNOSIS — R26.9 GAIT ABNORMALITY: Primary | ICD-10-CM

## 2022-07-19 PROCEDURE — 97112 NEUROMUSCULAR REEDUCATION: CPT

## 2022-07-19 PROCEDURE — 97110 THERAPEUTIC EXERCISES: CPT

## 2022-07-20 ENCOUNTER — HOSPITAL ENCOUNTER (OUTPATIENT)
Dept: CT IMAGING | Facility: HOSPITAL | Age: 77
Discharge: HOME | End: 2022-07-20
Attending: ANESTHESIOLOGY
Payer: COMMERCIAL

## 2022-07-20 DIAGNOSIS — M54.16 LUMBAR RADICULOPATHY: ICD-10-CM

## 2022-07-20 PROCEDURE — 72131 CT LUMBAR SPINE W/O DYE: CPT

## 2022-07-20 NOTE — PROGRESS NOTES
PT Discharge    Today's date: 2022  Patient name: Farhana Hdz  : 1945  MRN: [id number]  Referring provider: Claus Ferrell DO  Dx:   Encounter Diagnosis     ICD-10-CM    1  Gait abnormality  R26 9    2  Other spondylosis with radiculopathy, lumbar region  M47 26                   Assessment  Assessment details: PT notes the patient with continuation of decrease ROM and strength t/o the lumbar spine as well as the bilateral hip and LE with demonstration of impaired gait pattern and balance  PT with decision to transition to HEP secondary to minimal change in status  PT is in agreement with MD decision of further evaluation and treatment of the LE and lumbar spine  Impairments: abnormal gait, abnormal or restricted ROM, activity intolerance, impaired balance, impaired physical strength, lacks appropriate home exercise program, pain with function and poor posture   Understanding of Dx/Px/POC: fair   Prognosis: fair    Goals  ST  Initiate HEP  2  Decrease symptoms by 25-50%   3  Increase ROM by 25-50%   4  Increase strength by 1-2 mm grades  LT   Maintain gait pattern at present functional limitations with no regression to lower level   2  Maintain tolerance to standing and walking  3  Improve transfer status to /S   4   Maintain bed mobility status within findings of IE   5  Transition to HEP    Plan  Plan details: Transition to HEP  Patient would benefit from: skilled physical therapy  Planned therapy interventions: manual therapy, neuromuscular re-education, patient education, postural training, self care, strengthening, stretching, therapeutic exercise, home exercise program, graded exercise and balance  Frequency: 2x week  Duration in weeks: 1  Treatment plan discussed with: patient        Subjective Evaluation    History of Present Illness  Mechanism of injury: Patient reports decline in walking for 3+ years secondary to extensive injuries to the left LE    Patient reports she visited several MD about the issues and found to have lumbar radiculopathy  Patient was referred to OPPT for evaluation and treatment of gait dysfunction as well referral to pain management for symptom control  Patient reports she fractured her right femur during a fall with damage to the left TKA  Patient underwent several surgeries to the right femur and ankle for repair  Patient reports difficulty with standing, walking, stairs, transfers, ADL, bed mobility, balance, and sleep  Patient reports she is retired with significant PMH of DM, JOS, Pacemaker, CHF, Afib, and HTN  Patient reports she has utilizes a RW the majority of the time with ambulation as well as bilateral MAFO  Presently the patient has attended 9 sessions of skilled therapy and feels minimal to no change in status  Patient reports her legs continue to feel weak with difficulty with standing, walking, transfers, ADL and stairs  Patient reports she is undergoing further testing of the lumbar spine and LE  Pain  Current pain ratin  At best pain ratin  At worst pain ratin  Location: Left thigh   Quality: dull ache and discomfort  Aggravating factors: walking, stair climbing, standing and lifting  Progression: no change    Treatments  Current treatment: medication and physical therapy  Patient Goals  Patient goals for therapy: decreased pain, improved balance, increased motion, increased strength and independence with ADLs/IADLs          Objective     Postural Observations  Seated posture: poor  Standing posture: poor    Additional Postural Observation Details  PT notes FB of trunk with all standing positions    Palpation   Left   Muscle spasm in the erector spinae, lumbar paraspinals, piriformis, rectus femoris and TFL  Tenderness of the erector spinae, lumbar paraspinals, piriformis, rectus femoris and TFL  Right   Muscle spasm in the erector spinae and lumbar paraspinals     Tenderness of the erector spinae and lumbar paraspinals  Tenderness     Left Hip   Tenderness in the greater trochanter and iliac crest  No tenderness in the PSIS and sacroiliac joint  Right Hip   No tenderness in the PSIS       Neurological Testing     Sensation     Lumbar   Left   Diminished: light touch    Right   Diminished: light touch    Reflexes   Left   Patellar (L4): absent (0)  Achilles (S1): absent (0)    Right   Patellar (L4): absent (0)  Achilles (S1): absent (0)    Active Range of Motion     Lumbar   Flexion: 25 degrees   Extension: -7 degrees   Left lateral flexion: 10 degrees       Right lateral flexion: 10 degrees   Left rotation: 5 degrees   Right rotation: 5 degrees   Left Hip   Flexion: 7 degrees with pain  Extension: -5 degrees with pain  Abduction: 10 degrees with pain  External rotation (90/90): 10 degrees with pain  Internal rotation (90/90): 0 degrees with pain    Right Hip   Flexion: 66 degrees   Extension: 2 degrees   Abduction: 19 degrees   External rotation (90/90): 20 degrees   Internal rotation (90/90): 5 degrees     Additional Active Range of Motion Details  PT notes decrease ROM and strength t/o the lumbar spine with trunk/core weakness with abdominals of 2/5 and paraspinals of 2/5   PT notes decrease ROM and strength t/o the bilateral hip and LE   PT notes WFL t/o the bilateral knees    Passive Range of Motion   Left Hip   Flexion: 61 degrees   Extension: 2 degrees   Abduction: 20 degrees   External rotation (90/90): 20 degrees   Internal rotation (90/90): 5 degrees     Strength/Myotome Testing     Left Hip   Planes of Motion   Flexion: 3-  Extension: 3+  Abduction: 3-  Adduction: 4-  External rotation: 3  Internal rotation: 3    Right Hip   Planes of Motion   Flexion: 4  Extension: 4  Abduction: 4  Adduction: 4  External rotation: 4-  Internal rotation: 4    Left Knee   Flexion: 4-  Extension: 3+  Quadriceps contraction: poor    Right Knee   Flexion: 4+  Extension: 4  Quadriceps contraction: good    Left Ankle/Foot   Dorsiflexion: 2  Plantar flexion: 3+  Inversion: 3  Eversion: 3    Right Ankle/Foot   Dorsiflexion: 2+  Plantar flexion: 3+  Inversion: 3+  Eversion: 3+    Ambulation   Weight-Bearing Status   Assistive device used: wheeled walker    Observational Gait   Gait: antalgic   Decreased walking speed and stride length  Additional Observational Gait Details  PT notes the patient is MI with ambulation with RW but demonstration of antalgic gait pattern with decrease hip and knee flex, decrease step length, decrease push off, decrease toe clearance, decrease antonella and FB of trunk with rating of fair- with static and dynamic activities      Neuro Exam:     Functional outcomes   TU 95 (seconds)    Amputee   Gait comment: Patient unable to complete the sit to stand test              Precautions:  PACEMAKER, PMH DM, JOS, CHF, Afib, HTN, Left ankle and femoral fracture repairs      Manuals                                    Neuro Re-Ed       Foot onto step-Front and Lateral         Cone toe taps  Sit to stand   5X  1x     NBOS stance on foam  Supine Tball ABD crunch   10x3" Hold  15x3" Hold  15x3"     Stand lumbar extension against bar         Monster walk  4x10 Feet  NT   4x10'    Side step walk  4x10 Feet  NT   4x10'    Backwards walk         Ther Ex       Nu-step  10 min L3  10 min L3  10' L1 10' L1 10 min L1    Seated hip march  2x10 Bilat 2x10 Bilat  2x10 bilat 2x10 bilat    Seated LAQ  2x10 Bilat 2x10 Bilat  2x10 bilat 2x10 bilat    Stand hip march, ABD, and Ext  10x Bilat NT      Bridge/glute set  2x10  2x10  2x10 2x10    Seated HR/TR  2x10  2x10  2x10     Supine Tball LTR  10x5" Hold Bilat  10x5" Hold Bilat  10x5" bilat 10x5" bilat    Seated TB Hip Ext   15x Bilat   Red 2x10 bilat RED 2x10 bilat RED    Seated TB Knee Flex   15x Bilat   Red  2x10 bilat RED 2x10 bilat RED                    HEP update/review      30 min   Ther Activity    Gait Training   7/14 7/19                    Modalities   7/14 7/19    MHP to the left hip or/and LB Declined  Declined  15'MH declined Declined

## 2022-07-21 ENCOUNTER — OFFICE VISIT (OUTPATIENT)
Dept: PHYSICAL THERAPY | Facility: CLINIC | Age: 77
End: 2022-07-21
Payer: COMMERCIAL

## 2022-07-21 DIAGNOSIS — M47.26 OTHER SPONDYLOSIS WITH RADICULOPATHY, LUMBAR REGION: ICD-10-CM

## 2022-07-21 DIAGNOSIS — R26.9 GAIT ABNORMALITY: Primary | ICD-10-CM

## 2022-07-21 PROCEDURE — 97535 SELF CARE MNGMENT TRAINING: CPT | Performed by: PHYSICAL THERAPIST

## 2022-07-21 PROCEDURE — 97110 THERAPEUTIC EXERCISES: CPT | Performed by: PHYSICAL THERAPIST

## 2022-07-26 ENCOUNTER — TELEPHONE (OUTPATIENT)
Dept: OTHER | Facility: HOSPITAL | Age: 77
End: 2022-07-26

## 2022-07-26 ENCOUNTER — APPOINTMENT (OUTPATIENT)
Dept: PHYSICAL THERAPY | Facility: CLINIC | Age: 77
End: 2022-07-26
Payer: COMMERCIAL

## 2022-07-26 ENCOUNTER — PREP FOR PROCEDURE (OUTPATIENT)
Dept: PAIN MEDICINE | Facility: CLINIC | Age: 77
End: 2022-07-26

## 2022-07-26 ENCOUNTER — TELEPHONE (OUTPATIENT)
Dept: PAIN MEDICINE | Facility: CLINIC | Age: 77
End: 2022-07-26

## 2022-07-26 DIAGNOSIS — M54.16 LUMBAR RADICULOPATHY: Primary | ICD-10-CM

## 2022-07-26 NOTE — TELEPHONE ENCOUNTER
S/w pt  Scheduled Lt L2 & L4 TFESI 8/9/2022  Pt aware of instructions  No food/drink one hour prior  Wear comfortable clothing  A  is required  Hold Eliquis 5 days - faxed anticoagulant form to Dr Patrick Gutierrez, pt also stated Dr Hans Perea wanted to inquire whether to hold gabapentin  Continue all other prescribed medications  Call if prescribed an antibiotic or become ill  Refrain from vaccines two weeks prior and after procedure  Call with questions  * Copy of instructions, procedure brochure and business card mailed to pt 7/26/22

## 2022-07-26 NOTE — TELEPHONE ENCOUNTER
----- Message from Crissy Wade MD sent at 7/26/2022  9:05 AM EDT -----  Please schedule for left L2 and L4 TFESI to target radicular pain  Will need warfarin 5 day med hold   thanks

## 2022-07-26 NOTE — QUICK NOTE
L1-2:  Disc bulge and facet arthropathy  Mild indentation of the thecal sac  No significant foraminal stenosis      L2-3:  Disc bulge and facet arthropathy  Mild canal, mild right and moderate left foraminal stenosis      L3-4:  Disc bulge asymmetric to the right  Facet arthropathy  Mild canal stenosis and mild foraminal stenosis      L4-5:  Disc bulge and marginal osteophyte  Facet arthropathy  Moderate canal stenosis  Moderate right, mild left foraminal stenosis      L5-S1:  Small disc bulge  Right foraminal osteophyte  Facet arthropathy  No significant canal stenosis  Mild right foraminal stenosis      PARASPINAL SOFT TISSUES:  Small right pleural effusion      IMPRESSION:     Multilevel degenerative spondylosis most pronounced at L4-5  Moderate left foraminal stenosis at L2-L3 and moderate central canal stenosis at L4-L5; will plan for left L2 and L4 TFESI to target radicular pain  Will need warfarin 5 day med hold  Will obtain request  Informed consent to be obtained day of procedure  Risks, benefits and alternatives discussed via phone with patient

## 2022-07-28 ENCOUNTER — APPOINTMENT (OUTPATIENT)
Dept: PHYSICAL THERAPY | Facility: CLINIC | Age: 77
End: 2022-07-28
Payer: COMMERCIAL

## 2022-08-05 ENCOUNTER — TELEPHONE (OUTPATIENT)
Dept: PAIN MEDICINE | Facility: MEDICAL CENTER | Age: 77
End: 2022-08-05

## 2022-08-05 ENCOUNTER — TELEPHONE (OUTPATIENT)
Dept: PAIN MEDICINE | Facility: CLINIC | Age: 77
End: 2022-08-05

## 2022-08-05 ENCOUNTER — TELEPHONE (OUTPATIENT)
Dept: OTHER | Facility: OTHER | Age: 77
End: 2022-08-05

## 2022-08-05 DIAGNOSIS — I48.91 ATRIAL FIBRILLATION, UNSPECIFIED TYPE (HCC): Primary | ICD-10-CM

## 2022-08-05 NOTE — TELEPHONE ENCOUNTER
----- Message from Jonathon Jones MD sent at 8/5/2022  3:02 PM EDT -----  Sent lovenox to her pharmacy  Should inject 1ml (150 units) lovenox subcutaneously once a day for today, tomorrow and Sunday  Should take nothing Monday and go back on her warfarin Tuesday after procedure   On Tuesday I will rx more lovenox for her thanks

## 2022-08-05 NOTE — TELEPHONE ENCOUNTER
Patient called that saying that Dr Kevin Orozco need to call the coumadin department to see if she can still have the procedure and if she should hold her counmadin for 3 days instead of 5 days, patient is asking if the office can give her a call

## 2022-08-05 NOTE — TELEPHONE ENCOUNTER
Harley Hook called from coumadin clinic stating sparkle would like a call back regarding procedure on 08/08/2022    Please advise     Please call back at  [phone]   TY

## 2022-08-05 NOTE — TELEPHONE ENCOUNTER
Harley Hook from [facility] called stating that the patient was given instructions to hold warfarin (COUMADIN) 5 mg tablet for five days and patient refused Lovenox      [phone]

## 2022-08-08 NOTE — TELEPHONE ENCOUNTER
S/w Marleny at the [facility] coumadin clinic  Pt refused lovenox  Hieu Taylor advised pt to do a 3 day hold  Pt held Saturday, Sunday & today  Are you okay with this? Please advise

## 2022-08-08 NOTE — TELEPHONE ENCOUNTER
S/w pt  Put back on schedule for procedure 8/9/22  Pt is aware she will need to arrive one hour prior to procedure for lab work  Will call with any questions

## 2022-08-09 ENCOUNTER — HOSPITAL ENCOUNTER (OUTPATIENT)
Dept: RADIOLOGY | Facility: HOSPITAL | Age: 77
Discharge: HOME | End: 2022-08-09
Payer: COMMERCIAL

## 2022-08-09 ENCOUNTER — HOSPITAL ENCOUNTER (OUTPATIENT)
Facility: HOSPITAL | Age: 77
Setting detail: OUTPATIENT SURGERY
Discharge: HOME | End: 2022-08-09
Attending: ANESTHESIOLOGY | Admitting: ANESTHESIOLOGY
Payer: COMMERCIAL

## 2022-08-09 VITALS
SYSTOLIC BLOOD PRESSURE: 149 MMHG | OXYGEN SATURATION: 97 % | WEIGHT: 220 LBS | BODY MASS INDEX: 40.48 KG/M2 | HEART RATE: 98 BPM | DIASTOLIC BLOOD PRESSURE: 70 MMHG | RESPIRATION RATE: 20 BRPM | HEIGHT: 62 IN | TEMPERATURE: 98.1 F

## 2022-08-09 DIAGNOSIS — M25.511 ACUTE PAIN OF RIGHT SHOULDER: ICD-10-CM
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
## 2022-08-09 LAB
GLUCOSE SERPL-MCNC: 138 MG/DL (ref 65–140)
INR PPP: 1.73 (ref 0.84–1.19)
PROTHROMBIN TIME: 20.4 SECONDS (ref 11.6–14.5)

## 2022-08-09 PROCEDURE — 82948 REAGENT STRIP/BLOOD GLUCOSE: CPT

## 2022-08-09 PROCEDURE — 73030 X-RAY EXAM OF SHOULDER: CPT

## 2022-08-09 PROCEDURE — 85610 PROTHROMBIN TIME: CPT | Performed by: ANESTHESIOLOGY

## 2022-08-09 NOTE — NURSING NOTE
Patient presented for procedure via wheelchair  Assessed patient and patient stated she fell against a desk on Thursday, noted right upper arm with hematoma and a large amount of bruising of the whole upper arm  Dr Kyara Hwang aware

## 2022-08-09 NOTE — QUICK NOTE
PT/INR supratherapeutic despite patient holding coumadin 3 days  Patient presented for procedure via wheelchair  States she fell against a desk on Thursday, noted right upper arm with hematoma and a large amount of bruising of the whole upper arm  Conveyed findings through tiger text with pcp and instructed patient to follow up management of anticoagulation with pcp given hematoma; case cancelled  Counseled she should keep follow up with me for pain management in two weeks   Voiced understanding

## 2022-08-09 NOTE — NURSING NOTE
Dr Kira Caldwell aware of PT/INR result  Patient"s procedure cancelled by Khadijah Jasso called patients PCP for an appointment today to evaluate her right upper arm and regulate coumadin dose and resuming taking  Patient discharged to go to Drs appointment

## 2022-08-29 ENCOUNTER — TELEPHONE (OUTPATIENT)
Dept: PAIN MEDICINE | Facility: CLINIC | Age: 77
End: 2022-08-29

## 2022-08-29 NOTE — TELEPHONE ENCOUNTER
Dru  S/w pt  Pt states she took cymbalta for 4 days and it made her drowsy and she stopped it  Advised pt to take it in evening rather than in AM and see how she does   Pt will cb tomorrow with update

## 2022-08-29 NOTE — TELEPHONE ENCOUNTER
Patient states Cymbalta 30 mg make her drowsy, but she would like to discuss other pain medication alternatives   Please reach out to her at # [phone], thx

## 2022-09-02 ENCOUNTER — OFFICE VISIT (OUTPATIENT)
Dept: PAIN MEDICINE | Facility: CLINIC | Age: 77
End: 2022-09-02
Payer: COMMERCIAL

## 2022-09-02 VITALS
BODY MASS INDEX: 41.11 KG/M2 | TEMPERATURE: 97.7 F | WEIGHT: 223.4 LBS | HEART RATE: 73 BPM | HEIGHT: 62 IN | DIASTOLIC BLOOD PRESSURE: 70 MMHG | SYSTOLIC BLOOD PRESSURE: 124 MMHG | RESPIRATION RATE: 20 BRPM

## 2022-09-02 DIAGNOSIS — M54.16 LUMBAR RADICULOPATHY: Primary | ICD-10-CM

## 2022-09-02 PROCEDURE — 99214 OFFICE O/P EST MOD 30 MIN: CPT | Performed by: ANESTHESIOLOGY

## 2022-09-02 RX ORDER — ACETAMINOPHEN 500 MG
500 TABLET ORAL
COMMUNITY

## 2022-09-02 RX ORDER — GABAPENTIN 600 MG/1
600 TABLET ORAL 3 TIMES DAILY
Qty: 90 TABLET | Refills: 0 | Status: SHIPPED | OUTPATIENT
Start: 2022-09-02 | End: 2022-10-05 | Stop reason: SDUPTHER

## 2022-09-02 NOTE — PROGRESS NOTES
Assessment  1  Lumbar radiculopathy  -     gabapentin (Neurontin) 600 MG tablet; Take 1 tablet (600 mg total) by mouth 3 (three) times a day    Unable to perform epidural steroid injection given supratherapeutic INR and right shoulder hematoma (resolving) day of procedure  I counseled patient to discuss with her PCP regarding appropriate bridge to warfarin as warfarin is likely needed to be stopped 5 days prior to procedure for normalization of PT/INR  Additionally she requires a Lovenox bridge  She was not prepared to administer Lovenox for self and so the bridge plan we had developed for her was not able to be executed for epidural to be performed  Will increase gabapentin; I counseled her regarding red flag signs of cauda equina syndrome  Moderate central canal stenosis at L4-L5, otherwise prominent facet arthropathy with minimal nerve root compression in lumbar spine on CT scan  Left greater than right-sided lumbar radicular pain in L4 and L5 dermatomal distribution accompanied by weakness numbness and paresthesias  Patient additionally reports drop foot bilaterally  Ambulates with a walker with debilitating pain  Has been participate with 3 weeks of physical therapy without significant benefit and exacerbation of pain  Multilevel severe degenerative disc disease with spondyloarthropathy noted on x-ray lumbar spine in mid and low back  Additionally, age-indeterminate mild compression deformities of T11 and T12 superior endplates which is noncontributory  Reasonable to proceed with multimodal pain therapy plan as noted below focusing on imaging to guide interventional therapies  Risks, benefits alternatives to epidural steroid injections thoroughly discussed with patient  Handouts provided and questions answered to patient's satisfaction  In addition, lifestyle modifications extensively discussed including diet, exercise and weight loss      Plan  -F/u after med hold plan discussion with   Darhun  -cymbalta 30mg/day discontinued; risks, benefits and alternatives reviewed; questions answered to patient satisfaction; handouts provided  -gabapentin increased to 600 mg t i d; counseled regarding sedative effects of taking this medication and provided up titration calendar  Counseled not to take medication while driving or operating heavy machinery/using stairs  -continue formal physical therapy for lumbar spinal stenosis/lumbar radiculopathy; Physician directed home exercise plan as per [employer] demonstrated and handouts provided that patient plans to participate with for 1 hour, twice a week for the next 6 weeks  There are risks associated with opioid medications, including dependence, addiction and tolerance  The patient understands and agrees to use these medications only as prescribed  Potential side effects of the medications include, but are not limited to, constipation, drowsiness, addiction, impaired judgment and risk of fatal overdose if not taken as prescribed  The patient was warned against driving while taking sedation medications or operating heavy machinery  The patient voiced understanding  Sharing medications is a felony  At this point in time, the patient is showing no signs of addiction, abuse, diversion or suicidal ideation  [place] Prescription Drug Monitoring Program report was reviewed and was appropriate      Complete risks and benefits including bleeding, infection, tissue reaction, nerve injury and allergic reaction were discussed  The approach was demonstrated using models and literature was provided  Verbal and written consent was obtained  My impressions and treatment recommendations were discussed in detail with the patient who verbalized understanding and had no further questions  Discharge instructions were provided  I personally saw and examined the patient and I agree with the above discussed plan of care      My impressions and treatment recommendations were discussed in detail with the patient who verbalized understanding and had no further questions  Discharge instructions were provided  I personally saw and examined the patient and I agree with the above discussed plan of care  No orders of the defined types were placed in this encounter  History of Present Illness    Unable to perform epidural steroid injection given supratherapeutic INR and right shoulder hematoma (resolving) day of procedure  I counseled patient to discuss with her PCP regarding appropriate bridge to warfarin as warfarin is likely needed to be stopped 5 days prior to procedure for normalization of PT/INR  Additionally she requires a Lovenox bridge  She was not prepared to administer Lovenox for self and so the bridge plan we had developed for her was not able to be executed for epidural to be performed  Will increase gabapentin; I counseled her regarding red flag signs of cauda equina syndrome  Moderate central canal stenosis at L4-L5, otherwise prominent facet arthropathy with minimal nerve root compression in lumbar spine on CT scan  Farhana Hdz is a 69 y o  female  With pmhx of Afib on warfarin with pacemaker, CHF, DM-2, HTN, CKD, obesity, OA of both hips presenting with chronic lumbar radicular pain in the bilateral L4 and L5 dermatomal distribution accompanied by bilateral foot drop  Debilitating weakness numbness and paresthesias accompany the pain  The patient rates the pain at a 8/10 accompanied by electric shock-like shooting features and crampy burning pain in the aforementioned dermatomal distributions  The pain is worse in the mornings as well as the end of the day; exertion such as walking for long periods of time seems to exacerbate the pain  The patient ambulates with a walker and can hardly walk more than a few blocks without having debilitating pain    She tries to maintain an active lifestyle and finds that the current degree of pain seems to compromises her efforts  The pain significantly impacts independent activities of daily living and contributes to significant disability  She has attempted 3 weeks of physical therapy with exacerbation of the pain  He/She has taken naproxen as well as gabapentin with limited relief of the pain as well  She has never tried epidural steroid injections in the past  She denies any bowel or bladder dysfunction/incontinence saddle anesthesia; endorses antalgic gait  I have personally reviewed and/or updated the patient's past medical history, past surgical history, family history, social history, current medications, allergies, and vital signs today  Review of Systems   Constitutional: Positive for activity change  HENT: Negative  Eyes: Negative  Respiratory: Negative  Cardiovascular: Negative  Gastrointestinal: Negative  Endocrine: Negative  Genitourinary: Negative  Musculoskeletal: Positive for arthralgias, back pain, gait problem and myalgias  Skin: Negative  Allergic/Immunologic: Negative  Neurological: Positive for weakness and numbness  Hematological: Negative  Psychiatric/Behavioral: Negative  All other systems reviewed and are negative        Patient Active Problem List   Diagnosis    Ankle injury    Atrial fibrillation (HCC)    Diastolic congestive heart failure (HCC)    Type 2 diabetes mellitus with diabetic nephropathy (HCC)    JOS (obstructive sleep apnea)    Stage 3 chronic kidney disease (HCC)    Pacemaker    Type 2 diabetes mellitus with diabetic neuropathy (HCC)    Severe obesity with body mass index (BMI) of 36 0 to 36 9 with serious comorbidity ([address] )    Closed bimalleolar fracture of left ankle    Closed fracture of left ankle    Acquired hypothyroidism    Diabetes mellitus ([address] )    CHF (congestive heart failure) ([address] )    Essential hypertension    Renal disorder    Acute kidney injury ([address] )    Supratherapeutic INR    Pulmonary hypertension ([address] )    Acute respiratory failure with hypoxia (HCC)    Sepsis [facility]    Urinary tract infection    Sick sinus syndrome (HCC)    Nausea and vomiting    Chronic pain of left knee    Presence of artificial knee joint, left    Primary osteoarthritis of both hips    Pain in left hip    Other spondylosis with radiculopathy, lumbar region    Peripheral vascular disease ([facility]       Past Medical History:   Diagnosis Date    Arthritis     [facility] failure) [facility]     Diabetes mellitus ([facility]     Disease of thyroid gland     Hypertension     Pacemaker     Renal disorder        Past Surgical History:   Procedure Laterality Date    CARDIAC PACEMAKER PLACEMENT      CHOLECYSTECTOMY      JOINT REPLACEMENT      bilateral knee replacements    ORIF TIBIA & FIBULA FRACTURES Left 10/29/2020    Procedure: OPEN REDUCTION W/ INTERNAL FIXATION (ORIF) ANKLE;  Surgeon: Steph Alcala;   Location:  MAIN OR;  Service: Orthopedics    TONSILLECTOMY      TUBAL LIGATION         Family History   Problem Relation Age of Onset    Atrial fibrillation Mother     Arthritis Father     Kidney cancer Father     Breast cancer Sister         early 46s   Sheng Polio No Known Problems Sister     Kidney cancer Brother     No Known Problems Daughter     No Known Problems Daughter     No Known Problems Daughter     No Known Problems Daughter     No Known Problems Maternal Aunt     No Known Problems Maternal Aunt     No Known Problems Maternal Aunt     No Known Problems Paternal Aunt     No Known Problems Maternal Grandmother     No Known Problems Maternal Grandfather     No Known Problems Paternal Grandmother     No Known Problems Paternal Grandfather     Breast cancer Cousin         early 46s    Breast cancer additional onset Neg Hx     Colon cancer Neg Hx     Endometrial cancer Neg Hx     Ovarian cancer Neg Hx     BRCA 1/2 Neg Hx     BRCA2 Positive Neg Hx     BRCA2 Negative Neg Hx     BRCA1 Positive Neg Hx     BRCA1 Negative Neg Hx        Social History     Occupational History    Not on file   Tobacco Use    Smoking status: Never Smoker    Smokeless tobacco: Never Used   Vaping Use    Vaping Use: Never used   Substance and Sexual Activity    Alcohol use: Not Currently    Drug use: Not Currently    Sexual activity: Yes       Current Outpatient Medications on File Prior to Visit   Medication Sig    enoxaparin (LOVENOX) 150 mg/mL injection Inject 1 mL (150 mg total) under the skin every 24 hours    amLODIPine (NORVASC) 2 5 mg tablet Take 1 tablet (2 5 mg total) by mouth daily    Ascorbic Acid, Vitamin C, (VITAMIN C) 100 MG tablet Take 500 mg by mouth daily     Calcium Carb-Cholecalciferol (OSCAL-D) 500 mg-200 units per tablet Take 1 tablet by mouth daily    DULoxetine (CYMBALTA) 30 mg delayed release capsule Take 1 capsule (30 mg total) by mouth daily    gabapentin (NEURONTIN) 300 mg capsule Take 300 mg by mouth 2 (two) times a day 1 tab qam, 2 tabs qhs    levothyroxine 150 mcg tablet Take 150 mcg by mouth daily     Multiple Vitamin (Multi-Day) TABS Take 1 tablet by mouth daily     torsemide (DEMADEX) 100 mg tablet Take 0 5 tablets (50 mg total) by mouth daily    warfarin (COUMADIN) 5 mg tablet Take 4 mg by mouth Sun, Wed, Thursday,Friday- 1 tab  Tues, Saturday- 2 tab     No current facility-administered medications on file prior to visit  Allergies   Allergen Reactions    Rofecoxib Angioedema, Swelling, Hives and Other (See Comments)     swelling, sob  swelling, sob  Other reaction(s): Swelling / Edema  swelling, sob  Other reaction(s): SWELLING  Other reaction(s): Angioedema      Oxycodone-Acetaminophen GI Intolerance and Other (See Comments)     Unsure of rxn   Unsure of rxn   Unsure of rxn   Other reaction(s): "CAN'T BREATH"      Medical Tape Rash         Physical Exam    There were no vitals taken for this visit      Constitutional: normal, well developed, well nourished, alert, in no distress and non-toxic and no overt pain behavior  and obese  Eyes: anicteric  HEENT: grossly intact  Neck: supple, symmetric, trachea midline and no masses   Pulmonary:even and unlabored  Cardiovascular:No edema or pitting edema present  Skin:Normal without rashes or lesions and well hydrated  Psychiatric:Mood and affect appropriate  Neurologic:Cranial Nerves II-XII grossly intact Sensation grossly intact; no clonus negative ma's  Reflexes 2+ and brisk  SLR negative bilaterally  Spurling's maneuver negative bilaterally  Musculoskeletal: antalgic gait  3-4/5 strength with b/l ankle dorsiflexion, otherwise 5/5 strength bilaterally with AROM in all extremities  Unable to perform normal heel toe and tip toe walking  No significant pain with lumbar facet loading bilaterally and with lateral spine rotation  minimal ttp over lumbar paraspinal muscles  Negative sri's test, negative gaenslen's negative SIJ loading bilaterally  Imaging    LUMBAR SPINE     INDICATION:   M47 [id number]: Other spondylosis with radiculopathy, lumbar region      COMPARISON:  CT chest, abdomen and pelvis on 7/26/2021      VIEWS:  XR SPINE LUMBAR 2 OR 3 VIEWS INJURY        FINDINGS:     There are 5 non rib bearing lumbar vertebral bodies       Age-indeterminate mild compression deformities of T11 and T12 superior endplates, new since the prior CT      Slight lumbar dextroscoliosis  Mild grade 1 retrolisthesis of L3 on L4       Lumbar degenerative changes including multilevel severe disc space narrowing, vacuum phenomena, and osteophytic lipping noted    Bilateral facet arthropathy with associated foraminal narrowing, most severe in the mid to lower lumbar spine      The pedicles appear intact      Soft tissues are unremarkable      IMPRESSION:     Age-indeterminate mild compression deformities of T11 and T12 superior endplates        Degenerative changes as described      The study was marked in EPIC for significant notification         Workstation performed: [id number]

## 2022-09-02 NOTE — PATIENT INSTRUCTIONS
Core Strengthening Exercises   WHAT YOU NEED TO KNOW:   Your core includes the muscles of your lower back, hip, pelvis, and abdomen  Core strengthening exercises help heal and strengthen these muscles  This helps prevent another injury, and keeps your pelvis, spine, and hips in the correct position  DISCHARGE INSTRUCTIONS:   Contact your healthcare provider if:   You have sharp or worsening pain during exercise or at rest     You have questions or concerns about your shoulder exercises  Safety tips:  Talk to your healthcare provider before you start an exercise program  A physical therapist can teach you how to do core strengthening exercises safely  Do the exercises on a mat or firm surface  A firm surface will support your spine and prevent low back pain  Do not do these exercises on a bed  Move slowly and smoothly  Avoid fast or jerky motions  Stop if you feel pain  Core exercises should not be painful  Stop if you feel pain  Breathe normally during core exercises  Do not hold your breath  This may cause an increase in blood pressure and prevent muscle strengthening  Your healthcare provider will tell you when to inhale and exhale during the exercise  Begin all of your exercises with abdominal bracing  Abdominal bracing helps warm up your core muscles  You can also practice abdominal bracing throughout the day  Lie on your back with your knees bent and feet flat on the floor  Place your arms in a relaxed position beside your body  Tighten your abdominal muscles  Pull your belly button in and up toward your spine  Hold for 5 seconds  Relax your muscles  Repeat 10 times  Core strengthening exercises: Your healthcare provider will tell you how often to do these exercises  The provider will also tell you how many repetitions of each exercise you should do  Hold each exercise for 5 seconds or as directed  As you get stronger, increase your hold to 10 to 15 seconds   You can do some of these exercises on a stability ball, or with a weight  Ask your healthcare provider how to use a stability ball or weight for these exercises:  Bridging:  Lie on your back with your knees bent and feet flat on the floor  Rest your arms at your side  Tighten your buttocks, and then lift your hips 1 inch off the floor  Hold for 5 seconds  When you can do this exercise without pain for 10 seconds, increase the distance you lift your hips  A good goal is to be able to lift your hips so that your shoulders, hips, and knees are in a straight line  Dead bug:  Lie on your back with your knees bent and feet flat on the floor  Place your arms in a relaxed position beside your body  Begin with abdominal bracing  Next, raise one leg, keeping your knee bent  Hold for 5 seconds  Repeat with the other leg  When you can do this exercise without pain for 10 to 15 seconds, you may raise one straight leg and hold  Repeat with the other leg  Quadruped:  Place your hands and knees on the floor  Keep your wrists directly below your shoulders and your knees directly below your hips  Pull your belly button in toward your spine  Do not flatten or arch your back  Tighten your abdominal muscles below your belly button  Hold for 5 seconds  When you can do this exercise without pain for 10 to 15 seconds, you may extend one arm and hold  Repeat on the other side  Side bridge exercises:      Standing side bridge:  Stand next to a wall and extend one arm toward the wall  Place your palm flat on the wall with your fingers pointing upward  Begin with abdominal bracing  Next, without moving your feet, slowly bend your arm to 90 degrees  Hold for 5 seconds  Repeat on the other side  When you can do this exercise without pain for 10 to 15 seconds, you may do the bent leg side bridge on the floor  Bent leg side bridge:  Lie on one side with your legs, hips, and shoulders in a straight line   Prop yourself up onto your forearm so your elbow is directly below your shoulder  Bend your knees back to 90 degrees  Begin with abdominal bracing  Next, lift your hips and balance yourself on your forearm and knees  Hold for 5 seconds  Repeat on the other side  When you can do this exercise without pain for 10 to 15 seconds, you may do the straight leg side bridge on the floor  Straight leg side bridge:  Lie on one side with your legs, hips, and shoulders in a straight line  Prop yourself up onto your forearm so your elbow is directly below your shoulder  Begin with abdominal bracing  Lift your hips off the floor and balance yourself on your forearm and the outside of your flexed foot  Do not let your ankle bend sideways  Hold for 5 seconds  Repeat on the other side  When you can do this exercise without pain for 10 to 15 seconds, ask your healthcare provider for more advanced exercises  Superman:  Lie on your stomach  Extend your arms forward on the floor  Tighten your abdominal muscles and lift your right hand and left leg off the floor  Hold this position  Slowly return to the starting position  Tighten your abdominal muscles and lift your left hand and right leg off the floor  Hold this position  Slowly return to the starting position  Clam:  Lie on your side with your knees bent  Put your bottom arm under your head to keep your neck in line  Put your top hand on your hip to keep your pelvis from moving  Put your heels together, and keep them together during this exercise  Slowly raise your top knee toward the ceiling  Then lower your leg so your knees are together  Repeat this exercise 10 times  Then switch sides and do the exercise 10 times with the other leg  Curl up:  Lie on your back with your knees bent and feet flat on the floor  Place your hands, palms down, underneath your lower back  Next, with your elbows on the floor, lift your shoulders and chest 2 to 3 inches off the floor   Keep your head in line with your shoulders  Hold this position  Slowly return to the starting position  Straight leg raises:  Lie on your back with one leg straight  Bend the other knee and place your foot flat on the floor  Tighten your abdominal muscles  Keep your leg straight and slowly lift it straight up 6 to 12 inches off the floor  Hold this position  Lower your leg slowly  Do as many repetitions as directed on this side  Repeat with the other leg  Plank:  Lie on your stomach  Bend your elbows and place your forearms flat on the floor  Lift your chest, stomach, and knees off the floor  Make sure your elbows are below your shoulders  Your body should be in a straight line  Do not let your hips or lower back sink to the ground  Squeeze your abdominal muscles together and hold for 15 seconds  To make this exercise harder, hold for 30 seconds or lift 1 leg at a time  Bicycles:  Lie on your back  Bend both knees and bring them toward your chest  Your calves should be parallel to the floor  Place the palms of your hands on the back of your head  Straighten your right leg and keep it lifted 2 inches off the floor  Raise your head and shoulders off the floor and twist towards your left  Keep your head and shoulders lifted  Bend your right knee while you straighten your left leg  Keep your left leg 2 inches off the floor  Twist your head and chest towards the left leg  Continue to straighten 1 leg at a time and twist        Follow up with your doctor as directed:  Write down your questions so you remember to ask them during your visits  © Copyright [employer] 2022 Information is for End User's use only and may not be sold, redistributed or otherwise used for commercial purposes  All illustrations and images included in CareNotes® are the copyrighted property of [employer]  or [facility] [place]   The above information is an  only   It is not intended as medical advice for individual conditions or treatments  Talk to your doctor, nurse or pharmacist before following any medical regimen to see if it is safe and effective for you

## 2022-09-13 ENCOUNTER — TELEPHONE (OUTPATIENT)
Dept: PAIN MEDICINE | Facility: CLINIC | Age: 77
End: 2022-09-13

## 2022-09-13 NOTE — TELEPHONE ENCOUNTER
S/w pt  Rescheduled Lt L2 & L4 TFESI for 9/22/2022  Last coumadin dose 9/16/22  Pt to contact coumadin clinic to make sure still okay as per their previous recommendation  Pt to arrive one hour prior for lab work

## 2022-09-21 RX ORDER — ALPRAZOLAM 0.5 MG/1
0.5 TABLET ORAL ONCE
Status: CANCELLED | OUTPATIENT
Start: 2022-09-21 | End: 2022-09-21

## 2022-09-22 ENCOUNTER — APPOINTMENT (OUTPATIENT)
Dept: RADIOLOGY | Facility: HOSPITAL | Age: 77
End: 2022-09-22
Payer: COMMERCIAL

## 2022-09-22 ENCOUNTER — HOSPITAL ENCOUNTER (OUTPATIENT)
Facility: HOSPITAL | Age: 77
Setting detail: OUTPATIENT SURGERY
Discharge: HOME | End: 2022-09-22
Attending: ANESTHESIOLOGY | Admitting: ANESTHESIOLOGY
Payer: COMMERCIAL

## 2022-09-22 ENCOUNTER — HOSPITAL ENCOUNTER (OUTPATIENT)
Dept: RADIOLOGY | Facility: CLINIC | Age: 77
End: 2022-09-22
Payer: COMMERCIAL

## 2022-09-22 VITALS
RESPIRATION RATE: 22 BRPM | HEIGHT: 62 IN | SYSTOLIC BLOOD PRESSURE: 142 MMHG | WEIGHT: 223 LBS | DIASTOLIC BLOOD PRESSURE: 88 MMHG | OXYGEN SATURATION: 96 % | TEMPERATURE: 97.9 F | HEART RATE: 72 BPM | BODY MASS INDEX: 41.04 KG/M2

## 2022-09-22 LAB
GLUCOSE SERPL-MCNC: 148 MG/DL (ref 65–140)
INR PPP: 1.43 (ref 0.84–1.19)
PROTHROMBIN TIME: 17.5 SECONDS (ref 11.6–14.5)

## 2022-09-22 PROCEDURE — 85610 PROTHROMBIN TIME: CPT | Performed by: ANESTHESIOLOGY

## 2022-09-22 PROCEDURE — 82948 REAGENT STRIP/BLOOD GLUCOSE: CPT

## 2022-09-22 RX ORDER — BETAMETHASONE SODIUM PHOSPHATE AND BETAMETHASONE ACETATE 3; 3 MG/ML; MG/ML
24 INJECTION, SUSPENSION INTRA-ARTICULAR; INTRALESIONAL; INTRAMUSCULAR; SOFT TISSUE ONCE
Status: DISCONTINUED | OUTPATIENT
Start: 2022-09-22 | End: 2022-09-22 | Stop reason: HOSPADM

## 2022-09-22 NOTE — QUICK NOTE
Case cancelled secondary to supratherapeutic PT/INR; counseled to go back on warfarin therapy and f/u with pcp  will consider mbb at L3, L4, L5 for patient; will see her tomorrow in clinic to discuss opioid therapy

## 2022-09-22 NOTE — DISCHARGE INSTRUCTIONS
YOUR 2 WEEK FOLLOW UP HAS BEEN SCHEDULED; IF YOU WISH TO CHANGE THE FOLLOW UP, PLEASE CALL THE [facility] AT [place]: [phone]    Epidural Steroid Injection   AMBULATORY CARE:   What you need to know about an epidural steroid injection (GEOFFREY):  An GEOFFREY is a procedure to inject steroid medicine into the epidural space  The epidural space is between your spinal cord and vertebrae  Steroids reduce inflammation and fluid buildup in your spine that may be causing pain  You may be given pain medicine along with the steroids  How to prepare for an GEOFFREY:  Your healthcare provider will talk to you about how to prepare for your procedure  He or she will tell you what medicines to take or not take on the day of your procedure  You may need to stop taking blood thinners or other medicines several days before your procedure  You may need to adjust any diabetes medicine you take on the day of your procedure  Steroid medicine can increase your blood sugar level  Arrange for someone to drive you home when you are discharged  What will happen during an GEOFFREY:   You will be given medicine to numb the procedure area  You will be awake for the procedure, but you will not feel pain  You may also be given medicine to help you relax  Contrast liquid will be used to help your healthcare provider see the area better  Tell the healthcare provider if you have ever had an allergic reaction to contrast liquid  Your healthcare provider may place the needle into your neck area, middle of your back, or tailbone area  He may inject the medicine next to the nerves that are causing your pain  He may instead inject the medicine into a larger area of the epidural space  This helps the medicine spread to more nerves  Your healthcare provider will use a fluoroscope to help guide the needle to the right place  A fluoroscope is a type of x-ray   After the procedure, a bandage will be placed over the injection site to prevent infection  What will happen after an GEOFFREY:  You will have a bandage over the injection site to prevent infection  Your healthcare provider will tell you when you can bathe and any activity guidelines  You will be able to go home  Risks of an GEOFFREY:  You may have temporary or permanent nerve damage or paralysis  You may have bleeding or develop a serious infection, such as meningitis (swelling of the brain coverings)  An abscess may also develop  An abscess is a pus-filled area under the skin  You may need surgery to fix the abscess  You may have a seizure, anxiety, or trouble sleeping  If you are a man, you may have temporary erectile dysfunction (not able to have an erection)  Call your local emergency number (911 in the [address] [facility]) if:   You have a seizure  You have trouble moving your legs  Seek care immediately if:   Blood soaks through your bandage  You have a fever or chills, severe back pain, and the procedure area is sensitive to the touch  You cannot control when you urinate or have a bowel movement  Call your doctor if:   You have weakness or numbness in your legs  Your wound is red, swollen, or draining pus  You have nausea or are vomiting  Your face or neck is red and you feel warm  You have more pain than you had before the procedure  You have swelling in your hands or feet  You have questions or concerns about your condition or care  Care for your wound as directed: You may remove the bandage before you go to bed the day of your procedure  You may take a shower, but do not take a bath for at least 24 hours  Self-care:   Do not drive,  use machines, or do strenuous activity for 24 hours after your procedure or as directed  Continue other treatments  as directed  Steroid injections alone will not control your pain  The injections are meant to be used with other treatments, such as physical therapy      Follow up with your doctor as directed:  Write down your questions so you remember to ask them during your visits  © Copyright [employer] 2022 Information is for End User's use only and may not be sold, redistributed or otherwise used for commercial purposes  All illustrations and images included in CareNotes® are the copyrighted property of A [employer]  or [employer]  The above information is an  only  It is not intended as medical advice for individual conditions or treatments  Talk to your doctor, nurse or pharmacist before following any medical regimen to see if it is safe and effective for you

## 2022-09-23 ENCOUNTER — OFFICE VISIT (OUTPATIENT)
Dept: PAIN MEDICINE | Facility: CLINIC | Age: 77
End: 2022-09-23
Payer: COMMERCIAL

## 2022-09-23 VITALS
WEIGHT: 222.2 LBS | HEIGHT: 62 IN | DIASTOLIC BLOOD PRESSURE: 66 MMHG | RESPIRATION RATE: 20 BRPM | TEMPERATURE: 97.4 F | HEART RATE: 76 BPM | SYSTOLIC BLOOD PRESSURE: 120 MMHG | BODY MASS INDEX: 40.89 KG/M2

## 2022-09-23 DIAGNOSIS — G89.4 CHRONIC PAIN SYNDROME: ICD-10-CM

## 2022-09-23 DIAGNOSIS — F11.20 UNCOMPLICATED OPIOID DEPENDENCE (HCC): ICD-10-CM

## 2022-09-23 DIAGNOSIS — M47.816 LUMBAR SPONDYLOSIS: Primary | ICD-10-CM

## 2022-09-23 PROCEDURE — 99214 OFFICE O/P EST MOD 30 MIN: CPT | Performed by: ANESTHESIOLOGY

## 2022-09-23 RX ORDER — LIDOCAINE HYDROCHLORIDE 10 MG/ML
10 INJECTION, SOLUTION EPIDURAL; INFILTRATION; INTRACAUDAL; PERINEURAL ONCE
Status: CANCELLED | OUTPATIENT
Start: 2022-09-23 | End: 2022-09-23

## 2022-09-23 RX ORDER — HYDROCODONE BITARTRATE AND ACETAMINOPHEN 5; 325 MG/1; MG/1
1 TABLET ORAL EVERY 8 HOURS PRN
Qty: 90 TABLET | Refills: 0 | Status: SHIPPED | OUTPATIENT
Start: 2022-09-23 | End: 2022-10-28 | Stop reason: SDUPTHER

## 2022-09-23 RX ORDER — BUPIVACAINE HCL/PF 2.5 MG/ML
10 VIAL (ML) INJECTION ONCE
Status: CANCELLED | OUTPATIENT
Start: 2022-09-23 | End: 2022-09-23

## 2022-09-23 RX ORDER — GABAPENTIN 300 MG/1
CAPSULE ORAL
COMMUNITY
Start: 2022-09-21 | End: 2022-09-23

## 2022-09-23 RX ORDER — METHYLPREDNISOLONE ACETATE 80 MG/ML
80 INJECTION, SUSPENSION INTRA-ARTICULAR; INTRALESIONAL; INTRAMUSCULAR; PARENTERAL; SOFT TISSUE ONCE
Status: CANCELLED | OUTPATIENT
Start: 2022-09-23 | End: 2022-09-23

## 2022-09-23 NOTE — PATIENT INSTRUCTIONS

## 2022-09-23 NOTE — PROGRESS NOTES
Assessment  1  Lumbar spondylosis  -     HYDROcodone-acetaminophen (NORCO) 5-325 mg per tablet; Take 1 tablet by mouth every 8 (eight) hours as needed for pain Max Daily Amount: 3 tablets  -     Ambulatory referral to Physical Therapy; Future  -     Case request operating room: BLOCK MEDIAL BRANCH NERVES BILATERAL L3, L4, L5 #1; Standing  -     Case request operating room: BLOCK MEDIAL BRANCH NERVES BILATERAL L3, L4, L5 #1  -     MM ALL_Prescribed Meds and Special Instructions  -     MM DT_Alprazolam Definitive Test  -     MM DT_Amphetamine Definitive Test  -     MM DT_Buprenorphine Definitive Test  -     MM DT_Bupropion Definitive Test  -     MM DT_Carisoprodol Definitive Test  -     MM DT_Citalopram/Escitalopram Definitive Test  -     MM DT_Clonazepam Definitive Test  -     MM DT_Cocaine Definitive Test  -     MM DT_Codeine Definitive Test  -     MM Diazepam Definitive Test  -     MM DT_Ethyl Glucuronide Screen and Confirm Positive  -     MM DT_Fentanyl Definitive Test  -     MM DT_Heroin Definitive Test  -     MM DT_Hydrocodone Definitive Test  -     MM DT_Hydromorphone Definitive Test  -     MM DT_Kratom Definitive Test  -     MM Lorazepam Definitive Test  -     MM DT_MDMA Definitive Test  -     MM DT_Methadone Definitive Test  -     MM DT_Methamphetamine Definitive Test  -     MM DT_Morphine Definitive Test  -     MM DT_Oxazepam Definitive Test  -     MM DT_Oxycodone Definitive Test  -     MM DT_Oxymorphone Definitive Test  -     MM DT_Pregablin Definitive  -     MM DT_Tapentadol Definitive Test  -     MM DT_Temazapam Definitive Test  -     MM DT_THC Definitive Test  -     MM DT_Tramadol Definitive Test  -     MM DT_Validity Creatinine  -     MM DT_Validity Oxidant  -     MM DT_Validity pH  -     MM DT_Validity Specific    2  Uncomplicated opioid dependence (HCC)  -     Rapid drug screen, urine    3   Chronic pain syndrome  -     Rapid drug screen, urine    Unable to perform epidural steroid injection given supratherapeutic INR on multiple occassions  Denies any radicular symptoms at this time  Axial low back pain without radicular features; achy, nagging, indolent crampy and throbbing pain  +facet loading maneuvers eliciting pain consistent with lumbar facet arthropathy  Gabapentin increased to 600mg TID without notable benefit; I counseled her regarding red flag signs of cauda equina syndrome  Moderate central canal stenosis at L4-L5, otherwise prominent facet arthropathy with minimal nerve root compression in lumbar spine on CT scan  Risks, benefits and alternatives discussed with regard to opioid therapy, medial branch blocks and radiofrequency ablation; informed consent obtained  Left greater than right-sided lumbar radicular pain in L4 and L5 dermatomal distribution accompanied by weakness numbness and paresthesias  Patient additionally reports drop foot bilaterally  Ambulates with a walker with debilitating pain  Has been participate with 3 weeks of physical therapy without significant benefit and exacerbation of pain  Multilevel severe degenerative disc disease with spondyloarthropathy noted on x-ray lumbar spine in mid and low back  Additionally, age-indeterminate mild compression deformities of T11 and T12 superior endplates which is noncontributory  Reasonable to proceed with multimodal pain therapy plan as noted below focusing on imaging to guide interventional therapies  Risks, benefits alternatives to epidural steroid injections thoroughly discussed with patient  Handouts provided and questions answered to patient's satisfaction  In addition, lifestyle modifications extensively discussed including diet, exercise and weight loss      Plan  -bilateral L3, L4, L5 medial branch blocks #1; rtc 2 weeks post procedure  -cymbalta 30mg/day discontinued; risks, benefits and alternatives reviewed; questions answered to patient satisfaction; handouts provided  -opioid use agreement, uds obtained today  -norco 5-325mg i8cortc prn pain ordered; patient counseled to take OTC bowel regimen and risks of opioids discussed below   -gabapentin increased to 600 mg t i d; counseled regarding sedative effects of taking this medication and provided up titration calendar  Counseled not to take medication while driving or operating heavy machinery/using stairs  -continue formal physical therapy for lumbar spinal stenosis/lumbar radiculopathy; Physician directed home exercise plan as per [facility] demonstrated and handouts provided that patient plans to participate with for 1 hour, twice a week for the next 6 weeks  There are risks associated with opioid medications, including dependence, addiction and tolerance  The patient understands and agrees to use these medications only as prescribed  Potential side effects of the medications include, but are not limited to, constipation, drowsiness, addiction, impaired judgment and risk of fatal overdose if not taken as prescribed  The patient was warned against driving while taking sedation medications or operating heavy machinery  The patient voiced understanding  Sharing medications is a felony  At this point in time, the patient is showing no signs of addiction, abuse, diversion or suicidal ideation  [place] Prescription Drug Monitoring Program report was reviewed and was appropriate      Complete risks and benefits including bleeding, infection, tissue reaction, nerve injury and allergic reaction were discussed  The approach was demonstrated using models and literature was provided  Verbal and written consent was obtained  My impressions and treatment recommendations were discussed in detail with the patient who verbalized understanding and had no further questions  Discharge instructions were provided  I personally saw and examined the patient and I agree with the above discussed plan of care        New Medications Ordered This Visit   Medications    HYDROcodone-acetaminophen (NORCO) 5-325 mg per tablet     Sig: Take 1 tablet by mouth every 8 (eight) hours as needed for pain Max Daily Amount: 3 tablets     Dispense:  90 tablet     Refill:  0       History of Present Illness    Unable to perform epidural steroid injection given supratherapeutic INR on multiple occassions  Denies any radicular symptoms at this time  Axial low back pain without radicular features; achy, nagging, indolent crampy and throbbing pain  +facet loading maneuvers eliciting pain consistent with lumbar facet arthropathy  Gabapentin increased to 600mg TID without notable benefit  Previously described the following sxs:    Buffy Villalta is a 69 y o  female  With pmhx of Afib on warfarin with pacemaker, CHF, DM-2, HTN, CKD, obesity, OA of both hips presenting with chronic lumbar radicular pain in the bilateral L4 and L5 dermatomal distribution accompanied by bilateral foot drop  Debilitating weakness numbness and paresthesias accompany the pain  The patient rates the pain at a 8/10 accompanied by electric shock-like shooting features and crampy burning pain in the aforementioned dermatomal distributions  The pain is worse in the mornings as well as the end of the day; exertion such as walking for long periods of time seems to exacerbate the pain  The patient ambulates with a walker and can hardly walk more than a few blocks without having debilitating pain  She tries to maintain an active lifestyle and finds that the current degree of pain seems to compromises her efforts  The pain significantly impacts independent activities of daily living and contributes to significant disability  She has attempted 3 weeks of physical therapy with exacerbation of the pain  He/She has taken naproxen as well as gabapentin with limited relief of the pain as well   She has never tried epidural steroid injections in the past  She denies any bowel or bladder dysfunction/incontinence saddle anesthesia; endorses antalgic gait  I have personally reviewed and/or updated the patient's past medical history, past surgical history, family history, social history, current medications, allergies, and vital signs today  Review of Systems   Constitutional: Positive for activity change  HENT: Negative  Eyes: Negative  Respiratory: Negative  Cardiovascular: Negative  Gastrointestinal: Negative  Endocrine: Negative  Genitourinary: Negative  Musculoskeletal: Positive for arthralgias, back pain, gait problem and myalgias  Skin: Negative  Allergic/Immunologic: Negative  Neurological: Positive for weakness and numbness  Hematological: Negative  Psychiatric/Behavioral: Negative  All other systems reviewed and are negative        Patient Active Problem List   Diagnosis    Ankle injury    Atrial fibrillation (HCC)    Diastolic congestive heart failure (HCC)    Type 2 diabetes mellitus with diabetic nephropathy (HCC)    JOS (obstructive sleep apnea)    Stage 3 chronic kidney disease (HCC)    Pacemaker    Type 2 diabetes mellitus with diabetic neuropathy (HCC)    Severe obesity with body mass index (BMI) of 36 0 to 36 9 with serious comorbidity ([address]    Closed bimalleolar fracture of left ankle    Closed fracture of left ankle    Acquired hypothyroidism    Diabetes mellitus ([address] )    CHF (congestive heart failure) ([address] )    Essential hypertension    Renal disorder    Acute kidney injury ([address] )    Supratherapeutic INR    Pulmonary hypertension (HCC)    Acute respiratory failure with hypoxia (HCC)    Sepsis ([address] )    Urinary tract infection    Sick sinus syndrome (HCC)    Nausea and vomiting    Chronic pain of left knee    Presence of artificial knee joint, left    Primary osteoarthritis of both hips    Pain in left hip    Other spondylosis with radiculopathy, lumbar region    Peripheral vascular disease ([address] )       Past Medical History:   Diagnosis Date    Arthritis     [facility]est[facility] failure) [facility]     Diabetes mellitus ([facility]     Disease of thyroid gland     Hypertension     Pacemaker     Renal disorder        Past Surgical History:   Procedure Laterality Date    CARDIAC PACEMAKER PLACEMENT      CHOLECYSTECTOMY      JOINT REPLACEMENT      bilateral knee replacements    ORIF TIBIA & FIBULA FRACTURES Left 10/29/2020    Procedure: OPEN REDUCTION W/ INTERNAL FIXATION (ORIF) ANKLE;  Surgeon: Sandor Garnica;   Location:  MAIN OR;  Service: Orthopedics    TONSILLECTOMY      TUBAL LIGATION         Family History   Problem Relation Age of Onset    Atrial fibrillation Mother     Arthritis Father     Kidney cancer Father     Breast cancer Sister         early 46s   [facility] No Known Problems Sister     Kidney cancer Brother     No Known Problems Daughter     No Known Problems Daughter     No Known Problems Daughter     No Known Problems Daughter     No Known Problems Maternal Aunt     No Known Problems Maternal Aunt     No Known Problems Maternal Aunt     No Known Problems Paternal Aunt     No Known Problems Maternal Grandmother     No Known Problems Maternal Grandfather     No Known Problems Paternal Grandmother     No Known Problems Paternal Grandfather     Breast cancer Cousin         early 46s    Breast cancer additional onset Neg Hx     Colon cancer Neg Hx     Endometrial cancer Neg Hx     Ovarian cancer Neg Hx     BRCA 1/2 Neg Hx     BRCA2 Positive Neg Hx     BRCA2 Negative Neg Hx     BRCA1 Positive Neg Hx     BRCA1 Negative Neg Hx        Social History     Occupational History    Not on file   Tobacco Use    Smoking status: Never Smoker    Smokeless tobacco: Never Used   Vaping Use    Vaping Use: Never used   Substance and Sexual Activity    Alcohol use: Not Currently    Drug use: Not Currently    Sexual activity: Yes       Current Outpatient Medications on File Prior to Visit   Medication Sig    acetaminophen (TYLENOL) 500 mg tablet Take 500 mg by mouth    amLODIPine (NORVASC) 2 5 mg tablet Take 1 tablet (2 5 mg total) by mouth daily    Ascorbic Acid, Vitamin C, (VITAMIN C) 100 MG tablet Take 500 mg by mouth daily     gabapentin (Neurontin) 600 MG tablet Take 1 tablet (600 mg total) by mouth 3 (three) times a day    levothyroxine 150 mcg tablet Take 150 mcg by mouth daily     Multiple Vitamin (Multi-Day) TABS Take 1 tablet by mouth daily     warfarin (COUMADIN) 5 mg tablet Take 4 mg by mouth Sun, Wed, Thursday,Friday- 1 tab  Tues, Saturday- 2 tab    [DISCONTINUED] gabapentin (NEURONTIN) 300 mg capsule     torsemide (DEMADEX) 100 mg tablet Take 0 5 tablets (50 mg total) by mouth daily     No current facility-administered medications on file prior to visit  Allergies   Allergen Reactions    Rofecoxib Angioedema, Swelling, Hives and Other (See Comments)     swelling, sob  swelling, sob  Other reaction(s): Swelling / Edema  swelling, sob  Other reaction(s): SWELLING  Other reaction(s): Angioedema      Oxycodone-Acetaminophen GI Intolerance and Other (See Comments)     Unsure of rxn   Unsure of rxn   Unsure of rxn   Other reaction(s): "CAN'T BREATH"      Duloxetine Hcl Other (See Comments)     Slept for 4 days     Medical Tape Rash         Physical Exam    /66   Pulse 76   Temp (!) 97 4 °F (36 3 °C)   Resp 20   Ht 5' 2" (1 575 m)   Wt 101 kg (222 lb 3 2 oz)   BMI 40 64 kg/m²     Constitutional: normal, well developed, well nourished, alert, in no distress and non-toxic and no overt pain behavior  and obese  Eyes: anicteric  HEENT: grossly intact  Neck: supple, symmetric, trachea midline and no masses   Pulmonary:even and unlabored  Cardiovascular:No edema or pitting edema present  Skin:Normal without rashes or lesions and well hydrated  Psychiatric:Mood and affect appropriate  Neurologic:Cranial Nerves II-XII grossly intact Sensation grossly intact; no clonus negative ma's  Reflexes 2+ and brisk  SLR negative bilaterally  Spurling's maneuver negative bilaterally  Musculoskeletal: antalgic gait  3-4/5 strength with b/l ankle dorsiflexion, otherwise 5/5 strength bilaterally with AROM in all extremities  Unable to perform normal heel toe and tip toe walking  No significant pain with lumbar facet loading bilaterally and with lateral spine rotation  minimal ttp over lumbar paraspinal muscles  Negative sri's test, negative gaenslen's negative SIJ loading bilaterally  Imaging    LUMBAR SPINE     INDICATION:   M47 [id number]: Other spondylosis with radiculopathy, lumbar region      COMPARISON:  CT chest, abdomen and pelvis on 7/26/2021      VIEWS:  [facility] LUMBAR 2 OR 3 VIEWS INJURY        FINDINGS:     There are 5 non rib bearing lumbar vertebral bodies       Age-indeterminate mild compression deformities of T11 and T12 superior endplates, new since the prior CT      Slight lumbar dextroscoliosis  Mild grade 1 retrolisthesis of L3 on L4       Lumbar degenerative changes including multilevel severe disc space narrowing, vacuum phenomena, and osteophytic lipping noted    Bilateral facet arthropathy with associated foraminal narrowing, most severe in the mid to lower lumbar spine      The pedicles appear intact      Soft tissues are unremarkable      IMPRESSION:     Age-indeterminate mild compression deformities of T11 and T12 superior endplates        Degenerative changes as described      The study was marked in EPIC for significant notification         Workstation performed: [id number]

## 2022-09-23 NOTE — H&P (VIEW-ONLY)
Assessment  1  Lumbar spondylosis  -     HYDROcodone-acetaminophen (NORCO) 5-325 mg per tablet; Take 1 tablet by mouth every 8 (eight) hours as needed for pain Max Daily Amount: 3 tablets  -     Ambulatory referral to Physical Therapy; Future  -     Case request operating room: BLOCK MEDIAL BRANCH NERVES BILATERAL L3, L4, L5 #1; Standing  -     Case request operating room: BLOCK MEDIAL BRANCH NERVES BILATERAL L3, L4, L5 #1  -     MM ALL_Prescribed Meds and Special Instructions  -     MM DT_Alprazolam Definitive Test  -     MM DT_Amphetamine Definitive Test  -     MM DT_Buprenorphine Definitive Test  -     MM DT_Bupropion Definitive Test  -     MM DT_Carisoprodol Definitive Test  -     MM DT_Citalopram/Escitalopram Definitive Test  -     MM DT_Clonazepam Definitive Test  -     MM DT_Cocaine Definitive Test  -     MM DT_Codeine Definitive Test  -     MM Diazepam Definitive Test  -     MM DT_Ethyl Glucuronide Screen and Confirm Positive  -     MM DT_Fentanyl Definitive Test  -     MM DT_Heroin Definitive Test  -     MM DT_Hydrocodone Definitive Test  -     MM DT_Hydromorphone Definitive Test  -     MM DT_Kratom Definitive Test  -     MM Lorazepam Definitive Test  -     MM DT_MDMA Definitive Test  -     MM DT_Methadone Definitive Test  -     MM DT_Methamphetamine Definitive Test  -     MM DT_Morphine Definitive Test  -     MM DT_Oxazepam Definitive Test  -     MM DT_Oxycodone Definitive Test  -     MM DT_Oxymorphone Definitive Test  -     MM DT_Pregablin Definitive  -     MM DT_Tapentadol Definitive Test  -     MM DT_Temazapam Definitive Test  -     MM DT_THC Definitive Test  -     MM DT_Tramadol Definitive Test  -     MM DT_Validity Creatinine  -     MM DT_Validity Oxidant  -     MM DT_Validity pH  -     MM DT_Validity Specific    2  Uncomplicated opioid dependence (HCC)  -     Rapid drug screen, urine    3   Chronic pain syndrome  -     Rapid drug screen, urine    Unable to perform epidural steroid injection given supratherapeutic INR on multiple occassions  Denies any radicular symptoms at this time  Axial low back pain without radicular features; achy, nagging, indolent crampy and throbbing pain  +facet loading maneuvers eliciting pain consistent with lumbar facet arthropathy  Gabapentin increased to 600mg TID without notable benefit; I counseled her regarding red flag signs of cauda equina syndrome  Moderate central canal stenosis at L4-L5, otherwise prominent facet arthropathy with minimal nerve root compression in lumbar spine on CT scan  Risks, benefits and alternatives discussed with regard to opioid therapy, medial branch blocks and radiofrequency ablation; informed consent obtained  Left greater than right-sided lumbar radicular pain in L4 and L5 dermatomal distribution accompanied by weakness numbness and paresthesias  Patient additionally reports drop foot bilaterally  Ambulates with a walker with debilitating pain  Has been participate with 3 weeks of physical therapy without significant benefit and exacerbation of pain  Multilevel severe degenerative disc disease with spondyloarthropathy noted on x-ray lumbar spine in mid and low back  Additionally, age-indeterminate mild compression deformities of T11 and T12 superior endplates which is noncontributory  Reasonable to proceed with multimodal pain therapy plan as noted below focusing on imaging to guide interventional therapies  Risks, benefits alternatives to epidural steroid injections thoroughly discussed with patient  Handouts provided and questions answered to patient's satisfaction  In addition, lifestyle modifications extensively discussed including diet, exercise and weight loss      Plan  -bilateral L3, L4, L5 medial branch blocks #1; rtc 2 weeks post procedure  -cymbalta 30mg/day discontinued; risks, benefits and alternatives reviewed; questions answered to patient satisfaction; handouts provided  -opioid use agreement, uds obtained today  -norco 5-325mg [id number] prn pain ordered; patient counseled to take OTC bowel regimen and risks of opioids discussed below   -gabapentin increased to 600 mg t i d; counseled regarding sedative effects of taking this medication and provided up titration calendar  Counseled not to take medication while driving or operating heavy machinery/using stairs  -continue formal physical therapy for lumbar spinal stenosis/lumbar radiculopathy; Physician directed home exercise plan as per [facility] demonstrated and handouts provided that patient plans to participate with for 1 hour, twice a week for the next 6 weeks  There are risks associated with opioid medications, including dependence, addiction and tolerance  The patient understands and agrees to use these medications only as prescribed  Potential side effects of the medications include, but are not limited to, constipation, drowsiness, addiction, impaired judgment and risk of fatal overdose if not taken as prescribed  The patient was warned against driving while taking sedation medications or operating heavy machinery  The patient voiced understanding  Sharing medications is a felony  At this point in time, the patient is showing no signs of addiction, abuse, diversion or suicidal ideation  [place] Prescription Drug Monitoring Program report was reviewed and was appropriate      Complete risks and benefits including bleeding, infection, tissue reaction, nerve injury and allergic reaction were discussed  The approach was demonstrated using models and literature was provided  Verbal and written consent was obtained  My impressions and treatment recommendations were discussed in detail with the patient who verbalized understanding and had no further questions  Discharge instructions were provided  I personally saw and examined the patient and I agree with the above discussed plan of care        New Medications Ordered This Visit   Medications    HYDROcodone-acetaminophen (NORCO) 5-325 mg per tablet     Sig: Take 1 tablet by mouth every 8 (eight) hours as needed for pain Max Daily Amount: 3 tablets     Dispense:  90 tablet     Refill:  0       History of Present Illness    Unable to perform epidural steroid injection given supratherapeutic INR on multiple occassions  Denies any radicular symptoms at this time  Axial low back pain without radicular features; achy, nagging, indolent crampy and throbbing pain  +facet loading maneuvers eliciting pain consistent with lumbar facet arthropathy  Gabapentin increased to 600mg TID without notable benefit  Previously described the following sxs:    Lisette Soulier is a 69 y o  female  With pmhx of Afib on warfarin with pacemaker, CHF, DM-2, HTN, CKD, obesity, OA of both hips presenting with chronic lumbar radicular pain in the bilateral L4 and L5 dermatomal distribution accompanied by bilateral foot drop  Debilitating weakness numbness and paresthesias accompany the pain  The patient rates the pain at a 8/10 accompanied by electric shock-like shooting features and crampy burning pain in the aforementioned dermatomal distributions  The pain is worse in the mornings as well as the end of the day; exertion such as walking for long periods of time seems to exacerbate the pain  The patient ambulates with a walker and can hardly walk more than a few blocks without having debilitating pain  She tries to maintain an active lifestyle and finds that the current degree of pain seems to compromises her efforts  The pain significantly impacts independent activities of daily living and contributes to significant disability  She has attempted 3 weeks of physical therapy with exacerbation of the pain  He/She has taken naproxen as well as gabapentin with limited relief of the pain as well   She has never tried epidural steroid injections in the past  She denies any bowel or bladder dysfunction/incontinence saddle anesthesia; endorses antalgic gait  I have personally reviewed and/or updated the patient's past medical history, past surgical history, family history, social history, current medications, allergies, and vital signs today  Review of Systems   Constitutional: Positive for activity change  HENT: Negative  Eyes: Negative  Respiratory: Negative  Cardiovascular: Negative  Gastrointestinal: Negative  Endocrine: Negative  Genitourinary: Negative  Musculoskeletal: Positive for arthralgias, back pain, gait problem and myalgias  Skin: Negative  Allergic/Immunologic: Negative  Neurological: Positive for weakness and numbness  Hematological: Negative  Psychiatric/Behavioral: Negative  All other systems reviewed and are negative        Patient Active Problem List   Diagnosis    Ankle injury    Atrial fibrillation (HCC)    Diastolic congestive heart failure (HCC)    Type 2 diabetes mellitus with diabetic nephropathy (HCC)    JOS (obstructive sleep apnea)    Stage 3 chronic kidney disease (HCC)    Pacemaker    Type 2 diabetes mellitus with diabetic neuropathy (HCC)    Severe obesity with body mass index (BMI) of 36 0 to 36 9 with serious comorbidity ([address] )    Closed bimalleolar fracture of left ankle    Closed fracture of left ankle    Acquired hypothyroidism    Diabetes mellitus ([address] )    CHF (congestive heart failure) ([address] )    Essential hypertension    Renal disorder    Acute kidney injury ([address] )    Supratherapeutic INR    Pulmonary hypertension (HCC)    Acute respiratory failure with hypoxia (HCC)    Sepsis ([address] )    Urinary tract infection    Sick sinus syndrome (HCC)    Nausea and vomiting    Chronic pain of left knee    Presence of artificial knee joint, left    Primary osteoarthritis of both hips    Pain in left hip    Other spondylosis with radiculopathy, lumbar region    Peripheral vascular disease ([address] )       Past Medical History:   Diagnosis Date    Arthritis     [facility]     Diabetes mellitus [facility]     Disease of thyroid gland     Hypertension     Pacemaker     Renal disorder        Past Surgical History:   Procedure Laterality Date    CARDIAC PACEMAKER PLACEMENT      CHOLECYSTECTOMY      JOINT REPLACEMENT      bilateral knee replacements    ORIF TIBIA & FIBULA FRACTURES Left 10/29/2020    Procedure: OPEN REDUCTION W/ INTERNAL FIXATION (ORIF) ANKLE;  Surgeon: Delphine Gunter;   Location:  MAIN OR;  Service: Orthopedics    TONSILLECTOMY      TUBAL LIGATION         Family History   Problem Relation Age of Onset    Atrial fibrillation Mother     Arthritis Father     Kidney cancer Father     Breast cancer Sister         early 46s   Bhupinder Bronson No Known Problems Sister     Kidney cancer Brother     No Known Problems Daughter     No Known Problems Daughter     No Known Problems Daughter     No Known Problems Daughter     No Known Problems Maternal Aunt     No Known Problems Maternal Aunt     No Known Problems Maternal Aunt     No Known Problems Paternal Aunt     No Known Problems Maternal Grandmother     No Known Problems Maternal Grandfather     No Known Problems Paternal Grandmother     No Known Problems Paternal Grandfather     Breast cancer Cousin         early 46s    Breast cancer additional onset Neg Hx     Colon cancer Neg Hx     Endometrial cancer Neg Hx     Ovarian cancer Neg Hx     BRCA 1/2 Neg Hx     BRCA2 Positive Neg Hx     BRCA2 Negative Neg Hx     BRCA1 Positive Neg Hx     BRCA1 Negative Neg Hx        Social History     Occupational History    Not on file   Tobacco Use    Smoking status: Never Smoker    Smokeless tobacco: Never Used   Vaping Use    Vaping Use: Never used   Substance and Sexual Activity    Alcohol use: Not Currently    Drug use: Not Currently    Sexual activity: Yes       Current Outpatient Medications on File Prior to Visit   Medication Sig    acetaminophen (TYLENOL) 500 mg tablet Take 500 mg by mouth    amLODIPine (NORVASC) 2 5 mg tablet Take 1 tablet (2 5 mg total) by mouth daily    Ascorbic Acid, Vitamin C, (VITAMIN C) 100 MG tablet Take 500 mg by mouth daily     gabapentin (Neurontin) 600 MG tablet Take 1 tablet (600 mg total) by mouth 3 (three) times a day    levothyroxine 150 mcg tablet Take 150 mcg by mouth daily     Multiple Vitamin (Multi-Day) TABS Take 1 tablet by mouth daily     warfarin (COUMADIN) 5 mg tablet Take 4 mg by mouth Sun, Wed, Thursday,Friday- 1 tab  Tues, Saturday- 2 tab    [DISCONTINUED] gabapentin (NEURONTIN) 300 mg capsule     torsemide (DEMADEX) 100 mg tablet Take 0 5 tablets (50 mg total) by mouth daily     No current facility-administered medications on file prior to visit  Allergies   Allergen Reactions    Rofecoxib Angioedema, Swelling, Hives and Other (See Comments)     swelling, sob  swelling, sob  Other reaction(s): Swelling / Edema  swelling, sob  Other reaction(s): SWELLING  Other reaction(s): Angioedema      Oxycodone-Acetaminophen GI Intolerance and Other (See Comments)     Unsure of rxn   Unsure of rxn   Unsure of rxn   Other reaction(s): "CAN'T BREATH"      Duloxetine Hcl Other (See Comments)     Slept for 4 days     Medical Tape Rash         Physical Exam    /66   Pulse 76   Temp (!) 97 4 °F (36 3 °C)   Resp 20   Ht 5' 2" (1 575 m)   Wt 101 kg (222 lb 3 2 oz)   BMI 40 64 kg/m²     Constitutional: normal, well developed, well nourished, alert, in no distress and non-toxic and no overt pain behavior  and obese  Eyes: anicteric  HEENT: grossly intact  Neck: supple, symmetric, trachea midline and no masses   Pulmonary:even and unlabored  Cardiovascular:No edema or pitting edema present  Skin:Normal without rashes or lesions and well hydrated  Psychiatric:Mood and affect appropriate  Neurologic:Cranial Nerves II-XII grossly intact Sensation grossly intact; no clonus negative ma's  Reflexes 2+ and brisk  SLR negative bilaterally  Spurling's maneuver negative bilaterally  Musculoskeletal: antalgic gait  3-4/5 strength with b/l ankle dorsiflexion, otherwise 5/5 strength bilaterally with AROM in all extremities  Unable to perform normal heel toe and tip toe walking  No significant pain with lumbar facet loading bilaterally and with lateral spine rotation  minimal ttp over lumbar paraspinal muscles  Negative sri's test, negative gaenslen's negative SIJ loading bilaterally  Imaging    LUMBAR SPINE     INDICATION:   M47 26: Other spondylosis with radiculopathy, lumbar region      COMPARISON:  CT chest, abdomen and pelvis on 7/26/2021      VIEWS:  [facility] SPINE LUMBAR 2 OR 3 VIEWS INJURY        FINDINGS:     There are 5 non rib bearing lumbar vertebral bodies       Age-indeterminate mild compression deformities of T11 and T12 superior endplates, new since the prior CT      Slight lumbar dextroscoliosis  Mild grade 1 retrolisthesis of L3 on L4       Lumbar degenerative changes including multilevel severe disc space narrowing, vacuum phenomena, and osteophytic lipping noted    Bilateral facet arthropathy with associated foraminal narrowing, most severe in the mid to lower lumbar spine      The pedicles appear intact      Soft tissues are unremarkable      IMPRESSION:     Age-indeterminate mild compression deformities of T11 and T12 superior endplates        Degenerative changes as described      The study was marked in EPIC for significant notification         Workstation performed: [id number]

## 2022-09-26 NOTE — TELEPHONE ENCOUNTER
Anne from Dr Deena Onofre office states he's requesting to do a [id number] with Dr Lucio Villalobos regarding procedure   Please reach out to him at # [phone], thx

## 2022-09-26 NOTE — TELEPHONE ENCOUNTER
Called patient and she stated she finished her PT a couple months ago and they told her that she was done and didn't need to go back    Last PT session according to her chart was  07/21/2022    Inquired about the physical therapy that was just ordered from [address] [place] [address]  and she informed me that she did not want to go, because it didn't help her the last time

## 2022-09-28 LAB
6MAM UR QL CFM: NEGATIVE NG/ML
7AMINOCLONAZEPAM UR QL CFM: NEGATIVE NG/ML
A-OH ALPRAZ UR QL CFM: NEGATIVE NG/ML
ACCEPTABLE CREAT UR QL: NORMAL MG/DL
ACCEPTIBLE SP GR UR QL: NORMAL
AMPHET UR QL CFM: NEGATIVE NG/ML
AMPHET UR QL CFM: NEGATIVE NG/ML
BUPRENORPHINE UR QL CFM: NEGATIVE NG/ML
BZE UR QL CFM: NEGATIVE NG/ML
CARISOPRODOL UR QL CFM: NEGATIVE NG/ML
CODEINE UR QL CFM: NEGATIVE NG/ML
EDDP UR QL CFM: NEGATIVE NG/ML
ETHYL GLUCURONIDE UR QL SCN: NEGATIVE NG/ML
FENTANYL UR QL CFM: NEGATIVE NG/ML
GLIADIN IGG SER IA-ACNC: NEGATIVE NG/ML
HYDROCODONE UR QL CFM: ABNORMAL NG/ML
HYDROCODONE UR QL CFM: ABNORMAL NG/ML
HYDROMORPHONE UR QL CFM: ABNORMAL NG/ML
LORAZEPAM UR QL CFM: NEGATIVE NG/ML
MDMA UR QL CFM: NEGATIVE NG/ML
MEPROBAMATE UR QL CFM: NEGATIVE NG/ML
METHADONE UR QL CFM: NEGATIVE NG/ML
METHAMPHET UR QL CFM: NEGATIVE NG/ML
MORPHINE UR QL CFM: NEGATIVE NG/ML
MORPHINE UR QL CFM: NEGATIVE NG/ML
NITRITE UR QL: NORMAL UG/ML
NORBUPRENORPHINE UR QL CFM: NEGATIVE NG/ML
NORDIAZEPAM UR QL CFM: NEGATIVE NG/ML
NORFENTANYL UR QL CFM: NEGATIVE NG/ML
NORHYDROCODONE UR QL CFM: ABNORMAL NG/ML
NORHYDROCODONE UR QL CFM: ABNORMAL NG/ML
NOROXYCODONE UR QL CFM: NEGATIVE NG/ML
OXAZEPAM UR QL CFM: NEGATIVE NG/ML
OXYCODONE UR QL CFM: NEGATIVE NG/ML
OXYMORPHONE UR QL CFM: NEGATIVE NG/ML
OXYMORPHONE UR QL CFM: NEGATIVE NG/ML
PARA-FLUOROFENTANYL QUANTIFICATION: NORMAL NG/ML
RESULT ALL_PRESCRIBED MEDS AND SPECIAL INSTRUCTIONS: NORMAL
SL AMB 4-ANPP QUANTIFICATION: NORMAL NG/ML
SL AMB 7-OH-MITRAGYNINE (KRATOM ALKALOID) QUANTIFICATION: NEGATIVE NG/ML
SL AMB ACETYL FENTANYL QUANTIFICATION: NORMAL NG/ML
SL AMB ACETYL NORFENTANYL QUANTIFICATION: NORMAL NG/ML
SL AMB ACRYL FENTANYL QUANTIFICATION: NORMAL NG/ML
SL AMB CARFENTANIL QUANTIFICATION: NORMAL NG/ML
SL AMB CITALOPRAM/ESCITALOPRAM QUANTIFICATION: NEGATIVE NG/ML
SL AMB CITALOPRAM/ESCITALOPRAM QUANTIFICATION: NEGATIVE NG/ML
SL AMB CTHC (MARIJUANA METABOLITE) QUANTIFICATION: NEGATIVE NG/ML
SL AMB HYDROXYBUPROPION QUANTIFICATION: NEGATIVE NG/ML
SL AMB N-DESMETHYL-TRAMADOL QUANTIFICATION: NEGATIVE NG/ML
SL AMB PREGABALIN QUANTIFICATION: NEGATIVE
SPECIMEN PH ACCEPTABLE UR: NORMAL
TAPENTADOL UR QL CFM: NEGATIVE NG/ML
TEMAZEPAM UR QL CFM: NEGATIVE NG/ML
TEMAZEPAM UR QL CFM: NEGATIVE NG/ML
TRAMADOL UR QL CFM: NEGATIVE NG/ML
URATE/CREAT 24H UR: NEGATIVE NG/ML

## 2022-09-29 ENCOUNTER — TELEPHONE (OUTPATIENT)
Dept: PAIN MEDICINE | Facility: MEDICAL CENTER | Age: 77
End: 2022-09-29

## 2022-09-29 NOTE — TELEPHONE ENCOUNTER
Patient calling would like to know procedure instructions  Please advise     Patient can be reached at [phone] ty

## 2022-09-29 NOTE — TELEPHONE ENCOUNTER
S/w pt  She is aware of instructions  No food/drink one hour prior  Wear comfortable clothing  A  is required  Hold pain meds 6 hr prior and 6-8 hr after  Continue all other prescribed medications  Call if prescribed an antibiotic or become ill  Refrain from vaccinations two weeks prior and after procedure  Call with questions

## 2022-10-04 ENCOUNTER — HOSPITAL ENCOUNTER (OUTPATIENT)
Facility: HOSPITAL | Age: 77
Setting detail: OUTPATIENT SURGERY
Discharge: HOME | End: 2022-10-04
Attending: ANESTHESIOLOGY | Admitting: ANESTHESIOLOGY
Payer: COMMERCIAL

## 2022-10-04 ENCOUNTER — APPOINTMENT (OUTPATIENT)
Dept: RADIOLOGY | Facility: HOSPITAL | Age: 77
End: 2022-10-04
Payer: COMMERCIAL

## 2022-10-04 VITALS
TEMPERATURE: 98.2 F | RESPIRATION RATE: 18 BRPM | HEIGHT: 62 IN | HEART RATE: 82 BPM | DIASTOLIC BLOOD PRESSURE: 64 MMHG | WEIGHT: 222 LBS | SYSTOLIC BLOOD PRESSURE: 137 MMHG | BODY MASS INDEX: 40.85 KG/M2 | OXYGEN SATURATION: 96 %

## 2022-10-04 LAB — GLUCOSE SERPL-MCNC: 159 MG/DL (ref 65–140)

## 2022-10-04 PROCEDURE — 82948 REAGENT STRIP/BLOOD GLUCOSE: CPT

## 2022-10-04 PROCEDURE — 64494 INJ PARAVERT F JNT L/S 2 LEV: CPT | Performed by: ANESTHESIOLOGY

## 2022-10-04 PROCEDURE — 77002 NEEDLE LOCALIZATION BY XRAY: CPT

## 2022-10-04 PROCEDURE — 64493 INJ PARAVERT F JNT L/S 1 LEV: CPT | Performed by: ANESTHESIOLOGY

## 2022-10-04 RX ORDER — METHYLPREDNISOLONE ACETATE 80 MG/ML
80 INJECTION, SUSPENSION INTRA-ARTICULAR; INTRALESIONAL; INTRAMUSCULAR; PARENTERAL; SOFT TISSUE ONCE
Status: COMPLETED | OUTPATIENT
Start: 2022-10-04 | End: 2022-10-04

## 2022-10-04 RX ORDER — BUPIVACAINE HCL/PF 2.5 MG/ML
10 VIAL (ML) INJECTION ONCE
Status: COMPLETED | OUTPATIENT
Start: 2022-10-04 | End: 2022-10-04

## 2022-10-04 RX ORDER — LIDOCAINE HYDROCHLORIDE 10 MG/ML
10 INJECTION, SOLUTION EPIDURAL; INFILTRATION; INTRACAUDAL; PERINEURAL ONCE
Status: COMPLETED | OUTPATIENT
Start: 2022-10-04 | End: 2022-10-04

## 2022-10-04 RX ORDER — ALPRAZOLAM 0.5 MG/1
0.5 TABLET ORAL ONCE
Status: COMPLETED | OUTPATIENT
Start: 2022-10-04 | End: 2022-10-04

## 2022-10-04 RX ADMIN — ALPRAZOLAM 0.5 MG: 0.5 TABLET ORAL at 09:53

## 2022-10-04 NOTE — DISCHARGE INSTRUCTIONS
YOUR 2 WEEK FOLLOW UP HAS BEEN SCHEDULED; IF YOU WISH TO CHANGE THE FOLLOW UP, PLEASE CALL THE [facility] [place]: [phone]    MEDIAL BRANCH BLOCK DISCHARGE INSTRUCTIONS  ACTIVITY  Please do activities that will bring the normal pain that we are rating  For example, if vacuuming or walking increases the painm do that  Nolberto twill give the most accurate response to the diary  You may shower, but no tub baths today, or applied heat  CARE OF THE INJECTION SITE  This area may be numb for several hours after the injection  Notify the [facility] if you have any of the following: redness, drainage, swelling or fever above 100°F     SPECIAL INSTRUCTIONS  Please return the MBB diary to our office by mail, fax, or drop it off  MEDICATIONS  Please do not take any break through or short acting pain medications for 8 hours after the block  Continue to take all routine medications  Our office may have instructed you to hold some medications  You may resume _______________________________________________  If you have any problems specifically related to your procedure, please call our office at [phone]  Problems not related to your procedure should be directed at your primary care physician  Lumbar Radiofrequency Ablation   WHAT YOU NEED TO KNOW:   What do I need to know about lumbar radiofrequency ablation? Lumbar radiofrequency ablation (RFA) is a procedure used to treat facet joint pain in your lower back  Facet joints are found at the back of each vertebra  A needle electrode is used to send electrical currents to the nerves in your facet joint  The electrical currents create heat that damages the nerve so it cannot send pain signals  How do I prepare for lumbar RFA? Your healthcare provider will talk to you about how to prepare for this procedure  He may tell you not to eat or drink anything after midnight on the day of your procedure   He will tell you what medicines to take or not take on the day of your procedure  What will happen during lumbar RFA? You will lie on your stomach  You will be given local anesthesia to numb the area of your back where the needle electrode will be inserted  You may be given a sedative to help keep you relaxed  You may still feel pressure or pushing during the procedure, but you should not feel any pain  Your healthcare provider will use fluoroscopy (a type of x-ray) to guide the needle electrode to the nerves near your facet joint  Your healthcare provider may touch the affected nerve to make sure the needle electrode is in the right place  You will feel tingling or pressure when he does this  He will then apply local anesthesia to the nerve to numb it  This will prevent you from feeling pain when he applies heat to the nerve  Your healthcare provider will then apply heat to the nerve using the needle electrode  He may need to apply heat to more than one nerve  He will remove the needle electrode and apply a bandage over the area  What are the risks of lumbar RFA? You may have pain, numbness, tingling, or burning in the area where the lumbar RFA was done  These normally go away within 6 weeks  The needle electrode may injure your spinal nerves  This may cause permanent leg weakness or nerve pain  CARE AGREEMENT:   You have the right to help plan your care  Learn about your health condition and how it may be treated  Discuss treatment options with your healthcare providers to decide what care you want to receive  You always have the right to refuse treatment  The above information is an  only  It is not intended as medical advice for individual conditions or treatments  Talk to your doctor, nurse or pharmacist before following any medical regimen to see if it is safe and effective for you    © Copyright [employer] 2022 Information is for End User's use only and may not be sold, redistributed or otherwise used for commercial purposes   All illustrations and images included in CareNotes® are the copyrighted property of A D A M , Inc  or [facility] Uma Mcduffie

## 2022-10-04 NOTE — OP NOTE
OPERATIVE REPORT  PATIENT NAME: Lauro Ayala    :  1945  MRN: [id number]  Pt Location:  GI ROOM 01    SURGERY DATE: 10/4/2022    Surgeon(s) and Role: John tSone MD - Primary    Preop Diagnosis:  Lumbar spondylosis [M47 816]    Post-Op Diagnosis Codes:     * Lumbar spondylosis [M47 816]    Procedure(s) (LRB):  BLOCK MEDIAL BRANCH NERVES BILATERAL L3, L4, L5 #1 (Bilateral)    Specimen(s):  * No specimens in log *    Estimated Blood Loss:   Minimal    Drains:  * No LDAs found *    Anesthesia Type:   Local    Operative Indications:  Lumbar spondylosis [M47 816]    Operative Findings:  Bilateral L3, L4, L5 medial branch nerve regions identified under fluoroscopic guidance      Complications:   None    Procedure and Technique:  Please see detailed procedure note     I was present for the entire procedure    Patient Disposition:  PACU     SIGNATURE: Susy Clayton MD  DATE: 2022  TIME: 10:15 AM

## 2022-10-04 NOTE — PROCEDURES
Pre-procedure Diagnosis: Lumbar Facet Arthropathy  Post-procedure Diagnosis: Lumbar Facet Arthropathy  Procedure Title(s):  [BILATERAL L3, L4, L5] medial branch nerve blocks [(DIAGNOSTIC)]  Attending Surgeon:   Lorin Caicedo MD  Anesthesia:   Local     Indications: The patient is a 69y o  year-old female with a diagnosis of lumbar facet arthropathy  The patient's history and physical exam were reviewed  The risks, benefits and alternatives to the procedure were discussed, and all questions were answered to the patient's satisfaction  The patient agreed to proceed, and written informed consent was obtained  Procedure in Detail: The patient was brought into the procedure room and placed in the prone position on the fluoroscopy table  The area of the lumbar spine was prepped with chloraprep and then draped in a sterile manner  AP fluoroscopy was used to identify the [L3-L5] levels on the [LEFT] side  The C-arm was obliqued to visualize the junction of the superior articulate process and transverse process  The sacral ala was identified and marked  The skin in these identified areas was anesthetized with 1% lidocaine  A 22-gauge, 5-inch spinal needle was advanced toward each of these points under fluoroscopic guidance  Once bone was contacted, negative aspiration was confirmed and [1-mL] of a [6mL]mixture of [5mL] [0 25% bupivicaine] and 1mL of 80mg/mL Depomedrol was injected at each level  (The same procedure was performed on the RIGHT side )    After the procedure was completed, the patient's back was cleaned and bandages were placed at the needle insertion sites  Disposition: The patient tolerated the procedure well, and there were no apparent complications  The patient was taken to the recovery area where written discharge instructions for the procedure were given  The patient was given a pain diary to determine if the patient's pain improves following the injection   Once the diary is returned we will consider next appropriate course of treatment      Postoperative pain relief [WAS] significant    Estimated Blood Loss: None  Specimens Obtained: N/A

## 2022-10-04 NOTE — INTERVAL H&P NOTE
H&P reviewed  After examining the patient I find no changes in the patients condition since the H&P had been written      Vitals:    10/04/22 0944   BP: 139/69   Pulse: 74   Resp: 18   Temp: 98 1 °F (36 7 °C)   SpO2: 95%

## 2022-10-05 ENCOUNTER — TELEPHONE (OUTPATIENT)
Dept: OBGYN CLINIC | Facility: CLINIC | Age: 77
End: 2022-10-05

## 2022-10-05 DIAGNOSIS — M54.16 LUMBAR RADICULOPATHY: ICD-10-CM

## 2022-10-05 RX ORDER — GABAPENTIN 600 MG/1
600 TABLET ORAL 3 TIMES DAILY
Qty: 270 TABLET | Refills: 0 | Status: SHIPPED | OUTPATIENT
Start: 2022-10-05

## 2022-10-05 NOTE — TELEPHONE ENCOUNTER
S/w pt who states that she has enough Hydrocodone and does not need refill  Pt advised that when she drops off PD tomorrow to schedule f/u [facility] to be able to obtain refill  Pt is requesting a refill of Gabapentin  Pt is taking 600 mg tid, it is helping her pain and she has none left  LP 9/2/22 #90 with no refills

## 2022-10-05 NOTE — TELEPHONE ENCOUNTER
Pt would like a refill for Gabapentin 600 mg and hydrocodone 5-325  Please send to [facility] in [facility]

## 2022-10-12 ENCOUNTER — OFFICE VISIT (OUTPATIENT)
Dept: PAIN MEDICINE | Facility: CLINIC | Age: 77
End: 2022-10-12
Payer: COMMERCIAL

## 2022-10-12 VITALS
BODY MASS INDEX: 39.2 KG/M2 | RESPIRATION RATE: 20 BRPM | TEMPERATURE: 97.2 F | DIASTOLIC BLOOD PRESSURE: 58 MMHG | SYSTOLIC BLOOD PRESSURE: 128 MMHG | HEIGHT: 62 IN | HEART RATE: 92 BPM | WEIGHT: 213 LBS

## 2022-10-12 DIAGNOSIS — M47.816 LUMBAR SPONDYLOSIS: Primary | ICD-10-CM

## 2022-10-12 PROBLEM — M47.26 OTHER SPONDYLOSIS WITH RADICULOPATHY, LUMBAR REGION: Status: RESOLVED | Noted: 2022-06-07 | Resolved: 2022-10-12

## 2022-10-12 PROBLEM — A41.9 SEPSIS (HCC): Status: RESOLVED | Noted: 2021-07-26 | Resolved: 2022-10-12

## 2022-10-12 PROBLEM — N39.0 URINARY TRACT INFECTION: Status: RESOLVED | Noted: 2021-07-26 | Resolved: 2022-10-12

## 2022-10-12 PROCEDURE — 99214 OFFICE O/P EST MOD 30 MIN: CPT | Performed by: ANESTHESIOLOGY

## 2022-10-12 RX ORDER — METHYLPREDNISOLONE ACETATE 80 MG/ML
80 INJECTION, SUSPENSION INTRA-ARTICULAR; INTRALESIONAL; INTRAMUSCULAR; PARENTERAL; SOFT TISSUE ONCE
Status: CANCELLED | OUTPATIENT
Start: 2022-10-12 | End: 2022-10-12

## 2022-10-12 RX ORDER — LIDOCAINE HYDROCHLORIDE 10 MG/ML
10 INJECTION, SOLUTION EPIDURAL; INFILTRATION; INTRACAUDAL; PERINEURAL ONCE
Status: CANCELLED | OUTPATIENT
Start: 2022-10-12 | End: 2022-10-12

## 2022-10-12 RX ORDER — BUPIVACAINE HCL/PF 2.5 MG/ML
10 VIAL (ML) INJECTION ONCE
Status: CANCELLED | OUTPATIENT
Start: 2022-10-12 | End: 2022-10-12

## 2022-10-12 NOTE — PATIENT INSTRUCTIONS
Core Strengthening Exercises   WHAT YOU NEED TO KNOW:   Your core includes the muscles of your lower back, hip, pelvis, and abdomen  Core strengthening exercises help heal and strengthen these muscles  This helps prevent another injury, and keeps your pelvis, spine, and hips in the correct position  DISCHARGE INSTRUCTIONS:   Contact your healthcare provider if:   You have sharp or worsening pain during exercise or at rest     You have questions or concerns about your shoulder exercises  Safety tips:  Talk to your healthcare provider before you start an exercise program  A physical therapist can teach you how to do core strengthening exercises safely  Do the exercises on a mat or firm surface  A firm surface will support your spine and prevent low back pain  Do not do these exercises on a bed  Move slowly and smoothly  Avoid fast or jerky motions  Stop if you feel pain  Core exercises should not be painful  Stop if you feel pain  Breathe normally during core exercises  Do not hold your breath  This may cause an increase in blood pressure and prevent muscle strengthening  Your healthcare provider will tell you when to inhale and exhale during the exercise  Begin all of your exercises with abdominal bracing  Abdominal bracing helps warm up your core muscles  You can also practice abdominal bracing throughout the day  Lie on your back with your knees bent and feet flat on the floor  Place your arms in a relaxed position beside your body  Tighten your abdominal muscles  Pull your belly button in and up toward your spine  Hold for 5 seconds  Relax your muscles  Repeat 10 times  Core strengthening exercises: Your healthcare provider will tell you how often to do these exercises  The provider will also tell you how many repetitions of each exercise you should do  Hold each exercise for 5 seconds or as directed  As you get stronger, increase your hold to 10 to 15 seconds   You can do some of these exercises on a stability ball, or with a weight  Ask your healthcare provider how to use a stability ball or weight for these exercises:  Bridging:  Lie on your back with your knees bent and feet flat on the floor  Rest your arms at your side  Tighten your buttocks, and then lift your hips 1 inch off the floor  Hold for 5 seconds  When you can do this exercise without pain for 10 seconds, increase the distance you lift your hips  A good goal is to be able to lift your hips so that your shoulders, hips, and knees are in a straight line  Dead bug:  Lie on your back with your knees bent and feet flat on the floor  Place your arms in a relaxed position beside your body  Begin with abdominal bracing  Next, raise one leg, keeping your knee bent  Hold for 5 seconds  Repeat with the other leg  When you can do this exercise without pain for 10 to 15 seconds, you may raise one straight leg and hold  Repeat with the other leg  Quadruped:  Place your hands and knees on the floor  Keep your wrists directly below your shoulders and your knees directly below your hips  Pull your belly button in toward your spine  Do not flatten or arch your back  Tighten your abdominal muscles below your belly button  Hold for 5 seconds  When you can do this exercise without pain for 10 to 15 seconds, you may extend one arm and hold  Repeat on the other side  Side bridge exercises:      Standing side bridge:  Stand next to a wall and extend one arm toward the wall  Place your palm flat on the wall with your fingers pointing upward  Begin with abdominal bracing  Next, without moving your feet, slowly bend your arm to 90 degrees  Hold for 5 seconds  Repeat on the other side  When you can do this exercise without pain for 10 to 15 seconds, you may do the bent leg side bridge on the floor  Bent leg side bridge:  Lie on one side with your legs, hips, and shoulders in a straight line   Prop yourself up onto your forearm so your elbow is directly below your shoulder  Bend your knees back to 90 degrees  Begin with abdominal bracing  Next, lift your hips and balance yourself on your forearm and knees  Hold for 5 seconds  Repeat on the other side  When you can do this exercise without pain for 10 to 15 seconds, you may do the straight leg side bridge on the floor  Straight leg side bridge:  Lie on one side with your legs, hips, and shoulders in a straight line  Prop yourself up onto your forearm so your elbow is directly below your shoulder  Begin with abdominal bracing  Lift your hips off the floor and balance yourself on your forearm and the outside of your flexed foot  Do not let your ankle bend sideways  Hold for 5 seconds  Repeat on the other side  When you can do this exercise without pain for 10 to 15 seconds, ask your healthcare provider for more advanced exercises  Superman:  Lie on your stomach  Extend your arms forward on the floor  Tighten your abdominal muscles and lift your right hand and left leg off the floor  Hold this position  Slowly return to the starting position  Tighten your abdominal muscles and lift your left hand and right leg off the floor  Hold this position  Slowly return to the starting position  Clam:  Lie on your side with your knees bent  Put your bottom arm under your head to keep your neck in line  Put your top hand on your hip to keep your pelvis from moving  Put your heels together, and keep them together during this exercise  Slowly raise your top knee toward the ceiling  Then lower your leg so your knees are together  Repeat this exercise 10 times  Then switch sides and do the exercise 10 times with the other leg  Curl up:  Lie on your back with your knees bent and feet flat on the floor  Place your hands, palms down, underneath your lower back  Next, with your elbows on the floor, lift your shoulders and chest 2 to 3 inches off the floor   Keep your head in line with your shoulders  Hold this position  Slowly return to the starting position  Straight leg raises:  Lie on your back with one leg straight  Bend the other knee and place your foot flat on the floor  Tighten your abdominal muscles  Keep your leg straight and slowly lift it straight up 6 to 12 inches off the floor  Hold this position  Lower your leg slowly  Do as many repetitions as directed on this side  Repeat with the other leg  Plank:  Lie on your stomach  Bend your elbows and place your forearms flat on the floor  Lift your chest, stomach, and knees off the floor  Make sure your elbows are below your shoulders  Your body should be in a straight line  Do not let your hips or lower back sink to the ground  Squeeze your abdominal muscles together and hold for 15 seconds  To make this exercise harder, hold for 30 seconds or lift 1 leg at a time  Bicycles:  Lie on your back  Bend both knees and bring them toward your chest  Your calves should be parallel to the floor  Place the palms of your hands on the back of your head  Straighten your right leg and keep it lifted 2 inches off the floor  Raise your head and shoulders off the floor and twist towards your left  Keep your head and shoulders lifted  Bend your right knee while you straighten your left leg  Keep your left leg 2 inches off the floor  Twist your head and chest towards the left leg  Continue to straighten 1 leg at a time and twist        Follow up with your doctor as directed:  Write down your questions so you remember to ask them during your visits  © Copyright [employer] 2022 Information is for End User's use only and may not be sold, redistributed or otherwise used for commercial purposes  All illustrations and images included in CareNotes® are the copyrighted property of [employer]  or [facility] Uma Davenport   The above information is an  only   It is not intended as medical advice for individual conditions or treatments  Talk to your doctor, nurse or pharmacist before following any medical regimen to see if it is safe and effective for you

## 2022-10-12 NOTE — H&P (VIEW-ONLY)
Assessment  1  Lumbar spondylosis - Bilateral  -     Case request operating room: BLOCK MEDIAL BRANCH L3, L4, L5 #2; Standing  -     Case request operating room: BLOCK MEDIAL BRANCH L3, L4, L5 #2    Unable to perform epidural steroid injection given supratherapeutic INR on multiple occassions  Greater than 80% relief of pain with improved ability to participate with IADLs after bilateral L3, L4, L5 medial branch blocks #1 for almost a week  Denies any radicular symptoms at this time  Axial low back pain without radicular features; achy, nagging, indolent crampy and throbbing pain  +facet loading maneuvers eliciting pain consistent with lumbar facet arthropathy  Gabapentin increased to 600mg TID with notable benefit adding opioid therapy; I counseled her regarding red flag signs of cauda equina syndrome  Moderate central canal stenosis at L4-L5, otherwise prominent facet arthropathy with minimal nerve root compression in lumbar spine on CT scan  Risks, benefits and alternatives discussed with regard to opioid therapy, medial branch blocks and radiofrequency ablation; informed consent obtained  Left greater than right-sided lumbar radicular pain in L4 and L5 dermatomal distribution accompanied by weakness numbness and paresthesias  Patient additionally reports drop foot bilaterally  Ambulates with a walker with debilitating pain  Has been participate with 3 weeks of physical therapy without significant benefit and exacerbation of pain  Multilevel severe degenerative disc disease with spondyloarthropathy noted on x-ray lumbar spine in mid and low back  Additionally, age-indeterminate mild compression deformities of T11 and T12 superior endplates which is noncontributory  Reasonable to proceed with multimodal pain therapy plan as noted below focusing on imaging to guide interventional therapies  Risks, benefits alternatives to epidural steroid injections thoroughly discussed with patient    Handouts provided and questions answered to patient's satisfaction  In addition, lifestyle modifications extensively discussed including diet, exercise and weight loss  Plan  -bilateral L3, L4, L5 medial branch blocks #2; rtc 2 weeks post procedure  -cymbalta 30mg/day discontinued; risks, benefits and alternatives reviewed; questions answered to patient satisfaction; handouts provided  -opioid use agreement, uds obtained previously; wnl  -norco 5-325mg [id number] prn pain ordered; patient counseled to take OTC bowel regimen and risks of opioids discussed below   -gabapentin to be continued at 600 mg t i d; counseled regarding sedative effects of taking this medication and provided up titration calendar  Counseled not to take medication while driving or operating heavy machinery/using stairs  -continue formal physical therapy for lumbar spinal stenosis/lumbar radiculopathy; Physician directed home exercise plan as per [facility] demonstrated and handouts provided that patient plans to participate with for 1 hour, twice a week for the next 6 weeks  There are risks associated with opioid medications, including dependence, addiction and tolerance  The patient understands and agrees to use these medications only as prescribed  Potential side effects of the medications include, but are not limited to, constipation, drowsiness, addiction, impaired judgment and risk of fatal overdose if not taken as prescribed  The patient was warned against driving while taking sedation medications or operating heavy machinery  The patient voiced understanding  Sharing medications is a felony  At this point in time, the patient is showing no signs of addiction, abuse, diversion or suicidal ideation  [place] Prescription Drug Monitoring Program report was reviewed and was appropriate      Complete risks and benefits including bleeding, infection, tissue reaction, nerve injury and allergic reaction were discussed   The approach was demonstrated using models and literature was provided  Verbal and written consent was obtained  My impressions and treatment recommendations were discussed in detail with the patient who verbalized understanding and had no further questions  Discharge instructions were provided  I personally saw and examined the patient and I agree with the above discussed plan of care  No orders of the defined types were placed in this encounter  History of Present Illness    Unable to perform epidural steroid injection given supratherapeutic INR on multiple occassions  Denies any radicular symptoms at this time  Axial low back pain without radicular features; achy, nagging, indolent crampy and throbbing pain  +facet loading maneuvers eliciting pain consistent with lumbar facet arthropathy  Greater than 80% relief of pain with improved ability to participate with IADLs after bilateral L3, L4, L5 medial branch blocks #1 for almost a week  Doing better with norco supplementing gabapentin, having notable benefit  Previously described the following sxs:    Hilda Phillips is a 69 y o  female  With pmhx of Afib on warfarin with pacemaker, CHF, DM-2, HTN, CKD, obesity, OA of both hips presenting with chronic lumbar radicular pain in the bilateral L4 and L5 dermatomal distribution accompanied by bilateral foot drop  Debilitating weakness numbness and paresthesias accompany the pain  The patient rates the pain at a 8/10 accompanied by electric shock-like shooting features and crampy burning pain in the aforementioned dermatomal distributions  The pain is worse in the mornings as well as the end of the day; exertion such as walking for long periods of time seems to exacerbate the pain  The patient ambulates with a walker and can hardly walk more than a few blocks without having debilitating pain  She tries to maintain an active lifestyle and finds that the current degree of pain seems to compromises her efforts    The pain significantly impacts independent activities of daily living and contributes to significant disability  She has attempted 3 weeks of physical therapy with exacerbation of the pain  He/She has taken naproxen as well as gabapentin with limited relief of the pain as well  She has never tried epidural steroid injections in the past  She denies any bowel or bladder dysfunction/incontinence saddle anesthesia; endorses antalgic gait  I have personally reviewed and/or updated the patient's past medical history, past surgical history, family history, social history, current medications, allergies, and vital signs today  Review of Systems   Constitutional: Positive for activity change  HENT: Negative  Eyes: Negative  Respiratory: Negative  Cardiovascular: Negative  Gastrointestinal: Negative  Endocrine: Negative  Genitourinary: Negative  Musculoskeletal: Positive for arthralgias, back pain, gait problem and myalgias  Skin: Negative  Allergic/Immunologic: Negative  Neurological: Positive for weakness and numbness  Hematological: Negative  Psychiatric/Behavioral: Negative  All other systems reviewed and are negative        Patient Active Problem List   Diagnosis   • Ankle injury   • Atrial fibrillation ([facility])   • Diastolic congestive heart failure ([facility])   • Type 2 diabetes mellitus with diabetic nephropathy ([facility])   • JOS (obstructive sleep apnea)   • Stage 3 chronic kidney disease ([facility])   • Pacemaker   • Type 2 diabetes mellitus with diabetic neuropathy ([facility])   • Severe obesity with body mass index (BMI) of 36 0 to 36 9 with serious comorbidity ([facility] )   • Closed bimalleolar fracture of left ankle   • Closed fracture of left ankle   • Acquired hypothyroidism   • Diabetes mellitus ([address] )   • CHF (congestive heart failure) ([facility])   • Essential hypertension   • Renal disorder   • Acute kidney injury ([address] )   • Supratherapeutic INR   • Pulmonary hypertension ([address] )   • Acute respiratory failure with hypoxia ([facility])   • Sick sinus syndrome (H[facility])   • Nausea and vomiting   • Chronic pain of left knee   • Presence of artificial knee joint, left   • Primary osteoarthritis of both hips   • Pain in left hip   • Peripheral vascular disease (H[facility])   • Lumbar spondylosis       Past Medical History:   Diagnosis Date   • Arthritis    • CHF (congestive heart failure) (HCC)    • Diabetes mellitus ([facility] )    • Disease of thyroid gland    • Hypertension    • Pacemaker    • Renal disorder        Past Surgical History:   Procedure Laterality Date   • CARDIAC PACEMAKER PLACEMENT     • CHOLECYSTECTOMY     • FL GUIDED NEEDLE PLAC BX/ASP/INJ  10/4/2022   • JOINT REPLACEMENT      bilateral knee replacements   • NERVE BLOCK Bilateral 10/4/2022    Procedure: BLOCK MEDIAL BRANCH NERVES BILATERAL L3, L4, L5 #1;  Surgeon: Mauricio Orozco MD;  Location:  [facility];  Service: Pain Management    • ORIF TIBIA & FIBULA FRACTURES Left 10/29/2020    Procedure: OPEN REDUCTION W/ INTERNAL FIXATION (ORIF) ANKLE;  Surgeon: Hannah Mckinley;   Location:  MAIN OR;  Service: Orthopedics   • TONSILLECTOMY     • TUBAL LIGATION         Family History   Problem Relation Age of Onset   • Atrial fibrillation Mother    • Arthritis Father    • Kidney cancer Father    • Breast cancer Sister         early 46s   • No Known Problems Sister    • Kidney cancer Brother    • No Known Problems Daughter    • No Known Problems Daughter    • No Known Problems Daughter    • No Known Problems Daughter    • No Known Problems Maternal Aunt    • No Known Problems Maternal Aunt    • No Known Problems Maternal Aunt    • No Known Problems Paternal Aunt    • No Known Problems Maternal Grandmother    • No Known Problems Maternal Grandfather    • No Known Problems Paternal Grandmother    • No Known Problems Paternal Grandfather    • Breast cancer Cousin         early 46s   • Breast cancer additional onset Neg Hx    • Colon cancer Neg Hx    • Endometrial cancer Neg Hx    • Ovarian cancer Neg Hx    • BRCA 1/2 Neg Hx    • BRCA2 Positive Neg Hx    • BRCA2 Negative Neg Hx    • BRCA1 Positive Neg Hx    • BRCA1 Negative Neg Hx        Social History     Occupational History   • Not on file   Tobacco Use   • Smoking status: Never Smoker   • Smokeless tobacco: Never Used   Vaping Use   • Vaping Use: Never used   Substance and Sexual Activity   • Alcohol use: Not Currently   • Drug use: Not Currently   • Sexual activity: Yes       Current Outpatient Medications on File Prior to Visit   Medication Sig   • acetaminophen (TYLENOL) 500 mg tablet Take 500 mg by mouth   • Ascorbic Acid, Vitamin C, (VITAMIN C) 100 MG tablet Take 500 mg by mouth daily    • gabapentin (Neurontin) 600 MG tablet Take 1 tablet (600 mg total) by mouth 3 (three) times a day   • HYDROcodone-acetaminophen (NORCO) 5-325 mg per tablet Take 1 tablet by mouth every 8 (eight) hours as needed for pain Max Daily Amount: 3 tablets   • levothyroxine 150 mcg tablet Take 150 mcg by mouth daily    • Multiple Vitamin (Multi-Day) TABS Take 1 tablet by mouth daily    • warfarin (COUMADIN) 5 mg tablet Take 4 mg by mouth Sun, Wed, Thursday,Friday- 1 tab  Tues, Saturday- 2 tab   • amLODIPine (NORVASC) 2 5 mg tablet Take 1 tablet (2 5 mg total) by mouth daily   • torsemide (DEMADEX) 100 mg tablet Take 0 5 tablets (50 mg total) by mouth daily     No current facility-administered medications on file prior to visit         Allergies   Allergen Reactions   • Rofecoxib Angioedema, Swelling, Hives and Other (See Comments)     swelling, sob  swelling, sob  Other reaction(s): Swelling / Edema  swelling, sob  Other reaction(s): SWELLING  Other reaction(s): Angioedema     • Oxycodone-Acetaminophen GI Intolerance and Other (See Comments)     Unsure of rxn   Unsure of rxn   Unsure of rxn   Other reaction(s): "CAN'T BREATH"     • Duloxetine Hcl Other (See Comments)     Slept for 4 days    • Medical Tape Rash         Physical Exam    /58   Pulse 92 Temp (!) 97 2 °F (36 2 °C)   Resp 20   Ht 5' 2" (1 575 m)   Wt 96 6 kg (213 lb)   BMI 38 96 kg/m²     Constitutional: normal, well developed, well nourished, alert, in no distress and non-toxic and no overt pain behavior  and obese  Eyes: anicteric  HEENT: grossly intact  Neck: supple, symmetric, trachea midline and no masses   Pulmonary:even and unlabored  Cardiovascular:No edema or pitting edema present  Skin:Normal without rashes or lesions and well hydrated  Psychiatric:Mood and affect appropriate  Neurologic:Cranial Nerves II-XII grossly intact Sensation grossly intact; no clonus negative ma's  Reflexes 2+ and brisk  SLR negative bilaterally  Spurling's maneuver negative bilaterally  Musculoskeletal: antalgic gait  3-4/5 strength with b/l ankle dorsiflexion, otherwise 5/5 strength bilaterally with AROM in all extremities  Unable to perform normal heel toe and tip toe walking  No significant pain with lumbar facet loading bilaterally and with lateral spine rotation  minimal ttp over lumbar paraspinal muscles  Negative sri's test, negative gaenslen's negative SIJ loading bilaterally  Imaging    LUMBAR SPINE     INDICATION:   M47 [id number]: Other spondylosis with radiculopathy, lumbar region      COMPARISON:  CT chest, abdomen and pelvis on 7/26/2021      VIEWS:  XR SPINE LUMBAR 2 OR 3 VIEWS INJURY        FINDINGS:     There are 5 non rib bearing lumbar vertebral bodies       Age-indeterminate mild compression deformities of T11 and T12 superior endplates, new since the prior CT      Slight lumbar dextroscoliosis  Mild grade 1 retrolisthesis of L3 on L4       Lumbar degenerative changes including multilevel severe disc space narrowing, vacuum phenomena, and osteophytic lipping noted    Bilateral facet arthropathy with associated foraminal narrowing, most severe in the mid to lower lumbar spine      The pedicles appear intact      Soft tissues are unremarkable      IMPRESSION:     Age-indeterminate mild compression deformities of T11 and T12 superior endplates        Degenerative changes as described      The study was marked in EPIC for significant notification         Workstation performed: [id number]

## 2022-10-12 NOTE — PROGRESS NOTES
Assessment  1  Lumbar spondylosis - Bilateral  -     Case request operating room: BLOCK MEDIAL BRANCH L3, L4, L5 #2; Standing  -     Case request operating room: BLOCK MEDIAL BRANCH L3, L4, L5 #2    Unable to perform epidural steroid injection given supratherapeutic INR on multiple occassions  Greater than 80% relief of pain with improved ability to participate with IADLs after bilateral L3, L4, L5 medial branch blocks #1 for almost a week  Denies any radicular symptoms at this time  Axial low back pain without radicular features; achy, nagging, indolent crampy and throbbing pain  +facet loading maneuvers eliciting pain consistent with lumbar facet arthropathy  Gabapentin increased to 600mg TID with notable benefit adding opioid therapy; I counseled her regarding red flag signs of cauda equina syndrome  Moderate central canal stenosis at L4-L5, otherwise prominent facet arthropathy with minimal nerve root compression in lumbar spine on CT scan  Risks, benefits and alternatives discussed with regard to opioid therapy, medial branch blocks and radiofrequency ablation; informed consent obtained  Left greater than right-sided lumbar radicular pain in L4 and L5 dermatomal distribution accompanied by weakness numbness and paresthesias  Patient additionally reports drop foot bilaterally  Ambulates with a walker with debilitating pain  Has been participate with 3 weeks of physical therapy without significant benefit and exacerbation of pain  Multilevel severe degenerative disc disease with spondyloarthropathy noted on x-ray lumbar spine in mid and low back  Additionally, age-indeterminate mild compression deformities of T11 and T12 superior endplates which is noncontributory  Reasonable to proceed with multimodal pain therapy plan as noted below focusing on imaging to guide interventional therapies  Risks, benefits alternatives to epidural steroid injections thoroughly discussed with patient    Handouts provided and questions answered to patient's satisfaction  In addition, lifestyle modifications extensively discussed including diet, exercise and weight loss  Plan  -bilateral L3, L4, L5 medial branch blocks #2; rtc 2 weeks post procedure  -cymbalta 30mg/day discontinued; risks, benefits and alternatives reviewed; questions answered to patient satisfaction; handouts provided  -opioid use agreement, uds obtained previously; wnl  -norco 5-325mg [id number] prn pain ordered; patient counseled to take OTC bowel regimen and risks of opioids discussed below   -gabapentin to be continued at 600 mg t i d; counseled regarding sedative effects of taking this medication and provided up titration calendar  Counseled not to take medication while driving or operating heavy machinery/using stairs  -continue formal physical therapy for lumbar spinal stenosis/lumbar radiculopathy; Physician directed home exercise plan as per [employer] demonstrated and handouts provided that patient plans to participate with for 1 hour, twice a week for the next 6 weeks  There are risks associated with opioid medications, including dependence, addiction and tolerance  The patient understands and agrees to use these medications only as prescribed  Potential side effects of the medications include, but are not limited to, constipation, drowsiness, addiction, impaired judgment and risk of fatal overdose if not taken as prescribed  The patient was warned against driving while taking sedation medications or operating heavy machinery  The patient voiced understanding  Sharing medications is a felony  At this point in time, the patient is showing no signs of addiction, abuse, diversion or suicidal ideation  [place] Prescription Drug Monitoring Program report was reviewed and was appropriate      Complete risks and benefits including bleeding, infection, tissue reaction, nerve injury and allergic reaction were discussed   The approach was demonstrated using models and literature was provided  Verbal and written consent was obtained  My impressions and treatment recommendations were discussed in detail with the patient who verbalized understanding and had no further questions  Discharge instructions were provided  I personally saw and examined the patient and I agree with the above discussed plan of care  No orders of the defined types were placed in this encounter  History of Present Illness    Unable to perform epidural steroid injection given supratherapeutic INR on multiple occassions  Denies any radicular symptoms at this time  Axial low back pain without radicular features; achy, nagging, indolent crampy and throbbing pain  +facet loading maneuvers eliciting pain consistent with lumbar facet arthropathy  Greater than 80% relief of pain with improved ability to participate with IADLs after bilateral L3, L4, L5 medial branch blocks #1 for almost a week  Doing better with norco supplementing gabapentin, having notable benefit  Previously described the following sxs:    Cedrick Cabrales is a 69 y o  female  With pmhx of Afib on warfarin with pacemaker, CHF, DM-2, HTN, CKD, obesity, OA of both hips presenting with chronic lumbar radicular pain in the bilateral L4 and L5 dermatomal distribution accompanied by bilateral foot drop  Debilitating weakness numbness and paresthesias accompany the pain  The patient rates the pain at a 8/10 accompanied by electric shock-like shooting features and crampy burning pain in the aforementioned dermatomal distributions  The pain is worse in the mornings as well as the end of the day; exertion such as walking for long periods of time seems to exacerbate the pain  The patient ambulates with a walker and can hardly walk more than a few blocks without having debilitating pain  She tries to maintain an active lifestyle and finds that the current degree of pain seems to compromises her efforts    The pain significantly impacts independent activities of daily living and contributes to significant disability  She has attempted 3 weeks of physical therapy with exacerbation of the pain  He/She has taken naproxen as well as gabapentin with limited relief of the pain as well  She has never tried epidural steroid injections in the past  She denies any bowel or bladder dysfunction/incontinence saddle anesthesia; endorses antalgic gait  I have personally reviewed and/or updated the patient's past medical history, past surgical history, family history, social history, current medications, allergies, and vital signs today  Review of Systems   Constitutional: Positive for activity change  HENT: Negative  Eyes: Negative  Respiratory: Negative  Cardiovascular: Negative  Gastrointestinal: Negative  Endocrine: Negative  Genitourinary: Negative  Musculoskeletal: Positive for arthralgias, back pain, gait problem and myalgias  Skin: Negative  Allergic/Immunologic: Negative  Neurological: Positive for weakness and numbness  Hematological: Negative  Psychiatric/Behavioral: Negative  All other systems reviewed and are negative        Patient Active Problem List   Diagnosis   • Ankle injury   • Atrial fibrillation ([facility])   • Diastolic congestive heart failure ([facility])   • Type 2 diabetes mellitus with diabetic nephropathy ([facility])   • JOS (obstructive sleep apnea)   • Stage 3 chronic kidney disease ([facility])   • Pacemaker   • Type 2 diabetes mellitus with diabetic neuropathy ([facility])   • Severe obesity with body mass index (BMI) of 36 0 to 36 9 with serious comorbidity ([facility] [address] )   • Closed bimalleolar fracture of left ankle   • Closed fracture of left ankle   • Acquired hypothyroidism   • Diabetes mellitus ([address] )   • CHF (congestive heart failure) ([facility])   • Essential hypertension   • Renal disorder   • Acute kidney injury ([address] )   • Supratherapeutic INR   • Pulmonary hypertension ([address] )   • Acute respiratory failure with hypoxia (H[facility])   • Sick sinus syndrome (HCC)   • Nausea and vomiting   • Chronic pain of left knee   • Presence of artificial knee joint, left   • Primary osteoarthritis of both hips   • Pain in left hip   • Peripheral vascular disease (HCC)   • Lumbar spondylosis       Past Medical History:   Diagnosis Date   • Arthritis    • CHF (congestive heart failure) (HCC)    • Diabetes mellitus ([facility] [address] )    • Disease of thyroid gland    • Hypertension    • Pacemaker    • Renal disorder        Past Surgical History:   Procedure Laterality Date   • CARDIAC PACEMAKER PLACEMENT     • CHOLECYSTECTOMY     • FL GUIDED NEEDLE PLAC BX/ASP/INJ  10/4/2022   • JOINT REPLACEMENT      bilateral knee replacements   • NERVE BLOCK Bilateral 10/4/2022    Procedure: BLOCK MEDIAL BRANCH NERVES BILATERAL L3, L4, L5 #1;  Surgeon: Lorin Caicedo MD;  Location: [facility];  Service: Pain Management    • ORIF TIBIA & FIBULA FRACTURES Left 10/29/2020    Procedure: OPEN REDUCTION W/ INTERNAL FIXATION (ORIF) ANKLE;  Surgeon: Jyoti Greene;   Location: [facility];  Service: Orthopedics   • TONSILLECTOMY     • TUBAL LIGATION         Family History   Problem Relation Age of Onset   • Atrial fibrillation Mother    • Arthritis Father    • Kidney cancer Father    • Breast cancer Sister         early 46s   • No Known Problems Sister    • Kidney cancer Brother    • No Known Problems Daughter    • No Known Problems Daughter    • No Known Problems Daughter    • No Known Problems Daughter    • No Known Problems Maternal Aunt    • No Known Problems Maternal Aunt    • No Known Problems Maternal Aunt    • No Known Problems Paternal Aunt    • No Known Problems Maternal Grandmother    • No Known Problems Maternal Grandfather    • No Known Problems Paternal Grandmother    • No Known Problems Paternal Grandfather    • Breast cancer Cousin         early 46s   • Breast cancer additional onset Neg Hx    • Colon cancer Neg Hx    • Endometrial cancer Neg Hx    • Ovarian cancer Neg Hx    • BRCA 1/2 Neg Hx    • BRCA2 Positive Neg Hx    • BRCA2 Negative Neg Hx    • BRCA1 Positive Neg Hx    • BRCA1 Negative Neg Hx        Social History     Occupational History   • Not on file   Tobacco Use   • Smoking status: Never Smoker   • Smokeless tobacco: Never Used   Vaping Use   • Vaping Use: Never used   Substance and Sexual Activity   • Alcohol use: Not Currently   • Drug use: Not Currently   • Sexual activity: Yes       Current Outpatient Medications on File Prior to Visit   Medication Sig   • acetaminophen (TYLENOL) 500 mg tablet Take 500 mg by mouth   • Ascorbic Acid, Vitamin C, (VITAMIN C) 100 MG tablet Take 500 mg by mouth daily    • gabapentin (Neurontin) 600 MG tablet Take 1 tablet (600 mg total) by mouth 3 (three) times a day   • HYDROcodone-acetaminophen (NORCO) 5-325 mg per tablet Take 1 tablet by mouth every 8 (eight) hours as needed for pain Max Daily Amount: 3 tablets   • levothyroxine 150 mcg tablet Take 150 mcg by mouth daily    • Multiple Vitamin (Multi-Day) TABS Take 1 tablet by mouth daily    • warfarin (COUMADIN) 5 mg tablet Take 4 mg by mouth Sun, Wed, Thursday,Friday- 1 tab  Tues, Saturday- 2 tab   • amLODIPine (NORVASC) 2 5 mg tablet Take 1 tablet (2 5 mg total) by mouth daily   • torsemide (DEMADEX) 100 mg tablet Take 0 5 tablets (50 mg total) by mouth daily     No current facility-administered medications on file prior to visit         Allergies   Allergen Reactions   • Rofecoxib Angioedema, Swelling, Hives and Other (See Comments)     swelling, sob  swelling, sob  Other reaction(s): Swelling / Edema  swelling, sob  Other reaction(s): SWELLING  Other reaction(s): Angioedema     • Oxycodone-Acetaminophen GI Intolerance and Other (See Comments)     Unsure of rxn   Unsure of rxn   Unsure of rxn   Other reaction(s): "CAN'T BREATH"     • Duloxetine Hcl Other (See Comments)     Slept for 4 days    • Medical Tape Rash         Physical Exam    /58   Pulse 92 Temp (!) 97 2 °F (36 2 °C)   Resp 20   Ht 5' 2" (1 575 m)   Wt 96 6 kg (213 lb)   BMI 38 96 kg/m²     Constitutional: normal, well developed, well nourished, alert, in no distress and non-toxic and no overt pain behavior  and obese  Eyes: anicteric  HEENT: grossly intact  Neck: supple, symmetric, trachea midline and no masses   Pulmonary:even and unlabored  Cardiovascular:No edema or pitting edema present  Skin:Normal without rashes or lesions and well hydrated  Psychiatric:Mood and affect appropriate  Neurologic:Cranial Nerves II-XII grossly intact Sensation grossly intact; no clonus negative ma's  Reflexes 2+ and brisk  SLR negative bilaterally  Spurling's maneuver negative bilaterally  Musculoskeletal: antalgic gait  3-4/5 strength with b/l ankle dorsiflexion, otherwise 5/5 strength bilaterally with AROM in all extremities  Unable to perform normal heel toe and tip toe walking  No significant pain with lumbar facet loading bilaterally and with lateral spine rotation  minimal ttp over lumbar paraspinal muscles  Negative sri's test, negative gaenslen's negative SIJ loading bilaterally  Imaging    LUMBAR SPINE     INDICATION:   M47 26: Other spondylosis with radiculopathy, lumbar region      COMPARISON:  CT chest, abdomen and pelvis on 7/26/2021      VIEWS:  [facility] SPINE LUMBAR 2 OR 3 VIEWS INJURY        FINDINGS:     There are 5 non rib bearing lumbar vertebral bodies       Age-indeterminate mild compression deformities of T11 and T12 superior endplates, new since the prior CT      Slight lumbar dextroscoliosis  Mild grade 1 retrolisthesis of L3 on L4       Lumbar degenerative changes including multilevel severe disc space narrowing, vacuum phenomena, and osteophytic lipping noted    Bilateral facet arthropathy with associated foraminal narrowing, most severe in the mid to lower lumbar spine      The pedicles appear intact      Soft tissues are unremarkable      IMPRESSION:     Age-indeterminate mild compression deformities of T11 and T12 superior endplates        Degenerative changes as described      The study was marked in EPIC for significant notification         Workstation performed: [id number]    Detail Level: Detailed Quality 402: Tobacco Use And Help With Quitting Among Adolescents: Patient screened for tobacco and never smoked Quality 130: Documentation Of Current Medications In The Medical Record: Current Medications Documented

## 2022-10-20 ENCOUNTER — HOSPITAL ENCOUNTER (OUTPATIENT)
Facility: HOSPITAL | Age: 77
Setting detail: OUTPATIENT SURGERY
Discharge: HOME | End: 2022-10-20
Attending: ANESTHESIOLOGY | Admitting: ANESTHESIOLOGY
Payer: COMMERCIAL

## 2022-10-20 ENCOUNTER — APPOINTMENT (OUTPATIENT)
Dept: RADIOLOGY | Facility: HOSPITAL | Age: 77
End: 2022-10-20
Payer: COMMERCIAL

## 2022-10-20 VITALS
BODY MASS INDEX: 39.2 KG/M2 | TEMPERATURE: 98.1 F | RESPIRATION RATE: 20 BRPM | DIASTOLIC BLOOD PRESSURE: 86 MMHG | HEART RATE: 75 BPM | SYSTOLIC BLOOD PRESSURE: 168 MMHG | OXYGEN SATURATION: 99 % | HEIGHT: 62 IN | WEIGHT: 213 LBS

## 2022-10-20 LAB — GLUCOSE SERPL-MCNC: 163 MG/DL (ref 65–140)

## 2022-10-20 PROCEDURE — 64494 INJ PARAVERT F JNT L/S 2 LEV: CPT | Performed by: ANESTHESIOLOGY

## 2022-10-20 PROCEDURE — 77002 NEEDLE LOCALIZATION BY XRAY: CPT

## 2022-10-20 PROCEDURE — 64493 INJ PARAVERT F JNT L/S 1 LEV: CPT | Performed by: ANESTHESIOLOGY

## 2022-10-20 PROCEDURE — 82948 REAGENT STRIP/BLOOD GLUCOSE: CPT

## 2022-10-20 RX ORDER — METHYLPREDNISOLONE ACETATE 80 MG/ML
80 INJECTION, SUSPENSION INTRA-ARTICULAR; INTRALESIONAL; INTRAMUSCULAR; PARENTERAL; SOFT TISSUE ONCE
Status: DISCONTINUED | OUTPATIENT
Start: 2022-10-20 | End: 2022-10-20 | Stop reason: HOSPADM

## 2022-10-20 RX ORDER — LIDOCAINE HYDROCHLORIDE 10 MG/ML
10 INJECTION, SOLUTION EPIDURAL; INFILTRATION; INTRACAUDAL; PERINEURAL ONCE
Status: COMPLETED | OUTPATIENT
Start: 2022-10-20 | End: 2022-10-20

## 2022-10-20 RX ORDER — BUPIVACAINE HCL/PF 2.5 MG/ML
10 VIAL (ML) INJECTION ONCE
Status: COMPLETED | OUTPATIENT
Start: 2022-10-20 | End: 2022-10-20

## 2022-10-20 RX ORDER — METHYLPREDNISOLONE ACETATE 80 MG/ML
INJECTION, SUSPENSION INTRA-ARTICULAR; INTRALESIONAL; INTRAMUSCULAR; SOFT TISSUE AS NEEDED
Status: DISCONTINUED | OUTPATIENT
Start: 2022-10-20 | End: 2022-10-20 | Stop reason: HOSPADM

## 2022-10-20 NOTE — OP NOTE
OPERATIVE REPORT  PATIENT NAME: Angel Mosquera    :  1945  MRN: [id number]  Pt Location:  GI ROOM 01    SURGERY DATE: 10/20/2022    Surgeon(s) and Role: Pravin Puga MD - Primary    Preop Diagnosis:  Lumbar spondylosis [M47 816]    Post-Op Diagnosis Codes:     * Lumbar spondylosis [M47 816]    Procedure(s) (LRB):  BLOCK MEDIAL BRANCH L3, L4, L5 #2 (Bilateral)    Specimen(s):  * No specimens in log *    Estimated Blood Loss:   Minimal    Drains:  * No LDAs found *    Anesthesia Type:   Local    Operative Indications:  Lumbar spondylosis [M47 816]    Operative Findings:  Bilateral L3, L4, L5 medial branch nerve regions identified under fluoroscopic guidance       Complications:   None    Procedure and Technique:  Please see detailed procedure note     I was present for the entire procedure    Patient Disposition:  PACU     SIGNATURE: Rachel Johns MD  DATE: 2022  TIME: 1:20 PM

## 2022-10-20 NOTE — PROCEDURES
Pre-procedure Diagnosis: Lumbar Facet Arthropathy  Post-procedure Diagnosis: Lumbar Facet Arthropathy  Procedure Title(s):  [BILATERAL L3, L4, L5] medial branch nerve blocks [(CONFIRMATORY)]  Attending Surgeon:   Bryn Kelly MD  Anesthesia:   Local     Indications: The patient is a 69y o  year-old female with a diagnosis of lumbar facet arthropathy  The patient's history and physical exam were reviewed  The risks, benefits and alternatives to the procedure were discussed, and all questions were answered to the patient's satisfaction  The patient agreed to proceed, and written informed consent was obtained  Procedure in Detail: The patient was brought into the procedure room and placed in the prone position on the fluoroscopy table  The area of the lumbar spine was prepped with chloraprep and then draped in a sterile manner  AP fluoroscopy was used to identify the [L3-L5] levels on the [LEFT] side  The C-arm was obliqued to visualize the junction of the superior articulate process and transverse process  The sacral ala was identified and marked  The skin in these identified areas was anesthetized with 1% lidocaine  A 22-gauge, 5-inch spinal needle was advanced toward each of these points under fluoroscopic guidance  Once bone was contacted, negative aspiration was confirmed and [1-mL] of a [6mL]mixture of [5mL] [0 25% bupivicaine] and 1mL of 80mg/mL Depomedrol was injected at each level  (The same procedure was performed on the RIGHT side )    After the procedure was completed, the patient's back was cleaned and bandages were placed at the needle insertion sites  Disposition: The patient tolerated the procedure well, and there were no apparent complications  The patient was taken to the recovery area where written discharge instructions for the procedure were given  The patient was given a pain diary to determine if the patient's pain improves following the injection   Once the diary is returned we will consider next appropriate course of treatment      Postoperative pain relief [WAS] significant    Estimated Blood Loss: None  Specimens Obtained: N/A

## 2022-10-20 NOTE — INTERVAL H&P NOTE
H&P reviewed  After examining the patient I find no changes in the patients condition since the H&P had been written      Vitals:    10/20/22 1227   BP: 170/79   Pulse: 96   Resp: 20   Temp: 98 1 °F (36 7 °C)   SpO2: 96%

## 2022-10-20 NOTE — DISCHARGE INSTRUCTIONS
YOUR 2 WEEK FOLLOW UP HAS BEEN SCHEDULED; IF YOU WISH TO CHANGE THE FOLLOW UP, PLEASE CALL THE [facility] [place]: [phone]    MEDIAL BRANCH BLOCK DISCHARGE INSTRUCTIONS    ACTIVITY  Please do activities that will bring the normal pain that we are rating  For example, if vacuuming or walking increases the painm do that  Nolberto twill give the most accurate response to the diary  You may shower, but no tub baths today, or applied heat  CARE OF THE INJECTION SITE  This area may be numb for several hours after the injection  Notify the [facility] if you have any of the following: redness, drainage, swelling or fever above 100°F     SPECIAL INSTRUCTIONS  Please return the MBB diary to our office by mail, fax, or drop it off  MEDICATIONS  Please do not take any break through or short acting pain medications for 8 hours after the block  Continue to take all routine medications  Our office may have instructed you to hold some medications  You may resume _______________________________________________  If you have any problems specifically related to your procedure, please call our office at [phone]  Problems not related to your procedure should be directed at your primary care physician  Lumbar Radiofrequency Ablation   WHAT YOU NEED TO KNOW:   What do I need to know about lumbar radiofrequency ablation? Lumbar radiofrequency ablation (RFA) is a procedure used to treat facet joint pain in your lower back  Facet joints are found at the back of each vertebra  A needle electrode is used to send electrical currents to the nerves in your facet joint  The electrical currents create heat that damages the nerve so it cannot send pain signals  How do I prepare for lumbar RFA? Your healthcare provider will talk to you about how to prepare for this procedure  He may tell you not to eat or drink anything after midnight on the day of your procedure   He will tell you what medicines to take or not take on the day of your procedure  What will happen during lumbar RFA? You will lie on your stomach  You will be given local anesthesia to numb the area of your back where the needle electrode will be inserted  You may be given a sedative to help keep you relaxed  You may still feel pressure or pushing during the procedure, but you should not feel any pain  Your healthcare provider will use fluoroscopy (a type of x-ray) to guide the needle electrode to the nerves near your facet joint  Your healthcare provider may touch the affected nerve to make sure the needle electrode is in the right place  You will feel tingling or pressure when he does this  He will then apply local anesthesia to the nerve to numb it  This will prevent you from feeling pain when he applies heat to the nerve  Your healthcare provider will then apply heat to the nerve using the needle electrode  He may need to apply heat to more than one nerve  He will remove the needle electrode and apply a bandage over the area  What are the risks of lumbar RFA? You may have pain, numbness, tingling, or burning in the area where the lumbar RFA was done  These normally go away within 6 weeks  The needle electrode may injure your spinal nerves  This may cause permanent leg weakness or nerve pain  CARE AGREEMENT:   You have the right to help plan your care  Learn about your health condition and how it may be treated  Discuss treatment options with your healthcare providers to decide what care you want to receive  You always have the right to refuse treatment  The above information is an  only  It is not intended as medical advice for individual conditions or treatments  Talk to your doctor, nurse or pharmacist before following any medical regimen to see if it is safe and effective for you    © Copyright [employer] 2022 Information is for End User's use only and may not be sold, redistributed or otherwise used for commercial purposes   All illustrations and images included in CareNotes® are the copyrighted property of A D A M [employer] Inc  or [facility] Uma Mcduffie

## 2022-10-28 ENCOUNTER — TELEPHONE (OUTPATIENT)
Dept: PAIN MEDICINE | Facility: CLINIC | Age: 77
End: 2022-10-28

## 2022-10-28 DIAGNOSIS — M47.816 LUMBAR SPONDYLOSIS: ICD-10-CM

## 2022-10-28 RX ORDER — HYDROCODONE BITARTRATE AND ACETAMINOPHEN 5; 325 MG/1; MG/1
1 TABLET ORAL EVERY 8 HOURS PRN
Qty: 90 TABLET | Refills: 0 | Status: SHIPPED | OUTPATIENT
Start: 2022-10-28 | End: 2022-11-04

## 2022-10-28 NOTE — TELEPHONE ENCOUNTER
Pt is calling to remind Dr Fei Medina she is in need of a refill of the Norco  Please advise  Thanks

## 2022-11-04 ENCOUNTER — OFFICE VISIT (OUTPATIENT)
Dept: PAIN MEDICINE | Facility: CLINIC | Age: 77
End: 2022-11-04

## 2022-11-04 VITALS
HEART RATE: 84 BPM | HEIGHT: 62 IN | OXYGEN SATURATION: 97 % | SYSTOLIC BLOOD PRESSURE: 124 MMHG | DIASTOLIC BLOOD PRESSURE: 60 MMHG | WEIGHT: 225 LBS | BODY MASS INDEX: 41.41 KG/M2

## 2022-11-04 DIAGNOSIS — M47.816 LUMBAR SPONDYLOSIS: Primary | ICD-10-CM

## 2022-11-04 DIAGNOSIS — M54.16 LUMBAR RADICULOPATHY: ICD-10-CM

## 2022-11-04 NOTE — PATIENT INSTRUCTIONS
Core Strengthening Exercises   WHAT YOU NEED TO KNOW:   Your core includes the muscles of your lower back, hip, pelvis, and abdomen  Core strengthening exercises help heal and strengthen these muscles  This helps prevent another injury, and keeps your pelvis, spine, and hips in the correct position  DISCHARGE INSTRUCTIONS:   Contact your healthcare provider if:   You have sharp or worsening pain during exercise or at rest     You have questions or concerns about your shoulder exercises  Safety tips:  Talk to your healthcare provider before you start an exercise program  A physical therapist can teach you how to do core strengthening exercises safely  Do the exercises on a mat or firm surface  A firm surface will support your spine and prevent low back pain  Do not do these exercises on a bed  Move slowly and smoothly  Avoid fast or jerky motions  Stop if you feel pain  Core exercises should not be painful  Stop if you feel pain  Breathe normally during core exercises  Do not hold your breath  This may cause an increase in blood pressure and prevent muscle strengthening  Your healthcare provider will tell you when to inhale and exhale during the exercise  Begin all of your exercises with abdominal bracing  Abdominal bracing helps warm up your core muscles  You can also practice abdominal bracing throughout the day  Lie on your back with your knees bent and feet flat on the floor  Place your arms in a relaxed position beside your body  Tighten your abdominal muscles  Pull your belly button in and up toward your spine  Hold for 5 seconds  Relax your muscles  Repeat 10 times  Core strengthening exercises: Your healthcare provider will tell you how often to do these exercises  The provider will also tell you how many repetitions of each exercise you should do  Hold each exercise for 5 seconds or as directed  As you get stronger, increase your hold to 10 to 15 seconds   You can do some of these exercises on a stability ball, or with a weight  Ask your healthcare provider how to use a stability ball or weight for these exercises:  Bridging:  Lie on your back with your knees bent and feet flat on the floor  Rest your arms at your side  Tighten your buttocks, and then lift your hips 1 inch off the floor  Hold for 5 seconds  When you can do this exercise without pain for 10 seconds, increase the distance you lift your hips  A good goal is to be able to lift your hips so that your shoulders, hips, and knees are in a straight line  Dead bug:  Lie on your back with your knees bent and feet flat on the floor  Place your arms in a relaxed position beside your body  Begin with abdominal bracing  Next, raise one leg, keeping your knee bent  Hold for 5 seconds  Repeat with the other leg  When you can do this exercise without pain for 10 to 15 seconds, you may raise one straight leg and hold  Repeat with the other leg  Quadruped:  Place your hands and knees on the floor  Keep your wrists directly below your shoulders and your knees directly below your hips  Pull your belly button in toward your spine  Do not flatten or arch your back  Tighten your abdominal muscles below your belly button  Hold for 5 seconds  When you can do this exercise without pain for 10 to 15 seconds, you may extend one arm and hold  Repeat on the other side  Side bridge exercises:      Standing side bridge:  Stand next to a wall and extend one arm toward the wall  Place your palm flat on the wall with your fingers pointing upward  Begin with abdominal bracing  Next, without moving your feet, slowly bend your arm to 90 degrees  Hold for 5 seconds  Repeat on the other side  When you can do this exercise without pain for 10 to 15 seconds, you may do the bent leg side bridge on the floor  Bent leg side bridge:  Lie on one side with your legs, hips, and shoulders in a straight line   Prop yourself up onto your forearm so your elbow is directly below your shoulder  Bend your knees back to 90 degrees  Begin with abdominal bracing  Next, lift your hips and balance yourself on your forearm and knees  Hold for 5 seconds  Repeat on the other side  When you can do this exercise without pain for 10 to 15 seconds, you may do the straight leg side bridge on the floor  Straight leg side bridge:  Lie on one side with your legs, hips, and shoulders in a straight line  Prop yourself up onto your forearm so your elbow is directly below your shoulder  Begin with abdominal bracing  Lift your hips off the floor and balance yourself on your forearm and the outside of your flexed foot  Do not let your ankle bend sideways  Hold for 5 seconds  Repeat on the other side  When you can do this exercise without pain for 10 to 15 seconds, ask your healthcare provider for more advanced exercises  Superman:  Lie on your stomach  Extend your arms forward on the floor  Tighten your abdominal muscles and lift your right hand and left leg off the floor  Hold this position  Slowly return to the starting position  Tighten your abdominal muscles and lift your left hand and right leg off the floor  Hold this position  Slowly return to the starting position  Clam:  Lie on your side with your knees bent  Put your bottom arm under your head to keep your neck in line  Put your top hand on your hip to keep your pelvis from moving  Put your heels together, and keep them together during this exercise  Slowly raise your top knee toward the ceiling  Then lower your leg so your knees are together  Repeat this exercise 10 times  Then switch sides and do the exercise 10 times with the other leg  Curl up:  Lie on your back with your knees bent and feet flat on the floor  Place your hands, palms down, underneath your lower back  Next, with your elbows on the floor, lift your shoulders and chest 2 to 3 inches off the floor   Keep your head in line with your shoulders  Hold this position  Slowly return to the starting position  Straight leg raises:  Lie on your back with one leg straight  Bend the other knee and place your foot flat on the floor  Tighten your abdominal muscles  Keep your leg straight and slowly lift it straight up 6 to 12 inches off the floor  Hold this position  Lower your leg slowly  Do as many repetitions as directed on this side  Repeat with the other leg  Plank:  Lie on your stomach  Bend your elbows and place your forearms flat on the floor  Lift your chest, stomach, and knees off the floor  Make sure your elbows are below your shoulders  Your body should be in a straight line  Do not let your hips or lower back sink to the ground  Squeeze your abdominal muscles together and hold for 15 seconds  To make this exercise harder, hold for 30 seconds or lift 1 leg at a time  Bicycles:  Lie on your back  Bend both knees and bring them toward your chest  Your calves should be parallel to the floor  Place the palms of your hands on the back of your head  Straighten your right leg and keep it lifted 2 inches off the floor  Raise your head and shoulders off the floor and twist towards your left  Keep your head and shoulders lifted  Bend your right knee while you straighten your left leg  Keep your left leg 2 inches off the floor  Twist your head and chest towards the left leg  Continue to straighten 1 leg at a time and twist        Follow up with your doctor as directed:  Write down your questions so you remember to ask them during your visits  © Copyright [employer] 2022 Information is for End User's use only and may not be sold, redistributed or otherwise used for commercial purposes  All illustrations and images included in CareNotes® are the copyrighted property of [employer]  or [facility] Uma Davenport   The above information is an  only   It is not intended as medical advice for individual conditions or treatments  Talk to your doctor, nurse or pharmacist before following any medical regimen to see if it is safe and effective for you

## 2022-11-04 NOTE — PROGRESS NOTES
Assessment  1  Lumbar spondylosis    2  Lumbar radiculopathy    Unable to perform epidural steroid injection given supratherapeutic INR on multiple occassions  Endorses left L4 radicular pain where at least moderate stenosis noted on MRI at the L4-L5 level  Axial low back pain to a lesser degree; achy, nagging, indolent crampy and throbbing pain with sharp shooting pain in left L4 dermatome accompanied by pain limited weakness numbness and paresthesias  Weaned off of norco given GI upset, using gabapentin with nominal benefit  Still has not participated with PT formally  No significant benefit from medial branch blocks in lumbar spine  I counseled her regarding red flag signs of cauda equina syndrome  Moderate central canal stenosis at L4-L5, otherwise prominent facet arthropathy with minimal nerve root compression in lumbar spine on CT scan  Risks, benefits and alternatives discussed with regard to PT, NSGY consultation  Previously reported the following sxs:    Left greater than right-sided lumbar radicular pain in L4 and L5 dermatomal distribution accompanied by weakness numbness and paresthesias  Patient additionally reports drop foot bilaterally  Ambulates with a walker with debilitating pain  Has been participate with 3 weeks of physical therapy without significant benefit and exacerbation of pain  Multilevel severe degenerative disc disease with spondyloarthropathy noted on x-ray lumbar spine in mid and low back  Additionally, age-indeterminate mild compression deformities of T11 and T12 superior endplates which is noncontributory  Reasonable to proceed with multimodal pain therapy plan as noted below focusing on imaging to guide interventional therapies  Risks, benefits alternatives to epidural steroid injections thoroughly discussed with patient  Handouts provided and questions answered to patient's satisfaction    In addition, lifestyle modifications extensively discussed including diet, exercise and weight loss  Plan  -f/u after PT to consider referral to NSGY  -cymbalta 30mg/day discontinued; risks, benefits and alternatives reviewed; questions answered to patient satisfaction; handouts provided  -opioid use agreement, uds obtained previously; wnl  -norco 5-325mg [id number] prn pain discontinued given GI upset  patient counseled to take OTC bowel regimen and risks of opioids discussed below   -gabapentin to be continued at 600 mg t i d; counseled regarding sedative effects of taking this medication and provided up titration calendar  Counseled not to take medication while driving or operating heavy machinery/using stairs  -continue formal physical therapy for lumbar spinal stenosis/lumbar radiculopathy; Physician directed home exercise plan as per [facility] demonstrated and handouts provided that patient plans to participate with for 1 hour, twice a week for the next 6 weeks  There are risks associated with opioid medications, including dependence, addiction and tolerance  The patient understands and agrees to use these medications only as prescribed  Potential side effects of the medications include, but are not limited to, constipation, drowsiness, addiction, impaired judgment and risk of fatal overdose if not taken as prescribed  The patient was warned against driving while taking sedation medications or operating heavy machinery  The patient voiced understanding  Sharing medications is a felony  At this point in time, the patient is showing no signs of addiction, abuse, diversion or suicidal ideation  [address] Prescription Drug Monitoring Program report was reviewed and was appropriate      Complete risks and benefits including bleeding, infection, tissue reaction, nerve injury and allergic reaction were discussed  The approach was demonstrated using models and literature was provided  Verbal and written consent was obtained       My impressions and treatment recommendations were discussed in detail with the patient who verbalized understanding and had no further questions  Discharge instructions were provided  I personally saw and examined the patient and I agree with the above discussed plan of care  No orders of the defined types were placed in this encounter  History of Present Illness    Unable to perform epidural steroid injection given supratherapeutic INR on multiple occassions  Endorses left L4 radicular pain where at least moderate stenosis noted on MRI at the L4-L5 level  Axial low back pain to a lesser degree; achy, nagging, indolent crampy and throbbing pain with sharp shooting pain in left L4 dermatome accompanied by pain limited weakness numbness and paresthesias  Weaned off of norco given GI upset, using gabapentin with nominal benefit  Still has not participated with PT formally  No significant benefit from medial branch blocks in lumbar spine  Previously described the following sxs:    Nicki Hammonds is a 69 y o  female  With pmhx of Afib on warfarin with pacemaker, CHF, DM-2, HTN, CKD, obesity, OA of both hips presenting with chronic lumbar radicular pain in the bilateral L4 and L5 dermatomal distribution accompanied by bilateral foot drop  Debilitating weakness numbness and paresthesias accompany the pain  The patient rates the pain at a 8/10 accompanied by electric shock-like shooting features and crampy burning pain in the aforementioned dermatomal distributions  The pain is worse in the mornings as well as the end of the day; exertion such as walking for long periods of time seems to exacerbate the pain  The patient ambulates with a walker and can hardly walk more than a few blocks without having debilitating pain  She tries to maintain an active lifestyle and finds that the current degree of pain seems to compromises her efforts  The pain significantly impacts independent activities of daily living and contributes to significant disability   She has attempted 3 weeks of physical therapy with exacerbation of the pain  He/She has taken naproxen as well as gabapentin with limited relief of the pain as well  She has never tried epidural steroid injections in the past  She denies any bowel or bladder dysfunction/incontinence saddle anesthesia; endorses antalgic gait  I have personally reviewed and/or updated the patient's past medical history, past surgical history, family history, social history, current medications, allergies, and vital signs today  Review of Systems   Constitutional: Positive for activity change  HENT: Negative  Eyes: Negative  Respiratory: Negative  Cardiovascular: Negative  Gastrointestinal: Negative  Endocrine: Negative  Genitourinary: Negative  Musculoskeletal: Positive for arthralgias, back pain, gait problem and myalgias  Skin: Negative  Allergic/Immunologic: Negative  Neurological: Positive for weakness and numbness  Hematological: Negative  Psychiatric/Behavioral: Negative  All other systems reviewed and are negative        Patient Active Problem List   Diagnosis   • Ankle injury   • Atrial fibrillation ([facility])   • Diastolic congestive heart failure ([facility])   • Type 2 diabetes mellitus with diabetic nephropathy ([facility])   • JOS (obstructive sleep apnea)   • Stage 3 chronic kidney disease ([facility])   • Pacemaker   • Type 2 diabetes mellitus with diabetic neuropathy ([facility])   • Severe obesity with body mass index (BMI) of 36 0 to 36 9 with serious comorbidity ([facility] [address] )   • Closed bimalleolar fracture of left ankle   • Closed fracture of left ankle   • Acquired hypothyroidism   • Diabetes mellitus ([address] )   • CHF (congestive heart failure) ([facility])   • Essential hypertension   • Renal disorder   • Acute kidney injury ([facility] [address] )   • Supratherapeutic INR   • Pulmonary hypertension ([facility])   • Acute respiratory failure with hypoxia ([facility])   • Sick sinus syndrome ([facility])   • Nausea and vomiting   • Chronic pain of left knee   • Presence of artificial knee joint, left   • Primary osteoarthritis of both hips   • Pain in left hip   • Peripheral vascular disease ([facility])   • Lumbar spondylosis       Past Medical History:   Diagnosis Date   • Arthritis    • CHF (congestive heart failure) ([facility])    • Diabetes mellitus ([facility] [address] )    • Disease of thyroid gland    • Hypertension    • Pacemaker    • Renal disorder        Past Surgical History:   Procedure Laterality Date   • CARDIAC PACEMAKER PLACEMENT     • CHOLECYSTECTOMY     • FL GUIDED NEEDLE PLAC BX/ASP/INJ  10/4/2022   • FL GUIDED NEEDLE PLAC BX/ASP/INJ  10/20/2022   • JOINT REPLACEMENT      bilateral knee replacements   • NERVE BLOCK Bilateral 10/4/2022    Procedure: BLOCK MEDIAL BRANCH NERVES BILATERAL L3, L4, L5 #1;  Surgeon: Stanislaw Barone MD;  Location: [facility];  Service: Pain Management    • NERVE BLOCK Bilateral 10/20/2022    Procedure: BLOCK MEDIAL BRANCH L3, L4, L5 #2;  Surgeon: Stanislaw Barone MD;  Location: [facility];  Service: Pain Management    • ORIF TIBIA & FIBULA FRACTURES Left 10/29/2020    Procedure: OPEN REDUCTION W/ INTERNAL FIXATION (ORIF) ANKLE;  Surgeon: Tahmina Santillan;   Location: [facility];  Service: Orthopedics   • TONSILLECTOMY     • TUBAL LIGATION         Family History   Problem Relation Age of Onset   • Atrial fibrillation Mother    • Arthritis Father    • Kidney cancer Father    • Breast cancer Sister         early 46s   • No Known Problems Sister    • Kidney cancer Brother    • No Known Problems Daughter    • No Known Problems Daughter    • No Known Problems Daughter    • No Known Problems Daughter    • No Known Problems Maternal Aunt    • No Known Problems Maternal Aunt    • No Known Problems Maternal Aunt    • No Known Problems Paternal Aunt    • No Known Problems Maternal Grandmother    • No Known Problems Maternal Grandfather    • No Known Problems Paternal Grandmother    • No Known Problems Paternal Grandfather    • Breast cancer Cousin         early 46s   • Breast cancer additional onset Neg Hx    • Colon cancer Neg Hx    • Endometrial cancer Neg Hx    • Ovarian cancer Neg Hx    • BRCA 1/2 Neg Hx    • BRCA2 Positive Neg Hx    • BRCA2 Negative Neg Hx    • BRCA1 Positive Neg Hx    • BRCA1 Negative Neg Hx        Social History     Occupational History   • Not on file   Tobacco Use   • Smoking status: Never Smoker   • Smokeless tobacco: Never Used   Vaping Use   • Vaping Use: Never used   Substance and Sexual Activity   • Alcohol use: Not Currently   • Drug use: Not Currently   • Sexual activity: Yes       Current Outpatient Medications on File Prior to Visit   Medication Sig   • acetaminophen (TYLENOL) 500 mg tablet Take 500 mg by mouth   • amLODIPine (NORVASC) 2 5 mg tablet Take 1 tablet (2 5 mg total) by mouth daily   • Ascorbic Acid, Vitamin C, (VITAMIN C) 100 MG tablet Take 500 mg by mouth daily    • gabapentin (Neurontin) 600 MG tablet Take 1 tablet (600 mg total) by mouth 3 (three) times a day   • levothyroxine 150 mcg tablet Take 150 mcg by mouth daily    • Multiple Vitamin (Multi-Day) TABS Take 1 tablet by mouth daily    • torsemide (DEMADEX) 100 mg tablet Take 0 5 tablets (50 mg total) by mouth daily   • warfarin (COUMADIN) 5 mg tablet Take 4 mg by mouth Sun, Wed, Thursday,Friday- 1 tab  Tues, Saturday- 2 tab   • [DISCONTINUED] HYDROcodone-acetaminophen (NORCO) 5-325 mg per tablet Take 1 tablet by mouth every 8 (eight) hours as needed for pain Max Daily Amount: 3 tablets     No current facility-administered medications on file prior to visit         Allergies   Allergen Reactions   • Rofecoxib Angioedema, Swelling, Hives and Other (See Comments)     swelling, sob  swelling, sob  Other reaction(s): Swelling / Edema  swelling, sob  Other reaction(s): SWELLING  Other reaction(s): Angioedema     • Oxycodone-Acetaminophen GI Intolerance and Other (See Comments)     Unsure of rxn   Unsure of rxn   Unsure of rxn   Other reaction(s): "CAN'T BREATH"     • Duloxetine Hcl Other (See Comments)     Slept for 4 days    • Medical Tape Rash         Physical Exam    /60 (BP Location: Right arm, Patient Position: Sitting, Cuff Size: Standard)   Pulse 84   Ht 5' 2" (1 575 m)   Wt 102 kg (225 lb)   SpO2 97%   BMI 41 15 kg/m²     Constitutional: normal, well developed, well nourished, alert, in no distress and non-toxic and no overt pain behavior  and obese  Eyes: anicteric  HEENT: grossly intact  Neck: supple, symmetric, trachea midline and no masses   Pulmonary:even and unlabored  Cardiovascular:No edema or pitting edema present  Skin:Normal without rashes or lesions and well hydrated  Psychiatric:Mood and affect appropriate  Neurologic:Cranial Nerves II-XII grossly intact Sensation grossly intact; no clonus negative ma's  Reflexes 2+ and brisk  SLR negative bilaterally  Spurling's maneuver negative bilaterally  Musculoskeletal: antalgic gait  3-4/5 strength with b/l ankle dorsiflexion, otherwise 5/5 strength bilaterally with AROM in all extremities  Unable to perform normal heel toe and tip toe walking  No significant pain with lumbar facet loading bilaterally and with lateral spine rotation  minimal ttp over lumbar paraspinal muscles  Negative sri's test, negative gaenslen's negative SIJ loading bilaterally  Imaging    LUMBAR SPINE     INDICATION:   M47 [id number]: Other spondylosis with radiculopathy, lumbar region      COMPARISON:  CT chest, abdomen and pelvis on 7/26/2021      VIEWS:  XR SPINE LUMBAR 2 OR 3 VIEWS INJURY        FINDINGS:     There are 5 non rib bearing lumbar vertebral bodies       Age-indeterminate mild compression deformities of T11 and T12 superior endplates, new since the prior CT      Slight lumbar dextroscoliosis  Mild grade 1 retrolisthesis of L3 on L4       Lumbar degenerative changes including multilevel severe disc space narrowing, vacuum phenomena, and osteophytic lipping noted    Bilateral facet arthropathy with associated foraminal narrowing, most severe in the mid to lower lumbar spine      The pedicles appear intact      Soft tissues are unremarkable      IMPRESSION:     Age-indeterminate mild compression deformities of T11 and T12 superior endplates        Degenerative changes as described      The study was marked in EPIC for significant notification         Workstation performed: [id number]

## 2022-11-23 ENCOUNTER — APPOINTMENT (EMERGENCY)
Dept: NON INVASIVE DIAGNOSTICS | Facility: HOSPITAL | Age: 77
End: 2022-11-23

## 2022-11-23 ENCOUNTER — APPOINTMENT (EMERGENCY)
Dept: RADIOLOGY | Facility: HOSPITAL | Age: 77
End: 2022-11-23

## 2022-11-23 ENCOUNTER — HOSPITAL ENCOUNTER (EMERGENCY)
Facility: HOSPITAL | Age: 77
Discharge: HOME | End: 2022-11-23
Attending: EMERGENCY MEDICINE

## 2022-11-23 VITALS
WEIGHT: 230.6 LBS | DIASTOLIC BLOOD PRESSURE: 71 MMHG | BODY MASS INDEX: 42.18 KG/M2 | RESPIRATION RATE: 20 BRPM | HEART RATE: 94 BPM | TEMPERATURE: 97.9 F | SYSTOLIC BLOOD PRESSURE: 163 MMHG | OXYGEN SATURATION: 93 %

## 2022-11-23 DIAGNOSIS — M79.605 LEFT LEG PAIN: Primary | ICD-10-CM

## 2022-11-23 RX ORDER — LIDOCAINE 50 MG/G
1 PATCH TOPICAL ONCE
Status: DISCONTINUED | OUTPATIENT
Start: 2022-11-23 | End: 2022-11-23 | Stop reason: HOSPADM

## 2022-11-23 RX ORDER — MORPHINE SULFATE 4 MG/ML
4 INJECTION, SOLUTION INTRAMUSCULAR; INTRAVENOUS ONCE
Status: COMPLETED | OUTPATIENT
Start: 2022-11-23 | End: 2022-11-23

## 2022-11-23 RX ORDER — ONDANSETRON 4 MG/1
4 TABLET, ORALLY DISINTEGRATING ORAL EVERY 6 HOURS PRN
Qty: 20 TABLET | Refills: 0 | Status: SHIPPED | OUTPATIENT
Start: 2022-11-23

## 2022-11-23 RX ORDER — ACETAMINOPHEN 325 MG/1
975 TABLET ORAL ONCE
Status: COMPLETED | OUTPATIENT
Start: 2022-11-23 | End: 2022-11-23

## 2022-11-23 RX ORDER — ONDANSETRON 4 MG/1
4 TABLET, ORALLY DISINTEGRATING ORAL ONCE
Status: COMPLETED | OUTPATIENT
Start: 2022-11-23 | End: 2022-11-23

## 2022-11-23 RX ADMIN — MORPHINE SULFATE 4 MG: 4 INJECTION INTRAVENOUS at 14:04

## 2022-11-23 RX ADMIN — LIDOCAINE 5% 1 PATCH: 700 PATCH TOPICAL at 14:04

## 2022-11-23 RX ADMIN — ONDANSETRON 4 MG: 4 TABLET, ORALLY DISINTEGRATING ORAL at 14:04

## 2022-11-23 RX ADMIN — ACETAMINOPHEN 975 MG: 325 TABLET ORAL at 13:35

## 2022-11-23 NOTE — DISCHARGE INSTRUCTIONS
I am sorry we cannot find the definitive cause of your left leg pain  There was no evidence of blood clot and no evidence of bone abnormalities, however  Please continue to follow-up with pain management  If you feel nauseous from the pain medications, you can take zofran for nausea  A referral has been placed for you to see [facility] orthopedics, and their office will call you  If you don't hear back by the end of the week, then please give them a call via the [facility] Everywhere Referral contact #

## 2022-11-23 NOTE — ED PROVIDER NOTES
History  Chief Complaint   Patient presents with   • Leg Pain     Presents due to L knee pain with radiation into L hip, chronic issue, took gabapentin and percocet at 0900 w/o relief      HPI   77F w hx of arthritis, HTN, pacemaker, DM2 w peripheral neuropathy presenting with left leg pain  For the past year, patient has been having left knee pain  Left knee pain radiates to left hip  Hx of left knee replacement  Says she has seen orthopedics and pain management for her leg pain  Saw two different orthopedic surgeons, and says she was told contradictory information about the pain related to her hip vs her back  Received a back injection a month ago for her pain at pain management  Has been taking gabapentin and hydrocodone for pain, prescribed by her pain management doctor  Uses walker to get around the house  Denies any recent injuries/traumas  Prior to Admission Medications   Prescriptions Last Dose Informant Patient Reported? Taking?    Ascorbic Acid, Vitamin C, (VITAMIN C) 100 MG tablet   Yes No   Sig: Take 500 mg by mouth daily    Multiple Vitamin (Multi-Day) TABS   Yes No   Sig: Take 1 tablet by mouth daily    acetaminophen (TYLENOL) 500 mg tablet   Yes No   Sig: Take 500 mg by mouth   amLODIPine (NORVASC) 2 5 mg tablet   No No   Sig: Take 1 tablet (2 5 mg total) by mouth daily   gabapentin (Neurontin) 600 MG tablet   No No   Sig: Take 1 tablet (600 mg total) by mouth 3 (three) times a day   levothyroxine 150 mcg tablet   Yes No   Sig: Take 150 mcg by mouth daily    torsemide (DEMADEX) 100 mg tablet   No No   Sig: Take 0 5 tablets (50 mg total) by mouth daily   warfarin (COUMADIN) 5 mg tablet   Yes No   Sig: Take 4 mg by mouth Sun, Wed, Thursday,Friday- 1 tab  Tues, Saturday- 2 tab      Facility-Administered Medications: None       Past Medical History:   Diagnosis Date   • Arthritis    • CHF (congestive heart failure) (HCC)    • Diabetes mellitus (HCC)    • Disease of thyroid gland    • Hypertension    • Pacemaker    • Renal disorder        Past Surgical History:   Procedure Laterality Date   • CARDIAC PACEMAKER PLACEMENT     • CHOLECYSTECTOMY     • FL GUIDED NEEDLE PLAC BX/ASP/INJ  10/4/2022   • FL GUIDED NEEDLE PLAC BX/ASP/INJ  10/20/2022   • JOINT REPLACEMENT      bilateral knee replacements   • NERVE BLOCK Bilateral 10/4/2022    Procedure: BLOCK MEDIAL BRANCH NERVES BILATERAL L3, L4, L5 #1;  Surgeon: Jay Ramsey MD;  Location: [facility];  Service: Pain Management    • NERVE BLOCK Bilateral 10/20/2022    Procedure: BLOCK MEDIAL BRANCH L3, L4, L5 #2;  Surgeon: Jay Ramsey MD;  Location: [facility];  Service: Pain Management    • ORIF TIBIA & FIBULA FRACTURES Left 10/29/2020    Procedure: OPEN REDUCTION W/ INTERNAL FIXATION (ORIF) ANKLE;  Surgeon: Abbi Juarez; Location: [facility];  Service: Orthopedics   • TONSILLECTOMY     • TUBAL LIGATION         Family History   Problem Relation Age of Onset   • Atrial fibrillation Mother    • Arthritis Father    • Kidney cancer Father    • Breast cancer Sister         early 46s   • No Known Problems Sister    • Kidney cancer Brother    • No Known Problems Daughter    • No Known Problems Daughter    • No Known Problems Daughter    • No Known Problems Daughter    • No Known Problems Maternal Aunt    • No Known Problems Maternal Aunt    • No Known Problems Maternal Aunt    • No Known Problems Paternal Aunt    • No Known Problems Maternal Grandmother    • No Known Problems Maternal Grandfather    • No Known Problems Paternal Grandmother    • No Known Problems Paternal Grandfather    • Breast cancer Cousin         early 46s   • Breast cancer additional onset Neg Hx    • Colon cancer Neg Hx    • Endometrial cancer Neg Hx    • Ovarian cancer Neg Hx    • BRCA 1/2 Neg Hx    • BRCA2 Positive Neg Hx    • BRCA2 Negative Neg Hx    • BRCA1 Positive Neg Hx    • BRCA1 Negative Neg Hx      I have reviewed and agree with the history as documented      E-Cigarette/Vaping   • E-Cigarette Use Never User      E-Cigarette/Vaping Substances     Social History     Tobacco Use   • Smoking status: Never   • Smokeless tobacco: Never   Vaping Use   • Vaping Use: Never used   Substance Use Topics   • Alcohol use: Not Currently   • Drug use: Not Currently       Review of Systems   Constitutional: Negative for chills and fever  HENT: Negative for ear pain and sore throat  Eyes: Negative for pain and visual disturbance  Respiratory: Negative for cough and shortness of breath  Cardiovascular: Negative for chest pain and palpitations  Gastrointestinal: Negative for abdominal pain and vomiting  Genitourinary: Negative for dysuria and hematuria  Musculoskeletal: Negative for arthralgias and back pain  Left knee and hip pain   Skin: Negative for color change and rash  Neurological: Negative for seizures and syncope  All other systems reviewed and are negative  Physical Exam  Physical Exam  Vitals and nursing note reviewed  Constitutional:       General: She is not in acute distress  Appearance: She is well-developed  HENT:      Head: Normocephalic and atraumatic  Right Ear: External ear normal       Left Ear: External ear normal       Nose: Nose normal    Eyes:      Conjunctiva/sclera: Conjunctivae normal    Cardiovascular:      Rate and Rhythm: Normal rate and regular rhythm  Pulses:           Dorsalis pedis pulses are 2+ on the right side and 2+ on the left side  Posterior tibial pulses are 2+ on the right side and 2+ on the left side  Pulmonary:      Effort: Pulmonary effort is normal  No respiratory distress  Breath sounds: Normal breath sounds  Abdominal:      Palpations: Abdomen is soft  Tenderness: There is no abdominal tenderness  Musculoskeletal:         General: Tenderness present  Cervical back: Normal range of motion and neck supple  Comments: Mild tenderness over lateral aspect of left thigh   Able to extend/flex left knee and hip w/o pain  Skin:     General: Skin is warm and dry  Comments: Decreased sensations in bilateral feet (chronic)  Neurological:      General: No focal deficit present  Mental Status: She is alert  Mental status is at baseline  Vital Signs  ED Triage Vitals [11/23/22 1243]   Temperature Pulse Respirations Blood Pressure SpO2   97 9 °F (36 6 °C) 94 20 163/71 93 %      Temp Source Heart Rate Source Patient Position - Orthostatic VS BP Location FiO2 (%)   Temporal -- Lying Right arm --      Pain Score       10 - Worst Possible Pain           Vitals:    11/23/22 1243   BP: 163/71   Pulse: 94   Patient Position - Orthostatic VS: Lying       Visual Acuity    ED Medications  Medications   lidocaine (LIDODERM) 5 % patch 1 patch (1 patch Topical Medication Applied 11/23/22 1404)   acetaminophen (TYLENOL) tablet 975 mg (975 mg Oral Given 11/23/22 1335)   morphine injection 4 mg (4 mg Intramuscular Given 11/23/22 1404)   ondansetron (ZOFRAN-ODT) dispersible tablet 4 mg (4 mg Oral Given 11/23/22 1404)       Diagnostic Studies  Results Reviewed     None                 VAS lower limb venous duplex study, unilateral/limited   Final Result by Cecilia Horn MD (11/23 1524)      XR femur 2 views LEFT   Final Result by Jessica Aponte MD (11/23 1411)      No acute osseous abnormality  Revised left knee prosthesis without evidence of hardware complication  Workstation performed: [id number]         XR knee 4+ views left injury   Final Result by Jessica Aponte MD (11/23 1411)      No acute osseous abnormality  Revised left knee prosthesis without evidence of hardware complication  Workstation performed: [id number]         XR pelvis ap only 1 or 2 vw   Final Result by Jessica Aponte MD (11/23 1411)      No acute osseous abnormality  Revised left knee prosthesis without evidence of hardware complication        Workstation performed: [id number] Procedures  Procedures     ED Course  ED Course as of 11/23/22 1602   Wed Nov 23, 2022   1419 Pelvis, femur, and knee Xrays  IMPRESSION:  No acute osseous abnormality  Revised left knee prosthesis without evidence of hardware complication  1430 Per vascular tech, patient is negative for DVT  1430 I reevaluated patient  She feels improvement of pain after medications  She is prescribed gabapentin and norco, but does not always take it because she gets nauseous from the medications  Advised that she continue to follow-up with orthopedics  [facility]   77F presenting with chronic left knee w radiation to left hip  No new injuries/traumas  Has seen orthopedics and pain management for her knee/hip pain  Xrays of pelvis, left femur, and knee are w/o acute abnl  DVT US of LLE also obtained and did not show evidence of DVT  Patient was given medications for her pain and reported improvement  She was advised to continue follow-up with her pain management physician, and she is interested in seeing another orthopedic surgeon for a third opinion  Referral to [facility]  [place] was placed  Patient discharged home in stable condition  Return precautions were advised  Disposition  Final diagnoses:   Left leg pain     Time reflects when diagnosis was documented in both MDM as applicable and the Disposition within this note     Time User Action Codes Description Comment    11/23/2022  2:32 PM Sravan Moder Add [Q03 306] Left leg pain       ED Disposition     ED Disposition   Discharge    Condition   Stable    Date/Time   Wed Nov 23, 2022  2:32 PM    [address] [facility] discharge to home/self care                 Follow-up Information     Follow up With Specialties Details Why [address]  Referral Cardiology, Gastroenterology, General Surgery, Anesthesiology, Hematology and Oncology, Urology, Emergency Medicine, Hematology, Oncology, Diabetes Services Call   [address] [facility] [address]  [phone]            Discharge Medication List as of 11/23/2022  2:38 PM      START taking these medications    Details   ondansetron (Zofran ODT) 4 mg disintegrating tablet Take 1 tablet (4 mg total) by mouth every 6 (six) hours as needed for nausea or vomiting, Starting Wed 11/23/2022, Normal         CONTINUE these medications which have NOT CHANGED    Details   acetaminophen (TYLENOL) 500 mg tablet Take 500 mg by mouth, Historical Med      amLODIPine (NORVASC) 2 5 mg tablet Take 1 tablet (2 5 mg total) by mouth daily, Starting Thu 7/29/2021, Normal      Ascorbic Acid, Vitamin C, (VITAMIN C) 100 MG tablet Take 500 mg by mouth daily , Historical Med      gabapentin (Neurontin) 600 MG tablet Take 1 tablet (600 mg total) by mouth 3 (three) times a day, Starting Wed 10/5/2022, Normal      levothyroxine 150 mcg tablet Take 150 mcg by mouth daily , Starting Tue 9/8/2020, Historical Med      Multiple Vitamin (Multi-Day) TABS Take 1 tablet by mouth daily , Historical Med      torsemide (DEMADEX) 100 mg tablet Take 0 5 tablets (50 mg total) by mouth daily, Starting Thu 7/29/2021, Until Fri 11/4/2022, Normal      warfarin (COUMADIN) 5 mg tablet Take 4 mg by mouth Sun, Wed, Thursday,Friday- 1 tab  Tues, Saturday- 2 tab, Historical Med                 PDMP Review       Value Time User    PDMP Reviewed  Yes 11/23/2022  1:44 PM Olya Dowd MD          ED Provider  Electronically Signed by           Olya Dowd MD  11/23/22 9706

## 2023-01-23 ENCOUNTER — APPOINTMENT (EMERGENCY)
Dept: RADIOLOGY | Facility: HOSPITAL | Age: 78
End: 2023-01-23

## 2023-01-23 ENCOUNTER — HOSPITAL ENCOUNTER (INPATIENT)
Facility: HOSPITAL | Age: 78
LOS: 3 days | Discharge: NURSING, REHAB OR LONG-TERM CARE | End: 2023-01-26
Attending: STUDENT IN AN ORGANIZED HEALTH CARE EDUCATION/TRAINING PROGRAM | Admitting: FAMILY MEDICINE

## 2023-01-23 DIAGNOSIS — M25.512 ACUTE PAIN OF LEFT SHOULDER: ICD-10-CM

## 2023-01-23 DIAGNOSIS — I48.91 ATRIAL FIBRILLATION, UNSPECIFIED TYPE (HCC): ICD-10-CM

## 2023-01-23 DIAGNOSIS — R33.9 URINARY RETENTION: ICD-10-CM

## 2023-01-23 DIAGNOSIS — S82.242A CLOSED DISPLACED SPIRAL FRACTURE OF SHAFT OF LEFT TIBIA, INITIAL ENCOUNTER: Primary | ICD-10-CM

## 2023-01-23 DIAGNOSIS — E11.40 TYPE 2 DIABETES MELLITUS WITH DIABETIC NEUROPATHY, UNSPECIFIED WHETHER LONG TERM INSULIN USE (HCC): ICD-10-CM

## 2023-01-23 LAB
ANION GAP SERPL CALCULATED.3IONS-SCNC: 12 MMOL/L (ref 4–13)
BASOPHILS # BLD AUTO: 0.07 THOUSANDS/ÂΜL (ref 0–0.1)
BASOPHILS NFR BLD AUTO: 1 % (ref 0–1)
BUN SERPL-MCNC: 41 MG/DL (ref 5–25)
CALCIUM SERPL-MCNC: 9.2 MG/DL (ref 8.4–10.2)
CHLORIDE SERPL-SCNC: 101 MMOL/L (ref 96–108)
CO2 SERPL-SCNC: 26 MMOL/L (ref 21–32)
CREAT SERPL-MCNC: 1.26 MG/DL (ref 0.6–1.3)
EOSINOPHIL # BLD AUTO: 0.3 THOUSAND/ÂΜL (ref 0–0.61)
EOSINOPHIL NFR BLD AUTO: 4 % (ref 0–6)
ERYTHROCYTE [DISTWIDTH] IN BLOOD BY AUTOMATED COUNT: 15.9 % (ref 11.6–15.1)
GFR SERPL CREATININE-BSD FRML MDRD: 41 ML/MIN/1.73SQ M
GLUCOSE SERPL-MCNC: 154 MG/DL (ref 65–140)
GLUCOSE SERPL-MCNC: 175 MG/DL (ref 65–140)
GLUCOSE SERPL-MCNC: 206 MG/DL (ref 65–140)
HCT VFR BLD AUTO: 34.4 % (ref 34.8–46.1)
HGB BLD-MCNC: 10.9 G/DL (ref 11.5–15.4)
IMM GRANULOCYTES # BLD AUTO: 0.03 THOUSAND/UL (ref 0–0.2)
IMM GRANULOCYTES NFR BLD AUTO: 0 % (ref 0–2)
INR PPP: 2.51 (ref 0.84–1.19)
LYMPHOCYTES # BLD AUTO: 0.98 THOUSANDS/ÂΜL (ref 0.6–4.47)
LYMPHOCYTES NFR BLD AUTO: 14 % (ref 14–44)
MCH RBC QN AUTO: 25.8 PG (ref 26.8–34.3)
MCHC RBC AUTO-ENTMCNC: 31.7 G/DL (ref 31.4–37.4)
MCV RBC AUTO: 82 FL (ref 82–98)
MONOCYTES # BLD AUTO: 0.47 THOUSAND/ÂΜL (ref 0.17–1.22)
MONOCYTES NFR BLD AUTO: 7 % (ref 4–12)
NEUTROPHILS # BLD AUTO: 5.29 THOUSANDS/ÂΜL (ref 1.85–7.62)
NEUTS SEG NFR BLD AUTO: 74 % (ref 43–75)
NRBC BLD AUTO-RTO: 0 /100 WBCS
PLATELET # BLD AUTO: 171 THOUSANDS/UL (ref 149–390)
PMV BLD AUTO: 9.9 FL (ref 8.9–12.7)
POTASSIUM SERPL-SCNC: 3.5 MMOL/L (ref 3.5–5.3)
PROTHROMBIN TIME: 27.1 SECONDS (ref 11.6–14.5)
RBC # BLD AUTO: 4.22 MILLION/UL (ref 3.81–5.12)
SODIUM SERPL-SCNC: 139 MMOL/L (ref 135–147)
WBC # BLD AUTO: 7.14 THOUSAND/UL (ref 4.31–10.16)

## 2023-01-23 PROCEDURE — 2W3RX1Z IMMOBILIZATION OF LEFT LOWER LEG USING SPLINT: ICD-10-PCS | Performed by: STUDENT IN AN ORGANIZED HEALTH CARE EDUCATION/TRAINING PROGRAM

## 2023-01-23 RX ORDER — LEVOTHYROXINE SODIUM 0.15 MG/1
150 TABLET ORAL DAILY
Status: DISCONTINUED | OUTPATIENT
Start: 2023-01-24 | End: 2023-01-26 | Stop reason: HOSPADM

## 2023-01-23 RX ORDER — GABAPENTIN 300 MG/1
600 CAPSULE ORAL 3 TIMES DAILY
Status: DISCONTINUED | OUTPATIENT
Start: 2023-01-23 | End: 2023-01-26 | Stop reason: HOSPADM

## 2023-01-23 RX ORDER — ACETAMINOPHEN 325 MG/1
500 TABLET ORAL EVERY 4 HOURS PRN
Status: DISCONTINUED | OUTPATIENT
Start: 2023-01-23 | End: 2023-01-26

## 2023-01-23 RX ORDER — SODIUM CHLORIDE 9 MG/ML
125 INJECTION, SOLUTION INTRAVENOUS CONTINUOUS
Status: DISCONTINUED | OUTPATIENT
Start: 2023-01-23 | End: 2023-01-23

## 2023-01-23 RX ORDER — ONDANSETRON 2 MG/ML
4 INJECTION INTRAMUSCULAR; INTRAVENOUS EVERY 6 HOURS PRN
Status: DISCONTINUED | OUTPATIENT
Start: 2023-01-23 | End: 2023-01-26 | Stop reason: HOSPADM

## 2023-01-23 RX ORDER — FENTANYL CITRATE 50 UG/ML
50 INJECTION, SOLUTION INTRAMUSCULAR; INTRAVENOUS ONCE
Status: COMPLETED | OUTPATIENT
Start: 2023-01-23 | End: 2023-01-23

## 2023-01-23 RX ORDER — MORPHINE SULFATE 4 MG/ML
4 INJECTION, SOLUTION INTRAMUSCULAR; INTRAVENOUS ONCE
Status: COMPLETED | OUTPATIENT
Start: 2023-01-23 | End: 2023-01-23

## 2023-01-23 RX ORDER — TRAMADOL HYDROCHLORIDE 50 MG/1
50 TABLET ORAL EVERY 6 HOURS PRN
Status: DISCONTINUED | OUTPATIENT
Start: 2023-01-23 | End: 2023-01-26 | Stop reason: HOSPADM

## 2023-01-23 RX ORDER — HYDROMORPHONE HCL/PF 1 MG/ML
0.5 SYRINGE (ML) INJECTION EVERY 4 HOURS PRN
Status: DISCONTINUED | OUTPATIENT
Start: 2023-01-23 | End: 2023-01-26 | Stop reason: HOSPADM

## 2023-01-23 RX ORDER — SENNOSIDES 8.8 MG/5ML
8.8 LIQUID ORAL DAILY
Status: DISCONTINUED | OUTPATIENT
Start: 2023-01-24 | End: 2023-01-26 | Stop reason: HOSPADM

## 2023-01-23 RX ORDER — INSULIN LISPRO 100 [IU]/ML
1-6 INJECTION, SOLUTION INTRAVENOUS; SUBCUTANEOUS
Status: DISCONTINUED | OUTPATIENT
Start: 2023-01-23 | End: 2023-01-26

## 2023-01-23 RX ORDER — AMLODIPINE BESYLATE 2.5 MG/1
2.5 TABLET ORAL DAILY
Status: DISCONTINUED | OUTPATIENT
Start: 2023-01-24 | End: 2023-01-26 | Stop reason: HOSPADM

## 2023-01-23 RX ORDER — LIDOCAINE 50 MG/G
1 PATCH TOPICAL DAILY
Status: DISCONTINUED | OUTPATIENT
Start: 2023-01-24 | End: 2023-01-26 | Stop reason: HOSPADM

## 2023-01-23 RX ORDER — INSULIN LISPRO 100 [IU]/ML
1-6 INJECTION, SOLUTION INTRAVENOUS; SUBCUTANEOUS
Status: DISCONTINUED | OUTPATIENT
Start: 2023-01-23 | End: 2023-01-26 | Stop reason: HOSPADM

## 2023-01-23 RX ORDER — TORSEMIDE 100 MG/1
50 TABLET ORAL DAILY
Status: DISCONTINUED | OUTPATIENT
Start: 2023-01-24 | End: 2023-01-26 | Stop reason: HOSPADM

## 2023-01-23 RX ORDER — DOCUSATE SODIUM 100 MG/1
100 CAPSULE, LIQUID FILLED ORAL 2 TIMES DAILY
Status: DISCONTINUED | OUTPATIENT
Start: 2023-01-23 | End: 2023-01-26 | Stop reason: HOSPADM

## 2023-01-23 RX ADMIN — DOCUSATE SODIUM 100 MG: 100 CAPSULE, LIQUID FILLED ORAL at 18:06

## 2023-01-23 RX ADMIN — INSULIN LISPRO 1 UNITS: 100 INJECTION, SOLUTION INTRAVENOUS; SUBCUTANEOUS at 18:05

## 2023-01-23 RX ADMIN — TRAMADOL HYDROCHLORIDE 50 MG: 50 TABLET ORAL at 21:22

## 2023-01-23 RX ADMIN — MORPHINE SULFATE 4 MG: 4 INJECTION INTRAVENOUS at 16:38

## 2023-01-23 RX ADMIN — HYDROMORPHONE HYDROCHLORIDE 0.5 MG: 1 INJECTION, SOLUTION INTRAMUSCULAR; INTRAVENOUS; SUBCUTANEOUS at 23:34

## 2023-01-23 RX ADMIN — FENTANYL CITRATE 50 MCG: 50 INJECTION INTRAMUSCULAR; INTRAVENOUS at 15:28

## 2023-01-23 RX ADMIN — INSULIN LISPRO 1 UNITS: 100 INJECTION, SOLUTION INTRAVENOUS; SUBCUTANEOUS at 21:22

## 2023-01-23 RX ADMIN — HYDROMORPHONE HYDROCHLORIDE 0.5 MG: 1 INJECTION, SOLUTION INTRAMUSCULAR; INTRAVENOUS; SUBCUTANEOUS at 18:44

## 2023-01-23 RX ADMIN — GABAPENTIN 600 MG: 300 CAPSULE ORAL at 21:22

## 2023-01-23 NOTE — ASSESSMENT & PLAN NOTE
Wt Readings from Last 3 Encounters:   01/23/23 103 kg (226 lb 10 1 oz)   11/23/22 105 kg (230 lb 9 6 oz)   11/04/22 102 kg (225 lb)       Remain euvolemic  Continue torsemide  Intake and output, low-sodium diet

## 2023-01-23 NOTE — ASSESSMENT & PLAN NOTE
Happened at home after getting twisted on left knee when she was trying to get up from laundry  After the incident, patient was feeling imbalance and need help  Complaining pain  Continue pain management  Status post posterior splint placement in ER  Notify orthopedic surgeon-we will follow in a m    Patient is on Coumadin, INR is therapeutic 2 51-hold any kind of anticoagulation till evaluated by orthopedic surgeon

## 2023-01-23 NOTE — ASSESSMENT & PLAN NOTE
Lab Results   Component Value Date    HGBA1C 7 0 (H) 10/17/2022       No results for input(s): POCGLU in the last 72 hours      Blood Sugar Average: Last 72 hrs:  Per patient, diet controlled  Remain in the hospital, continue sliding scale with hypoglycemia precaution

## 2023-01-23 NOTE — ASSESSMENT & PLAN NOTE
As per patient, she had ankle injury and status post fixed  Patient does have plates and screws-as per patient there is some infection is in situ  Will discuss with orthopedic surgeon to take a look

## 2023-01-23 NOTE — H&P
[address] 1945, 69 y o  female MRN: [id number]  Unit/Bed#: ED 08 Encounter: [id number]  Primary Care Provider: Tito Verma MD   Date and time admitted to hospital: 1/23/2023  2:69 PM    Diastolic congestive heart failure [facility])  Assessment & Plan  Wt Readings from Last 3 Encounters:   01/23/23 103 kg (226 lb 10 1 oz)   11/23/22 105 kg (230 lb 9 6 oz)   11/04/22 102 kg (225 lb)       Remain euvolemic  Continue torsemide  Intake and output, low-sodium diet      Ankle injury  Assessment & Plan  As per patient, she had ankle injury and status post fixed  Patient does have plates and screws-as per patient there is some infection is in situ  Will discuss with orthopedic surgeon to take a look    * Closed displaced spiral fracture of shaft of left tibia  Assessment & Plan  Happened at home after getting twisted on left knee when she was trying to get up from laundry  After the incident, patient was feeling imbalance and need help  Complaining pain  Continue pain management  Status post posterior splint placement in ER  Notify orthopedic surgeon-we will follow in a m  Patient is on Coumadin, INR is therapeutic 2 51-hold any kind of anticoagulation till evaluated by orthopedic surgeon    Atrial fibrillation [facility])  Assessment & Plan  V paced rhythm, Rate 78  Not any beta-blocker, patient has pacemaker in place  Patient also takes Coumadin 1 mg daily except Tuesday when she takes 2 mg  INR is therapeutic 2 51  Coumadin remains on hold due to tibial fracture, till evaluated by surgeon    JOS (obstructive sleep apnea)  Assessment & Plan  As per family member, patient does not use any oxygen or CPAP  Monitor at this time    Type 2 diabetes mellitus with diabetic nephropathy [facility])  Assessment & Plan  Lab Results   Component Value Date    HGBA1C 7 0 (H) 10/17/2022       No results for input(s): POCGLU in the last 72 hours      Blood Sugar Average: Last 72 hrs:  Per patient, diet controlled  Remain in the hospital, continue sliding scale with hypoglycemia precaution      VTE Pharmacologic Prophylaxis: VTE Score: 12 High Risk (Score >/= 5) - Pharmacological DVT Prophylaxis Ordered: warfarin (Coumadin)  Sequential Compression Devices Ordered  Code Status: Level 1 - Full Code as per patient  Discussion with family: Updated  () via phone  Anticipated Length of Stay: Patient will be admitted on an inpatient basis with an anticipated length of stay of greater than 2 midnights secondary to To monitor above conditions  Total Time for Visit, including Counseling / Coordination of Care: 20 minutes Greater than 50% of this total time spent on direct patient counseling and coordination of care  Chief Complaint: Left leg pain    History of Present Illness:  Selvin Tyler is a 69 y o  female with a PMH of chronic atrial fibrillation, diastolic heart failure, who presents with left lower extremity pain  Patient reports she was doing laundry and trying to get up and twist her knee and heard a crack  After that she was in severe pain and lost her balance  Did not pass out  Denies any chest pain, nausea, vomiting, diarrhea       Review of Systems:  Review of Systems   Constitutional: Positive for activity change  Negative for appetite change, chills, diaphoresis, fatigue, fever and unexpected weight change  HENT: Negative for congestion, dental problem, drooling, sinus pressure, sinus pain and sneezing  Respiratory: Negative for apnea, cough, chest tightness, shortness of breath and stridor  Cardiovascular: Negative for chest pain, palpitations and leg swelling  Gastrointestinal: Negative for abdominal distention, abdominal pain, anal bleeding, constipation, diarrhea, nausea and rectal pain  Genitourinary: Negative for difficulty urinating and dyspareunia  Musculoskeletal: Positive for arthralgias and gait problem   Negative for myalgias, neck pain and neck stiffness  Skin: Negative for color change, pallor and wound  Neurological: Negative for dizziness, tremors, seizures, syncope, speech difficulty, weakness, light-headedness and headaches  Hematological: Negative for adenopathy  Psychiatric/Behavioral: Negative for agitation, behavioral problems, confusion and decreased concentration  All other systems reviewed and are negative  Past Medical and Surgical History:   Past Medical History:   Diagnosis Date   • Arthritis    • CHF (congestive heart failure) ([facility] [address] )    • Diabetes mellitus ([facility] )    • Disease of thyroid gland    • Hypertension    • Pacemaker    • Renal disorder        Past Surgical History:   Procedure Laterality Date   • CARDIAC PACEMAKER PLACEMENT     • CHOLECYSTECTOMY     • FL GUIDED NEEDLE PLAC BX/ASP/INJ  10/4/2022   • FL GUIDED NEEDLE PLAC BX/ASP/INJ  10/20/2022   • JOINT REPLACEMENT      bilateral knee replacements   • NERVE BLOCK Bilateral 10/4/2022    Procedure: BLOCK MEDIAL BRANCH NERVES BILATERAL L3, L4, L5 #1;  Surgeon: Darcy Hunter MD;  Location:  ENDO;  Service: Pain Management    • NERVE BLOCK Bilateral 10/20/2022    Procedure: BLOCK MEDIAL BRANCH L3, L4, L5 #2;  Surgeon: Darcy Hunter MD;  Location:  ENDO;  Service: Pain Management    • ORIF TIBIA & FIBULA FRACTURES Left 10/29/2020    Procedure: OPEN REDUCTION W/ INTERNAL FIXATION (ORIF) ANKLE;  Surgeon: María Bull; Location:  MAIN OR;  Service: Orthopedics   • TONSILLECTOMY     • TUBAL LIGATION         Meds/Allergies:  Prior to Admission medications    Medication Sig Start Date End Date Taking?  Authorizing Provider   acetaminophen (TYLENOL) 500 mg tablet Take 500 mg by mouth    Historical Provider, MD   amLODIPine (NORVASC) 2 5 mg tablet Take 1 tablet (2 5 mg total) by mouth daily 7/29/21   Tyrel Moore MD   Ascorbic Acid, Vitamin C, (VITAMIN C) 100 MG tablet Take 500 mg by mouth daily     Historical Provider, MD   gabapentin (Neurontin) 600 MG tablet Take 1 tablet (600 mg total) by mouth 3 (three) times a day 10/5/22   Sahra Diaz MD   levothyroxine 150 mcg tablet Take 150 mcg by mouth daily  9/8/20   Historical Provider, MD   Multiple Vitamin (Multi-Day) TABS Take 1 tablet by mouth daily     Historical Provider, MD   ondansetron (Zofran ODT) 4 mg disintegrating tablet Take 1 tablet (4 mg total) by mouth every 6 (six) hours as needed for nausea or vomiting 11/23/22   Jimy Mendoza MD   torsemide [facility]) 100 mg tablet Take 0 5 tablets (50 mg total) by mouth daily 7/29/21 11/4/22  Helena Romero MD   warfarin (COUMADIN) 5 mg tablet Take 4 mg by mouth Sun, Wed, Thursday,Friday- 1 tab  Tues, Saturday- 2 tab 9/8/20   Historical Provider, MD     I have reviewed home medications with patient personally  Allergies:    Allergies   Allergen Reactions   • Rofecoxib Angioedema, Swelling, Hives and Other (See Comments)     swelling, sob  swelling, sob  Other reaction(s): Swelling / Edema  swelling, sob  Other reaction(s): SWELLING  Other reaction(s): Angioedema     • Oxycodone-Acetaminophen GI Intolerance and Other (See Comments)     Unsure of rxn   Unsure of rxn   Unsure of rxn   Other reaction(s): "CAN'T BREATH"     • Duloxetine Hcl Other (See Comments)     Slept for 4 days    • Medical Tape Rash       Social History:  Marital Status: /Civil Union   Occupation: Unknown  Patient Pre-hospital Living Situation: Home  Patient Pre-hospital Level of Mobility: walks  Patient Pre-hospital Diet Restrictions: Cardiac/diabetic diet  Substance Use History:   Social History     Substance and Sexual Activity   Alcohol Use Not Currently     Social History     Tobacco Use   Smoking Status Never   Smokeless Tobacco Never     Social History     Substance and Sexual Activity   Drug Use Not Currently       Family History:  Family History   Problem Relation Age of Onset   • Atrial fibrillation Mother    • Arthritis Father    • Kidney cancer Father    • Breast cancer Sister         early 46s   • No Known Problems Sister    • Kidney cancer Brother    • No Known Problems Daughter    • No Known Problems Daughter    • No Known Problems Daughter    • No Known Problems Daughter    • No Known Problems Maternal Aunt    • No Known Problems Maternal Aunt    • No Known Problems Maternal Aunt    • No Known Problems Paternal Aunt    • No Known Problems Maternal Grandmother    • No Known Problems Maternal Grandfather    • No Known Problems Paternal Grandmother    • No Known Problems Paternal Grandfather    • Breast cancer Cousin         early 46s   • Breast cancer additional onset Neg Hx    • Colon cancer Neg Hx    • Endometrial cancer Neg Hx    • Ovarian cancer Neg Hx    • BRCA 1/2 Neg Hx    • BRCA2 Positive Neg Hx    • BRCA2 Negative Neg Hx    • BRCA1 Positive Neg Hx    • BRCA1 Negative Neg Hx        Physical Exam:     Vitals:   Blood Pressure: 134/70 (01/23/23 1745)  Pulse: 78 (01/23/23 1745)  Temperature: 98 8 °F (37 1 °C) (01/23/23 1524)  Temp Source: Temporal (01/23/23 1524)  Respirations: 14 (01/23/23 1745)  Height: 5' 2" (157 5 cm) (01/23/23 1524)  Weight - Scale: 103 kg (226 lb 10 1 oz) (01/23/23 1524)  SpO2: 97 % (01/23/23 1745)    Physical Exam  Vitals and nursing note reviewed  Exam conducted with a chaperone present  Constitutional:       Appearance: Normal appearance  She is not ill-appearing or diaphoretic  HENT:      Head: Normocephalic and atraumatic  Mouth/Throat:      Mouth: Mucous membranes are moist       Pharynx: Oropharynx is clear  No oropharyngeal exudate or posterior oropharyngeal erythema  Eyes:      General: No scleral icterus  Left eye: No discharge  Extraocular Movements: Extraocular movements intact  Conjunctiva/sclera: Conjunctivae normal       Pupils: Pupils are equal, round, and reactive to light  Neck:      Vascular: No carotid bruit  Cardiovascular:      Rate and Rhythm: Normal rate  Heart sounds: Murmur heard       No friction rub  No gallop  Pulmonary:      Effort: Pulmonary effort is normal  No respiratory distress  Breath sounds: No stridor  No wheezing or rhonchi  Abdominal:      General: Abdomen is flat  Bowel sounds are normal  There is no distension  Palpations: There is no mass  Tenderness: There is no abdominal tenderness  Hernia: No hernia is present  Musculoskeletal:         General: Tenderness (left tibia) present  Cervical back: Normal range of motion  No rigidity or tenderness  Right lower leg: No edema  Comments: Left lower extremity is on splint, pedal pulse palpable   Lymphadenopathy:      Cervical: No cervical adenopathy  Skin:     Capillary Refill: Capillary refill takes less than 2 seconds  Findings: No lesion or rash  Neurological:      General: No focal deficit present  Mental Status: She is alert and oriented to person, place, and time  Cranial Nerves: No cranial nerve deficit  Sensory: No sensory deficit  Motor: No weakness        Gait: Gait normal       Deep Tendon Reflexes: Reflexes normal          Additional Data:     Lab Results:  Results from last 7 days   Lab Units 01/23/23  1606   WBC Thousand/uL 7 14   HEMOGLOBIN g/dL 10 9*   HEMATOCRIT % 34 4*   PLATELETS Thousands/uL 171   NEUTROS PCT % 74   LYMPHS PCT % 14   MONOS PCT % 7   EOS PCT % 4     Results from last 7 days   Lab Units 01/23/23  1616   SODIUM mmol/L 139   POTASSIUM mmol/L 3 5   CHLORIDE mmol/L 101   CO2 mmol/L 26   BUN mg/dL 41*   CREATININE mg/dL 1 26   ANION GAP mmol/L 12   CALCIUM mg/dL 9 2   GLUCOSE RANDOM mg/dL 206*     Results from last 7 days   Lab Units 01/23/23  1606   INR  2 51*                   Lines/Drains:  Invasive Devices     Peripheral Intravenous Line  Duration           Peripheral IV 01/23/23 Right Antecubital <1 day                    Imaging: Reviewed radiology reports from this admission including: xray(s)  XR knee 1 or 2 views left   ED Interpretation by Cecille Aguirre DO (01/23 4810)   No acute fracture or dislocation noted on knee XR  XR ankle 3+ views LEFT   ED Interpretation by Cecille Aguirre DO (01/23 8688)   Left tibial shaft fracture          EKG and Other Studies Reviewed on Admission:   · EKG: Ventricular paced rhythm, heart rate 78  ** Please Note: This note has been constructed using a voice recognition system   **

## 2023-01-23 NOTE — ED PROVIDER NOTES
History  Chief Complaint   Patient presents with   • Ankle Injury     Pt turned left ankle and " heard crack"  History provided by:  Patient and EMS personnel  Ankle Injury  Location:  Left ankle  Quality:  "my leg is numb"   Severity:  Moderate  Onset quality:  Sudden  Duration:  30 minutes  Timing:  Constant  Progression:  Unchanged  Chronicity:  New  Context:  Was doing laundry when she pivoted on her left leg and heard a "crack"   noted a deformity and attempted reduction  Relieved by:  Nothing tried  Worsened by: Movement  Ineffective treatments:  Nothing tried  Associated symptoms: no abdominal pain, no chest pain, no congestion, no cough, no diarrhea, no headaches, no myalgias, no nausea, no rash, no rhinorrhea, no shortness of breath, no sore throat, no vomiting and no wheezing    Associated symptoms comment:  Denies other injuries  Risk factors:  Hx of neuropathy    Prior to Admission Medications   Prescriptions Last Dose Informant Patient Reported? Taking?    Ascorbic Acid, Vitamin C, (VITAMIN C) 100 MG tablet   Yes No   Sig: Take 500 mg by mouth daily    Multiple Vitamin (Multi-Day) TABS   Yes No   Sig: Take 1 tablet by mouth daily    acetaminophen (TYLENOL) 500 mg tablet   Yes No   Sig: Take 500 mg by mouth   amLODIPine (NORVASC) 2 5 mg tablet   No No   Sig: Take 1 tablet (2 5 mg total) by mouth daily   gabapentin (Neurontin) 600 MG tablet   No No   Sig: Take 1 tablet (600 mg total) by mouth 3 (three) times a day   levothyroxine 150 mcg tablet   Yes No   Sig: Take 150 mcg by mouth daily    ondansetron (Zofran ODT) 4 mg disintegrating tablet   No No   Sig: Take 1 tablet (4 mg total) by mouth every 6 (six) hours as needed for nausea or vomiting   torsemide (DEMADEX) 100 mg tablet   No No   Sig: Take 0 5 tablets (50 mg total) by mouth daily   warfarin (COUMADIN) 5 mg tablet   Yes No   Sig: Take 4 mg by mouth Sun, Wed, Thursday,Friday- 1 tab  Tues, Saturday- 2 tab      Facility-Administered Medications: None       Past Medical History:   Diagnosis Date   • Arthritis    • CHF (congestive heart failure) ([facility] [address] )    • Diabetes mellitus ([facility] [address] )    • Disease of thyroid gland    • Hypertension    • Pacemaker    • Renal disorder        Past Surgical History:   Procedure Laterality Date   • CARDIAC PACEMAKER PLACEMENT     • CHOLECYSTECTOMY     • FL GUIDED NEEDLE PLAC BX/ASP/INJ  10/4/2022   • FL GUIDED NEEDLE PLAC BX/ASP/INJ  10/20/2022   • JOINT REPLACEMENT      bilateral knee replacements   • NERVE BLOCK Bilateral 10/4/2022    Procedure: BLOCK MEDIAL BRANCH NERVES BILATERAL L3, L4, L5 #1;  Surgeon: Darcy Hunter MD;  Location: [facility];  Service: Pain Management    • NERVE BLOCK Bilateral 10/20/2022    Procedure: BLOCK MEDIAL BRANCH L3, L4, L5 #2;  Surgeon: Darcy Hunter MD;  Location: [facility];  Service: Pain Management    • ORIF TIBIA & FIBULA FRACTURES Left 10/29/2020    Procedure: OPEN REDUCTION W/ INTERNAL FIXATION (ORIF) ANKLE;  Surgeon: María Bull;   Location[facility];  Service: Orthopedics   • TONSILLECTOMY     • TUBAL LIGATION         Family History   Problem Relation Age of Onset   • Atrial fibrillation Mother    • Arthritis Father    • Kidney cancer Father    • Breast cancer Sister         early 46s   • No Known Problems Sister    • Kidney cancer Brother    • No Known Problems Daughter    • No Known Problems Daughter    • No Known Problems Daughter    • No Known Problems Daughter    • No Known Problems Maternal Aunt    • No Known Problems Maternal Aunt    • No Known Problems Maternal Aunt    • No Known Problems Paternal Aunt    • No Known Problems Maternal Grandmother    • No Known Problems Maternal Grandfather    • No Known Problems Paternal Grandmother    • No Known Problems Paternal Grandfather    • Breast cancer Cousin         early 46s   • Breast cancer additional onset Neg Hx    • Colon cancer Neg Hx    • Endometrial cancer Neg Hx    • Ovarian cancer Neg Hx    • BRCA 1/2 Neg Hx    • BRCA2 Positive Neg Hx    • BRCA2 Negative Neg Hx    • BRCA1 Positive Neg Hx    • BRCA1 Negative Neg Hx      I have reviewed and agree with the history as documented  E-Cigarette/Vaping   • E-Cigarette Use Never User      E-Cigarette/Vaping Substances     Social History     Tobacco Use   • Smoking status: Never   • Smokeless tobacco: Never   Vaping Use   • Vaping Use: Never used   Substance Use Topics   • Alcohol use: Not Currently   • Drug use: Not Currently     Review of Systems   HENT: Negative for congestion, rhinorrhea, sinus pressure, sinus pain and sore throat  Respiratory: Negative for cough, chest tightness, shortness of breath and wheezing  Cardiovascular: Negative for chest pain and palpitations  Gastrointestinal: Negative for abdominal pain, diarrhea, nausea and vomiting  Musculoskeletal: Positive for arthralgias, gait problem and joint swelling  Negative for back pain, myalgias, neck pain and neck stiffness  Skin: Negative for color change, pallor, rash and wound  Neurological: Positive for numbness  Negative for dizziness, weakness, light-headedness and headaches  Hematological: Bruises/bleeds easily  All other systems reviewed and are negative  Physical Exam  Physical Exam  Vitals and nursing note reviewed  Constitutional:       General: She is in acute distress  Appearance: She is not ill-appearing or toxic-appearing  HENT:      Head: Normocephalic and atraumatic  Right Ear: External ear normal       Left Ear: External ear normal       Nose: No congestion or rhinorrhea  Eyes:      General: No scleral icterus  Right eye: No discharge  Left eye: No discharge  Extraocular Movements: Extraocular movements intact  Conjunctiva/sclera: Conjunctivae normal       Pupils: Pupils are equal, round, and reactive to light  Cardiovascular:      Rate and Rhythm: Normal rate and regular rhythm  Pulses: Normal pulses        Heart sounds: Normal heart sounds  No murmur heard  Pulmonary:      Effort: Pulmonary effort is normal  No respiratory distress  Breath sounds: Normal breath sounds  No stridor  No wheezing, rhonchi or rales  Chest:      Chest wall: No tenderness  Abdominal:      General: Abdomen is flat  Bowel sounds are normal       Palpations: Abdomen is soft  Tenderness: There is no abdominal tenderness  There is no right CVA tenderness, left CVA tenderness, guarding or rebound  Musculoskeletal:         General: Swelling, tenderness, deformity and signs of injury present  Cervical back: Neck supple  No tenderness  Legs:       Comments: Deformity of the left ankle  2+ DP pulse palpated  Patient is able to wiggle her toes  Expresses worsening numbness on the foot/ankle  Tenderness palpation/swelling along the knee  No bruising  Skin:     General: Skin is warm and dry  Capillary Refill: Capillary refill takes less than 2 seconds  Coloration: Skin is not jaundiced or pale  Findings: No bruising, erythema, lesion or rash  Neurological:      General: No focal deficit present  Mental Status: She is alert and oriented to person, place, and time  Mental status is at baseline  Cranial Nerves: No cranial nerve deficit  Sensory: Sensory deficit present  Motor: No weakness  Psychiatric:         Mood and Affect: Mood normal          Behavior: Behavior normal          Thought Content:  Thought content normal          Judgment: Judgment normal        Vital Signs  ED Triage Vitals [01/23/23 1524]   Temperature Pulse Respirations Blood Pressure SpO2   98 8 °F (37 1 °C) 83 16 133/78 98 %      Temp Source Heart Rate Source Patient Position - Orthostatic VS BP Location FiO2 (%)   Temporal Monitor Sitting Left arm --      Pain Score       4         Vitals:    01/23/23 1524   BP: 133/78   Pulse: 83   Patient Position - Orthostatic VS: Sitting     ED Medications  Medications   fentanyl citrate (PF) 100 MCG/2ML 50 mcg (50 mcg Intravenous Given 1/23/23 1528)   morphine injection 4 mg (4 mg Intravenous Given 1/23/23 1638)     Diagnostic Studies  Results Reviewed     Procedure Component Value Units Date/Time    Protime-INR [[id number]]  (Abnormal) Collected: 01/23/23 1606    Lab Status: Final result Specimen: Blood Updated: 01/23/23 1642     Protime 27 1 seconds      INR 3 79    Basic metabolic panel [[id number]]  (Abnormal) Collected: 01/23/23 1616    Lab Status: Final result Specimen: Blood Updated: 01/23/23 1632     Sodium 139 mmol/L      Potassium 3 5 mmol/L      Chloride 101 mmol/L      CO2 26 mmol/L      ANION GAP 12 mmol/L      BUN 41 mg/dL      Creatinine 1 26 mg/dL      Glucose 206 mg/dL      Calcium 9 2 mg/dL      eGFR 41 ml/min/1 73sq m     Narrative:      National Kidney Disease Foundation guidelines for Chronic Kidney Disease (CKD):   •  Stage 1 with normal or high GFR (GFR > 90 mL/min/1 73 square meters)  •  Stage 2 Mild CKD (GFR = 60-89 mL/min/1 73 square meters)  •  Stage 3A Moderate CKD (GFR = 45-59 mL/min/1 73 square meters)  •  Stage 3B Moderate CKD (GFR = 30-44 mL/min/1 73 square meters)  •  Stage 4 Severe CKD (GFR = 15-29 mL/min/1 73 square meters)  •  Stage 5 End Stage CKD (GFR <15 mL/min/1 73 square meters)  Note: GFR calculation is accurate only with a steady state creatinine    CBC and differential [[id number]]  (Abnormal) Collected: 01/23/23 1606    Lab Status: Final result Specimen: Blood Updated: 01/23/23 1609     WBC 7 14 Thousand/uL      RBC 4 22 Million/uL      Hemoglobin 10 9 g/dL      Hematocrit 34 4 %      MCV 82 fL      MCH 25 8 pg      MCHC 31 7 g/dL      RDW 15 9 %      MPV 9 9 fL      Platelets 569 Thousands/uL      nRBC 0 /100 WBCs      Neutrophils Relative 74 %      Immat GRANS % 0 %      Lymphocytes Relative 14 %      Monocytes Relative 7 %      Eosinophils Relative 4 %      Basophils Relative 1 %      Neutrophils Absolute 5 29 Thousands/µL      Immature Grans Absolute 0 03 Thousand/uL      Lymphocytes Absolute 0 98 Thousands/µL      Monocytes Absolute 0 47 Thousand/µL      Eosinophils Absolute 0 30 Thousand/µL      Basophils Absolute 0 07 Thousands/µL              XR knee 1 or 2 views left   ED Interpretation by Elly Garcia DO (01/23 1550)   No acute fracture or dislocation noted on knee XR  XR ankle 3+ views LEFT   ED Interpretation by Elly Garcia DO (01/23 1544)   Left tibial shaft fracture             Procedures  Splint application    Date/Time: 1/23/2023 4:15 PM  Performed by: Elly Garcia DO  Authorized by: Elly Garcia DO   Universal Protocol:  Consent: Verbal consent obtained  Consent given by: patient  Patient understanding: patient states understanding of the procedure being performed  Site marked: the operative site was marked    Pre-procedure details:     Sensation:  Normal    Skin color:  Pink  Procedure details:     Laterality:  Left    Location:  Leg    Leg:  L lower leg    Splint type:  Short leg    Supplies:  Cotton padding, elastic bandage and Ortho-Glass  Post-procedure details:     Pain:  Unchanged    Sensation:  Normal    Skin color:  Pink    Patient tolerance of procedure: Tolerated well, no immediate complications    ECG 12 Lead Documentation Only    Date/Time: 1/23/2023 4:49 PM  Performed by: Elly Garcia DO  Authorized by: Elly Garcia DO     Indications / Diagnosis:  Preop  ECG reviewed by me, the ED Provider: yes    Patient location:  ED  Previous ECG:     Previous ECG:  Unavailable  Interpretation:     Interpretation: non-specific    Rate:     ECG rate:  77    ECG rate assessment: normal    Rhythm:     Rhythm: paced    Pacing:     Capture:  Complete  Ectopy:     Ectopy: PVCs      PVCs:  Infrequent  QRS:     QRS axis:  Left    QRS intervals:   Wide  ST segments:     ST segments:  Normal  T waves:     T waves: normal        ED Course  ED Course as of 01/23/23 1622   Mon Jan 23, 2023   1544 Left tibial shaft fracture noted on XR   1550 No acute fracture or dislocation noted on knee XR     1559 Per chart review, I guess there is concern for possible infected hardware in the left knee  Not currently on antibiotics  Patient denies fever/chills  Was referred to [facility] in [place] with a scheduled appointment for Wednesday  No hx of aspiration  SBIRT 22yo+    Flowsheet Row Most Recent Value   SBIRT (23 yo +)    In order to provide better care to our patients, we are screening all of our patients for alcohol and drug use  Would it be okay to ask you these screening questions? Yes Filed at: 01/23/2023 1522   Initial Alcohol Screen: US AUDIT-C     1  How often do you have a drink containing alcohol? 0 Filed at: 01/23/2023 1522   2  How many drinks containing alcohol do you have on a typical day you are drinking? 0 Filed at: 01/23/2023 1522   3a  Male UNDER 65: How often do you have five or more drinks on one occasion? 0 Filed at: 01/23/2023 1522   3b  FEMALE Any Age, or MALE 65+: How often do you have 4 or more drinks on one occassion? 0 Filed at: 01/23/2023 1522   Audit-C Score 0 Filed at: 01/23/2023 1522   DANIELA: How many times in the past year have you    Used an illegal drug or used a prescription medication for non-medical reasons? Never Filed at: 01/23/2023 1522        Medical Decision Making  History and clinical findings are most consistent with the below diagnosis/diagnoses  Laboratory and imaging interpretation above  The case was discussed with Orthopedics (Dr Daina Wilkinson)  OK for SLIM admission with Orthopedic consult in the AM  Although the patient expresses worsening numbness along the LLE, the patient's continued to have normal capillary refill, DP pulse, motor function  Splint applied  See procedural note above  IV Fentanyl/Morphine administered for pain  No signs of large knee effusion or overlying cellulitis  No leukocytosis  Low concern for an acute septic joint  Admitted to [facility]       Closed displaced spiral fracture of shaft of left tibia, initial encounter: acute illness or injury  Amount and/or Complexity of Data Reviewed  Labs: ordered  Radiology: ordered and independent interpretation performed  Risk  Prescription drug management  Decision regarding hospitalization  Disposition  Final diagnoses:   Closed displaced spiral fracture of shaft of left tibia, initial encounter     Time reflects when diagnosis was documented in both MDM as applicable and the Disposition within this note     Time User Action Codes Description Comment    1/23/2023  4:33 PM Keri Brown [X83 320O] Closed displaced spiral fracture of shaft of left tibia, initial encounter       ED Disposition     ED Disposition   Admit    Condition   Stable    Date/Time   Mon Jan 23, 2023  4:32 PM    Comment   Case was discussed with Dr Nirmal Augustin and the patient's admission status was agreed to be Admission Status: inpatient status to the service of Dr Nirmal Sanford    None         Patient's Medications   Discharge Prescriptions    No medications on file       No discharge procedures on file      PDMP Review       Value Time User    PDMP Reviewed  Yes 11/23/2022  1:44 PM Pavel Cruz MD          ED Provider  Electronically Signed by           West Baig DO  01/23/23 7727

## 2023-01-23 NOTE — ASSESSMENT & PLAN NOTE
V paced rhythm, Rate 78  Not any beta-blocker, patient has pacemaker in place  Patient also takes Coumadin 1 mg daily except Tuesday when she takes 2 mg  INR is therapeutic 2 51  Coumadin remains on hold due to tibial fracture, till evaluated by surgeon

## 2023-01-24 ENCOUNTER — APPOINTMENT (INPATIENT)
Dept: RADIOLOGY | Facility: HOSPITAL | Age: 78
End: 2023-01-24

## 2023-01-24 PROBLEM — M25.512 ACUTE PAIN OF LEFT SHOULDER: Status: ACTIVE | Noted: 2023-01-24

## 2023-01-24 LAB
ANION GAP SERPL CALCULATED.3IONS-SCNC: 10 MMOL/L (ref 4–13)
ATRIAL RATE: 63 BPM
BUN SERPL-MCNC: 43 MG/DL (ref 5–25)
CALCIUM SERPL-MCNC: 9 MG/DL (ref 8.4–10.2)
CHLORIDE SERPL-SCNC: 101 MMOL/L (ref 96–108)
CO2 SERPL-SCNC: 27 MMOL/L (ref 21–32)
CREAT SERPL-MCNC: 1.39 MG/DL (ref 0.6–1.3)
ERYTHROCYTE [DISTWIDTH] IN BLOOD BY AUTOMATED COUNT: 16.2 % (ref 11.6–15.1)
GFR SERPL CREATININE-BSD FRML MDRD: 36 ML/MIN/1.73SQ M
GLUCOSE SERPL-MCNC: 142 MG/DL (ref 65–140)
GLUCOSE SERPL-MCNC: 184 MG/DL (ref 65–140)
GLUCOSE SERPL-MCNC: 195 MG/DL (ref 65–140)
GLUCOSE SERPL-MCNC: 228 MG/DL (ref 65–140)
GLUCOSE SERPL-MCNC: 235 MG/DL (ref 65–140)
HCT VFR BLD AUTO: 34.3 % (ref 34.8–46.1)
HGB BLD-MCNC: 10.8 G/DL (ref 11.5–15.4)
INR PPP: 2.16 (ref 0.84–1.19)
MCH RBC QN AUTO: 25.8 PG (ref 26.8–34.3)
MCHC RBC AUTO-ENTMCNC: 31.5 G/DL (ref 31.4–37.4)
MCV RBC AUTO: 82 FL (ref 82–98)
PLATELET # BLD AUTO: 151 THOUSANDS/UL (ref 149–390)
PMV BLD AUTO: 10 FL (ref 8.9–12.7)
POTASSIUM SERPL-SCNC: 4.1 MMOL/L (ref 3.5–5.3)
PROTHROMBIN TIME: 24.2 SECONDS (ref 11.6–14.5)
QRS AXIS: -86 DEGREES
QRSD INTERVAL: 194 MS
QT INTERVAL: 486 MS
QTC INTERVAL: 549 MS
RBC # BLD AUTO: 4.19 MILLION/UL (ref 3.81–5.12)
SODIUM SERPL-SCNC: 138 MMOL/L (ref 135–147)
T WAVE AXIS: 90 DEGREES
VENTRICULAR RATE: 77 BPM
WBC # BLD AUTO: 8.27 THOUSAND/UL (ref 4.31–10.16)

## 2023-01-24 RX ADMIN — LEVOTHYROXINE SODIUM 150 MCG: 150 TABLET ORAL at 09:25

## 2023-01-24 RX ADMIN — INSULIN LISPRO 1 UNITS: 100 INJECTION, SOLUTION INTRAVENOUS; SUBCUTANEOUS at 09:24

## 2023-01-24 RX ADMIN — INSULIN LISPRO 2 UNITS: 100 INJECTION, SOLUTION INTRAVENOUS; SUBCUTANEOUS at 12:14

## 2023-01-24 RX ADMIN — AMLODIPINE BESYLATE 2.5 MG: 2.5 TABLET ORAL at 09:26

## 2023-01-24 RX ADMIN — HYDROMORPHONE HYDROCHLORIDE 0.5 MG: 1 INJECTION, SOLUTION INTRAMUSCULAR; INTRAVENOUS; SUBCUTANEOUS at 15:24

## 2023-01-24 RX ADMIN — HYDROMORPHONE HYDROCHLORIDE 0.5 MG: 1 INJECTION, SOLUTION INTRAMUSCULAR; INTRAVENOUS; SUBCUTANEOUS at 10:32

## 2023-01-24 RX ADMIN — LIDOCAINE 5% 1 PATCH: 700 PATCH TOPICAL at 09:27

## 2023-01-24 RX ADMIN — DOCUSATE SODIUM 100 MG: 100 CAPSULE, LIQUID FILLED ORAL at 09:26

## 2023-01-24 RX ADMIN — TRAMADOL HYDROCHLORIDE 50 MG: 50 TABLET ORAL at 17:36

## 2023-01-24 RX ADMIN — HYDROMORPHONE HYDROCHLORIDE 0.5 MG: 1 INJECTION, SOLUTION INTRAMUSCULAR; INTRAVENOUS; SUBCUTANEOUS at 21:18

## 2023-01-24 RX ADMIN — TORSEMIDE 50 MG: 100 TABLET ORAL at 09:25

## 2023-01-24 RX ADMIN — DOCUSATE SODIUM 100 MG: 100 CAPSULE, LIQUID FILLED ORAL at 17:35

## 2023-01-24 RX ADMIN — HYDROMORPHONE HYDROCHLORIDE 0.5 MG: 1 INJECTION, SOLUTION INTRAMUSCULAR; INTRAVENOUS; SUBCUTANEOUS at 04:25

## 2023-01-24 RX ADMIN — GABAPENTIN 600 MG: 300 CAPSULE ORAL at 15:23

## 2023-01-24 RX ADMIN — TRAMADOL HYDROCHLORIDE 50 MG: 50 TABLET ORAL at 09:25

## 2023-01-24 RX ADMIN — GABAPENTIN 600 MG: 300 CAPSULE ORAL at 09:25

## 2023-01-24 RX ADMIN — INSULIN LISPRO 2 UNITS: 100 INJECTION, SOLUTION INTRAVENOUS; SUBCUTANEOUS at 21:18

## 2023-01-24 RX ADMIN — SENNOSIDES 8.8 MG: 8.8 SYRUP ORAL at 09:27

## 2023-01-24 RX ADMIN — GABAPENTIN 600 MG: 300 CAPSULE ORAL at 21:16

## 2023-01-24 NOTE — PLAN OF CARE
Problem: MOBILITY - ADULT  Goal: Maintain or return to baseline ADL function  Description: INTERVENTIONS:  -  Assess patient's ability to carry out ADLs; assess patient's baseline for ADL function and identify physical deficits which impact ability to perform ADLs (bathing, care of mouth/teeth, toileting, grooming, dressing, etc )  - Assess/evaluate cause of self-care deficits   - Assess range of motion  - Assess patient's mobility; develop plan if impaired  - Assess patient's need for assistive devices and provide as appropriate  - Encourage maximum independence but intervene and supervise when necessary  - Involve family in performance of ADLs  - Assess for home care needs following discharge   - Consider OT consult to assist with ADL evaluation and planning for discharge  - Provide patient education as appropriate  1/23/2023 2036 by Mary Leone RN  Outcome: Progressing  1/23/2023 2036 by Mary Leone RN  Outcome: Progressing     Problem: PAIN - ADULT  Goal: Verbalizes/displays adequate comfort level or baseline comfort level  Description: Interventions:  - Encourage patient to monitor pain and request assistance  - Assess pain using appropriate pain scale  - Administer analgesics based on type and severity of pain and evaluate response  - Implement non-pharmacological measures as appropriate and evaluate response  - Consider cultural and social influences on pain and pain management  - Notify physician/advanced practitioner if interventions unsuccessful or patient reports new pain  Outcome: Progressing     Problem: SAFETY ADULT  Goal: Maintain or return to baseline ADL function  Description: INTERVENTIONS:  -  Assess patient's ability to carry out ADLs; assess patient's baseline for ADL function and identify physical deficits which impact ability to perform ADLs (bathing, care of mouth/teeth, toileting, grooming, dressing, etc )  - Assess/evaluate cause of self-care deficits   - Assess range of motion  - Assess patient's mobility; develop plan if impaired  - Assess patient's need for assistive devices and provide as appropriate  - Encourage maximum independence but intervene and supervise when necessary  - Involve family in performance of ADLs  - Assess for home care needs following discharge   - Consider OT consult to assist with ADL evaluation and planning for discharge  - Provide patient education as appropriate  1/23/2023 2036 by Ashwin Garcia RN  Outcome: Progressing  1/23/2023 2036 by Ashwin Garcia RN  Outcome: Progressing

## 2023-01-24 NOTE — UTILIZATION REVIEW
NOTIFICATION OF INPATIENT ADMISSION   AUTHORIZATION REQUEST   SERVICING FACILITY:   [address] [place] [address]  Davis Simmons [address] [place] [address] Otis Veliz  Tax ID: [id number]  NPI: [id number] ATTENDING PROVIDER:  Attending Name and NPI#: Hannah Wagner Md [[id number]]  Address: [facility]  Davis Simmons [address] [place] [address] Otis Arcos  Phone: [phone]   ADMISSION INFORMATION:  Place of Service: Inpatient [address] [place] [address]  [address]  Place of Service Code: 21  Inpatient Admission Date/Time: 1/23/23  4:33 PM  Discharge Date/Time: No discharge date for patient encounter  Admitting Diagnosis Code/Description:  Ankle injury [S99 919A]  Closed displaced spiral fracture of shaft of left tibia, initial encounter [S82 242A]     UTILIZATION REVIEW CONTACT:  Jessie Riggins Utilization   Network Utilization Review Department  Phone: [phone]  Fax [phone]  Email: [email or web address]  [email or web address]  Contact for approvals/pending authorizations, clinical reviews, and discharge  PHYSICIAN ADVISORY SERVICES:  Medical Necessity Denial & [facility] Review  Phone: [phone]  Fax: [phone]  Email: @[email or web address]

## 2023-01-24 NOTE — PHYSICAL THERAPY NOTE
PHYSICAL THERAPY NOTE          Patient Name: Toniann Osgood  [id number] Date: 1/24/2023 01/24/23 1249   Note Type   Note type Evaluation; Cancelled Session   Cancel Reasons Medical status     Received order for PT consult  Chart reviewed  Patient with Acute minimally displaced spiral oblique fracture of the distal tibial metadiaphysis  Fracture line appears to terminate approximately 3 mm above the most superior transverse syndesmotic screw on left ankle xray  Orthopedics consulted  Will await recommendations from ortho and see patient as medically appropriate a later time       Araceli Serum, PT,DPT

## 2023-01-24 NOTE — PROGRESS NOTES
[address]  Progress Note - Renu Gomez 1945, 69 y o  female MRN: [id number]  Unit/Bed#: PACU 05 Encounter: [id number]  Primary Care Provider: Kyra Vigil MD   Date and time admitted to hospital: 1/23/2023  3:18 PM    * Closed displaced spiral fracture of shaft of left tibia  Assessment & Plan  · Presented to the ER after fall, hitting left knee  · S/p posterior splint placement in the ER  · Orthopedics are consulted  · Ideally surgically fixated  · There is complications from distal hardware  · Further complicating issue is patient has loosening of femoral prosthesis on a bone scan of her left hip  · Patient is on Coumadin -INR therapeutic at 2 5 on admission  · Trend INR until less than 1 6  Lab Results   Component Value Date    INR 2 16 (H) 01/24/2023    INR 2 51 (H) 01/23/2023   Continue to hold Coumadin  Obtain PT/OT consults after procedure    Acute pain of left shoulder  Assessment & Plan  Patient endorses today acute pain of left shoulder  No imaging done on admission    X-rays ordered by orthopedics, follow-up results  For now continue pain control    JOS (obstructive sleep apnea)  Assessment & Plan  As per family member, patient does not use any oxygen or CPAP  Monitor at this time    Type 2 diabetes mellitus with diabetic nephropathy [facility])  Assessment & Plan  Lab Results   Component Value Date    HGBA1C 7 0 (H) 10/17/2022       Recent Labs     01/23/23  1804 01/23/23  2122 01/24/23  0725 01/24/23  1050   POCGLU 175* 154* 184* 195*       Blood Sugar Average: Last 72 hrs:  (P) 177Per patient, diet controlled  Remain in the hospital, continue sliding scale with hypoglycemia precaution    Diastolic congestive heart failure (HCC)  Assessment & Plan  Wt Readings from Last 3 Encounters:   01/24/23 103 kg (226 lb 10 1 oz)   11/23/22 105 kg (230 lb 9 6 oz)   11/04/22 102 kg (225 lb)       Remain euvolemic  Continue torsemide  Intake and output, low-sodium diet      Atrial fibrillation [facility])  Assessment & Plan  V paced rhythm, Rate 78  Not any beta-blocker, patient has pacemaker in place  Patient also takes Coumadin 1 mg daily except Tuesday when she takes 2 mg  INR is therapeutic 2 51 on admission   Coumadin remains on hold due to tibial fracture  Goal INR prior surgery is <1 6      VTE Pharmacologic Prophylaxis: VTE Score: 12 High Risk (Score >/= 5) - Pharmacological DVT Prophylaxis Contraindicated  Sequential Compression Devices Ordered  -Currently therapeutic INR, wanting INR to be less than 1 6    Patient Centered Rounds: I performed bedside rounds with nursing staff today  Discussions with Specialists or Other Care Team Provider: CM, ortho    Education and Discussions with Family / Patient: Patient declined call to   Time Spent for Care: 30 minutes  More than 50% of total time spent on counseling and coordination of care as described above  Current Length of Stay: 1 day(s)  Current Patient Status: Inpatient   Certification Statement: The patient will continue to require additional inpatient hospital stay due to Tibial fracture  Discharge Plan: Anticipate discharge in >72 hrs to discharge location to be determined pending rehab evaluations  Code Status: Level 1 - Full Code    Subjective:   Seen and examined  Endorsing left shoulder pain today as well as left ankle pain  Pain control has been moderately effective, she starts to have pain again prior to next dose being due  Denies events overnight, no new acute concerns today  Objective:     Vitals:   Temp (24hrs), Av 4 °F (36 9 °C), Min:98 1 °F (36 7 °C), Max:98 8 °F (37 1 °C)    Temp:  [98 1 °F (36 7 °C)-98 8 °F (37 1 °C)] 98 3 °F (36 8 °C)  HR:  [70-83] 71  Resp:  [14-20] 19  BP: (133-147)/(64-78) 147/68  SpO2:  [91 %-98 %] 91 %  Body mass index is 41 45 kg/m²  Input and Output Summary (last 24 hours):      Intake/Output Summary (Last 24 hours) at 2023 1319  Last data filed at 1/24/2023 1243  Gross per 24 hour   Intake 360 ml   Output 300 ml   Net 60 ml       Physical Exam:   Physical Exam  Vitals and nursing note reviewed  Constitutional:       General: She is not in acute distress  Appearance: Normal appearance  HENT:      Head: Normocephalic and atraumatic  Nose: No congestion  Mouth/Throat:      Mouth: Mucous membranes are moist    Eyes:      Conjunctiva/sclera: Conjunctivae normal    Cardiovascular:      Rate and Rhythm: Normal rate and regular rhythm  Pulses: Normal pulses  Heart sounds: Normal heart sounds  No murmur heard  Pulmonary:      Effort: Pulmonary effort is normal  No respiratory distress  Breath sounds: Normal breath sounds  Abdominal:      General: Bowel sounds are normal       Palpations: Abdomen is soft  Tenderness: There is no abdominal tenderness  Musculoskeletal:         General: Normal range of motion  Right lower leg: No edema  Left lower leg: No edema  Comments: Left ankle range of motion limited due to splint, left shoulder range of motion limited due to pain   Skin:     General: Skin is warm and dry  Neurological:      Mental Status: She is alert and oriented to person, place, and time            Additional Data:     Labs:  Results from last 7 days   Lab Units 01/24/23  0920 01/23/23  1606   WBC Thousand/uL 8 27 7 14   HEMOGLOBIN g/dL 10 8* 10 9*   HEMATOCRIT % 34 3* 34 4*   PLATELETS Thousands/uL 151 171   NEUTROS PCT %  --  74   LYMPHS PCT %  --  14   MONOS PCT %  --  7   EOS PCT %  --  4     Results from last 7 days   Lab Units 01/24/23  0920   SODIUM mmol/L 138   POTASSIUM mmol/L 4 1   CHLORIDE mmol/L 101   CO2 mmol/L 27   BUN mg/dL 43*   CREATININE mg/dL 1 39*   ANION GAP mmol/L 10   CALCIUM mg/dL 9 0   GLUCOSE RANDOM mg/dL 235*     Results from last 7 days   Lab Units 01/24/23  0920   INR  2 16*     Results from last 7 days   Lab Units 01/24/23  1050 01/24/23  0725 01/23/23  2122 01/23/23  1804 POC GLUCOSE mg/dl 195* 184* 154* 175*               Lines/Drains:  Invasive Devices     Peripheral Intravenous Line  Duration           Peripheral IV 01/23/23 Right Antecubital <1 day                      Imaging: Reviewed radiology reports from this admission including: xray(s)    Recent Cultures (last 7 days):         Last 24 Hours Medication List:   Current Facility-Administered Medications   Medication Dose Route Frequency Provider Last Rate   • acetaminophen  488 mg Oral Q4H PRN Thaddeus Hopper MD     • amLODIPine  2 5 mg Oral Daily Thaddeus Hopper MD     • docusate sodium  100 mg Oral BID Thaddeus Hopper MD     • gabapentin  600 mg Oral TID Thaddeus Hopper MD     • HYDROmorphone  0 5 mg Intravenous Q4H PRN Thaddeus Hopper MD     • insulin lispro  1-6 Units Subcutaneous TID AC Thaddeus Hopper MD     • insulin lispro  1-6 Units Subcutaneous HS Thaddeus Hopper MD     • levothyroxine  150 mcg Oral Daily Thaddeus Hopper MD     • lidocaine  1 patch Topical Daily Thaddeus Hopper MD     • ondansetron  4 mg Intravenous Q6H PRN Thaddeus Hopper MD     • senna  8 8 mg Oral Daily Thaddeus Hopper MD     • torsemide  50 mg Oral Daily Thaddeus Hopper MD     • traMADol  50 mg Oral Q6H PRN Thaddeus Hopper MD          Today, Patient Was Seen By: Arben Adames PA-C    **Please Note: This note may have been constructed using a voice recognition system  **

## 2023-01-24 NOTE — ASSESSMENT & PLAN NOTE
· Presented to the ER after fall, hitting left knee  · S/p posterior splint placement in the ER  · Orthopedics are consulted  · Ideally surgically fixated  · There is complications from distal hardware  · Further complicating issue is patient has loosening of femoral prosthesis on a bone scan of her left hip  · Patient is on Coumadin -INR therapeutic at 2 5 on admission  · Trend INR until less than 1 6  Lab Results   Component Value Date    INR 2 16 (H) 01/24/2023    INR 2 51 (H) 01/23/2023   Continue to hold Coumadin  Obtain PT/OT consults after procedure

## 2023-01-24 NOTE — MALNUTRITION/BMI
This medical record reflects one or more clinical indicators suggestive of morbid obesity  BMI Findings:  Adult BMI Classifications: Morbid Obesity 40-44 9        Body mass index is 41 45 kg/m²  See Nutrition note dated 1/24/2023 for additional details  Completed nutrition assessment is viewable in the nutrition documentation

## 2023-01-24 NOTE — ASSESSMENT & PLAN NOTE
Lab Results   Component Value Date    HGBA1C 7 0 (H) 10/17/2022       Recent Labs     01/23/23  1804 01/23/23  2122 01/24/23  0725 01/24/23  1050   POCGLU 175* 154* 184* 195*       Blood Sugar Average: Last 72 hrs:  (P) 177Per patient, diet controlled  Remain in the hospital, continue sliding scale with hypoglycemia precaution

## 2023-01-24 NOTE — CONSULTS
Consultation - Orthopedics   Jordon Matson 69 y o  female MRN: [id number]  Unit/Bed#: PACU 05 Encounter: [id number]      Assessment/Plan     Assessment:  Fracture distal left tibia; left shoulder pain; ambulatory dysfunction; diabetes mellitus with nephropathy and neuropathy; atrial fibrillation; congestive heart failure; sleep apnea  Plan:  The left shoulder pain will be evaluated with an x-ray as 1 has not been obtained  Left tibial fracture would ideally be fixated surgically  However, this procedure will be complicated by the presence of distal hardware in her tibia from previous ankle fracture fixation in the presence of a revision knee prosthesis with a longstem  Conservative care with immobilization is certainly an option but would potentially lead to nonunion or additional fracture displacement requiring surgical fixation  The patient's diabetic neuropathy would increase her risk for perioperative complications as well as delayed healing  Currently, her INR is elevated and surgery will need to be postponed until her INR is 1 5 or less if surgical intervention is to be performed  Further complicating the issue is the fact that the patient states she had seen a surgeon in [place] regarding her left thigh pain which has been present since before this injury and for at least several months and was informed that there is loosening of the femoral prosthesis documented on a bone scan  This was reviewed in care everywhere indicating loosening of the femoral component  History of Present Illness   Physician Requesting Consult: Shira Lovell MD  Reason for Consult / Principal Problem: Fracture left tibia; left shoulder pain  HPI: Jordon Matson is a 69y o  year old female who presents with fall at home at approximately 2 PM on 1/23/2023    She states she was in her laundry room, space is somewhat cramped and as she tried to turn she lost her balance, fell and injured her left lower extremity and believes she struck her left shoulder against the doorway  She was unable to ambulate and was brought via ambulance to the emergency room at  where she was evaluated, x-rays were obtained and she was admitted with a left distal tibial fracture  She was splinted but continues to complain of left lower extremity pain  She is also noted left shoulder pain  She has minimal, if any, pain at rest in the left shoulder but any attempt at motion triggers pain  She denies right upper or lower extremity complaints  She admits to significantly decreased sensation in the lower extremities as a result of diabetic neuropathy  Consults    Review of Systems: The patient's 10 organ system review is negative for acute complaints excluding as in the history of chief complaint above  Historical Information   Past Medical History:   Diagnosis Date   • Arthritis    • CHF (congestive heart failure) ([facility] [address] )    • Diabetes mellitus [facility] )    • Disease of thyroid gland    • Hypertension    • Pacemaker    • Renal disorder      Past Surgical History:   Procedure Laterality Date   • CARDIAC PACEMAKER PLACEMENT     • CHOLECYSTECTOMY     • FL GUIDED NEEDLE PLAC BX/ASP/INJ  10/4/2022   • FL GUIDED NEEDLE PLAC BX/ASP/INJ  10/20/2022   • JOINT REPLACEMENT      bilateral knee replacements   • NERVE BLOCK Bilateral 10/4/2022    Procedure: BLOCK MEDIAL BRANCH NERVES BILATERAL L3, L4, L5 #1;  Surgeon: Sonya Keller MD;  Location:  ENDO;  Service: Pain Management    • NERVE BLOCK Bilateral 10/20/2022    Procedure: BLOCK MEDIAL BRANCH L3, L4, L5 #2;  Surgeon: Sonya Keller MD;  Location:  ENDO;  Service: Pain Management    • ORIF TIBIA & FIBULA FRACTURES Left 10/29/2020    Procedure: OPEN REDUCTION W/ INTERNAL FIXATION (ORIF) ANKLE;  Surgeon: Clint Alfonso;   Location:  MAIN OR;  Service: Orthopedics   • TONSILLECTOMY     • TUBAL LIGATION       Social History   Social History     Substance and Sexual Activity   Alcohol Use Not Currently     Social History     Substance and Sexual Activity   Drug Use Not Currently     E-Cigarette/Vaping   • E-Cigarette Use Never User      E-Cigarette/Vaping Substances     Social History     Tobacco Use   Smoking Status Never   Smokeless Tobacco Never     Family History: non-contributory    Meds/Allergies   all current active meds have been reviewed  Allergies   Allergen Reactions   • Rofecoxib Angioedema, Swelling, Hives and Other (See Comments)     swelling, sob  swelling, sob  Other reaction(s): Swelling / Edema  swelling, sob  Other reaction(s): SWELLING  Other reaction(s): Angioedema     • Oxycodone-Acetaminophen GI Intolerance and Other (See Comments)     Unsure of rxn   Unsure of rxn   Unsure of rxn   Other reaction(s): "CAN'T BREATH"     • Duloxetine Hcl Other (See Comments)     Slept for 4 days    • Medical Tape Rash       Objective   Vitals: Blood pressure 147/68, pulse 71, temperature 98 3 °F (36 8 °C), temperature source Oral, resp  rate 19, height 5' 2" (1 575 m), weight 103 kg (226 lb 10 1 oz), SpO2 91 %  ,Body mass index is 41 45 kg/m²  Intake/Output Summary (Last 24 hours) at 1/24/2023 1315  Last data filed at 1/24/2023 1243  Gross per 24 hour   Intake 360 ml   Output 300 ml   Net 60 ml     I/O last 24 hours: In: 360 [P O :360]  Out: 300 [Urine:300]    Invasive Devices     Peripheral Intravenous Line  Duration           Peripheral IV 01/23/23 Right Antecubital <1 day                Physical Exam: Rome Marroquin is alert, oriented and appears to be in no acute distress  She was resting comfortably upon my arrival although she does complain of pain once she is awakened  HEENT exam is benign  Heart regular, pulses palpable in the bilateral upper extremities and right lower extremity  Popliteal pulse palpable left lower extremity but dorsalis pedis and posterior tibial pulses not assessed due to the patient's injury and splinting    Lungs clear  Abdomen soft, nontender  Motor and sensory exams grossly intact in the upper extremities  Bilateral lower extremity exam demonstrates decreased sensation in the ankle and foot of the right lower extremity and the exposed portion of the left foot  Good color and capillary refill is noted in the toes of the left foot and throughout the right lower extremity  She is able to actively move her toes in the left lower extremity and has good active motion in the right lower extremity  The clavicles and ribs were nontender  As best can be evaluated with the patient's left shoulder complaint and left lower extremity injury, the spine was nontender  Pelvis is nontender  Ortho Exam: The left lower extremity is immobilized in a posterior splint  There is external rotation however this appears to be mostly from her hip with external rotation of the patella noted  The toes demonstrate good color and capillary refill  The proximal calf was nontender and compartments were soft as best can be examined  There is a well-healed incision about the left knee consistent with her past history of knee replacement and subsequent revision knee replacement  Range of motion of the left knee and hip was not assessed due to the patient's left leg injury and at the request of the patient  The left upper extremity exam demonstrated good range of motion of the elbow, forearm, wrist and hand  She does complain of shoulder pain during elbow range of motion  I was able to passively place the shoulder through a gentle range of motion with complaints of pain from the patient  I was able to reach approximately 60 degrees of abduction, 60 degrees of forward flexion, 20 degrees of external rotation and internal rotation to the anterior abdominal wall  There is proximal humeral and shoulder tenderness  There is no ecchymosis or swelling  There is no gross deformity  There are no lacerations or abrasions  The clavicle was nontender, acromion and scapular spine nontender      The right upper extremity and right lower extremity examinations are grossly benign  Lab Results:   CBC:   Lab Results   Component Value Date    WBC 8 27 01/24/2023    HGB 10 8 (L) 01/24/2023    HCT 34 3 (L) 01/24/2023    MCV 82 01/24/2023     01/24/2023    MCH 25 8 (L) 01/24/2023    MCHC 31 5 01/24/2023    RDW 16 2 (H) 01/24/2023    MPV 10 0 01/24/2023    NRBC 0 01/23/2023     CMP:   Lab Results   Component Value Date    SODIUM 138 01/24/2023     01/24/2023    CO2 27 01/24/2023    BUN 43 (H) 01/24/2023    CREATININE 1 39 (H) 01/24/2023    CALCIUM 9 0 01/24/2023    EGFR 36 01/24/2023     PT/INR:   Lab Results   Component Value Date    INR 2 16 (H) 01/24/2023     Imaging Studies: X-rays were reviewed and demonstrate a fracture of the distal portion of the left tibial shaft with a minimal degree of displacement  The ankle fracture, previously surgically treated is well-healed with stable hardware in place  There is a revision knee prosthesis present with evidence of periosteal reaction  However, comparison to images of the knee from November 2023 does not demonstrate any change in the alignment of the prosthesis or change in the periosteal reaction  EKG, Pathology, and Other Studies: I have personally reviewed pertinent reports

## 2023-01-24 NOTE — OCCUPATIONAL THERAPY NOTE
Occupational Therapy Cancellation Note         Patient Name: Brennen Robbins  [id number] Date: 1/24/2023  Problem List  Principal Problem:    Closed displaced spiral fracture of shaft of left tibia  Active Problems:    Atrial fibrillation (HCC)    Diastolic congestive heart failure (HCC)    Type 2 diabetes mellitus with diabetic nephropathy (HCC)    JOS (obstructive sleep apnea)    Acute pain of left shoulder          01/24/23 1149   Note Type   Note type Evaluation; Cancelled Session   Cancel Reasons Medical status       OT orders received  Chart review completed  Pt admitted to [address] [place] [address] with Dx: closed displaced spiral fracture of shaft of L tibia  Pt with significant surgical history of LLE and acute pain within the past few months  Pt is at this time continuing to be evaluated and assess by orthopedics to determine most appropriate plan of action  Will hold OT services at this time and re-address as medically appropriate and as schedule allows         BASIL Miller/NAYANA

## 2023-01-24 NOTE — ASSESSMENT & PLAN NOTE
Patient endorses today acute pain of left shoulder  No imaging done on admission    X-rays ordered by orthopedics, follow-up results  For now continue pain control

## 2023-01-24 NOTE — CASE MANAGEMENT
Case Management Assessment & Discharge Planning Note    Patient name [facility] PACU 05/PACU 05 MRN [id number]  : 1945 Date 2023       Current Admission Date: 2023  Current Admission Diagnosis:Closed displaced spiral fracture of shaft of left tibia   Patient Active Problem List    Diagnosis Date Noted   • Closed displaced spiral fracture of shaft of left tibia 2023   • Lumbar spondylosis 10/12/2022   • Peripheral vascular disease ([address] ) 2022   • Primary osteoarthritis of both hips 2022   • Pain in left hip 2022   • Chronic pain of left knee 10/13/2021   • Presence of artificial knee joint, left 10/13/2021   • Nausea and vomiting 2021   • Acute respiratory failure with hypoxia ([address] ) 2021   • Sick sinus syndrome ([address] ) 2021   • Pulmonary hypertension ([address] ) 2021   • Supratherapeutic INR 2021   • Acute kidney injury ([address] ) 06/15/2021   • Acquired hypothyroidism    • Diabetes mellitus ([address] )    • CHF (congestive heart failure) ([address] )    • Essential hypertension    • Renal disorder    • Closed fracture of left ankle 2021   • Closed bimalleolar fracture of left ankle 2020   • Pacemaker 10/29/2020   • Type 2 diabetes mellitus with diabetic neuropathy ([address] ) 10/29/2020   • Severe obesity with body mass index (BMI) of 36 0 to 36 9 with serious comorbidity ([address] )    • Stage 3 chronic kidney disease ([address] ) 10/26/2020   • Ankle injury 10/25/2020   • Atrial fibrillation ([address] ) 2748   • Diastolic congestive heart failure ([address] ) 10/25/2020   • Type 2 diabetes mellitus with diabetic nephropathy ([address] ) 10/25/2020   • JOS (obstructive sleep apnea) 10/25/2020      LOS (days): 1  Geometric Mean LOS (GMLOS) (days): 2 70  Days to GMLOS:1 9     OBJECTIVE:    Risk of Unplanned Readmission Score: 14 85         Current admission status: Inpatient  Referral Reason:  (DC PLANNING)    Preferred Pharmacy:   [address] [facility] [place] - [address] [address]  Phone: [phone] Fax: [phone]    Primary Care Provider: Brigido Monsivais MD    Primary Insurance: [facility]  Secondary Insurance:     Admit with Closed displaced spiral fracture of shaft of left tibia  Await Ortho to see      CM met with patient at the bedside,baseline information  was obtained  CM discussed the role of CM in helping the patient develop a discharge plan and assist the patient in carry out their plan  ASSESSMENT:  Active Health Care Proxies    There are no active Health Care Proxies on file  Advance Directives  Does patient have a [address]?: Yes (unclear who that is)  Does patient have Advance Directives?: No  Was patient offered paperwork?: Yes (declinied)  Primary Contact: Gomez Meyer ( spouse)         Readmission Root Cause  30 Day Readmission: No    Patient Information  Admitted from[de-identified] Home  Mental Status: Alert  During Assessment patient was accompanied by: Not accompanied during assessment  Assessment information provided by[de-identified] Patient  Primary Caregiver: Self  Support Systems: Children, Spouse/significant other  [place] of Residence: [facility] do you live in[place] [facility] entry access options   Select all that apply : Stairs  Number of steps to enter home : 1 (when entering the back of the home)  Type of Current Residence: 2 story home  Upon entering residence, is there a bedroom on the main floor (no further steps)?: No  A bedroom is located on the following floor levels of residence (select all that apply):: 2nd Floor (15 steps with stairglide)  Upon entering residence, is there a bathroom on the main floor (no further steps)?:  (Powder room)  Number of steps to 2nd floor from main floor:  (15)  In the last 12 months, was there a time when you were not able to pay the mortgage or rent on time?: No  In the last 12 months, how many places have you lived?: 1  In the last 12 months, was there a time when you did not have a steady place to sleep or slept in a shelter (including now)?: No  Homeless/housing insecurity resource given?: No  Living Arrangements: Lives w/ Spouse/significant other  Is patient a ?: No    Activities of Daily Living Prior to Admission  Functional Status: Assistance  Completes ADLs independently?: No  Level of ADL dependence: Assistance  Ambulates independently?: Yes  Does patient use assisted devices?: Yes  Assisted Devices (DME) used: Walker (stair glide)  Does patient currently own DME?: Yes  What DME does the patient currently own?: Gaurav Herring  Does patient have a history of Outpatient Therapy (PT/OT)?: Yes ([facility])  Does the patient have a history of Short-Term Rehab?: Yes ([facility])  Does patient have a history of HHC?: No ([facility])  Does patient currently have [address]?: No         Patient Information Continued  Income Source: Pension/FPC  Does patient have prescription coverage?: No  Within the past 12 months, you worried that your food would run out before you got the money to buy more : Never true  Within the past 12 months, the food you bought just didn't last and you didn't have money to get more : Never true  Food insecurity resource given?: No  Does patient receive dialysis treatments?: No  Does patient have a history of substance abuse?: No  Does patient have a history of Mental Health Diagnosis?: No         Means of Transportation  Means of Transport to Appts[de-identified] Family transport  In the past 12 months, has lack of transportation kept you from medical appointments or from getting medications?: No  In the past 12 months, has lack of transportation kept you from meetings, work, or from getting things needed for daily living?: No  Was application for public transport provided?: No        DISCHARGE DETAILS:    Discharge planning discussed with[de-identified] patient                                     Other Referral/Resources/Interventions Provided:  Referral Comments: Await plan of care with Ortho and Weightbare status--PT/OT notes            Discharge Destination Plan[de-identified] Short Term Rehab  Transport at Discharge :  (tbd)            CM will follow plan of care with Ortho, therapy recommendations  Anticipate patient will need inpatient strengthening

## 2023-01-24 NOTE — ASSESSMENT & PLAN NOTE
V paced rhythm, Rate 78  Not any beta-blocker, patient has pacemaker in place  Patient also takes Coumadin 1 mg daily except Tuesday when she takes 2 mg  INR is therapeutic 2 51 on admission   Coumadin remains on hold due to tibial fracture  Goal INR prior surgery is <1 6

## 2023-01-24 NOTE — ASSESSMENT & PLAN NOTE
Wt Readings from Last 3 Encounters:   01/24/23 103 kg (226 lb 10 1 oz)   11/23/22 105 kg (230 lb 9 6 oz)   11/04/22 102 kg (225 lb)       Remain euvolemic  Continue torsemide  Intake and output, low-sodium diet

## 2023-01-25 ENCOUNTER — TELEPHONE (OUTPATIENT)
Dept: UROLOGY | Facility: CLINIC | Age: 78
End: 2023-01-25

## 2023-01-25 DIAGNOSIS — R33.9 URINARY RETENTION: Primary | ICD-10-CM

## 2023-01-25 LAB
ANION GAP SERPL CALCULATED.3IONS-SCNC: 9 MMOL/L (ref 4–13)
BUN SERPL-MCNC: 43 MG/DL (ref 5–25)
CALCIUM SERPL-MCNC: 8.8 MG/DL (ref 8.4–10.2)
CHLORIDE SERPL-SCNC: 99 MMOL/L (ref 96–108)
CO2 SERPL-SCNC: 28 MMOL/L (ref 21–32)
CREAT SERPL-MCNC: 1.32 MG/DL (ref 0.6–1.3)
ERYTHROCYTE [DISTWIDTH] IN BLOOD BY AUTOMATED COUNT: 16.2 % (ref 11.6–15.1)
GFR SERPL CREATININE-BSD FRML MDRD: 39 ML/MIN/1.73SQ M
GLUCOSE SERPL-MCNC: 175 MG/DL (ref 65–140)
GLUCOSE SERPL-MCNC: 185 MG/DL (ref 65–140)
GLUCOSE SERPL-MCNC: 188 MG/DL (ref 65–140)
GLUCOSE SERPL-MCNC: 207 MG/DL (ref 65–140)
HCT VFR BLD AUTO: 33 % (ref 34.8–46.1)
HGB BLD-MCNC: 10.3 G/DL (ref 11.5–15.4)
INR PPP: 2 (ref 0.84–1.19)
MCH RBC QN AUTO: 25.3 PG (ref 26.8–34.3)
MCHC RBC AUTO-ENTMCNC: 31.2 G/DL (ref 31.4–37.4)
MCV RBC AUTO: 81 FL (ref 82–98)
PLATELET # BLD AUTO: 154 THOUSANDS/UL (ref 149–390)
PMV BLD AUTO: 10.6 FL (ref 8.9–12.7)
POTASSIUM SERPL-SCNC: 3.7 MMOL/L (ref 3.5–5.3)
PROTHROMBIN TIME: 22.8 SECONDS (ref 11.6–14.5)
RBC # BLD AUTO: 4.07 MILLION/UL (ref 3.81–5.12)
SODIUM SERPL-SCNC: 136 MMOL/L (ref 135–147)
WBC # BLD AUTO: 9.19 THOUSAND/UL (ref 4.31–10.16)

## 2023-01-25 PROCEDURE — 0T9B70Z DRAINAGE OF BLADDER WITH DRAINAGE DEVICE, VIA NATURAL OR ARTIFICIAL OPENING: ICD-10-PCS | Performed by: UROLOGY

## 2023-01-25 RX ORDER — WARFARIN SODIUM 1 MG/1
1 TABLET ORAL
Status: DISCONTINUED | OUTPATIENT
Start: 2023-01-25 | End: 2023-01-26

## 2023-01-25 RX ADMIN — HYDROMORPHONE HYDROCHLORIDE 0.5 MG: 1 INJECTION, SOLUTION INTRAMUSCULAR; INTRAVENOUS; SUBCUTANEOUS at 05:22

## 2023-01-25 RX ADMIN — DOCUSATE SODIUM 100 MG: 100 CAPSULE, LIQUID FILLED ORAL at 17:04

## 2023-01-25 RX ADMIN — HYDROMORPHONE HYDROCHLORIDE 0.5 MG: 1 INJECTION, SOLUTION INTRAMUSCULAR; INTRAVENOUS; SUBCUTANEOUS at 10:47

## 2023-01-25 RX ADMIN — LEVOTHYROXINE SODIUM 150 MCG: 150 TABLET ORAL at 08:30

## 2023-01-25 RX ADMIN — INSULIN LISPRO 1 UNITS: 100 INJECTION, SOLUTION INTRAVENOUS; SUBCUTANEOUS at 08:27

## 2023-01-25 RX ADMIN — GABAPENTIN 600 MG: 300 CAPSULE ORAL at 08:31

## 2023-01-25 RX ADMIN — GABAPENTIN 600 MG: 300 CAPSULE ORAL at 16:06

## 2023-01-25 RX ADMIN — WARFARIN SODIUM 1 MG: 1 TABLET ORAL at 17:04

## 2023-01-25 RX ADMIN — TRAMADOL HYDROCHLORIDE 50 MG: 50 TABLET ORAL at 08:31

## 2023-01-25 RX ADMIN — GABAPENTIN 600 MG: 300 CAPSULE ORAL at 20:57

## 2023-01-25 RX ADMIN — INSULIN LISPRO 1 UNITS: 100 INJECTION, SOLUTION INTRAVENOUS; SUBCUTANEOUS at 22:13

## 2023-01-25 RX ADMIN — SENNOSIDES 8.8 MG: 8.8 SYRUP ORAL at 08:32

## 2023-01-25 RX ADMIN — INSULIN LISPRO 1 UNITS: 100 INJECTION, SOLUTION INTRAVENOUS; SUBCUTANEOUS at 16:08

## 2023-01-25 RX ADMIN — AMLODIPINE BESYLATE 2.5 MG: 2.5 TABLET ORAL at 08:30

## 2023-01-25 RX ADMIN — LIDOCAINE 5% 1 PATCH: 700 PATCH TOPICAL at 08:28

## 2023-01-25 RX ADMIN — INSULIN LISPRO 2 UNITS: 100 INJECTION, SOLUTION INTRAVENOUS; SUBCUTANEOUS at 12:15

## 2023-01-25 RX ADMIN — DOCUSATE SODIUM 100 MG: 100 CAPSULE, LIQUID FILLED ORAL at 08:31

## 2023-01-25 RX ADMIN — TORSEMIDE 50 MG: 100 TABLET ORAL at 08:32

## 2023-01-25 NOTE — ASSESSMENT & PLAN NOTE
· Voiding with purwick however bladder scanned for 925 ml, PVR remained 625ml  · Urinary retentions protocol  · Urology consult

## 2023-01-25 NOTE — ASSESSMENT & PLAN NOTE
· V paced rhythm, Rate 78  · Not any beta-blocker, patient has pacemaker in place  · AC: Coumadin 1 mg daily except Tuesday when she takes 2 mg  · Coumadin held on admission with fracture-Per Ortho can resume, conservative management  · INR 2-continue PTA dosing

## 2023-01-25 NOTE — ASSESSMENT & PLAN NOTE
· Patient endorses today acute pain of left shoulder  · No imaging done on admission  · X-rays ordered by orthopedics-no acute osseous abnormality  For now continue pain control

## 2023-01-25 NOTE — PLAN OF CARE
Problem: OCCUPATIONAL THERAPY ADULT  Goal: Performs self-care activities at highest level of function for planned discharge setting  See evaluation for individualized goals  Description: Treatment Interventions: ADL retraining, Functional transfer training, UE strengthening/ROM, Endurance training, Patient/family training, Equipment evaluation/education, Neuromuscular reeducation, Compensatory technique education, Continued evaluation, Energy conservation, Activityengagement          See flowsheet documentation for full assessment, interventions and recommendations  Note: Limitation: Decreased ADL status, Decreased UE strength, Decreased Safe judgement during ADL, Decreased endurance, Decreased self-care trans, Decreased high-level ADLs  Prognosis: Good  Assessment: Pt is a 69 y o  female, admitted to [facility] 1/23/2023 d/t experiencing a fall at home resulting in L shoulder pain and LLE pain  Dx: closed displaced spiral fracture of shaft of L tibia  Pt is currently NWB LLE for 3 months, with plans for CAM boot per Orthopedics  Pt with PMHx impacting their performance during ADL tasks, including: arthritis, CHF, DM, HTN, pacemaker, renal disorder  Pt with significant LLE surgical history with prior ankle hardware placed  Prior to admission to the hospital Pt was performing ADLs without physical assistance  IADLs with physical assistance  Functional transfers/ambulation without physical assistance  Cognitive status was PTA was intact  OT order placed to assess Pt's ADLs, cognitive status, and performance during functional tasks in order to maximize safety and independence while making most appropriate d/c recommendations   Pt's clinical presentation is currently unstable/unpredictable given new onset deficits that effect Pt's occupational performance and ability to safely return to PLOF including decrease activity tolerance, decrease standing balance, decrease sitting balance, decrease performance during ADL tasks, decrease safety awareness , decrease UB MS, increased pain, decrease generalized strength, decrease activity engagement, decrease performance during functional transfers, frequent falls, high fall risk and limited insight to deficits combined with medical complications of tachycardia, pain impacting overall mobility status, abnormal CBC, edema/swelling, decreased skin integrity, multiple readmissions, incontinence, fear/retreat and need for input for mobility technique/safety  Personal factors affecting Pt at time of initial evaluation include: inaccessible home environment, multi-level environment, availability as recommended, past experience, inability to perform current job functions, inability to perform IADLs, inability to perform ADLs, inability to ambulate household distances, inability to navigate community distances, limited insight into impairments, decreased initiation and engagement, recent fall(s)/fall history and questionable non-compliance  Pt will benefit from continued skilled OT services to address deficits as defined above and to maximize level independence/participation during ADLs and functional tasks to facilitate return toward PLOF and improved quality of life  From an occupational therapy standpoint, recommendation at time of d/c would be post acute rehab       OT Discharge Recommendation: Post acute rehabilitation services

## 2023-01-25 NOTE — PHYSICAL THERAPY NOTE
PHYSICAL THERAPY EVALUATION  NAME:  Tiffany Hu  DATE: 01/25/23    AGE:   69 y o  Mrn:   [id number]  ADMIT DX:  Ankle injury [S91 [id number]]  Closed displaced spiral fracture of shaft of left tibia, initial encounter [S17 136A]    Past Medical History:   Diagnosis Date   • Arthritis    • CHF (congestive heart failure) ([facility] )    • Diabetes mellitus ([facility] )    • Disease of thyroid gland    • Hypertension    • Pacemaker    • Renal disorder      Length Of Stay: 2  Performed at least 2 patient identifiers during session: Name and Birthday  PHYSICAL THERAPY EVALUATION :    01/25/23 1036   PT Last Visit   PT Visit Date 01/25/23   Note Type   Note type Evaluation   Pain Assessment   Pain Assessment Tool FLACC   Pain Score   (9 or 10)   Pain Location/Orientation Orientation: Left; Location: Shoulder; Location: Leg  (thich)   Pain Rating: FLACC (Rest) - Face 1   Pain Rating: FLACC (Rest) - Legs 0   Pain Rating: FLACC (Rest) - Activity 0   Pain Rating: FLACC (Rest) - Cry 1   Pain Rating: FLACC (Rest) - Consolability 0   Score: FLACC (Rest) 2   Pain Rating: FLACC (Activity) - Face 1   Pain Rating: FLACC (Activity) - Legs 1   Pain Rating: FLACC (Activity) - Activity 0   Pain Rating: FLACC (Activity) - Cry 1   Pain Rating: FLACC (Activity) - Consolability 0   Score: FLACC (Activity) 3   Restrictions/Precautions   Weight Bearing Precautions Per Order Yes   LLE Weight Bearing Per Order NWB  (cam boot to be placed in next 24-48 hours per orthopedic note  no order for CAM boot at this time)   Braces or Orthoses Other (Comment)  (MAFO right foot   Has left MAFO as well due to B foot drop )   Other Precautions Fall Risk;WBS;Pain   Home Living   Type of Home House  ([address])   Home Layout Two level;1/2 bath on main level;Bed/bath upstairs  (stair glide to 2nd floor)   Bathroom Shower/Tub Tub/shower unit   [employer] Raised   Bathroom Equipment Tub transfer bench;Grab bars in shower;Grab bars around toilet   Home Equipment Fernando Moreno; Wheelchair-manual   Additional Comments Pt reports living [facility] with 1 [address] and stair glide to 2nd floor  Pt uses RW for ambulation and prn wheelchair  Prior Function   Level of [place] Independent with ADLs; Independent with functional mobility; Independent with IADLS   Lives With Spouse   Receives Help From [facility] in the last 6 months 1 to 4   Comments Reports being independent wiht mobility, ADLs and has assistance for IADLs  General   Additional Pertinent History left tibia/fibula xray: Stable alignment of a minimally displaced distal tibial metadiaphyseal fracture  No proximal tibial or fibular fracture  Cognition   Following Commands Follows one step commands without difficulty   Subjective   Subjective "You aren't getting me out of bed are you?" (educated patient on plan for session)   RLE Assessment   RLE Assessment   (minimal hip flexion AROM and ankle DF < neutral  knee ext -10 degrees  strength 3-/5 except ankle DF 2-/5)   LLE Assessment   LLE Assessment   (ankle not assessed in splint  knee extension -10 degrees sitting EOB, hip flexion minimal  strength 3-/5)   Light Touch   RLE Light Touch Impaired   RLE Light Touch Comments absent B mid legs and down, diminished B mid leg and up   LLE Light Touch Impaired   LLE Light Touch Comments absent B mid legs and down, diminished B mid leg and up   Proprioception   RLE Proprioception Impaired   LLE Proprioception Impaired   Wound 01/25/23 MASD Buttocks Right   Date First Assessed/Time First Assessed: 01/25/23 1035   Primary Wound Type: MASD  Location: Buttocks  Wound Location Orientation: Right  Wound Description (Comments): intact blisters x2   Wound Description Intact;Fragile  (fluid filled blister)   Chantell-wound Assessment Intact;Pink;Fragile   Drainage Amount None   Treatments Cleansed;Site care   Dressing Open to air   Bed Mobility   Supine to Sit 2  Maximal assistance   Additional items Assist x 2; Increased time required;Verbal cues;LE management  (trunk management)   Additional Comments HOB elevated < 30 degrees  required maxAx2 to achieve sitting at EOB with LE and trunk management  cues for technique and sequence  Transfers   Sit to Stand 2  Maximal assistance   Additional items Assist x 2  (with 3rd person to mainatin NWB L LE )   Stand to Sit 2  Maximal assistance   Additional items Assist x 1; Increased time required;Verbal cues  (manual cues for controlled descen, inc post lean  2nd person for NWB L LE)   Stand pivot Unable to assess   Additional Comments use of RW  pt with B UEs on RW to facilitate ant wt shift and transition to RW  required maxAx2 to [employer] standing with 3rd person to maintain NWB of L LE during transfer and when standing  required maxAx1-2 to maintain standing wiht B UE support on RW  pain in left UE, but able to utilize for support when standing  stand to sit wiht maxAx1 for controlled descent and 2nd person to maintain NWB L LE throughout  unable to trial spt due to safety concerns with inability to wt shift with L LE NWB  R MAFO on during session  pt with significant posteiror lean upon standing requiring maxAx2 to maintain-->maxAx1  stood for ~ 30 seconds  NWB L LE throghout  while in standing, 4th person swapped bed for [facility] for patient  Ambulation/Elevation   Distance pt unable   Balance   Static Sitting Fair +   Dynamic Sitting Fair   Static Standing Poor -   Activity Tolerance   Activity Tolerance Patient limited by fatigue;Patient limited by pain   Medical Staff Made Aware Deepali AYERS   Nurse Made Aware RNHelena   Assessment   Prognosis Fair   Problem List Decreased strength;Decreased endurance; Impaired balance;Decreased mobility; Decreased safety awareness; Impaired sensation;Obesity; Decreased skin integrity;Orthopedic restrictions;Pain   Barriers to Discharge Decreased caregiver support; Inaccessible home environment   Barriers to Discharge Comments requires assistance to complete mobility   Goals Patient Goals "Go home"   [facility] Expiration Date 02/08/23   PT Treatment Day 1   Plan   Treatment/Interventions ADL retraining;Functional transfer training;LE strengthening/ROM; Therapeutic exercise; Endurance training;Patient/family training;Equipment eval/education; Bed mobility;Gait training; Compensatory technique education;Spoke to nursing;Spoke to case management;OT   PT Frequency 3-5x/wk   Recommendation   PT Discharge Recommendation Post acute rehabilitation services   Equipment Recommended   (TBD by rehab)   Additional Comments trial knee walker attachment as able   Additional Comments 2 Coevaluation and treatment due to etensive need for assistance and safety concerns with NWB L LE    AM-PAC Basic Mobility Inpatient   Turning in Flat Bed Without Bedrails 2   Lying on Back to Sitting on Edge of Flat Bed Without Bedrails 1   Moving Bed to Chair 1   Standing Up From Chair Using Arms 1   Walk in Room 1   Climb 3-5 Stairs With Railing 1   Basic Mobility Inpatient Raw Score 7   Turning Head Towards Sound 4   Follow Simple Instructions 4   Low Function Basic Mobility Raw Score 15   Low Function Basic Mobility Standardized Score 23 9   Highest Level Of Mobility   -HL Goal 2: Bed activities/Dependent transfer   -HL Achieved 3: Sit at edge of bed   Additional Treatment Session   Start Time 1059   End Time 1110   Treatment Assessment Pt tolerated fairly  She continues to require maxAx2 to achieve standing and maxAx1 to maintain standing due to posteiror lean with poor placement of R L when initiating standing  She was able to stand for 30-45 seconds with manual cues for balance and tatcile cues at right knee  She fatigues quickly  She requires a 3rd person to maintain NWB L LE  She is limited by decreased strength, balance, endurance  She will continue to benefit from PT services to maximize LOF     Equipment Use use of RW  sit to stand with maxAx2 and 3rd person to maintain NWB L LE  manual cues for R LE placement and foot blocked however pt moving R foot anterior to COG  inc posteiror lean upon standing requiring maxAx2 to achieve standing  static standing with maxAx1 with inc right knee flexion with tactile cues at knee to maintain  verbal cues for uprgiht posture  4th person swapped bed and BSC  stand to sit with maxAx1 for controlled descent and antoher person for NWB L LE  returned to supine with maxAx2 with maual assistance for LE and trrunk management  End of Consult   Patient Position at End of Consult Supine; All needs within reach     (Please find full objective findings from PT assessment regarding body systems outlined above)  Assessment: Pt is a 69 y o  female seen for PT evaluation s/p admission to [address] [place] [address] on 1/23/2023 with Closed displaced spiral fracture of shaft of left tibia  Order placed for PT services    Upon evaluation: Pt is presenting with impaired functional mobility due to pain, decreased strength, decreased ROM, decreased endurance, impaired balance, altered sensation, decreased safety awareness, impaired judgment, orthopedic restrictions, fall risk, LE edema, and impaired skin integrity requiring maximal of 2 assistance for bed mobility, maximal of 2 assistance for transfers, and mechanical conveyance from nursing standpoint for OOB mobility, unable to spt due to safety concerns with NWB L LE  Pt's clinical presentation is currently unpredictable given the functional mobility deficits above, especially weakness, decreased ROM, edema of extremities, decreased skin integrity, decreased endurance, pain, decreased activity tolerance, decreased functional mobility tolerance, altered sensation, decreased safety awareness, and impaired judgement, coupled with fall risks as indicated by AM-PAC 6-Clicks: 2/77 as well as hx of falls, impaired balance, polypharmacy, decreased safety awareness, and limited sensation/neuropathy and combined with medical complications of pain impacting overall mobility status, abnormal renal lab values, abnormal H&H, abnormal blood sugars, fear/retreat, need for input for mobility technique/safety and Stable alignment of a minimally displaced distal tibial metadiaphyseal fracture  No proximal tibial or fibular fracture  Pt's PMHx and comorbidities that may affect physical performance and progress include: CHF, A fib, DM, HTN, obesity, neuropathy and SSS s/p pacemaker, arthritis  Personal factors affecting pt at time of IE include: inaccessible home environment, step(s) to enter environment, limited home support, inability to perform IADLs, inability to perform ADLs, inability to navigate level surfaces without external assistance and recent fall(s)/fall history  Pt will benefit from continued skilled PT services to address deficits as defined above and to maximize level of functional mobility to facilitate return toward PLOF and improved QOL  From PT/mobility standpoint, recommendation at time of d/c would be Short term rehab pending progress in order to reduce fall risk and maximize pt's functional independence and consistency with mobility in order to facilitate return to PLOF  Recommend trial with walker next 1-2 sessions, ther ex next 1-2 sessions and mechanical conveyance from nursing standpoint for OOB mobility  The patient's AM-PAC Basic Mobility Inpatient Short Form Raw Score is 7  A Raw score of less than or equal to 16 suggests the patient may benefit from discharge to post-acute rehabilitation services  Please also refer to the recommendation of the Physical Therapist for safe discharge planning  Goals: Pt will: Perform bed mobility tasks with modAx1 to reposition in bed and prepare for transfers  Pt will perform transfers with modAx1 to decrease burden of care, decrease risk for falls and improve activity tolerance and prepare for ambulation   Pt will ambulate with RW and left knee walker attachment if appropriate for >/= 10' with  modAx1 to decrease burden of care, decrease risk for falls, improve ease of transfers, improve activity tolerance and improve gait quality and to access home environment  Pt will increase B LE strength >/= 1/2 MMT grade to facilitate functional mobility  Pt will self propel manual wheelchair >/= 150' Supervision to access living environment       Chelsy Castillo, PT,DPT

## 2023-01-25 NOTE — TELEPHONE ENCOUNTER
Left message on daughters phone to call office back  Scheduled pt for 2/1 9:30 with Dr Albert Anthony

## 2023-01-25 NOTE — PLAN OF CARE
Problem: Potential for Falls  Goal: Patient will remain free of falls  Description: INTERVENTIONS:  - Educate patient/family on patient safety including physical limitations  - Instruct patient to call for assistance with activity   - Consult OT/PT to assist with strengthening/mobility   - Keep Call bell within reach  - Keep bed low and locked with side rails adjusted as appropriate  - Keep care items and personal belongings within reach  - Initiate and maintain comfort rounds  - Make Fall Risk Sign visible to staff  - Offer Toileting every 2 Hours, in advance of need  - Initiate/Maintain bed alarm  - Obtain necessary fall risk management equipment: yellow socks  - Apply yellow socks and bracelet for high fall risk patients  - Consider moving patient to room near nurses station  1/25/2023 0735 by Marquis Zhao RN  Outcome: Progressing  1/25/2023 0732 by Marquis Zhao RN  Outcome: Progressing  1/24/2023 1914 by Marquis Zhao, RN  Outcome: Progressing     Problem: MOBILITY - ADULT  Goal: Maintain or return to baseline ADL function  Description: INTERVENTIONS:  - Educate patient/family on patient safety including physical limitations  - Instruct patient to call for assistance with activity   - Consult OT/PT to assist with strengthening/mobility   - Keep Call bell within reach  - Keep bed low and locked with side rails adjusted as appropriate  - Keep care items and personal belongings within reach  - Initiate and maintain comfort rounds  - Make Fall Risk Sign visible to staff  - Offer Toileting every 2Hours, in advance of need  - Initiate/Maintain bed alarm  - Obtain necessary fall risk management equipment: yellow socks, walker  - Apply yellow socks and bracelet for high fall risk patients  - Consider moving patient to room near nurses station  1/25/2023 0735 by Marquis Zhao RN  Outcome: Progressing  1/25/2023 0732 by Marquis Zhao RN  Outcome: Progressing  1/24/2023 1914 by Marquis Zhao, RN  Outcome: Progressing     Problem: PAIN - ADULT  Goal: Verbalizes/displays adequate comfort level or baseline comfort level  Description: Interventions:  - Encourage patient to monitor pain and request assistance  - Assess pain using appropriate pain scale  - Administer analgesics based on type and severity of pain and evaluate response  - Implement non-pharmacological measures as appropriate and evaluate response  - Consider cultural and social influences on pain and pain management  - Notify physician/advanced practitioner if interventions unsuccessful or patient reports new pain  1/25/2023 0735 by Ina Rodriguez RN  Outcome: Progressing  1/25/2023 0732 by Ina Rodriguez RN  Outcome: Progressing  1/24/2023 1914 by Ina Rodriguez RN  Outcome: Progressing     Problem: SAFETY ADULT  Goal: Patient will remain free of falls  Description: INTERVENTIONS:  - Educate patient/family on patient safety including physical limitations  - Instruct patient to call for assistance with activity   - Consult OT/PT to assist with strengthening/mobility   - Keep Call bell within reach  - Keep bed low and locked with side rails adjusted as appropriate  - Keep care items and personal belongings within reach  - Initiate and maintain comfort rounds  - Make Fall Risk Sign visible to staff  - Offer Toileting every 2 Hours, in advance of need  - Initiate/Maintain bed alarm  - Obtain necessary fall risk management equipment: yellow socks  - Apply yellow socks and bracelet for high fall risk patients  - Consider moving patient to room near nurses station  1/25/2023 0735 by Ina Rodriguez RN  Outcome: Progressing  1/25/2023 0732 by Ina Rodriguez RN  Outcome: Progressing  1/24/2023 1914 by Ina Rodriguez RN  Outcome: Progressing     Problem: SAFETY ADULT  Goal: Maintain or return to baseline ADL function  Description: INTERVENTIONS:  - Educate patient/family on patient safety including physical limitations  - Instruct patient to call for assistance with activity   - Consult OT/PT to assist with strengthening/mobility   - Keep Call bell within reach  - Keep bed low and locked with side rails adjusted as appropriate  - Keep care items and personal belongings within reach  - Initiate and maintain comfort rounds  - Make Fall Risk Sign visible to staff  - Offer Toileting every 2Hours, in advance of need  - Initiate/Maintain bed alarm  - Obtain necessary fall risk management equipment: yellow socks, walker  - Apply yellow socks and bracelet for high fall risk patients  - Consider moving patient to room near nurses station  1/25/2023 0735 by Mone Bernal RN  Outcome: Progressing  1/25/2023 0732 by Mone Bernal RN  Outcome: Progressing  1/24/2023 1914 by Mone Bernal RN  Outcome: Progressing     Problem: DISCHARGE PLANNING  Goal: Discharge to home or other facility with appropriate resources  Description: INTERVENTIONS:  - Identify barriers to discharge w/patient and caregiver  - Arrange for needed discharge resources and transportation as appropriate  - Identify discharge learning needs (meds, wound care, etc )  - Arrange for interpretive services to assist at discharge as needed  - Refer to Case Management Department for coordinating discharge planning if the patient needs post-hospital services based on physician/advanced practitioner order or complex needs related to functional status, cognitive ability, or social support system  1/25/2023 0735 by Mone Bernal RN  Outcome: Progressing  1/25/2023 0732 by Mone Bernal RN  Outcome: Progressing  1/24/2023 1914 by Mone Bernal RN  Outcome: Progressing     Problem: Knowledge Deficit  Goal: Patient/family/caregiver demonstrates understanding of disease process, treatment plan, medications, and discharge instructions  Description: Complete learning assessment and assess knowledge base    Interventions:  - Provide teaching at level of understanding  - Provide teaching via preferred learning methods  1/25/2023 0735 by Trevor Magana RN  Outcome: Progressing  1/25/2023 0732 by Trevor Magana RN  Outcome: Progressing  1/24/2023 1914 by Trevor Magana RN  Outcome: Progressing     Problem: Prexisting or High Potential for Compromised Skin Integrity  Goal: Skin integrity is maintained or improved  Description: INTERVENTIONS:  - Identify patients at risk for skin breakdown  - Assess and monitor skin integrity  - Assess and monitor nutrition and hydration status  - Monitor labs   - Assess for incontinence   - Turn and reposition patient  - Assist with mobility/ambulation  - Relieve pressure over bony prominences  - Avoid friction and shearing  - Provide appropriate hygiene as needed including keeping skin clean and dry  - Evaluate need for skin moisturizer/barrier cream  - Collaborate with interdisciplinary team   - Patient/family teaching  - Consider wound care consult   1/25/2023 0735 by Trevor Magana RN  Outcome: Progressing  1/25/2023 0732 by Trevor Magana RN  Outcome: Progressing

## 2023-01-25 NOTE — ASSESSMENT & PLAN NOTE
Wt Readings from Last 3 Encounters:   01/24/23 103 kg (226 lb 10 1 oz)   11/23/22 105 kg (230 lb 9 6 oz)   11/04/22 102 kg (225 lb)       · Remain euvolemic  · Continue torsemide  · Intake and output, low-sodium diet

## 2023-01-25 NOTE — PLAN OF CARE
Problem: PHYSICAL THERAPY ADULT  Goal: Performs mobility at highest level of function for planned discharge setting  See evaluation for individualized goals  Description: Treatment/Interventions: ADL retraining, Functional transfer training, LE strengthening/ROM, Therapeutic exercise, Endurance training, Patient/family training, Equipment eval/education, Bed mobility, Gait training, Compensatory technique education, Spoke to nursing, Spoke to case management, OT  Equipment Recommended:  (TBD by rehab)       See flowsheet documentation for full assessment, interventions and recommendations  Note: Prognosis: Fair  Problem List: Decreased strength, Decreased endurance, Impaired balance, Decreased mobility, Decreased safety awareness, Impaired sensation, Obesity, Decreased skin integrity, Orthopedic restrictions, Pain  Assessment: Pt is a 69 y o  female seen for PT evaluation s/p admission to [address] [place] [address] on 1/23/2023 with Closed displaced spiral fracture of shaft of left tibia  Order placed for PT services    Upon evaluation: Pt is presenting with impaired functional mobility due to pain, decreased strength, decreased ROM, decreased endurance, impaired balance, altered sensation, decreased safety awareness, impaired judgment, orthopedic restrictions, fall risk, LE edema, and impaired skin integrity requiring maximal of 2 assistance for bed mobility, maximal of 2 assistance for transfers, and mechanical conveyance from nursing standpoint for OOB mobility, unable to spt due to safety concerns with NWB L LE  Pt's clinical presentation is currently unpredictable given the functional mobility deficits above, especially weakness, decreased ROM, edema of extremities, decreased skin integrity, decreased endurance, pain, decreased activity tolerance, decreased functional mobility tolerance, altered sensation, decreased safety awareness, and impaired judgement, coupled with fall risks as indicated by AM-PAC 6-Clicks: 2/25 as well as hx of falls, impaired balance, polypharmacy, decreased safety awareness, and limited sensation/neuropathy and combined with medical complications of pain impacting overall mobility status, abnormal renal lab values, abnormal H&H, abnormal blood sugars, fear/retreat, need for input for mobility technique/safety and Stable alignment of a minimally displaced distal tibial metadiaphyseal fracture  No proximal tibial or fibular fracture  Pt's PMHx and comorbidities that may affect physical performance and progress include: CHF, A fib, DM, HTN, obesity, neuropathy and SSS s/p pacemaker, arthritis  Personal factors affecting pt at time of IE include: inaccessible home environment, step(s) to enter environment, limited home support, inability to perform IADLs, inability to perform ADLs, inability to navigate level surfaces without external assistance and recent fall(s)/fall history  Pt will benefit from continued skilled PT services to address deficits as defined above and to maximize level of functional mobility to facilitate return toward PLOF and improved QOL  From PT/mobility standpoint, recommendation at time of d/c would be Short term rehab pending progress in order to reduce fall risk and maximize pt's functional independence and consistency with mobility in order to facilitate return to PLOF  Recommend t  Barriers to Discharge: Decreased caregiver support, Inaccessible home environment  Barriers to Discharge Comments: requires assistance to complete mobility  PT Discharge Recommendation: Post acute rehabilitation services    See flowsheet documentation for full assessment

## 2023-01-25 NOTE — PLAN OF CARE
Problem: Potential for Falls  Goal: Patient will remain free of falls  Description: INTERVENTIONS:  - Educate patient/family on patient safety including physical limitations  - Instruct patient to call for assistance with activity   - Consult OT/PT to assist with strengthening/mobility   - Keep Call bell within reach  - Keep bed low and locked with side rails adjusted as appropriate  - Keep care items and personal belongings within reach  - Initiate and maintain comfort rounds  - Make Fall Risk Sign visible to staff  - Offer Toileting every 2 Hours, in advance of need  - Initiate/Maintain bed alarm  - Obtain necessary fall risk management equipment: yellow socks  - Apply yellow socks and bracelet for high fall risk patients  - Consider moving patient to room near nurses station  Outcome: Progressing     Problem: MOBILITY - ADULT  Goal: Maintain or return to baseline ADL function  Description: INTERVENTIONS:  - Educate patient/family on patient safety including physical limitations  - Instruct patient to call for assistance with activity   - Consult OT/PT to assist with strengthening/mobility   - Keep Call bell within reach  - Keep bed low and locked with side rails adjusted as appropriate  - Keep care items and personal belongings within reach  - Initiate and maintain comfort rounds  - Make Fall Risk Sign visible to staff  - Offer Toileting every 2Hours, in advance of need  - Initiate/Maintain bed alarm  - Obtain necessary fall risk management equipment: yellow socks, walker  - Apply yellow socks and bracelet for high fall risk patients  - Consider moving patient to room near nurses station  Outcome: Progressing  Goal: Maintains/Returns to pre admission functional level  Description: INTERVENTIONS:  - Perform BMAT or MOVE assessment daily    - Set and communicate daily mobility goal to care team and patient/family/caregiver     - Collaborate with rehabilitation services on mobility goals if consult  Problem: PAIN - ADULT  Goal: Verbalizes/displays adequate comfort level or baseline comfort level  Description: Interventions:  - Encourage patient to monitor pain and request assistance  - Assess pain using appropriate pain scale  - Administer analgesics based on type and severity of pain and evaluate response  - Implement non-pharmacological measures as appropriate and evaluate response  - Consider cultural and social influences on pain and pain management  - Notify physician/advanced practitioner if interventions unsuccessful or patient reports new pain  Outcome: Progressing     Problem: SAFETY ADULT  Goal: Patient will remain free of falls  Description: INTERVENTIONS:  - Educate patient/family on patient safety including physical limitations  - Instruct patient to call for assistance with activity   - Consult OT/PT to assist with strengthening/mobility   - Keep Call bell within reach  - Keep bed low and locked with side rails adjusted as appropriate  - Keep care items and personal belongings within reach  - Initiate and maintain comfort rounds  - Make Fall Risk Sign visible to staff  - Offer Toileting every 2 Hours, in advance of need  - Initiate/Maintain bed alarm  - Obtain necessary fall risk management equipment: yellow socks  - Apply yellow socks and bracelet for high fall risk patients  - Consider moving patient to room near nurses station  Outcome: Progressing  Goal: Maintain or return to baseline ADL function  Description: INTERVENTIONS:  - Educate patient/family on patient safety including physical limitations  - Instruct patient to call for assistance with activity   - Consult OT/PT to assist with strengthening/mobility   - Keep Call bell within reach  - Keep bed low and locked with side rails adjusted as appropriate  - Keep care items and personal belongings within reach  - Initiate and maintain comfort rounds  - Make Fall Risk Sign visible to staff  - Offer Toileting every 2Hours, in advance of need  - Initiate/Maintain bed alarm  - Obtain necessary fall risk management equipment: yellow socks, walker  - Apply yellow socks and bracelet for high fall risk patients  - Consider moving patient to room near nurses station  Outcome: Progressing  Goal: Maintains/Returns to pre admission functional level  Description: INTERVENTIONS:  - Perform BMAT or MOVE assessment daily    - Set and communicate daily mobility goal to care team and patient/family/caregiver  - Collaborate with rehabilitation services on mobility goals if consulted  Problem: Knowledge Deficit  Goal: Patient/family/caregiver demonstrates understanding of disease process, treatment plan, medications, and discharge instructions  Description: Complete learning assessment and assess knowledge base  Interventions:  - Provide teaching at level of understanding  - Provide teaching via preferred learning methods  Outcome: Progressing     Problem: Nutrition/Hydration-ADULT  Goal: Nutrient/Hydration intake appropriate for improving, restoring or maintaining nutritional needs  Description: Monitor and assess patient's nutrition/hydration status for malnutrition  Collaborate with interdisciplinary team and initiate plan and interventions as ordered  Monitor patient's weight and dietary intake as ordered or per policy  Utilize nutrition screening tool and intervene as necessary  Determine patient's food preferences and provide high-protein, high-caloric foods as appropriate       INTERVENTIONS:  - Monitor oral intake, urinary output, labs, and treatment plans  - Assess nutrition and hydration status and recommend course of action  - Evaluate amount of meals eaten  - Assist patient with eating if necessary   - Allow adequate time for meals  - Recommend/ encourage appropriate diets, oral nutritional supplements, and vitamin/mineral supplements  - Order, calculate, and assess calorie counts as needed  - Recommend, monitor, and adjust tube feedings and TPN/PPN based on assessed needs  - Assess need for intravenous fluids  - Provide specific nutrition/hydration education as appropriate  - Include patient/family/caregiver in decisions related to nutrition  Outcome: Progressing     - Out of bed for toileting  - Record patient progress and toleration of activity level   Outcome: Progressing     - Out of bed for toileting  - Record patient progress and toleration of activity level   Outcome: Progressing

## 2023-01-25 NOTE — TELEPHONE ENCOUNTER
----- Message from Tobie Gottron, MD sent at 1/25/2023  2:10 PM EST -----  Please set her up for voiding trial in 5 to 7 days    She is currently in the hospital

## 2023-01-25 NOTE — CASE MANAGEMENT
Case Management Discharge Planning Note    Patient name Brandon Benavidez  Location /-16 MRN [id number]  : 1945 Date 2023       Current Admission Date: 2023  Current Admission Diagnosis:Closed displaced spiral fracture of shaft of left tibia   Patient Active Problem List    Diagnosis Date Noted   • Urinary retention 2023   • Acute pain of left shoulder 2023   • Closed displaced spiral fracture of shaft of left tibia 2023   • Lumbar spondylosis 10/12/2022   • Peripheral vascular disease ([address] ) 2022   • Primary osteoarthritis of both hips 2022   • Pain in left hip 2022   • Chronic pain of left knee 10/13/2021   • Presence of artificial knee joint, left 10/13/2021   • Nausea and vomiting 2021   • Acute respiratory failure with hypoxia ([address] ) 2021   • Sick sinus syndrome ([address] ) 2021   • Pulmonary hypertension ([address] ) 2021   • Supratherapeutic INR 2021   • Acute kidney injury ([address] ) 06/15/2021   • Acquired hypothyroidism    • Diabetes mellitus ([address] )    • CHF (congestive heart failure) ([address] )    • Essential hypertension    • Renal disorder    • Closed fracture of left ankle 2021   • Closed bimalleolar fracture of left ankle 2020   • Pacemaker 10/29/2020   • Type 2 diabetes mellitus with diabetic neuropathy ([address] ) 10/29/2020   • Severe obesity with body mass index (BMI) of 36 0 to 36 9 with serious comorbidity ([address] )    • Stage 3 chronic kidney disease ([address] ) 10/26/2020   • Atrial fibrillation ([address] )    • Diastolic congestive heart failure ([address] ) 10/25/2020   • Type 2 diabetes mellitus with diabetic nephropathy ([address] ) 10/25/2020   • JOS (obstructive sleep apnea) 10/25/2020      LOS (days): 2  Geometric Mean LOS (GMLOS) (days): 2 70  Days to GMLOS:0 7     OBJECTIVE:  Risk of Unplanned Readmission Score: 16 35         Current admission status: Inpatient   Preferred Pharmacy:   [facility] [place] - [address]  [address] [address]  Phone: [phone] Fax: [phone]    Primary Care Provider: Holden Medina MD    Primary Insurance: [facility]  Secondary Insurance:     DISCHARGE DETAILS:     Pt is going to be NWB and recommendation is for STR    Discharge planning discussed with[de-identified] patient  Freedom of Choice: Yes  Comments - Freedom of Choice: STR and NWB is needed on L Lower extremity- agreeable for a blanket referral for local facilities to see who can accept her with her insurance  CM contacted family/caregiver?: Yes  Were Treatment Team discharge recommendations reviewed with patient/caregiver?: Yes  Did patient/caregiver verbalize understanding of patient care needs?: Yes  Were patient/caregiver advised of the risks associated with not following Treatment Team discharge recommendations?: Yes    Contacts  Patient Contacts: Mr Indy Quinonez  Relationship to Patient[de-identified] Family  Contact Method: Phone  Phone Number: [phone] (H)  Reason/Outcome: Discharge Planning, [address]         Is the patient interested in [facility] at discharge?: No    DME Referral Provided  Referral made for DME?: No    Other Referral/Resources/Interventions Provided:  Referral Comments: [address] referrals made for STR         Treatment Team Recommendation: Short Term Rehab  Discharge Destination Plan[de-identified] Short Term Rehab  Transport at Discharge : [facility] Ambulance      Will follow up for acceptance- Pt will need an auth for STR

## 2023-01-25 NOTE — PROGRESS NOTES
[address] Dickinson  Progress Note - Brennen Robbins 1945, 69 y o  female MRN: [id number]  Unit/Bed#: PACU 05 Encounter: [id number]  Primary Care Provider: Thang Clinton MD   Date and time admitted to hospital: 1/23/2023  3:18 PM    * Closed displaced spiral fracture of shaft of left tibia  Assessment & Plan  · Presented to the ER after fall, hitting left knee  · S/p posterior splint placement in the ER  · Orthopedics are consulted  · There is complications from distal hardware   · Further complicating issue is patient has loosening of femoral prosthesis on a bone scan of her left hip  · Conservative management, no planned surgery-NWB x3 months w/ CAM boot  · Coumadin -INR therapeutic at 2 5 on admission( held)- resume Coumadin with no surgical intervention   · PT/OT consult- per nursing x4 assist to commode anticipate rehab needs  Lab Results   Component Value Date    INR 2 00 (H) 01/25/2023    INR 2 16 (H) 01/24/2023       Urinary retention  Assessment & Plan  · Voiding with purwick however bladder scanned for 925 ml, PVR remained 625ml  · Urinary retentions protocol  · Urology consult    Acute pain of left shoulder  Assessment & Plan  · Patient endorses today acute pain of left shoulder  · No imaging done on admission  · X-rays ordered by orthopedics-no acute osseous abnormality  For now continue pain control    JOS (obstructive sleep apnea)  Assessment & Plan  · As per family member, patient does not use any oxygen or CPAP  · Monitor at this time    Type 2 diabetes mellitus with diabetic nephropathy [facility])  Assessment & Plan  Lab Results   Component Value Date    HGBA1C 7 0 (H) 10/17/2022       Recent Labs     01/24/23  1558 01/24/23  2114 01/25/23  0737 01/25/23  1123   POCGLU 142* 228* 185* 207*       Blood Sugar Average: Last 72 hrs:  (P) 183  75Per patient, diet controlled  · Remain in the hospital, continue sliding scale with hypoglycemia precaution    Diastolic congestive heart failure [facility])  Assessment & Plan  Wt Readings from Last 3 Encounters:   23 103 kg (226 lb 10 1 oz)   22 105 kg (230 lb 9 6 oz)   22 102 kg (225 lb)       · Remain euvolemic  · Continue torsemide  · Intake and output, low-sodium diet      Atrial fibrillation (HCC)  Assessment & Plan  · V paced rhythm, Rate 78  · Not any beta-blocker, patient has pacemaker in place  · [facility] [place]: Coumadin 1 mg daily except Tuesday when she takes 2 mg  · Coumadin held on admission with fracture-Per Ortho can resume, conservative management  · INR 2-continue PTA dosing        VTE Pharmacologic Prophylaxis: VTE Score: 12 High Risk (Score >/= 5) - Pharmacological DVT Prophylaxis Ordered: warfarin (Coumadin)  Sequential Compression Devices Ordered  Patient Centered Rounds: I performed bedside rounds with nursing staff today  Discussions with Specialists or Other Care Team Provider: SERGIO, RN    Education and Discussions with Family / Patient: Updated  () via phone  Time Spent for Care: 45 minutes  More than 50% of total time spent on counseling and coordination of care as described above  Current Length of Stay: 2 day(s)  Current Patient Status: Inpatient   Certification Statement: The patient will continue to require additional inpatient hospital stay due to Pending PT/OT eval, urinary retention  Discharge Plan: Anticipate discharge in 24-48 hrs to discharge location to be determined pending rehab evaluations  Code Status: Level 1 - Full Code    Subjective:   Patient having left shoulder and leg pain  Plan this morning pending on orthopedic surgery evaluation    At this time no operative intervention and will continue conservative management    Objective:     Vitals:   Temp (24hrs), Av 3 °F (36 8 °C), Min:98 °F (36 7 °C), Max:98 4 °F (36 9 °C)    Temp:  [98 °F (36 7 °C)-98 4 °F (36 9 °C)] 98 °F (36 7 °C)  HR:  [] 100  Resp:  [17-18] 18  BP: (142-149)/(58-87) 149/87  SpO2:  [90 %-91 %] 90 %  Body mass index is 41 45 kg/m²  Input and Output Summary (last 24 hours): Intake/Output Summary (Last 24 hours) at 1/25/2023 1211  Last data filed at 1/25/2023 1110  Gross per 24 hour   Intake 1680 ml   Output 1250 ml   Net 430 ml       Physical Exam:   Physical Exam  Vitals and nursing note reviewed  Constitutional:       General: She is not in acute distress  Appearance: She is well-developed  She is obese  HENT:      Head: Normocephalic and atraumatic  Mouth/Throat:      Mouth: Mucous membranes are moist    Eyes:      Conjunctiva/sclera: Conjunctivae normal       Pupils: Pupils are equal, round, and reactive to light  Cardiovascular:      Rate and Rhythm: Normal rate and regular rhythm  Pulses: Normal pulses  Heart sounds: No murmur heard  Pulmonary:      Effort: Pulmonary effort is normal  No respiratory distress  Breath sounds: Normal breath sounds  Abdominal:      Palpations: Abdomen is soft  Tenderness: There is no abdominal tenderness  Musculoskeletal:         General: Tenderness (left shoulder, LLE) present  No swelling  Cervical back: Neck supple  Comments: Decreased ROM left should -pain  Decreased ROM LLE with splint inplace     Skin:     General: Skin is warm and dry  Capillary Refill: Capillary refill takes less than 2 seconds  Neurological:      General: No focal deficit present  Mental Status: She is alert  Psychiatric:         Mood and Affect: Mood normal          Additional Data:     Labs:  Results from last 7 days   Lab Units 01/25/23  0528 01/24/23  0920 01/23/23  1606   WBC Thousand/uL 9 19   < > 7 14   HEMOGLOBIN g/dL 10 3*   < > 10 9*   HEMATOCRIT % 33 0*   < > 34 4*   PLATELETS Thousands/uL 154   < > 171   NEUTROS PCT %  --   --  74   LYMPHS PCT %  --   --  14   MONOS PCT %  --   --  7   EOS PCT %  --   --  4    < > = values in this interval not displayed       Results from last 7 days   Lab Units 01/25/23  0528 SODIUM mmol/L 136   POTASSIUM mmol/L 3 7   CHLORIDE mmol/L 99   CO2 mmol/L 28   BUN mg/dL 43*   CREATININE mg/dL 1 32*   ANION GAP mmol/L 9   CALCIUM mg/dL 8 8   GLUCOSE RANDOM mg/dL 188*     Results from last 7 days   Lab Units 01/25/23  0528   INR  2 00*     Results from last 7 days   Lab Units 01/25/23  1123 01/25/23  0737 01/24/23  2114 01/24/23  1558 01/24/23  1050 01/24/23  0725 01/23/23  2122 01/23/23  1804   POC GLUCOSE mg/dl 207* 185* 228* 142* 195* 184* 154* 175*               Lines/Drains:  Invasive Devices     Peripheral Intravenous Line  Duration           Peripheral IV 01/23/23 Right Antecubital 1 day          Drain  Duration           External Urinary Catheter 1 day                      Imaging: Reviewed radiology reports from this admission including: xray(s)    Recent Cultures (last 7 days):         Last 24 Hours Medication List:   Current Facility-Administered Medications   Medication Dose Route Frequency Provider Last Rate   • acetaminophen  488 mg Oral Q4H PRN Mariela Harris MD     • amLODIPine  2 5 mg Oral Daily Mariela Harris MD     • docusate sodium  100 mg Oral BID Mariela Harris MD     • gabapentin  600 mg Oral TID Mariela Harris MD     • HYDROmorphone  0 5 mg Intravenous Q4H PRN Mariela Harris MD     • insulin lispro  1-6 Units Subcutaneous TID AC Omar Laughlin MD     • insulin lispro  1-6 Units Subcutaneous HS Mariela Harris MD     • levothyroxine  150 mcg Oral Daily Mariela Harris MD     • lidocaine  1 patch Topical Daily Mariela Harris MD     • ondansetron  4 mg Intravenous Q6H PRN Mariela Harris MD     • senna  8 8 mg Oral Daily Mariela Harris MD     • torsemide  50 mg Oral Daily Mariela Harris MD     • traMADol  50 mg Oral Q6H PRN Mariela Harris MD          Today, Patient Was Seen By: SAM Sanderson    **Please Note: This note may have been constructed using a voice recognition system  **

## 2023-01-25 NOTE — ASSESSMENT & PLAN NOTE
· Presented to the ER after fall, hitting left knee  · S/p posterior splint placement in the ER  · Orthopedics are consulted  · There is complications from distal hardware   · Further complicating issue is patient has loosening of femoral prosthesis on a bone scan of her left hip  · Conservative management, no planned surgery-NWB x3 months w/ CAM boot  · Coumadin -INR therapeutic at 2 5 on admission( held)- resume Coumadin with no surgical intervention   · PT/OT consult- per nursing x4 assist to commode anticipate rehab needs  Lab Results   Component Value Date    INR 2 00 (H) 01/25/2023    INR 2 16 (H) 01/24/2023

## 2023-01-25 NOTE — PROGRESS NOTES
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 with apparent loosening of proximal portion of femoral stem; diabetes with nephropathy and neuropathy    Plan: At this time, the fracture will be treated conservatively with immobilization and close follow-up  I discussed this with the patient and she is agreeable to proceed understanding that if the fracture were to displace surgical intervention may be warranted  However, conservative treatment would avoid the potential risks of surgery especially in light of the increased risk of complications due to her diabetic neuropathy and likely vasculopathy  She will be placed into a cam boot sometime within the next 24 to 48 hours  Discharge planning may proceed  She will need to be nonweightbearing, likely for approximately 3 months  Weight bearing: Nonweightbearing left lower extremity    VTE Pharmacologic Prophylaxis: Per recommendations of the medical service with return to use of Coumadin if preferred  VTE Mechanical Prophylaxis: sequential compression device right lower extremity    Subjective: Lyle Chavez continues to complain of left lower leg pain  She denies any significant change in symptoms since yesterday  Vitals: Blood pressure 149/87, pulse 100, temperature 98 °F (36 7 °C), temperature source Oral, resp  rate 18, height 5' 2" (1 575 m), weight 103 kg (226 lb 10 1 oz), SpO2 90 %  ,Body mass index is 41 45 kg/m²        Intake/Output Summary (Last 24 hours) at 1/25/2023 0932  Last data filed at 1/25/2023 0857  Gross per 24 hour   Intake 1200 ml   Output 1125 ml   Net 75 ml       Invasive Devices     Peripheral Intravenous Line  Duration           Peripheral IV 01/23/23 Right Antecubital 1 day          Drain  Duration           External Urinary Catheter <1 day                Physical Exam: The left lower extremity examination is unchanged from yesterday  Left shoulder exam demonstrates persistent pain with no change from yesterday's exam     Lab, Imaging and other studies:   CBC:   Lab Results   Component Value Date    WBC 9 19 01/25/2023    HGB 10 3 (L) 01/25/2023    HCT 33 0 (L) 01/25/2023    MCV 81 (L) 01/25/2023     01/25/2023    MCH 25 3 (L) 01/25/2023    MCHC 31 2 (L) 01/25/2023    RDW 16 2 (H) 01/25/2023    MPV 10 6 01/25/2023     CMP:   Lab Results   Component Value Date    SODIUM 136 01/25/2023    CL 99 01/25/2023    CO2 28 01/25/2023    BUN 43 (H) 01/25/2023    CREATININE 1 32 (H) 01/25/2023    CALCIUM 8 8 01/25/2023    EGFR 39 01/25/2023     PT/INR:   Lab Results   Component Value Date    INR 2 00 (H) 01/25/2023     X-ray of the left shoulder demonstrate some mild degenerative disease but no evidence of acute injury  Results were reviewed with the patient

## 2023-01-25 NOTE — ASSESSMENT & PLAN NOTE
Lab Results   Component Value Date    HGBA1C 7 0 (H) 10/17/2022       Recent Labs     01/24/23  1558 01/24/23  2114 01/25/23  0737 01/25/23  1123   POCGLU 142* 228* 185* 207*       Blood Sugar Average: Last 72 hrs:  (P) 183  75Per patient, diet controlled  · Remain in the hospital, continue sliding scale with hypoglycemia precaution

## 2023-01-25 NOTE — UTILIZATION REVIEW
Initial Clinical Review    Admission: Date/Time/Statement:   Admission Orders (From admission, onward)     Ordered        01/23/23 1633  INPATIENT ADMISSION  Once                      Orders Placed This Encounter   Procedures   • INPATIENT ADMISSION     Standing Status:   Standing     Number of Occurrences:   1     Order Specific Question:   Level of Care     Answer:   Med Surg [16]     Order Specific Question:   Estimated length of stay     Answer:   More than 2 Midnights     Order Specific Question:   Certification     Answer:   I certify that inpatient services are medically necessary for this patient for a duration of greater than two midnights  See H&P and MD Progress Notes for additional information about the patient's course of treatment  ED Arrival Information     Expected   1/23/2023     Arrival   1/23/2023 15:18    Acuity   Urgent            Means of arrival   Ambulance    Escorted by   [employer] [facility]   Hospitalist    Admission type   Emergency            Arrival complaint   ankle injury           Chief Complaint   Patient presents with   • Ankle Injury     Pt turned left ankle and " heard crack"  Initial Presentation: 69 y o  female presented to ED via EMS as inpatient admission for closed displaced spiral fracture of shaft of left tibia  PMH of chronic atrial fibrillation,on coumadin diastolic heart failure,JOS,type 2 DM, who presents with left lower extremity pain  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 and heard a "crack"   noted a deformity and attempted reduction  After that she was in severe pain and lost her balance  Patient c/o numbness along the LLE   On exam in acute distress  Deformity of the left ankle  2+ DP pulse palpated  Patient is able to wiggle her toes  Expresses worsening numbness on the foot/ankle  Tenderness palpation/swelling along the knee  No bruising       INR is therapeutic 2 51-hold any kind of anticoagulation till evaluated by orthopedic surgeon    Consult Orthopedics    Left tibial fracture would ideally be fixated surgically  However, this procedure will be complicated by the presence of distal hardware in her tibia from previous ankle fracture fixation in the presence of a revision knee prosthesis with a longstem  Conservative care with immobilization is certainly an option but would potentially lead to nonunion or additional fracture displacement requiring surgical fixation  The patient's diabetic neuropathy would increase her risk for perioperative complications as well as delayed healing  Currently, her INR is elevated and surgery will need to be postponed until her INR is 1 5 or less if surgical intervention is to be performed  Date:   01-25-23    INR 2 continue to be to high for surgery need to be < 1 5 holding coumadin As per orthopedics Fracture distal left tibial shaft; previous ORIF of left ankle; presence of revision left knee replacement arthroplasty with apparent loosening of proximal portion of femoral stem; diabetes with nephropathy and neuropathy  will be treated conservatively with immobilization and close follow-up  I discussed this with the patient and she is agreeable to proceed understanding that if the fracture were to displace surgical intervention may be warranted  However, conservative treatment would avoid the potential risks of surgery especially in light of the increased risk of complications due to her diabetic neuropathy and likely vasculopathy  She will be placed into a cam boot PT evaluation  Post acute rehabilitation services CM aware of STR referral placed  Urology consult:    Assessment:  Urinary retention  Patient currently with significant ambulatory dysfunction given her fractured femur and injured left shoulder  She is currently voiding small amounts into the pure wick  Her PVR currently is almost 800 cc  No dysuria  She does have some urge incontinence at home  She has ambulatory dysfunction normally  She wears 2 depends per day and normally leaks on her way to the bathroom with some urgency  She has remote history of UTI  No history of hematuria  No history of kidney stones or any other  abnormality  She had a normal CT scan of the abdomen pelvis a year and a half ago  Plan:  After discussing the options with the patient  Her preference is to have a Tolentino catheter in until she gets some of her abilities to mobilize back  She would likely be going to rehab  We will place the Tolentino for 5 to 7 days and then give her a voiding trial   We will start on some Flomax    Urine culture to rule out UTI       ED Triage Vitals [01/23/23 1524]   Temperature Pulse Respirations Blood Pressure SpO2   98 8 °F (37 1 °C) 83 16 133/78 98 %      Temp Source Heart Rate Source Patient Position - Orthostatic VS BP Location FiO2 (%)   Temporal Monitor Sitting Left arm --      Pain Score       4          Wt Readings from Last 1 Encounters:   01/24/23 103 kg (226 lb 10 1 oz)     Additional Vital Signs:     Date/Time Temp Pulse Resp BP MAP (mmHg) SpO2 O2 Device Patient Position - Orthostatic VS   01/25/23 15:06:01 97 9 °F (36 6 °C) 101 21 141/75 -- 93 % -- Lying   01/25/23 1425 -- -- -- -- -- 90 % None (Room air) --   01/25/23 1424 -- 86 -- -- -- -- -- --   01/25/23 12:41:43 97 5 °F (36 4 °C) 118 Abnormal  18 125/78 54 Abnormal  87 % Abnormal  -- Lying   01/25/23 0736 98 °F (36 7 °C) 100 18 149/87 109 90 % None (Room air) Lying   01/24/23 2249 98 4 °F (36 9 °C) 58 18 149/77 93 91 % None (Room air) Lying   01/24/23 1519 98 4 °F (36 9 °C) 61 17 142/58 75 90 % None (Room air) Lying   01/24/23 0726 98 3 °F (36 8 °C) 71 19 147/68 85 91 % -- Lying   01/24/23 0518 98 1 °F (36 7 °C) 72 20 146/68 -- 98 % None (Room air) --   01/23/23 1914 -- 70 18 140/64 92 97 % None (Room air) --   01/23/23 1900 -- 74 16 141/65 96 94 % None (Room air) --   01/23/23 1745 -- 78 14 134/70 -- 97 % None (Room air) [facility] Pertinent Labs/Diagnostic Test Results:   EKG 01-24-23  ECG rate:  77    ECG rate assessment: normal    Rhythm:     Rhythm: paced    Pacing:     Capture:  Complete  Ectopy:     Ectopy: PVCs      PVCs:  Infrequent  QRS:     QRS axis:  Left    QRS intervals: Wide  ST segments:     ST segments:  Normal  T waves:     T waves: normal           XR shoulder 2+ vw left    (01/24 1539)      No acute osseous abnormality  XR tibia fibula 2 vw left    (01/24 1426)      Stable alignment of a minimally displaced distal tibial metadiaphyseal fracture  No proximal tibial or fibular fracture  XR knee 1 or 2 views left    (01/23 1550)   No acute fracture or dislocation noted on knee XR         (01/24 0723)      No acute osseous abnormality  XR ankle 3+ views LEFT    (01/23 1544)   Left tibial shaft fracture       (01/24 0735)      Acute minimally displaced fracture of the distal tibial metadiaphysis  Prior medial malleolar and tibiofibular ORIF  Hardware is intact  New minimal radiolucency about the lateral tibial aspect of the middle syndesmotic screw suggestive of early changes of loosening  Attention on follow-up imaging is advised           Results from last 7 days   Lab Units 01/25/23  0528 01/24/23  0920 01/23/23  1606   WBC Thousand/uL 9 19 8 27 7 14   HEMOGLOBIN g/dL 10 3* 10 8* 10 9*   HEMATOCRIT % 33 0* 34 3* 34 4*   PLATELETS Thousands/uL 154 151 171   NEUTROS ABS Thousands/µL  --   --  5 29         Results from last 7 days   Lab Units 01/25/23  0528 01/24/23  0920 01/23/23  1616   SODIUM mmol/L 136 138 139   POTASSIUM mmol/L 3 7 4 1 3 5   CHLORIDE mmol/L 99 101 101   CO2 mmol/L 28 27 26   ANION GAP mmol/L 9 10 12   BUN mg/dL 43* 43* 41*   CREATININE mg/dL 1 32* 1 39* 1 26   EGFR ml/min/1 73sq m 39 36 41   CALCIUM mg/dL 8 8 9 0 9 2         Results from last 7 days   Lab Units 01/25/23  1123 01/25/23  0737 01/24/23  2114 01/24/23  1558 01/24/23  1050 01/24/23  0725 01/23/23  2122 01/23/23  1804   POC GLUCOSE mg/dl 207* 185* 228* 142* 195* 184* 154* 175*     Results from last 7 days   Lab Units 01/25/23  0528 01/24/23  0920 01/23/23  1616   GLUCOSE RANDOM mg/dL 188* 235* 206*     Results from last 7 days   Lab Units 01/25/23  0528 01/24/23  0920 01/23/23  1606   PROTIME seconds 22 8* 24 2* 27 1*   INR  2 00* 2 16* 2 51*       ED Treatment:   Medication Administration from 01/23/2023 1518 to 01/23/2023 1959       Date/Time Order Dose Route Action     01/23/2023 1528 EST fentanyl citrate (PF) 100 MCG/2ML 50 mcg 50 mcg Intravenous Given     01/23/2023 1638 EST morphine injection 4 mg 4 mg Intravenous Given     01/23/2023 1806 EST docusate sodium (COLACE) capsule 100 mg 100 mg Oral Given     01/23/2023 1844 EST HYDROmorphone (DILAUDID) injection 0 5 mg 0 5 mg Intravenous Given     01/23/2023 1805 EST insulin lispro (HumaLOG) 100 units/mL subcutaneous injection 1-6 Units 1 Units Subcutaneous Given        Past Medical History:   Diagnosis Date   • Arthritis    • CHF (congestive heart failure) (HCC)    • Diabetes mellitus (HCC)    • Disease of thyroid gland    • Hypertension    • Pacemaker    • Renal disorder      Present on Admission:  • Closed displaced spiral fracture of shaft of left tibia  • Diastolic congestive heart failure (HCC)  • Type 2 diabetes mellitus with diabetic nephropathy (HCC)  • Atrial fibrillation (HCC)  • JOS (obstructive sleep apnea)      Admitting Diagnosis: Ankle injury [S98 969H]  Closed displaced spiral fracture of shaft of left tibia, initial encounter [S82 021A]  Age/Sex: 69 y o  female     Admission Orders:  SCD  Blood sugars ac and hs  SSI  PT/OT      Scheduled Medications:  amLODIPine, 2 5 mg, Oral, Daily  docusate sodium, 100 mg, Oral, BID  gabapentin, 600 mg, Oral, TID  insulin lispro, 1-6 Units, Subcutaneous, TID AC  insulin lispro, 1-6 Units, Subcutaneous, HS  levothyroxine, 150 mcg, Oral, Daily  lidocaine, 1 patch, Topical, Daily  senna, 8 8 mg, Oral, Daily  torsemide, 50 mg, Oral, Daily  warfarin, 1 mg, Oral, Daily (warfarin)      Continuous IV Infusions:     PRN Meds:  acetaminophen, 488 mg, Oral, Q4H PRN  HYDROmorphone, 0 5 mg, Intravenous, Q4H PRN  ondansetron, 4 mg, Intravenous, Q6H PRN  traMADol, 50 mg, Oral, Q6H PRN        IP CONSULT TO ORTHOPEDIC SURGERY  IP CONSULT TO CASE MANAGEMENT  IP CONSULT TO UROLOGY    Network Utilization Review Department  ATTENTION: Please call with any questions or concerns to [phone] and carefully listen to the prompts so that you are directed to the right person  All voicemails are confidential   [facility] all requests for admission clinical reviews, approved or denied determinations and any other requests to dedicated fax number below belonging to the campus where the patient is receiving treatment   List of dedicated fax numbers for the Facilities:  [address] DENIALS (Administrative/Medical Necessity) [phone]   [address] (Maternity/NICU/Pediatrics) [phone]   [facility] Bhavya Veliz [phone]   [place] [address] [phone]   [address] [address] Yumi Sultana [facility] 28 [phone]   [address] [facility] [place] [facility] 134 815 [facility] [phone]

## 2023-01-25 NOTE — PLAN OF CARE
Problem: PHYSICAL THERAPY ADULT  Goal: Performs mobility at highest level of function for planned discharge setting  See evaluation for individualized goals  Description: Treatment/Interventions: ADL retraining, Functional transfer training, LE strengthening/ROM, Therapeutic exercise, Endurance training, Patient/family training, Equipment eval/education, Bed mobility, Gait training, Compensatory technique education, Spoke to nursing, Spoke to case management, OT  Equipment Recommended:  (TBD by rehab)       See flowsheet documentation for full assessment, interventions and recommendations  9/19/7594 [id number] by Geoff Henning, PT  Note: Prognosis: Fair  Problem List: Decreased strength, Decreased endurance, Impaired balance, Decreased mobility, Decreased safety awareness, Impaired sensation, Obesity, Decreased skin integrity, Orthopedic restrictions, Pain  Pt tolerated fairly  She continues to require maxAx2 to achieve standing and maxAx1 to maintain standing due to posteiror lean with poor placement of R L when initiating standing  She was able to stand for 30-45 seconds with manual cues for balance and tatcile cues at right knee  She fatigues quickly  She requires a 3rd person to maintain NWB L LE  She is limited by decreased strength, balance, endurance  She will continue to benefit from PT services to maximize LOF  Barriers to Discharge: Decreased caregiver support, Inaccessible home environment  Barriers to Discharge Comments: requires assistance to complete mobility  PT Discharge Recommendation: Post acute rehabilitation services    See flowsheet documentation for full assessment

## 2023-01-25 NOTE — CONSULTS
Consultation - Urology   Quin Reddy 69 y o  female MRN: [id number]  Unit/Bed#: -01 Encounter: [id number]      Assessment/Plan      Assessment:  Urinary retention  Patient currently with significant ambulatory dysfunction given her fractured femur and injured left shoulder  She is currently voiding small amounts into the pure wick  Her PVR currently is almost 800 cc  No dysuria  She does have some urge incontinence at home  She has ambulatory dysfunction normally  She wears 2 depends per day and normally leaks on her way to the bathroom with some urgency  She has remote history of UTI  No history of hematuria  No history of kidney stones or any other  abnormality  She had a normal CT scan of the abdomen pelvis a year and a half ago  Plan:  After discussing the options with the patient  Her preference is to have a Tolentino catheter in until she gets some of her abilities to mobilize back  She would likely be going to rehab  We will place the Tolentino for 5 to 7 days and then give her a voiding trial   We will start on some Flomax  Urine culture to rule out UTI    History of Present Illness   Attending: Anderson Ernst MD  HPI: Quin Reddy is a 69y o  year old female who presents with urinary retention after recent fracture with related to a fall  Typically she voids every hour at home with urgency she voids into a pad on the way to the bathroom  She uses 2 pads per 24 hours  She does have urinary urgency and frequency  She denies any  history other than a remote UTI  No kidney stones  Normal CT scan of the abdomen and pelvis a year and a half ago  REVIEW OF SYSTEMS  Const: Denies chills, fever and weight loss  CV: Denies chest pain  Resp: Denies SOB  GI: Denies abdominal pain, nausea and vomiting  : Denies symptoms other than stated above  Musculo: Denies back pain      Historical Information   Past Medical History:   Diagnosis Date   • Arthritis    • CH[facility]congestive [facility] failure) [facility] )    • Diabetes mellitus ([facility] )    • Disease of thyroid gland    • Hypertension    • Pacemaker    • Renal disorder      Past Surgical History:   Procedure Laterality Date   • CARDIAC PACEMAKER PLACEMENT     • CHOLECYSTECTOMY     • FL GUIDED NEEDLE PLAC BX/ASP/INJ  10/4/2022   • FL GUIDED NEEDLE PLAC BX/ASP/INJ  10/20/2022   • JOINT REPLACEMENT      bilateral knee replacements   • NERVE BLOCK Bilateral 10/4/2022    Procedure: BLOCK MEDIAL BRANCH NERVES BILATERAL L3, L4, L5 #1;  Surgeon: Candice De La Torre MD;  [facility];  Service: Pain Management    • NERVE BLOCK Bilateral 10/20/2022    Procedure: BLOCK MEDIAL BRANCH L3, L4, L5 #2;  Surgeon: Candice De La Torre MD;  [facility];  Service: Pain Management    • ORIF TIBIA & FIBULA FRACTURES Left 10/29/2020    Procedure: OPEN REDUCTION W/ INTERNAL FIXATION (ORIF) ANKLE;  Surgeon: Bennie Ramírez;   [facility];  Service: Orthopedics   • TONSILLECTOMY     • TUBAL LIGATION       Social History   Social History     Substance and Sexual Activity   Alcohol Use Not Currently     E-Cigarette/Vaping   • E-Cigarette Use Never User      E-Cigarette/Vaping Substances     Social History     Tobacco Use   Smoking Status Never   Smokeless Tobacco Never         Meds/Allergies   all current active meds have been reviewed, current meds:   Current Facility-Administered Medications   Medication Dose Route Frequency   • acetaminophen (TYLENOL) tablet 488 mg  488 mg Oral Q4H PRN   • amLODIPine (NORVASC) tablet 2 5 mg  2 5 mg Oral Daily   • docusate sodium (COLACE) capsule 100 mg  100 mg Oral BID   • gabapentin (NEURONTIN) capsule 600 mg  600 mg Oral TID   • HYDROmorphone (DILAUDID) injection 0 5 mg  0 5 mg Intravenous Q4H PRN   • insulin lispro (HumaLOG) 100 units/mL subcutaneous injection 1-6 Units  1-6 Units Subcutaneous TID AC   • insulin lispro (HumaLOG) 100 units/mL subcutaneous injection 1-6 Units  1-6 Units Subcutaneous HS   • levothyroxine tablet 150 mcg  150 mcg Oral Daily   • lidocaine (LIDODERM) 5 % patch 1 patch  1 patch Topical Daily   • ondansetron (ZOFRAN) injection 4 mg  4 mg Intravenous Q6H PRN   • senna oral syrup 8 8 mg  8 8 mg Oral Daily   • torsemide (DEMADEX) tablet 50 mg  50 mg Oral Daily   • traMADol (ULTRAM) tablet 50 mg  50 mg Oral Q6H PRN   • [MAR Hold] warfarin (COUMADIN) tablet 1 mg  1 mg Oral Daily (warfarin)   , and PTA meds:   Prior to Admission Medications   Prescriptions Last Dose Informant Patient Reported? Taking?    Ascorbic Acid, Vitamin C, (VITAMIN C) 100 MG tablet 1/22/2023  Yes Yes   Sig: Take 500 mg by mouth daily    Multiple Vitamin (Multi-Day) TABS 1/22/2023  Yes Yes   Sig: Take 1 tablet by mouth daily    acetaminophen (TYLENOL) 500 mg tablet 1/22/2023  Yes Yes   Sig: Take 500 mg by mouth   amLODIPine (NORVASC) 2 5 mg tablet 1/22/2023  No Yes   Sig: Take 1 tablet (2 5 mg total) by mouth daily   gabapentin (Neurontin) 600 MG tablet 1/22/2023  No Yes   Sig: Take 1 tablet (600 mg total) by mouth 3 (three) times a day   levothyroxine 150 mcg tablet 1/22/2023  Yes Yes   Sig: Take 150 mcg by mouth daily    ondansetron (Zofran ODT) 4 mg disintegrating tablet Past Week  No Yes   Sig: Take 1 tablet (4 mg total) by mouth every 6 (six) hours as needed for nausea or vomiting   torsemide (DEMADEX) 100 mg tablet   No No   Sig: Take 0 5 tablets (50 mg total) by mouth daily   warfarin (COUMADIN) 5 mg tablet 1/22/2023  Yes Yes   Sig: Take 4 mg by mouth Sun, Wed, Thursday,Friday- 1 tab  Tues, Saturday- 2 tab      Facility-Administered Medications: None     Allergies   Allergen Reactions   • Rofecoxib Angioedema, Swelling, Hives and Other (See Comments)     swelling, sob  swelling, sob  Other reaction(s): Swelling / Edema  swelling, sob  Other reaction(s): SWELLING  Other reaction(s): Angioedema     • Oxycodone-Acetaminophen GI Intolerance and Other (See Comments)     Unsure of rxn   Unsure of rxn   Unsure of rxn   Other reaction(s): "CAN'T BREATH"     • Duloxetine Hcl Other (See Comments)     Slept for 4 days    • Medical Tape Rash       Objective   Vitals: Blood pressure 125/78, pulse (!) 118, temperature 97 5 °F (36 4 °C), temperature source Temporal, resp  rate 18, height 5' 2" (1 575 m), weight 103 kg (226 lb 10 1 oz), SpO2 (!) 87 %  I/O last 24 hours: In: 1800 [P O :1800]  Out: 1675 [Urine:1675]    Invasive Devices     Peripheral Intravenous Line  Duration           Peripheral IV 01/23/23 Right Antecubital 1 day          Drain  Duration           External Urinary Catheter 1 day                PHYSICAL EXAM  Const: Appears healthy and well developed  No signs of acute distress present  Resp: Respirations are regular and unlabored  CV: Rate is regular  Rhythm is regular  Abdomen: Abdomen is soft, nontender, and nondistended  Kidneys are not palpable  : Grossly distended bladder  No CVA tenderness  Mild SP tenderness  Psych: Patient's attitude is cooperative  Mood is normal  Affect is normal     Lab Results: I have personally reviewed pertinent reports  CBC:   Lab Results   Component Value Date    WBC 9 19 01/25/2023    HGB 10 3 (L) 01/25/2023    HCT 33 0 (L) 01/25/2023    MCV 81 (L) 01/25/2023     01/25/2023    MCH 25 3 (L) 01/25/2023    MCHC 31 2 (L) 01/25/2023    RDW 16 2 (H) 01/25/2023    MPV 10 6 01/25/2023     CMP:   Lab Results   Component Value Date    SODIUM 136 01/25/2023    CL 99 01/25/2023    CO2 28 01/25/2023    BUN 43 (H) 01/25/2023    CREATININE 1 32 (H) 01/25/2023    CALCIUM 8 8 01/25/2023    EGFR 39 01/25/2023     Urinalysis: No results found for: Sahra Lemuel, SPECGRAV, PHUR, LEUKOCYTESUR, NITRITE, PROTEINUA, GLUCOSEU, KETONESU, BILIRUBINUR, BLOODU  Urine Culture: No results found for: URINECX  PSA: No results found for: PSA  Imaging Studies: I have personally reviewed pertinent reports     and I have personally reviewed pertinent films in PACS  EKG, Pathology, and Other Studies: I have personally reviewed pertinent reports  and I have personally reviewed pertinent films in PACS    Code Status: Level 1 - Full Code  Advance Directive and Living Will:      Power of :    POLST:      Counseling / Coordination of Care  Total floor / unit time spent today 20 minutes  Greater than 50% of total time was spent with the patient and / or family counseling and / or coordination of care   A description of the counseling / coordination of care:

## 2023-01-25 NOTE — OCCUPATIONAL THERAPY NOTE
Occupational Therapy Evaluation     Evaluation: 1007-9978  Treatment: 2448-2096    Patient Name: Tiffany Hu  Today's Date: 1/25/2023  Problem List  Principal Problem:    Closed displaced spiral fracture of shaft of left tibia  Active Problems:    Atrial fibrillation (HCC)    Diastolic congestive heart failure (HCC)    Type 2 diabetes mellitus with diabetic nephropathy (HCC)    JOS (obstructive sleep apnea)    Acute pain of left shoulder    Urinary retention    Past Medical History  Past Medical History:   Diagnosis Date    Arthritis     CHF [facility]congestive heart failure) ([facility])     Diabetes mellitus ([facility] )     Disease of thyroid gland     Hypertension     Pacemaker     Renal disorder      Past Surgical History  Past Surgical History:   Procedure Laterality Date    CARDIAC PACEMAKER PLACEMENT      CHOLECYSTECTOMY      FL GUIDED NEEDLE PLAC BX/ASP/INJ  10/4/2022    FL GUIDED NEEDLE PLAC BX/ASP/INJ  10/20/2022    JOINT REPLACEMENT      bilateral knee replacements    NERVE BLOCK Bilateral 10/4/2022    Procedure: BLOCK MEDIAL BRANCH NERVES BILATERAL L3, L4, L5 #1;  Surgeon: Beatriz Woods MD;  [facility];  Service: Pain Management     NERVE BLOCK Bilateral 10/20/2022    Procedure: BLOCK MEDIAL BRANCH L3, L4, L5 #2;  Surgeon: Beatriz Woods MD;  [facility];  Service: Pain Management     ORIF TIBIA & FIBULA FRACTURES Left 10/29/2020    Procedure: OPEN REDUCTION W/ INTERNAL FIXATION (ORIF) ANKLE;  Surgeon: Cole Cm; [facility];  Service: Orthopedics    TONSILLECTOMY      TUBAL LIGATION           01/25/23 1037   Note Type   Note type Evaluation   Pain Assessment   Pain Assessment Tool FLACC   Pain Score 9   Pain Location/Orientation Orientation: Left; Location: Shoulder; Location: Leg   Pain Rating: FLACC (Rest) - Face 1   Pain Rating: FLACC (Rest) - Legs 0   Pain Rating: FLACC (Rest) - Activity 0   Pain Rating: FLACC (Rest) - Cry 1   Pain Rating: FLACC (Rest) - Consolability 0   Score: FLACC (Rest) 2   Pain Rating: FLACC (Activity) - Face 1   Pain Rating: FLACC (Activity) - Legs 1   Pain Rating: FLACC (Activity) - Activity 0   Pain Rating: FLACC (Activity) - Cry 1   Pain Rating: FLACC (Activity) - Consolability 0   Score: FLACC (Activity) 3   Restrictions/Precautions   Weight Bearing Precautions Per Order Yes   LLE Weight Bearing Per Order NWB  (cam boot to be placed in next 24-48 hours)   Other Precautions Fall Risk;Pain;WBS   Home Living   Type of Home House  (1 FERNANDO)   Home Layout Two level;1/2 bath on main level;Bed/bath upstairs  (stair glide to 2nd floor)   Bathroom Shower/Tub Tub/shower unit   [employer] Raised   Bathroom Equipment Tub transfer bench;Grab bars in shower;Grab bars around toilet   [address] [facility]; Wheelchair-manual   Additional Comments Pt reports living in a 2 story home with 1 FERNANDO  1/2 bath on 1st floor, stair glide to 2nd floor  Pt ambulates with RW at baseline although also has a w/c for pRN use   Prior Function   Level of [place] Independent with ADLs; Independent with functional mobility; Independent with IADLS   Lives With Spouse   Receives Help From [facility] in the last 6 months 1 to 4   Comments Pt repors completing ADLs and functional ambulation @ Mod I with RW  Pt has assistance for IADLs and community mobility from family    ADL   Where Assessed Edge of bed   Grooming Assistance 5  Supervision/Setup   Grooming Deficit Setup   UB Dressing Assistance 2  Maximal Assistance    [address]  Total Assistance   [address] [facility]; Requires assistive device for steadying;Verbal cueing;Supervision/safety; Increased time to complete; Don/doff R sock; Don/doff L sock; Thread RLE into pants; Thread LLE into pants;Pull up over hips   Toileting Assistance  1  Total Assistance   Toileting Deficit Steadying;Verbal cueing;Supervison/safety; Increased time to complete; Bedside commode;Clothing management up;Clothing management down;Perineal hygiene   Additional Comments Pt completing ADL tasks while seated at EOB  UB Dressing @ Max A d/t pain in LUE and inability to thread arm without assistance  Mod A for donning shirt over head  Total A for LB Dressing for donning socks/pants around feet and for CM around waist d/t requiring BUE supported at RW at all times  Please refer ot treatment note fo rperformance during grooming and toileting takss  Bed Mobility   Supine to Sit 2  Maximal assistance   Additional items Assist x 2; Increased time required;Verbal cues;LE management  (trunk management)   Transfers   Sit to Stand 2  Maximal assistance  (+1 person to maintain LLE NWB)   Additional items Assist x 2; Increased time required;Verbal cues   Stand to Sit 3  Moderate assistance   Additional items Assist x 1; Increased time required;Verbal cues   Stand pivot Unable to assess   Toilet transfer 2  Maximal assistance   Additional items Assist x 2; Increased time required;Commode;Verbal cues   Additional Comments Pt completing functional trnasfers with use of RW For UB support Max x's 2 for STS from EOB with a 3rd person holding/assist LLE in order to maintain NWB  Pt then maintaining standing for ~ 30 seconds while a 4th person swapped bed for BSC to allow Pt to go to the bathroom  unable to safely assess SPT at this time d/t inability to weight hsiftin and pain in LUE while utilizing RW for UB support   Balance   Static Sitting Fair +   Dynamic Sitting Fair   Static Standing Poor +   Activity Tolerance   Activity Tolerance Patient limited by fatigue;Patient limited by pain   Medical Staff Made Aware [place] with PT, Beatriz Mcdonnell   Nurse Made Aware Spoke with RN, Steele Duverney Assessment   RUE Assessment WFL   Hand Function   Gross Motor Coordination Impaired  (LUE impaired)   Fine Motor Coordination Impaired  (LUE impaired)   Hand Function Comments When Pt fell, Pt landed on L side  L shoulder in pain although all imaging negative  Pt slightly guarding not not able to use functionally although was then able to use minimal on RW with encouragement while standing   Sensation   Light Touch No apparent deficits   Cognition   Overall Cognitive Status [facility]   Arousal/Participation Alert; Responsive; Cooperative   Attention Attends with cues to redirect   Memory Within functional limits   Following Commands Follows one step commands without difficulty   Assessment   Limitation Decreased ADL status; Decreased UE strength;Decreased Safe judgement during ADL;Decreased endurance;Decreased self-care trans;Decreased high-level ADLs   Prognosis Good   Assessment Pt is a 69 y o  female, admitted to [address] [place] [address] 1/23/2023 d/t experiencing a fall at home resulting in L shoulder pain and LLE pain  Dx: closed displaced spiral fracture of shaft of L tibia  Pt is currently NWB LLE for 3 months, with plans for CAM boot per Orthopedics  Pt with PMHx impacting their performance during ADL tasks, including: arthritis, CHF, DM, HTN, pacemaker, renal disorder  Pt with significant LLE surgical history with prior ankle hardware placed  Prior to admission to the hospital Pt was performing ADLs without physical assistance  IADLs with physical assistance  Functional transfers/ambulation without physical assistance  Cognitive status was PTA was intact  OT order placed to assess Pt's ADLs, cognitive status, and performance during functional tasks in order to maximize safety and independence while making most appropriate d/c recommendations   Pt's clinical presentation is currently unstable/unpredictable given new onset deficits that effect Pt's occupational performance and ability to safely return to OF including decrease activity tolerance, decrease standing balance, decrease sitting balance, decrease performance during ADL tasks, decrease safety awareness , decrease UB MS, increased pain, decrease generalized strength, decrease activity engagement, decrease performance during functional transfers, frequent falls, high fall risk and limited insight to deficits combined with medical complications of tachycardia, pain impacting overall mobility status, abnormal CBC, edema/swelling, decreased skin integrity, multiple readmissions, incontinence, fear/retreat and need for input for mobility technique/safety  Personal factors affecting Pt at time of initial evaluation include: inaccessible home environment, multi-level environment, availability as recommended, past experience, inability to perform current job functions, inability to perform IADLs, inability to perform ADLs, inability to ambulate household distances, inability to navigate community distances, limited insight into impairments, decreased initiation and engagement, recent fall(s)/fall history and questionable non-compliance  Pt will benefit from continued skilled OT services to address deficits as defined above and to maximize level independence/participation during ADLs and functional tasks to facilitate return toward PLOF and improved quality of life  From an occupational therapy standpoint, recommendation at time of d/c would be post acute rehab  Plan   Treatment Interventions ADL retraining;Functional transfer training;UE strengthening/ROM; Endurance training;Patient/family training;Equipment evaluation/education; Neuromuscular reeducation; Compensatory technique education;Continued evaluation; Energy conservation; Activityengagement   Goal Expiration Date 02/08/23   OT Treatment Day 1   OT Frequency 3-5x/wk   Recommendation   OT Discharge Recommendation Post acute rehabilitation services   AM-PAC Daily Activity Inpatient   Lower Body Dressing 1   Bathing 2   Toileting 1   Upper Body Dressing 2   Grooming 3   Eating 3   Daily Activity Raw Score 12   Daily Activity Standardized Score (Calc for Raw Score >=11) 30 6   AM-PAC Applied Cognition Inpatient   Following a Speech/Presentation 4   Understanding Ordinary Conversation 4   Taking Medications 4   Remembering Where Things Are Placed or Put Away 4   Remembering List of 4-5 Errands 4   Taking Care of Complicated Tasks 4   Applied Cognition Raw Score 24   Applied Cognition Standardized Score 62 21   Additional Treatment Session   Start Time 1100   End Time 1110   Treatment Assessment Pt seen for treatment session #1 this date  Pt alert and agreeable to participate at this time  Pt completing toileitng tasks with use of BSC @ Max-Total A  Max x's 2 for STS from [facility] with 3rd person keeping LLE off floor  Pt then standing for 30 seconds while therapist completed hygiene tasks and CM around waist while a 4th person swapped BSC for bed  Pt then seated at EOB while maintaining F+ unsupported sitting balance  Will continue to assess Pt's ability to complete SPT as able when LUE pain is better control  Pt then completin grooming tasks @ S after set-up with use of RUE only  Pt encouraged to use LUE although Pt reports "it just hurts so bad whenever i move it"  Pt supine in bed at end of session with call bell inr each, chair alarm intact josue ll needs met at this time  Additional Treatment Day 1     The patient's raw score on the AM-PAC Daily Activity inpatient short form is 12, standardized score is 30 6, less than 39 4  Patients at this level are likely to benefit from DC to post-acute rehabilitation services  Please refer to the recommendation of the Occupational Therapist for safe DC planning  Pt goals to be met by 2/8/2023    Pt will demonstrate ability to complete supine<>sit @ S in order to increase safety and independence during ADL tasks  Pt will demonstrate ability to complete UB ADLs including washing/dressing @ S in order to increase performance and participation during meaningful tasks  Pt will demonstrate ability to complete LB dressing @ Min A in order to increase safety and independence during meaningful tasks     Pt will demonstrate ability to koby/doff socks/shoes while sitting EOB @ Min A in order to increase safety and independence during meaningful tasks  Pt will demonstrate ability to complete toileting tasks including CM and pericare @ Min A in order to increase safety and independence during meaningful tasks  Pt will demonstrate ability to complete EOB, chair, toilet/commode transfers @ Min A in order to increase performance and participation during functional tasks  Pt will demonstrate ability to stand for 2-3 minutes while maintaining F+ balance with use of RW for UB support PRN  Pt will demonstrate ability to tolerate 30-35 minute OT session with no vc'ing for deep breathing or use of energy conservation techniques in order to increase activity tolerance during functional tasks  Pt will demonstrate Good carryover of use of energy conservation/compensatory strategies during ADLs and functional tasks in order to increase safety and reduce risk for falls  Pt will demonstrate Good attention and participation in continued evaluation of functional ambulation house hold distances in order to assist with safe d/c planning  Pt will attend to continued cognitive assessments 100% of the time in order to provide most appropriate d/c recommendations  Pt will follow 100% simple 2-step commands and be A&O x4 consistently with environmental cues to increase participation in functional activities  Pt will identify 3 areas of interest/hobbies and 1 intervention on how to incorporate into daily life in order to increase interaction with environment and peers as well as increase participation in meaningful tasks  Pt will demonstrate 100% carryover of BUE HEP in order to increase BUE MS and increase performance during functional tasks upon d/c home      Daylin Yoly OTR/L

## 2023-01-26 ENCOUNTER — APPOINTMENT (INPATIENT)
Dept: RADIOLOGY | Facility: HOSPITAL | Age: 78
End: 2023-01-26

## 2023-01-26 VITALS
DIASTOLIC BLOOD PRESSURE: 70 MMHG | HEART RATE: 102 BPM | BODY MASS INDEX: 41.46 KG/M2 | WEIGHT: 225.31 LBS | OXYGEN SATURATION: 92 % | RESPIRATION RATE: 20 BRPM | TEMPERATURE: 98.8 F | HEIGHT: 62 IN | SYSTOLIC BLOOD PRESSURE: 120 MMHG

## 2023-01-26 LAB
ANION GAP SERPL CALCULATED.3IONS-SCNC: 11 MMOL/L (ref 4–13)
BUN SERPL-MCNC: 41 MG/DL (ref 5–25)
CALCIUM SERPL-MCNC: 8.3 MG/DL (ref 8.4–10.2)
CHLORIDE SERPL-SCNC: 97 MMOL/L (ref 96–108)
CO2 SERPL-SCNC: 24 MMOL/L (ref 21–32)
CREAT SERPL-MCNC: 1.25 MG/DL (ref 0.6–1.3)
ERYTHROCYTE [DISTWIDTH] IN BLOOD BY AUTOMATED COUNT: 15.9 % (ref 11.6–15.1)
GFR SERPL CREATININE-BSD FRML MDRD: 41 ML/MIN/1.73SQ M
GLUCOSE SERPL-MCNC: 166 MG/DL (ref 65–140)
GLUCOSE SERPL-MCNC: 176 MG/DL (ref 65–140)
GLUCOSE SERPL-MCNC: 196 MG/DL (ref 65–140)
HCT VFR BLD AUTO: 31.7 % (ref 34.8–46.1)
HGB BLD-MCNC: 10.1 G/DL (ref 11.5–15.4)
INR PPP: 1.74 (ref 0.84–1.19)
MCH RBC QN AUTO: 25.9 PG (ref 26.8–34.3)
MCHC RBC AUTO-ENTMCNC: 31.9 G/DL (ref 31.4–37.4)
MCV RBC AUTO: 81 FL (ref 82–98)
PLATELET # BLD AUTO: 147 THOUSANDS/UL (ref 149–390)
PMV BLD AUTO: 10.2 FL (ref 8.9–12.7)
POTASSIUM SERPL-SCNC: 3.5 MMOL/L (ref 3.5–5.3)
PROTHROMBIN TIME: 20.5 SECONDS (ref 11.6–14.5)
RBC # BLD AUTO: 3.9 MILLION/UL (ref 3.81–5.12)
SARS-COV-2 RNA RESP QL NAA+PROBE: NEGATIVE
SODIUM SERPL-SCNC: 132 MMOL/L (ref 135–147)
WBC # BLD AUTO: 9.97 THOUSAND/UL (ref 4.31–10.16)

## 2023-01-26 PROCEDURE — 2W3RX2Z IMMOBILIZATION OF LEFT LOWER LEG USING CAST: ICD-10-PCS | Performed by: ORTHOPAEDIC SURGERY

## 2023-01-26 RX ORDER — DOCUSATE SODIUM 100 MG/1
100 CAPSULE, LIQUID FILLED ORAL 2 TIMES DAILY
Refills: 0
Start: 2023-01-26

## 2023-01-26 RX ORDER — ACETAMINOPHEN 325 MG/1
650 TABLET ORAL EVERY 6 HOURS SCHEDULED
Status: DISCONTINUED | OUTPATIENT
Start: 2023-01-26 | End: 2023-01-26 | Stop reason: HOSPADM

## 2023-01-26 RX ORDER — TRAMADOL HYDROCHLORIDE 50 MG/1
50 TABLET ORAL EVERY 6 HOURS PRN
Qty: 8 TABLET | Refills: 0 | Status: SHIPPED | OUTPATIENT
Start: 2023-01-26 | End: 2023-01-28

## 2023-01-26 RX ORDER — SENNOSIDES 8.8 MG/5ML
8.8 LIQUID ORAL DAILY
Refills: 0
Start: 2023-01-27

## 2023-01-26 RX ORDER — INSULIN LISPRO 100 [IU]/ML
1-6 INJECTION, SOLUTION INTRAVENOUS; SUBCUTANEOUS
Refills: 0
Start: 2023-01-26

## 2023-01-26 RX ORDER — INSULIN LISPRO 100 [IU]/ML
2-12 INJECTION, SOLUTION INTRAVENOUS; SUBCUTANEOUS
Status: DISCONTINUED | OUTPATIENT
Start: 2023-01-26 | End: 2023-01-26 | Stop reason: HOSPADM

## 2023-01-26 RX ORDER — TAMSULOSIN HYDROCHLORIDE 0.4 MG/1
0.4 CAPSULE ORAL
Refills: 0
Start: 2023-01-26

## 2023-01-26 RX ORDER — TAMSULOSIN HYDROCHLORIDE 0.4 MG/1
0.4 CAPSULE ORAL
Status: DISCONTINUED | OUTPATIENT
Start: 2023-01-26 | End: 2023-01-26 | Stop reason: HOSPADM

## 2023-01-26 RX ORDER — INSULIN LISPRO 100 [IU]/ML
2-12 INJECTION, SOLUTION INTRAVENOUS; SUBCUTANEOUS
Refills: 0
Start: 2023-01-26

## 2023-01-26 RX ORDER — WARFARIN SODIUM 4 MG/1
4 TABLET ORAL
Status: DISCONTINUED | OUTPATIENT
Start: 2023-01-26 | End: 2023-01-26 | Stop reason: HOSPADM

## 2023-01-26 RX ORDER — LIDOCAINE 50 MG/G
1 PATCH TOPICAL DAILY
Refills: 0
Start: 2023-01-27

## 2023-01-26 RX ADMIN — LEVOTHYROXINE SODIUM 150 MCG: 150 TABLET ORAL at 08:16

## 2023-01-26 RX ADMIN — ACETAMINOPHEN 325MG 650 MG: 325 TABLET ORAL at 12:03

## 2023-01-26 RX ADMIN — AMLODIPINE BESYLATE 2.5 MG: 2.5 TABLET ORAL at 08:16

## 2023-01-26 RX ADMIN — ACETAMINOPHEN 325MG 650 MG: 325 TABLET ORAL at 08:16

## 2023-01-26 RX ADMIN — TRAMADOL HYDROCHLORIDE 50 MG: 50 TABLET ORAL at 03:50

## 2023-01-26 RX ADMIN — DOCUSATE SODIUM 100 MG: 100 CAPSULE, LIQUID FILLED ORAL at 08:16

## 2023-01-26 RX ADMIN — INSULIN LISPRO 1 UNITS: 100 INJECTION, SOLUTION INTRAVENOUS; SUBCUTANEOUS at 12:03

## 2023-01-26 RX ADMIN — TRAMADOL HYDROCHLORIDE 50 MG: 50 TABLET ORAL at 16:42

## 2023-01-26 RX ADMIN — LIDOCAINE 5% 1 PATCH: 700 PATCH TOPICAL at 08:16

## 2023-01-26 RX ADMIN — GABAPENTIN 600 MG: 300 CAPSULE ORAL at 08:16

## 2023-01-26 RX ADMIN — INSULIN LISPRO 2 UNITS: 100 INJECTION, SOLUTION INTRAVENOUS; SUBCUTANEOUS at 08:18

## 2023-01-26 RX ADMIN — TORSEMIDE 50 MG: 100 TABLET ORAL at 08:16

## 2023-01-26 RX ADMIN — HYDROMORPHONE HYDROCHLORIDE 0.5 MG: 1 INJECTION, SOLUTION INTRAMUSCULAR; INTRAVENOUS; SUBCUTANEOUS at 13:58

## 2023-01-26 RX ADMIN — HYDROMORPHONE HYDROCHLORIDE 0.5 MG: 1 INJECTION, SOLUTION INTRAMUSCULAR; INTRAVENOUS; SUBCUTANEOUS at 08:15

## 2023-01-26 RX ADMIN — SENNOSIDES 8.8 MG: 8.8 SYRUP ORAL at 08:16

## 2023-01-26 NOTE — PLAN OF CARE
Problem: Potential for Falls  Goal: Patient will remain free of falls  Description: INTERVENTIONS:  - Educate patient/family on patient safety including physical limitations  - Instruct patient to call for assistance with activity   - Consult OT/PT to assist with strengthening/mobility   - Keep Call bell within reach  - Keep bed low and locked with side rails adjusted as appropriate  - Keep care items and personal belongings within reach  - Initiate and maintain comfort rounds  - Make Fall Risk Sign visible to staff  - Offer Toileting every 2 Hours, in advance of need  - Initiate/Maintain bed alarm  - Obtain necessary fall risk management equipment: yellow socks  - Apply yellow socks and bracelet for high fall risk patients  - Consider moving patient to room near nurses station  Outcome: Progressing     Problem: MOBILITY - ADULT  Goal: Maintain or return to baseline ADL function  Description: INTERVENTIONS:  - Educate patient/family on patient safety including physical limitations  - Instruct patient to call for assistance with activity   - Consult OT/PT to assist with strengthening/mobility   - Keep Call bell within reach  - Keep bed low and locked with side rails adjusted as appropriate  - Keep care items and personal belongings within reach  - Initiate and maintain comfort rounds  - Make Fall Risk Sign visible to staff  - Offer Toileting every 2Hours, in advance of need  - Initiate/Maintain bed alarm  - Obtain necessary fall risk management equipment: yellow socks, walker  - Apply yellow socks and bracelet for high fall risk patients  - Consider moving patient to room near nurses station  Outcome: Progressing  Goal: Maintains/Returns to pre admission functional level  Description: INTERVENTIONS:  - Perform BMAT or MOVE assessment daily    - Set and communicate daily mobility goal to care team and patient/family/caregiver     - Collaborate with rehabilitation services on mobility goals if consulted  - Perform Range of Motion 3 times a day  - Reposition patient every 3 hours    - Dangle patient 3 times a day  - Stand patient 3 times a day  - Ambulate patient 3 times a day  - Out of bed to chair 3 times a day   - Out of bed for meals 3 times a day  - Out of bed for toileting  - Record patient progress and toleration of activity level   Outcome: Progressing     Problem: PAIN - ADULT  Goal: Verbalizes/displays adequate comfort level or baseline comfort level  Description: Interventions:  - Encourage patient to monitor pain and request assistance  - Assess pain using appropriate pain scale  - Administer analgesics based on type and severity of pain and evaluate response  - Implement non-pharmacological measures as appropriate and evaluate response  - Consider cultural and social influences on pain and pain management  - Notify physician/advanced practitioner if interventions unsuccessful or patient reports new pain  Outcome: Progressing     Problem: SAFETY ADULT  Goal: Patient will remain free of falls  Description: INTERVENTIONS:  - Educate patient/family on patient safety including physical limitations  - Instruct patient to call for assistance with activity   - Consult OT/PT to assist with strengthening/mobility   - Keep Call bell within reach  - Keep bed low and locked with side rails adjusted as appropriate  - Keep care items and personal belongings within reach  - Initiate and maintain comfort rounds  - Make Fall Risk Sign visible to staff  - Offer Toileting every 2 Hours, in advance of need  - Initiate/Maintain bed alarm  - Obtain necessary fall risk management equipment: yellow socks  - Apply yellow socks and bracelet for high fall risk patients  - Consider moving patient to room near nurses station  Outcome: Progressing  Goal: Maintain or return to baseline ADL function  Description: INTERVENTIONS:  - Educate patient/family on patient safety including physical limitations  - Instruct patient to call for assistance with activity   - Consult OT/PT to assist with strengthening/mobility   - Keep Call bell within reach  - Keep bed low and locked with side rails adjusted as appropriate  - Keep care items and personal belongings within reach  - Initiate and maintain comfort rounds  - Make Fall Risk Sign visible to staff  - Offer Toileting every 2Hours, in advance of need  - Initiate/Maintain bed alarm  - Obtain necessary fall risk management equipment: yellow socks, walker  - Apply yellow socks and bracelet for high fall risk patients  - Consider moving patient to room near nurses station  Outcome: Progressing  Goal: Maintains/Returns to pre admission functional level  Description: INTERVENTIONS:  - Perform BMAT or MOVE assessment daily    - Set and communicate daily mobility goal to care team and patient/family/caregiver  - Collaborate with rehabilitation services on mobility goals if consulted  - Perform Range of Motion 3 times a day  - Reposition patient every 3 hours    - Dangle patient 3 times a day  - Stand patient 3 times a day  - Ambulate patient 3 times a day  - Out of bed to chair 3 times a day   - Out of bed for meals 3 times a day  - Out of bed for toileting  - Record patient progress and toleration of activity level   Outcome: Progressing     Problem: DISCHARGE PLANNING  Goal: Discharge to home or other facility with appropriate resources  Description: INTERVENTIONS:  - Identify barriers to discharge w/patient and caregiver  - Arrange for needed discharge resources and transportation as appropriate  - Identify discharge learning needs (meds, wound care, etc )  - Arrange for interpretive services to assist at discharge as needed  - Refer to Case Management Department for coordinating discharge planning if the patient needs post-hospital services based on physician/advanced practitioner order or complex needs related to functional status, cognitive ability, or social support system  Outcome: Progressing     Problem: Knowledge Deficit  Goal: Patient/family/caregiver demonstrates understanding of disease process, treatment plan, medications, and discharge instructions  Description: Complete learning assessment and assess knowledge base  Interventions:  - Provide teaching at level of understanding  - Provide teaching via preferred learning methods  Outcome: Progressing     Problem: Nutrition/Hydration-ADULT  Goal: Nutrient/Hydration intake appropriate for improving, restoring or maintaining nutritional needs  Description: Monitor and assess patient's nutrition/hydration status for malnutrition  Collaborate with interdisciplinary team and initiate plan and interventions as ordered  Monitor patient's weight and dietary intake as ordered or per policy  Utilize nutrition screening tool and intervene as necessary  Determine patient's food preferences and provide high-protein, high-caloric foods as appropriate       INTERVENTIONS:  - Monitor oral intake, urinary output, labs, and treatment plans  - Assess nutrition and hydration status and recommend course of action  - Evaluate amount of meals eaten  - Assist patient with eating if necessary   - Allow adequate time for meals  - Recommend/ encourage appropriate diets, oral nutritional supplements, and vitamin/mineral supplements  - Order, calculate, and assess calorie counts as needed  - Recommend, monitor, and adjust tube feedings and TPN/PPN based on assessed needs  - Assess need for intravenous fluids  - Provide specific nutrition/hydration education as appropriate  - Include patient/family/caregiver in decisions related to nutrition  Outcome: Progressing     Problem: Prexisting or High Potential for Compromised Skin Integrity  Goal: Skin integrity is maintained or improved  Description: INTERVENTIONS:  - Identify patients at risk for skin breakdown  - Assess and monitor skin integrity  - Assess and monitor nutrition and hydration status  - Monitor labs   - Assess for incontinence - Turn and reposition patient  - Assist with mobility/ambulation  - Relieve pressure over bony prominences  - Avoid friction and shearing  - Provide appropriate hygiene as needed including keeping skin clean and dry  - Evaluate need for skin moisturizer/barrier cream  - Collaborate with interdisciplinary team   - Patient/family teaching  - Consider wound care consult   Outcome: Progressing

## 2023-01-26 NOTE — DISCHARGE INSTR - AVS FIRST PAGE
Repeat INR in 2 days- adjust coumadin accordingly    -urology follow-up 5-7  days   - Orthopedic follow-up 1 week with xray

## 2023-01-26 NOTE — PROGRESS NOTES
Brief note: After patient was placed in a long cam boot, x-rays were obtained evaluate fracture position  These were reviewed and note acceptable position and alignment of the distal tibial shaft fracture and proximal fibular fractures  For continued treatment in the cam boot and weightbearing on the left lower extremity  Patient will need repeat x-ray and evaluation in 1 week

## 2023-01-26 NOTE — ASSESSMENT & PLAN NOTE
· V paced rhythm, Rate 78  · Not any beta-blocker, patient has pacemaker in place  · [facility]: Coumadin 4 mg daily except Tuesday when she takes 8 mg (reviewed last pharmacy dosing note 1/10/23)  · Coumadin held on admission with fracture-Per Ortho can resume, conservative management  · INR 2> 1 7- resumed PTA dosing with Repeat INR 2 days

## 2023-01-26 NOTE — CASE MANAGEMENT
Case Management Discharge Planning Note    Patient name Toniann Osgood  Location /-23 MRN [id number]  : 1945 Date 2023       Current Admission Date: 2023  Current Admission Diagnosis:Closed displaced spiral fracture of shaft of left tibia   Patient Active Problem List    Diagnosis Date Noted   • Urinary retention 2023   • Acute pain of left shoulder 2023   • Closed displaced spiral fracture of shaft of left tibia 2023   • Lumbar spondylosis 10/12/2022   • Peripheral vascular disease ([address] ) 2022   • Primary osteoarthritis of both hips 2022   • Pain in left hip 2022   • Chronic pain of left knee 10/13/2021   • Presence of artificial knee joint, left 10/13/2021   • Nausea and vomiting 2021   • Acute respiratory failure with hypoxia ([address] ) 2021   • Sick sinus syndrome ([address] ) 2021   • Pulmonary hypertension ([address] ) 2021   • Supratherapeutic INR 2021   • Acute kidney injury ([address] ) 06/15/2021   • Acquired hypothyroidism    • Diabetes mellitus ([address] )    • CHF (congestive heart failure) ([address] )    • Essential hypertension    • Renal disorder    • Closed fracture of left ankle 2021   • Closed bimalleolar fracture of left ankle 2020   • Pacemaker 10/29/2020   • Type 2 diabetes mellitus with diabetic neuropathy ([address] ) 10/29/2020   • Severe obesity with body mass index (BMI) of 36 0 to 36 9 with serious comorbidity ([address] )    • Stage 3 chronic kidney disease ([address] ) 10/26/2020   • Atrial fibrillation ([address] )    • Diastolic congestive heart failure ([address] ) 10/25/2020   • Type 2 diabetes mellitus with diabetic nephropathy ([address] ) 10/25/2020   • JOS (obstructive sleep apnea) 10/25/2020      LOS (days): 3  Geometric Mean LOS (GMLOS) (days): 2 70  Days to GMLOS:-0 2     OBJECTIVE:  Risk of Unplanned Readmission Score: 17 3         Current admission status: Inpatient   Preferred Pharmacy:   [facility] [place] - [address]  [address] [address]  Phone: [phone] Fax: [phone]    Primary Care Provider: Akosua Stevens MD    Primary Insurance: Nolan URBINA  Secondary Insurance:     DISCHARGE DETAILS:    Discharge planning discussed with[de-identified] patient  Freedom of Choice: Yes  Comments - Freedom of Choice: Amy Chen            Transported by [employer] and Unit #): [facility]  ETA of Transport (Date): 01/26/23  ETA of Transport (Time): 1630               CM met with patient to let her know auth received for [facility]  CM submitted [employer] request for BLS transport to [facility]  CM left voice message for spouse making him aware of patients discharge today to [facility]  CM submitted [facility] Request to [employer] for [employer]

## 2023-01-26 NOTE — CASE MANAGEMENT
Case Management Discharge Planning Note    Patient name Katina Del Castillo  Location /-33 MRN [id number]  : 1945 Date 2023       Current Admission Date: 2023  Current Admission Diagnosis:Closed displaced spiral fracture of shaft of left tibia   Patient Active Problem List    Diagnosis Date Noted   • Urinary retention 2023   • Acute pain of left shoulder 2023   • Closed displaced spiral fracture of shaft of left tibia 2023   • Lumbar spondylosis 10/12/2022   • Peripheral vascular disease ([address] ) 2022   • Primary osteoarthritis of both hips 2022   • Pain in left hip 2022   • Chronic pain of left knee 10/13/2021   • Presence of artificial knee joint, left 10/13/2021   • Nausea and vomiting 2021   • Acute respiratory failure with hypoxia ([address] ) 2021   • Sick sinus syndrome ([address] ) 2021   • Pulmonary hypertension ([address] ) 2021   • Supratherapeutic INR 2021   • Acute kidney injury ([address] ) 06/15/2021   • Acquired hypothyroidism    • Diabetes mellitus ([address] )    • CHF (congestive heart failure) ([address] )    • Essential hypertension    • Renal disorder    • Closed fracture of left ankle 2021   • Closed bimalleolar fracture of left ankle 2020   • Pacemaker 10/29/2020   • Type 2 diabetes mellitus with diabetic neuropathy ([address] ) 10/29/2020   • Severe obesity with body mass index (BMI) of 36 0 to 36 9 with serious comorbidity ([address] )    • Stage 3 chronic kidney disease ([address] ) 10/26/2020   • Atrial fibrillation ([address] ) 10/22/0502   • Diastolic congestive heart failure ([address] ) 10/25/2020   • Type 2 diabetes mellitus with diabetic nephropathy ([address] ) 10/25/2020   • JOS (obstructive sleep apnea) 10/25/2020      LOS (days): 3  Geometric Mean LOS (GMLOS) (days): 2 70  Days to GMLOS:0     OBJECTIVE:  Risk of Unplanned Readmission Score: 17 26         Current admission status: Inpatient   Preferred Pharmacy:   [facility] [facility] [place] - [address] [facility]  [address] [address]  Phone: [phone] Fax: [phone]    Primary Care Provider: Meg Pace MD    Primary Insurance: Marie URBINA  Secondary Insurance:     DISCHARGE DETAILS:    Discharge planning discussed with[de-identified] patient and daughterLuana Nan of Choice: Yes  Comments - Freedom of Choice: ANGELINE [facility]  CM contacted family/caregiver?: Yes  Were Treatment Team discharge recommendations reviewed with patient/caregiver?: Yes  Did patient/caregiver verbalize understanding of patient care needs?: Yes  Were patient/caregiver advised of the risks associated with not following Treatment Team discharge recommendations?: Yes    Contacts  Patient Contacts: Bright oWrthy, daughter  Relationship to Patient[de-identified] Family  Contact Method: Phone  Phone Number: see face sheet  Reason/Outcome: Discharge [address]         Is the patient interested in [address] at discharge?: No    DME Referral Provided  Referral made for DME?: No    Other Referral/Resources/Interventions Provided:  Interventions: Short Term Rehab  Referral Comments: ANGELINE [facility]  [employer] [facility]         Treatment Team Recommendation: Short Term Rehab  Discharge Destination Plan[de-identified] Short Term Rehab  Transport at Discharge : BLS Ambulance         CM met with patient, and daughter Bright Worthy was on phone, to review [facility] Provider Choice List: [facility] [employer], [place] [facility], [facility], [place] and [place]  Patient chose W [place]  CM made [facility] providers aware of patients choice  CM sent for insurance auth for [facility]

## 2023-01-26 NOTE — PLAN OF CARE
Problem: Potential for Falls  Goal: Patient will remain free of falls  Description: INTERVENTIONS:  - Educate patient/family on patient safety including physical limitations  - Instruct patient to call for assistance with activity   - Consult OT/PT to assist with strengthening/mobility   - Keep Call bell within reach  - Keep bed low and locked with side rails adjusted as appropriate  - Keep care items and personal belongings within reach  - Initiate and maintain comfort rounds  - Make Fall Risk Sign visible to staff  - Offer Toileting every 2 Hours, in advance of need  - Initiate/Maintain bed alarm  - Obtain necessary fall risk management equipment: yellow socks  - Apply yellow socks and bracelet for high fall risk patients  - Consider moving patient to room near nurses station  Outcome: Progressing     Problem: MOBILITY - ADULT  Goal: Maintain or return to baseline ADL function  Description: INTERVENTIONS:  - Educate patient/family on patient safety including physical limitations  - Instruct patient to call for assistance with activity   - Consult OT/PT to assist with strengthening/mobility   - Keep Call bell within reach  - Keep bed low and locked with side rails adjusted as appropriate  - Keep care items and personal belongings within reach  - Initiate and maintain comfort rounds  - Make Fall Risk Sign visible to staff  - Offer Toileting every 2Hours, in advance of need  - Initiate/Maintain bed alarm  - Obtain necessary fall risk management equipment: yellow socks, walker  - Apply yellow socks and bracelet for high fall risk patients  - Consider moving patient to room near nurses station  Outcome: Progressing  Goal: Maintains/Returns to pre admission functional level  Description: INTERVENTIONS:  - Perform BMAT or MOVE assessment daily    - Set and communicate daily mobility goal to care team and patient/family/caregiver     - Collaborate with rehabilitation services on mobility goals if consulted  - Out of bed for toileting  - Record patient progress and toleration of activity level   Outcome: Progressing     Problem: PAIN - ADULT  Goal: Verbalizes/displays adequate comfort level or baseline comfort level  Description: Interventions:  - Encourage patient to monitor pain and request assistance  - Assess pain using appropriate pain scale  - Administer analgesics based on type and severity of pain and evaluate response  - Implement non-pharmacological measures as appropriate and evaluate response  - Consider cultural and social influences on pain and pain management  - Notify physician/advanced practitioner if interventions unsuccessful or patient reports new pain  Outcome: Progressing     Problem: SAFETY ADULT  Goal: Patient will remain free of falls  Description: INTERVENTIONS:  - Educate patient/family on patient safety including physical limitations  - Instruct patient to call for assistance with activity   - Consult OT/PT to assist with strengthening/mobility   - Keep Call bell within reach  - Keep bed low and locked with side rails adjusted as appropriate  - Keep care items and personal belongings within reach  - Initiate and maintain comfort rounds  - Make Fall Risk Sign visible to staff  - Offer Toileting every 2 Hours, in advance of need  - Initiate/Maintain bed alarm  - Obtain necessary fall risk management equipment: yellow socks  - Apply yellow socks and bracelet for high fall risk patients  - Consider moving patient to room near nurses station  Outcome: Progressing  Goal: Maintain or return to baseline ADL function  Description: INTERVENTIONS:  - Educate patient/family on patient safety including physical limitations  - Instruct patient to call for assistance with activity   - Consult OT/PT to assist with strengthening/mobility   - Keep Call bell within reach  - Keep bed low and locked with side rails adjusted as appropriate  - Keep care items and personal belongings within reach  - Initiate and maintain comfort rounds  - Make Fall Risk Sign visible to staff  - Offer Toileting every 2Hours, in advance of need  - Initiate/Maintain bed alarm  - Obtain necessary fall risk management equipment: yellow socks, walker  - Apply yellow socks and bracelet for high fall risk patients  - Consider moving patient to room near nurses station  Outcome: Progressing  Goal: Maintains/Returns to pre admission functional level  Description: INTERVENTIONS:  - Perform BMAT or MOVE assessment daily    - Set and communicate daily mobility goal to care team and patient/family/caregiver  - Collaborate with rehabilitation services on mobility goals if consulted  - Out of bed for toileting  - Record patient progress and toleration of activity level   Outcome: Progressing     Problem: DISCHARGE PLANNING  Goal: Discharge to home or other facility with appropriate resources  Description: INTERVENTIONS:  - Identify barriers to discharge w/patient and caregiver  - Arrange for needed discharge resources and transportation as appropriate  - Identify discharge learning needs (meds, wound care, etc )  - Arrange for interpretive services to assist at discharge as needed  - Refer to Case Management Department for coordinating discharge planning if the patient needs post-hospital services based on physician/advanced practitioner order or complex needs related to functional status, cognitive ability, or social support system  Outcome: Progressing     Problem: Knowledge Deficit  Goal: Patient/family/caregiver demonstrates understanding of disease process, treatment plan, medications, and discharge instructions  Description: Complete learning assessment and assess knowledge base    Interventions:  - Provide teaching at level of understanding  - Provide teaching via preferred learning methods  Outcome: Progressing     Problem: Nutrition/Hydration-ADULT  Goal: Nutrient/Hydration intake appropriate for improving, restoring or maintaining nutritional needs  Description: Monitor and assess patient's nutrition/hydration status for malnutrition  Collaborate with interdisciplinary team and initiate plan and interventions as ordered  Monitor patient's weight and dietary intake as ordered or per policy  Utilize nutrition screening tool and intervene as necessary  Determine patient's food preferences and provide high-protein, high-caloric foods as appropriate       INTERVENTIONS:  - Monitor oral intake, urinary output, labs, and treatment plans  - Assess nutrition and hydration status and recommend course of action  - Evaluate amount of meals eaten  - Assist patient with eating if necessary   - Allow adequate time for meals  - Recommend/ encourage appropriate diets, oral nutritional supplements, and vitamin/mineral supplements  - Order, calculate, and assess calorie counts as needed  - Recommend, monitor, and adjust tube feedings and TPN/PPN based on assessed needs  - Assess need for intravenous fluids  - Provide specific nutrition/hydration education as appropriate  - Include patient/family/caregiver in decisions related to nutrition  Outcome: Progressing     Problem: Prexisting or High Potential for Compromised Skin Integrity  Goal: Skin integrity is maintained or improved  Description: INTERVENTIONS:  - Identify patients at risk for skin breakdown  - Assess and monitor skin integrity  - Assess and monitor nutrition and hydration status  - Monitor labs   - Assess for incontinence   - Turn and reposition patient  - Assist with mobility/ambulation  - Relieve pressure over bony prominences  - Avoid friction and shearing  - Provide appropriate hygiene as needed including keeping skin clean and dry  - Evaluate need for skin moisturizer/barrier cream  - Collaborate with interdisciplinary team   - Patient/family teaching  - Consider wound care consult   Outcome: Progressing

## 2023-01-26 NOTE — PROGRESS NOTES
Progress Note - Orthopedics   Giulia Goddard 69 y o  female MRN: [id number]  Unit/Bed#: -01      Subjective:    69 y  o female with fracture distal left tibial shaft with previous ORIF left ankle and presence of revision replacement with apparent loosening of the proximal and of the femoral stem notes slightly improved pain her left shin  No acute events, no new complaints  Patient doing well  Pain well controlled  Denies fevers, chills, CP, SOB, N/V, numbness or tingling       Labs:  0   Lab Value Date/Time    HCT 31 7 (L) 01/26/2023 0440    HCT 33 0 (L) 01/25/2023 0528    HCT 34 3 (L) 01/24/2023 0920    HGB 10 1 (L) 01/26/2023 0440    HGB 10 3 (L) 01/25/2023 0528    HGB 10 8 (L) 01/24/2023 0920    INR 1 74 (H) 01/26/2023 0440    WBC 9 97 01/26/2023 0440    WBC 9 19 01/25/2023 0528    WBC 8 27 01/24/2023 0920     Meds:    Current Facility-Administered Medications:   •  acetaminophen (TYLENOL) tablet 650 mg, 650 mg, Oral, Q6H [facility], [facility], [facility], 650 mg at 01/26/23 9734  •  amLODIPine (NORVASC) tablet 2 5 mg, 2 5 mg, Oral, Daily, Jeff Ko MD, 2 5 mg at 01/26/23 6499  •  docusate sodium (COLACE) capsule 100 mg, 100 mg, Oral, BID, Ashley Laughlin MD, 100 mg at 01/26/23 8845  •  gabapentin (NEURONTIN) capsule 600 mg, 600 mg, Oral, TID, Jeff Ko MD, 600 mg at 01/26/23 0816  •  HYDROmorphone (DILAUDID) injection 0 5 mg, 0 5 mg, Intravenous, Q4H PRN, Ashley Laughlin MD, 0 5 mg at 01/26/23 0815  •  insulin lispro (HumaLOG) 100 units/mL subcutaneous injection 1-6 Units, 1-6 Units, Subcutaneous, TID AC, 2 Units at 01/26/23 0818 **AND** Fingerstick Glucose (POCT), , , TID AC, Omar Laughlin MD  •  insulin lispro (HumaLOG) 100 units/mL subcutaneous injection 1-6 Units, 1-6 Units, Subcutaneous, HS, Jeff Ko MD, 1 Units at 01/25/23 2213  •  levothyroxine tablet 150 mcg, 150 mcg, Oral, Daily, Ashley Maid Truman MD, 150 mcg at 01/26/23 0816  •  lidocaine (LIDODERM) 5 % patch 1 patch, 1 patch, Topical, Daily, Marci Laughlin MD, 1 patch at 01/26/23 0816  •  ondansetron (ZOFRAN) injection 4 mg, 4 mg, Intravenous, Q6H PRN, Deon Mariscal MD  •  senna oral syrup 8 8 mg, 8 8 mg, Oral, Daily, Marci Laughlin MD, 8 8 mg at 01/26/23 0816  •  torsemide (DEMADEX) tablet 50 mg, 50 mg, Oral, Daily, Marci Laughlin MD, 50 mg at 01/26/23 0816  •  traMADol (ULTRAM) tablet 50 mg, 50 mg, Oral, Q6H PRN, Marci Laughlin MD, 50 mg at 01/26/23 0350  •  warfarin (COUMADIN) tablet 1 mg, 1 mg, Oral, Daily (warfarin), Conception Oliverio, CRNP, 1 mg at 01/25/23 1704    Blood Culture:   Lab Results   Component Value Date    BLOODCX No Growth After 5 Days  07/26/2021    BLOODCX No Growth After 5 Days  07/26/2021     Wound Culture:   No results found for: WOUNDCULT    Ins and Outs:  I/O last 24 hours: In: 1200 [P O :1200]  Out: 2850 [Urine:2850]    Physical:  Vitals:    01/26/23 0717   BP: 141/92   Pulse: (!) 110   Resp: 19   Temp: 97 5 °F (36 4 °C)   SpO2: 93%     Musculoskeletal: left Lower Extremity  · Skin faint ecchymosis  There are no cutaneous lesions    No erythema  · Devious posterior ankle splint and padding removed for skin inspection  Does have a history of peripheral neuropathy  Decreased sensation to foot  She is able to wiggle her toes  She has very minimal range of motion with dorsiflexion of the ankle secondary to fracture  No gross bony malalignment visualized  She has light external rotation of bilateral lower extremities  · No calf pain negative Homans' sign  · Tenderness at the fracture site as expected  · Digits warm and well perfused  · Capillary refill < 2 seconds    Assessment:    69 y  o female left still tibial shaft fracture with hardware ORIF bimalleolar ankle fracture and revision knee arthroplasty stem with apparent loosening of the proximal aspect of the femoral component  Patient doing better  Plan:  · Strict NWB LLE    Was fitted with a high tide Cam boot which is to remain in place at all times till seen back in orthopedic office  We will see the patient back with Dr Roxy Coleman in a week  · Obtain x-ray of the tibia through the cam boot today to check on fracture alignment    · Pain control and DVT prophylaxis per primary service  · Dispo: Ortho will follow    Esau Boyer PA-C

## 2023-01-26 NOTE — ASSESSMENT & PLAN NOTE
Lab Results   Component Value Date    HGBA1C 7 0 (H) 10/17/2022       Recent Labs     01/25/23  1123 01/25/23  2212 01/26/23  0717 01/26/23  1120   POCGLU 207* 175* 196* 176*       Blood Sugar Average: Last 72 hrs:  (P) 900 [id number] patient, diet controlled  · Remain in the hospital, continue sliding scale with hypoglycemia precaution

## 2023-01-26 NOTE — CASE MANAGEMENT
[facility] received request for authorization from Care Manager  Authorization request for: SNF  Facility Name: [facility]  MMI:[id number]  Facility MD:  Kareem Simmons   NPI: [id number]  Authorization initiated by contacting insurance:  [email or web address] Via: Fax  Pending auth : None generated   Clinicals submitted via:  Fax

## 2023-01-26 NOTE — PROGRESS NOTES
-- Patient:  -- MRN: [id number]  -- Aidin Request ID: [id number]  -- Level of care reserved: [facility]  -- Partner Reserved: [facility], [facility], [address] [phone]  -- Clinical needs requested:  -- Geography searched: 10 miles around [phone]  -- Start of Service:  -- Request sent: 4:02pm EST on 1/25/2023 by Stormy Feeling  -- Partner reserved: 10:19am EST on 1/26/2023 by Pratik Cain  -- Choice list shared: 10:18am EST on 1/26/2023 by Pratik Cain

## 2023-01-26 NOTE — ASSESSMENT & PLAN NOTE
· Voiding with purwick however bladder scanned for 925 ml, PVR remained 625ml  · Urinary retentions protocol  · Urology consult  · Pt requesting berg with poor mobility 2/2 fracture   · Follow-up 5-7 days for voiding trial Dr Lily Reveles  · Start flomax

## 2023-01-26 NOTE — ASSESSMENT & PLAN NOTE
· Presented to the ER after fall, hitting left knee  · S/p posterior splint placement in the ER  · Orthopedics are consulted  · There is complications from distal hardware   · Further complicating issue is patient has loosening of femoral prosthesis on a bone scan of her left hip  · Conservative management, no planned surgery-NWB x3 months w/ CAM boot  · Coumadin -INR therapeutic at 2 5 on admission( held)- resume Coumadin with no surgical intervention   · CAM boot placed by Ortho with xrays showing appropriate alignment   F/u 1 week with ortho w/ xray  · PT/OT consult- rehab recommended- accepted at [facility] Results   Component Value Date    INR 1 74 (H) 01/26/2023    INR 2 00 (H) 01/25/2023

## 2023-01-26 NOTE — DISCHARGE INSTR - OTHER ORDERS
Skin care Plan:  1-Cleanse bilateral buttocks wound with normal saline, apply Xeroform to open wound bed, Cover with Allevyn bordered foam  Alex foam with a T for treatment and change daily and prn soil or dislodgment  2-Turn/reposition q2h or when medically stable for pressure re-distribution on skin   Use foam wedges  3-Elevate heels to offload pressure  4-Moisturize skin daily with skin nourishing cream  5-Ehob cushion in chair when out of bed  6-Hydraguard to bilateral heels BID and PRN

## 2023-01-26 NOTE — WOUND OSTOMY CARE
Consult Note - Wound   Aleksandar Gatica 69 y o  female MRN: [id number]  Unit/Bed#: -[place] Encounter: [id number]        History and Present Illness:  Seen today for initial wound assessment  Pt with closed displaced spiral fracture of shaft of left tibia  Type 2 DM, Peripheral neuropathy,PVD  Assessment Findings:   1)Right buttocks blister, scant clear tan drainage noted when allevyn foam removed, Xeroform applied to wound to prevent drainage adhering to wound and promote healing  then covered with Allevyn foam   2)Left lower buttocks red light purple non blanchable, when assessing skin slightly sloughing off to partial thickness skin loss  Xeroform applied and covered with allevyn   Areas photographed and measured as one  3)Right heel intact, offloaded on pillow  Left heel with immobilizer intact removed and assessed by Orthopedic PA-C this am      Patient has an immobilizer to LLE and decreased mobility  Wounds to buttocks appear due to friction and sheering possible pressure, Encouraged to left buttocks when repositioning and to not drag buttocks when moving up in bed  Positioned on side with foam wedges  Pt has a berg catheter, alert and oriented x4  No induration, fluctuance, odor, warmth/temperature differences, or purulence noted to the above noted wounds and skin areas assessed  New dressings applied per orders listed below  Patient tolerated well- no s/s of non-verbal pain or discomfort observed during the encounter  RN assisted with assessment and aware of findings and plan of care  See flow sheets for more detailed assessment findings  Wound care will continue to follow  Skin care Plan:  1-Cleanse bilateral buttocks wound with normal saline, apply Xeroform to open wound bed, Cover with Allevyn bordered foam  Alex foam with a T for treatment and change daily and prn soil or dislodgment  2-Turn/reposition q2h or when medically stable for pressure re-distribution on skin   Use foam wedges  3-Elevate heels to offload pressure  4-Moisturize skin daily with skin nourishing cream  5-Ehob cushion in chair when out of bed  6-Hydraguard to bilateral heels BID and PRN  Wounds:  Wound 01/25/23 Pressure Injury Buttocks Right;Left (Active)   Wound Image   01/26/23 1033   Wound Description Drainage; Beefy red;Non-blanchable erythema 01/26/23 1033   Pressure Injury Stage 2 01/26/23 1033   Chantell-wound Assessment Denuded;Erythema 01/26/23 1033   Wound Length (cm) 5 cm 01/26/23 1033   Wound Width (cm) 5 cm 01/26/23 1033   Wound Depth (cm) 0 1 cm 01/26/23 1033   Wound Surface Area (cm^2) 25 cm^2 01/26/23 1033   Wound Volume (cm^3) 2 5 cm^3 01/26/23 1033   Calculated Wound Volume (cm^3) 2 5 cm^3 01/26/23 1033   Drainage Amount Scant 01/26/23 1033   Drainage Description Clear 01/26/23 1033   Treatments Cleansed;Site care 01/26/23 1033   Dressing Xeroform; Foam, Silicon (eg  Allevyn, etc) 01/26/23 1033   Dressing Changed New 01/26/23 1033   Patient Tolerance Tolerated well 01/26/23 1033   Dressing Status Clean;Dry; Intact 01/26/23 1033     Call or tigertext with any questions  Wound Care will continue to follow      Maurice WATTS RN

## 2023-01-26 NOTE — ASSESSMENT & PLAN NOTE
Wt Readings from Last 3 Encounters:   01/26/23 102 kg (225 lb 5 oz)   11/23/22 105 kg (230 lb 9 6 oz)   11/04/22 102 kg (225 lb)       · Remain euvolemic  · Continue torsemide  · Intake and output, low-sodium diet

## 2023-01-26 NOTE — DISCHARGE SUMMARY
[address]  Discharge- Norah Mann 1945, 69 y o  female MRN: [id number]  Unit/Bed#: -[place] Encounter: [id number]  Primary Care Provider: Laura Green MD   Date and time admitted to hospital: 1/23/2023  3:18 PM    * Closed displaced spiral fracture of shaft of left tibia  Assessment & Plan  · Presented to the ER after fall, hitting left knee  · S/p posterior splint placement in the ER  · Orthopedics are consulted  · There is complications from distal hardware   · Further complicating issue is patient has loosening of femoral prosthesis on a bone scan of her left hip  · Conservative management, no planned surgery-NWB x3 months w/ CAM boot  · Coumadin -INR therapeutic at 2 5 on admission( held)- resume Coumadin with no surgical intervention   · CAM boot placed by Ortho with xrays showing appropriate alignment   F/u 1 week with ortho w/ xray  · PT/OT consult- rehab recommended- accepted at [facility] Results   Component Value Date    INR 1 74 (H) 01/26/2023    INR 2 00 (H) 01/25/2023       Urinary retention  Assessment & Plan  · Voiding with purwick however bladder scanned for 925 ml, PVR remained 625ml  · Urinary retentions protocol  · Urology consult  · Pt requesting berg with poor mobility 2/2 fracture   · Follow-up 5-7 days for voiding trial Dr Gracie Monroe  · Start flomax    Acute pain of left shoulder  Assessment & Plan  · Patient endorses today acute pain of left shoulder  · No imaging done on admission  · X-rays ordered by orthopedics-no acute osseous abnormality  For now continue scheduled and PRN pain control, ice as needed     JOS (obstructive sleep apnea)  Assessment & Plan  · As per family member, patient does not use any oxygen or CPAP  · Monitor at this time    Type 2 diabetes mellitus with diabetic nephropathy [facility])  Assessment & Plan  Lab Results   Component Value Date    HGBA1C 7 0 (H) 10/17/2022       Recent Labs     01/25/23  1123 01/25/23 2212 01/26/23  0717 01/26/23  1120   POCGLU 207* 175* 196* 176*       Blood Sugar Average: Last 72 hrs:  (P) 615 [id number] patient, diet controlled  · Remain in the hospital, continue sliding scale with hypoglycemia precaution    Diastolic congestive heart failure (HCC)  Assessment & Plan  Wt Readings from Last 3 Encounters:   01/26/23 102 kg (225 lb 5 oz)   11/23/22 105 kg (230 lb 9 6 oz)   11/04/22 102 kg (225 lb)       · Remain euvolemic  · Continue torsemide  · Intake and output, low-sodium diet      Atrial fibrillation (HCC)  Assessment & Plan  · V paced rhythm, Rate 78  · Not any beta-blocker, patient has pacemaker in place  · AC: Coumadin 4 mg daily except Tuesday when she takes 8 mg (reviewed last pharmacy dosing note 1/10/23)  · Coumadin held on admission with fracture-Per Ortho can resume, conservative management  · INR 2> 1 7- resumed PTA dosing with Repeat INR 2 days      Medical Problems     Resolved Problems  Date Reviewed: 1/25/2023   None       Discharging Physician / Practitioner: SAM Rodriguez  PCP: Thompson Jiang MD  Admission Date:   Admission Orders (From admission, onward)     Ordered        01/23/23 300 [facility]  Once                      Discharge Date: 01/26/23    Consultations During Hospital Stay:  · Urology  · Orthopedic surgery  · PT/OT  · Wound care  · Case management    Procedures Performed:   · 9/34 splint application in ER    Significant Findings / Test Results:   · X-ray knee left no acute osseous abnormality  · X-ray ankle left-acute minimally displaced fracture of distal tibia metadiaphysis  Prior medial malleoli or ankle tibiofibular ORIF hardware intact new minimally radiolucent city around lateral tibia aspect of the middle syndesmotic screw suggestive of early changes of loosening  · X-ray tib-fib left-stable alignment minimally displaced distal tibial metadiaphyseal fracture    No proximal tibial or fibular fracture  · X-ray shoulder left no acute osseous abnormality  Degenerative changes  · 1/26 X-ray tibia fibula left    Incidental Findings:   · none      Test Results Pending at Discharge (will require follow up):   · none     Outpatient Tests Requested:  · Xray 1 week with ortho followup  · INR 2 days    Complications: None    Reason for Admission: Fracture of left tibia    Hospital Course:   Renny Ruelas is a 69 y o  female patient who originally presented to the hospital on 1/23/2023 due to fall at home with left tibia fracture on imaging  Orthopedic surgery consulted and opted for conservative management and no surgical intervention at this time  Patient will be nonweightbearing on left lower extremity times months, cam boot fitted by orthopedic surgery and remain in place at all times until follow-up and directed otherwise  Orthopedic surgery follow-up with Dr Lockwood in 1 week with x-rays  Patient anticoagulated on Coumadin initially held on admission for possible surgical intervention however resume following orthopedic evaluation and conservative management  After receiving INR 1 7, continue PTA dosing and repeat INR days  Urinary retention during admission requiring berg catheter placement, follow up with urology in 5-7 days for voiding trial       Please see above list of diagnoses and related plan for additional information  Condition at Discharge: stable    Discharge Day Visit / Exam:   Subjective:  Left shoulder pain, and decreased ROM, reviewed pain regimen and PRN  Encourage icing  Vitals: Blood Pressure: 141/92 (01/26/23 0717)  Pulse: (!) 110 (01/26/23 0717)  Temperature: 97 5 °F (36 4 °C) (01/26/23 0717)  Temp Source: Temporal (01/25/23 2215)  Respirations: 19 (01/26/23 0717)  Height: 5' 2" (157 5 cm) (01/23/23 1524)  Weight - Scale: 102 kg (225 lb 5 oz) (01/26/23 0535)  SpO2: 93 % (01/26/23 0717)  Exam:   Physical Exam  Vitals and nursing note reviewed  Constitutional:       General: She is not in acute distress  Appearance: She is well-developed  HENT:      Head: Normocephalic and atraumatic  Mouth/Throat:      Mouth: Mucous membranes are moist    Eyes:      Conjunctiva/sclera: Conjunctivae normal       Pupils: Pupils are equal, round, and reactive to light  Cardiovascular:      Rate and Rhythm: Normal rate and regular rhythm  Pulses: Normal pulses  Heart sounds: No murmur heard  Pulmonary:      Effort: Pulmonary effort is normal  No respiratory distress  Breath sounds: Normal breath sounds  Abdominal:      General: Bowel sounds are normal       Palpations: Abdomen is soft  Tenderness: There is no abdominal tenderness  Musculoskeletal:         General: Swelling (left shoulder, ), tenderness (left shoulder, left leg ) and signs of injury (left shoulder with decrease ROM) present  Cervical back: Neck supple  Skin:     General: Skin is warm and dry  Capillary Refill: Capillary refill takes less than 2 seconds  Neurological:      General: No focal deficit present  Mental Status: She is alert  Psychiatric:         Mood and Affect: Mood normal           Discussion with Family: Attempted to update  ( and daughter) via phone  Unable to contact  Discharge instructions/Information to patient and family:   See after visit summary for information provided to patient and family  Provisions for Follow-Up Care:  See after visit summary for information related to follow-up care and any pertinent home health orders  Disposition:   [facility]    Planned Readmission: none     Discharge Statement:  I spent 45 minutes discharging the patient  This time was spent on the day of discharge  I had direct contact with the patient on the day of discharge   Greater than 50% of the total time was spent examining patient, answering all patient questions, arranging and discussing plan of care with patient as well as directly providing post-discharge instructions  Additional time then spent on discharge activities  Discharge Medications:  See after visit summary for reconciled discharge medications provided to patient and/or family        **Please Note: This note may have been constructed using a voice recognition system**

## 2023-01-26 NOTE — CASE MANAGEMENT
Yadi Enciso [address] has received approved authorization from insurance:     Authorization received for: SNF  Facility: [address] [place] [address] #:[id number]   Start of Care:01/26/2023  Care Manager notified: Reilly Wade received info

## 2023-01-26 NOTE — ASSESSMENT & PLAN NOTE
· Patient endorses today acute pain of left shoulder  · No imaging done on admission  · X-rays ordered by orthopedics-no acute osseous abnormality  For now continue scheduled and PRN pain control, ice as needed

## 2023-01-27 DIAGNOSIS — N39.0 URINARY TRACT INFECTION WITHOUT HEMATURIA, SITE UNSPECIFIED: Primary | ICD-10-CM

## 2023-01-27 LAB — BACTERIA UR CULT: ABNORMAL

## 2023-01-27 RX ORDER — NITROFURANTOIN 25; 75 MG/1; MG/1
100 CAPSULE ORAL 2 TIMES DAILY
Qty: 14 CAPSULE | Refills: 0 | Status: SHIPPED | OUTPATIENT
Start: 2023-01-27 | End: 2023-01-30 | Stop reason: SDUPTHER

## 2023-01-27 NOTE — UTILIZATION REVIEW
NOTIFICATION OF ADMISSION DISCHARGE   This is a Notification of Discharge from [facility]  Please be advised that this patient has been discharge from our facility  Below you will find the admission and discharge date and time including the patient’s disposition  UTILIZATION REVIEW CONTACT:  [address] [place]  Utilization   Network Utilization Review Department  Phone: [phone] x carefully listen to the prompts  All voicemails are confidential   Email: [email or web address]     ADMISSION INFORMATION  PRESENTATION DATE: 1/23/2023  3:18 PM  OBERVATION ADMISSION DATE:   INPATIENT ADMISSION DATE: 1/23/23  4:33 PM   DISCHARGE DATE: 1/26/2023  4:59 PM   DISPOSITION:Non [facility] SNF/TCU/SNU    IMPORTANT INFORMATION:  Send all requests for admission clinical reviews, approved or denied determinations and any other requests to dedicated fax number below belonging to the campus where the patient is receiving treatment   List of dedicated fax numbers:  [address] DENIALS (Administrative/Medical Necessity) [phone]   [address] (Maternity/NICU/Pediatrics) [phone]   [facility] [phone]   [facility] [phone]   [address] [phone]   220 [facility] [phone]   90 [facility] [phone]   [address] [place] [address] 119 [phone]   [facility]  [phone]   [facility] [phone] [facility] [address] [facility] [phone]

## 2023-01-27 NOTE — CASE MANAGEMENT
Case Management Discharge Planning Note    Patient name Pepper Hawkins  Location /-35 MRN [id number]  : 1945 Date 2023       Current Admission Date: 2023  Current Admission Diagnosis:Closed displaced spiral fracture of shaft of left tibia   Patient Active Problem List    Diagnosis Date Noted   • Urinary retention 2023   • Acute pain of left shoulder 2023   • Closed displaced spiral fracture of shaft of left tibia 2023   • Lumbar spondylosis 10/12/2022   • Peripheral vascular disease ([address] ) 2022   • Primary osteoarthritis of both hips 2022   • Pain in left hip 2022   • Chronic pain of left knee 10/13/2021   • Presence of artificial knee joint, left 10/13/2021   • Nausea and vomiting 2021   • Acute respiratory failure with hypoxia ([address] ) 2021   • Sick sinus syndrome ([address] ) 2021   • Pulmonary hypertension ([address] ) 2021   • Supratherapeutic INR 2021   • Acute kidney injury ([address] ) 06/15/2021   • Acquired hypothyroidism    • Diabetes mellitus ([address] )    • CHF (congestive heart failure) ([address] )    • Essential hypertension    • Renal disorder    • Closed fracture of left ankle 2021   • Closed bimalleolar fracture of left ankle 2020   • Pacemaker 10/29/2020   • Type 2 diabetes mellitus with diabetic neuropathy ([address] ) 10/29/2020   • Severe obesity with body mass index (BMI) of 36 0 to 36 9 with serious comorbidity ([address] )    • Stage 3 chronic kidney disease ([address] ) 10/26/2020   • Atrial fibrillation ([address] )    • Diastolic congestive heart failure ([address] ) 10/25/2020   • Type 2 diabetes mellitus with diabetic nephropathy ([address] ) 10/25/2020   • JOS (obstructive sleep apnea) 10/25/2020      LOS (days): 3  Geometric Mean LOS (GMLOS) (days): 2 70  Days to GMLOS:-0 3     OBJECTIVE:  Risk of Unplanned Readmission Score: 18 82         Current admission status: Inpatient   Preferred Pharmacy:   [facility] SAMMIE Roberson - [address] [facility]  [address] [facility] [address] [address]  Phone: [phone] Fax: [phone]    Primary Care Provider: Cely Loyola MD    Primary Insurance: Lawyer Cranker REP  Secondary Insurance:     DISCHARGE DETAILS:        [facility] for transport on 1/26/23: [id number]

## 2023-01-30 DIAGNOSIS — N39.0 URINARY TRACT INFECTION WITHOUT HEMATURIA, SITE UNSPECIFIED: ICD-10-CM

## 2023-01-30 RX ORDER — NITROFURANTOIN 25; 75 MG/1; MG/1
100 CAPSULE ORAL 2 TIMES DAILY
Qty: 14 CAPSULE | Refills: 0 | Status: SHIPPED | OUTPATIENT
Start: 2023-01-30 | End: 2023-02-06

## 2023-01-31 ENCOUNTER — TELEPHONE (OUTPATIENT)
Dept: OTHER | Facility: OTHER | Age: 78
End: 2023-01-31

## 2023-01-31 NOTE — TELEPHONE ENCOUNTER
Patient is calling regarding cancelling an appointment      Date/Time:02/01/2023 @ 0945  Patient was rescheduled: YES [] NO [x]    Patient requesting call back to reschedule: YES [] NO [x]

## 2023-02-02 NOTE — TELEPHONE ENCOUNTER
Amy trujillo called to set up voiding trial      They can pull catheter in the morning with orders  Fax number is [phone]        Please call Tracy Cyr at [phone]

## 2023-02-03 ENCOUNTER — HOSPITAL ENCOUNTER (OUTPATIENT)
Dept: RADIOLOGY | Facility: CLINIC | Age: 78
End: 2023-02-03

## 2023-02-03 ENCOUNTER — OFFICE VISIT (OUTPATIENT)
Dept: OBGYN CLINIC | Facility: CLINIC | Age: 78
End: 2023-02-03

## 2023-02-03 VITALS
WEIGHT: 225 LBS | HEART RATE: 75 BPM | SYSTOLIC BLOOD PRESSURE: 130 MMHG | HEIGHT: 62 IN | BODY MASS INDEX: 41.41 KG/M2 | DIASTOLIC BLOOD PRESSURE: 60 MMHG | TEMPERATURE: 97.5 F

## 2023-02-03 DIAGNOSIS — S82.242D CLOSED DISPLACED SPIRAL FRACTURE OF SHAFT OF LEFT TIBIA WITH ROUTINE HEALING, SUBSEQUENT ENCOUNTER: ICD-10-CM

## 2023-02-03 DIAGNOSIS — S82.242D CLOSED DISPLACED SPIRAL FRACTURE OF SHAFT OF LEFT TIBIA WITH ROUTINE HEALING, SUBSEQUENT ENCOUNTER: Primary | ICD-10-CM

## 2023-02-03 RX ORDER — TRAMADOL HYDROCHLORIDE 50 MG/1
50 TABLET ORAL EVERY 6 HOURS PRN
COMMUNITY

## 2023-02-03 RX ORDER — L. ACIDOPHILUS/L.BULGARICUS 1MM CELL
1 TABLET ORAL DAILY
COMMUNITY

## 2023-02-03 NOTE — TELEPHONE ENCOUNTER
Lori w/ stephon Appt scheduled for 2/7, asked for order to remove cath be faxed       Faxed order to [phone] attn ahsley

## 2023-02-03 NOTE — PROGRESS NOTES
Patient Name:  Araseli Casanova  MRN:  [id number]    Assessment     1  Closed displaced spiral fracture of shaft of left tibia with routine healing, subsequent encounter  XR ankle 3+ vw left    XR tibia fibula 2 vw left    DXA bone density spine hip and pelvis          Plan     1  I would recommend follow-up in 1 week  Once again, x-rays of the ankle will be obtained with AP and lateral views only necessary  She is to continue with the cam boot full-time, not removed for any reason  She is to remain nonweightbearing  Return in about 1 week (around 2/10/2023)  Subjective   Araseli Casanova returns for follow-up of her left tibial fracture  The patient is 11 day(s) post injury and returns for routine follow-up  Patient complains of pain of her left thigh, with known chronic left thigh pain status post loosening of her left revision knee replacement  She indicates that she is scheduled to be seen in [facility] by a surgeon to discuss revision  In regards to her ankle last leg, she denies any complaints today  Objective     /60 (BP Location: Right arm)   Pulse 75   Temp 97 5 °F (36 4 °C) (Temporal)   Ht 5' 2" (1 575 m)   Wt 102 kg (225 lb)   BMI 41 15 kg/m²     Exam demonstrates the boot in place  The toes demonstrate good color and capillary refill  She is able to move her toes  She has a dense neuropathy and sensation is absent in the bilateral feet  Data Review     I have personally reviewed pertinent films in PACS demonstrating the fracture to remain in good alignment      Daksha Winn

## 2023-02-06 ENCOUNTER — TELEPHONE (OUTPATIENT)
Dept: OBGYN CLINIC | Facility: MEDICAL CENTER | Age: 78
End: 2023-02-06

## 2023-02-06 NOTE — TELEPHONE ENCOUNTER
Caller: Radiology     Doctor: Dr Wilhemina Councilman    Reason for call: Significant findings on test preformed on 2/3 per radiology

## 2023-02-07 ENCOUNTER — TELEPHONE (OUTPATIENT)
Dept: UROLOGY | Facility: CLINIC | Age: 78
End: 2023-02-07

## 2023-02-07 DIAGNOSIS — R33.9 URINARY RETENTION: Primary | ICD-10-CM

## 2023-02-07 NOTE — TELEPHONE ENCOUNTER
Telephone call to ESTEFANI EASON [facility]  Left a message requesting more information about the cath

## 2023-02-10 ENCOUNTER — OFFICE VISIT (OUTPATIENT)
Dept: UROLOGY | Facility: CLINIC | Age: 78
End: 2023-02-10

## 2023-02-10 VITALS
TEMPERATURE: 97.4 F | HEIGHT: 62 IN | DIASTOLIC BLOOD PRESSURE: 82 MMHG | HEART RATE: 81 BPM | BODY MASS INDEX: 41.15 KG/M2 | OXYGEN SATURATION: 92 % | SYSTOLIC BLOOD PRESSURE: 140 MMHG

## 2023-02-10 DIAGNOSIS — R33.9 URINARY RETENTION: Primary | ICD-10-CM

## 2023-02-10 LAB — POST-VOID RESIDUAL VOLUME, ML POC: 291 ML

## 2023-02-10 RX ORDER — PROMETHAZINE HYDROCHLORIDE 25 MG/1
25 TABLET ORAL EVERY 6 HOURS PRN
COMMUNITY

## 2023-02-10 RX ORDER — ACETAMINOPHEN 325 MG/1
650 TABLET ORAL EVERY 6 HOURS PRN
COMMUNITY

## 2023-02-10 RX ORDER — PROMETHAZINE HYDROCHLORIDE 25 MG/1
25 SUPPOSITORY RECTAL EVERY 6 HOURS PRN
COMMUNITY

## 2023-02-10 RX ORDER — POLYETHYLENE GLYCOL 3350 17 G/17G
17 POWDER, FOR SOLUTION ORAL DAILY
COMMUNITY

## 2023-02-10 RX ORDER — LOPERAMIDE HYDROCHLORIDE 2 MG/1
2 TABLET ORAL 4 TIMES DAILY PRN
COMMUNITY

## 2023-02-10 RX ORDER — BISACODYL 5 MG/1
5 TABLET, DELAYED RELEASE ORAL DAILY PRN
COMMUNITY

## 2023-02-10 NOTE — PROGRESS NOTES
UROLOGY PROGRESS NOTE         NAME: Didier Britt  AGE: 69 y o  SEX: female  : 1945   MRN: [id number]    DATE: 2/10/2023  TIME: 3:15 PM    Assessment and Plan      Impression:   1  Urinary retention  -     POCT Measure PVR  Patient is voided 3 times since the Tolentino was removed this morning with good volumes  She feels like she needs to void now   Plan: Resolved retention  Levaquin 500 mg daily for 3 days to prevent infection  She will get cath as needed at the nursing home and return as needed      Chief Complaint   No chief complaint on file  History of Present Illness     HPI: Didier Britt is a 69y o  year old female who presents with history of urinary retention a hospital or with a recent tibial fracture  She is laying in bed nonweightbearing  Tolentino was removed this morning  She is voided 3 times with good volumes  She overflowed the bedpan  Currently she needs to void  There is 291 cc in her bladder  We do not have a bedpan  She has resolved urinary retention  Previous PVR was 700 cc  No fever chills no pain                The following portions of the patient's history were reviewed and updated as appropriate: allergies, current medications, past family history, past medical history, past social history, past surgical history and problem list   Past Medical History:   Diagnosis Date   • Arthritis    • CHF (congestive heart failure) ([facility] [place] [address] )    • Diabetes mellitus ([facility] [place] [address] )    • Disease of thyroid gland    • Hypertension    • Pacemaker    • Renal disorder      Past Surgical History:   Procedure Laterality Date   • CARDIAC PACEMAKER PLACEMENT     • CHOLECYSTECTOMY     • FL GUIDED NEEDLE PLAC BX/ASP/INJ  10/4/2022   • FL GUIDED NEEDLE PLAC BX/ASP/INJ  10/20/2022   • JOINT REPLACEMENT      bilateral knee replacements   • NERVE BLOCK Bilateral 10/4/2022    Procedure: BLOCK MEDIAL BRANCH NERVES BILATERAL L3, L4, L5 #1;  Surgeon: Luis Armando Camacho MD;  Location:  END[facility];  Service: Pain Management    • NERVE BLOCK Bilateral 10/20/2022    Procedure: BLOCK MEDIAL BRANCH L3, L4, L5 #2;  Surgeon: Sahra Diaz MD;  [facility];  Service: Pain Management    • ORIF TIBIA & FIBULA FRACTURES Left 10/29/2020    Procedure: OPEN REDUCTION W/ INTERNAL FIXATION (ORIF) ANKLE;  Surgeon: Mis Ledesma; [facility];  Service: Orthopedics   • TONSILLECTOMY     • TUBAL LIGATION       shoulder  Review of Systems     Const: Denies chills, fever and weight loss  CV: Denies chest pain  Resp: Denies SOB  GI: Denies abdominal pain, nausea and vomiting  : Denies symptoms other than stated above  Musculo: Denies back pain  Objective   /82 (BP Location: Left arm, Patient Position: Supine, Cuff Size: Large)   Pulse 81   Temp (!) 97 4 °F (36 3 °C)   Ht 5' 2" (1 575 m)   SpO2 92%   BMI 41 15 kg/m²     Physical Exam  Const: Appears healthy and well developed  No signs of acute distress present  Resp: Respirations are regular and unlabored  CV: Rate is regular  Rhythm is regular  Abdomen: Abdomen is soft, nontender, and nondistended  Kidneys are not palpable  : No suprapubic pain  No CVA tenderness  Psych: Patient's attitude is cooperative   Mood is normal  Affect is normal     Current Medications     Current Outpatient Medications:   •  acetaminophen (TYLENOL) 325 mg tablet, Take 650 mg by mouth every 6 (six) hours as needed for mild pain, Disp: , Rfl:   •  acetaminophen (TYLENOL) 500 mg tablet, Take 500 mg by mouth, Disp: , Rfl:   •  amLODIPine (NORVASC) 2 5 mg tablet, Take 1 tablet (2 5 mg total) by mouth daily, Disp: 30 tablet, Rfl: 0  •  Ascorbic Acid, Vitamin C, (VITAMIN C) 100 MG tablet, Take 500 mg by mouth daily , Disp: , Rfl:   •  bisacodyl (DULCOLAX) 5 mg EC tablet, Take 5 mg by mouth daily as needed for constipation, Disp: , Rfl:   •  docusate sodium (COLACE) 100 mg capsule, Take 1 capsule (100 mg total) by mouth 2 (two) times a day, Disp: , Rfl: 0  •  gabapentin (Neurontin) 600 MG tablet, Take 1 tablet (600 mg total) by mouth 3 (three) times a day, Disp: 270 tablet, Rfl: 0  •  insulin lispro (HumaLOG) 100 units/mL injection, Inject 1-6 Units under the skin daily at bedtime, Disp: , Rfl: 0  •  insulin lispro (HumaLOG) 100 units/mL injection, Inject 2-12 Units under the skin 3 (three) times a day before meals, Disp: , Rfl: 0  •  levothyroxine 150 mcg tablet, Take 150 mcg by mouth daily , Disp: , Rfl:   •  lidocaine (LIDODERM) 5 %, Apply 1 patch topically over 12 hours daily Remove & Discard patch within 12 hours or as directed by MD Do not start before January 27, 2023 , Disp: , Rfl: 0  •  loperamide (IMODIUM A-D) 2 MG tablet, Take 2 mg by mouth 4 (four) times a day as needed for diarrhea, Disp: , Rfl:   •  Multiple Vitamin (Multi-Day) TABS, Take 1 tablet by mouth daily , Disp: , Rfl:   •  ondansetron (Zofran ODT) 4 mg disintegrating tablet, Take 1 tablet (4 mg total) by mouth every 6 (six) hours as needed for nausea or vomiting, Disp: 20 tablet, Rfl: 0  •  polyethylene glycol (MIRALAX) 17 g packet, Take 17 g by mouth daily, Disp: , Rfl:   •  promethazine (PHENERGAN) 25 mg suppository, Insert 25 mg into the rectum every 6 (six) hours as needed for nausea or vomiting, Disp: , Rfl:   •  promethazine (PHENERGAN) 25 mg tablet, Take 25 mg by mouth every 6 (six) hours as needed for nausea or vomiting, Disp: , Rfl:   •  Sennosides (senna) 8 8 mg/5 mL oral syrup, Take 5 mL (8 8 mg total) by mouth daily Do not start before January 27, 2023 , Disp: , Rfl: 0  •  tamsulosin (FLOMAX) 0 4 mg, Take 1 capsule (0 4 mg total) by mouth daily with dinner, Disp: , Rfl: 0  •  torsemide (DEMADEX) 100 mg tablet, Take 0 5 tablets (50 mg total) by mouth daily, Disp: 15 tablet, Rfl: 0  •  traMADol (ULTRAM) 50 mg tablet, Take 50 mg by mouth every 6 (six) hours as needed for moderate pain, Disp: , Rfl:   •  warfarin (COUMADIN) 5 mg tablet, Take 4 mg by mouth Sun, Wed, Thursday,Friday- 1 tab  4 mg Tues, Saturday- 2 tab 8 mg, Disp: , Rfl:   •  Lactobacillus Probiotic TABS, Take 1 capsule by mouth daily (Patient not taking: Reported on 2/10/2023), Disp: , Rfl:         Meghna Moody MD

## 2023-02-13 ENCOUNTER — OFFICE VISIT (OUTPATIENT)
Dept: OBGYN CLINIC | Facility: CLINIC | Age: 78
End: 2023-02-13

## 2023-02-13 ENCOUNTER — HOSPITAL ENCOUNTER (OUTPATIENT)
Dept: RADIOLOGY | Facility: CLINIC | Age: 78
Discharge: HOME | End: 2023-02-13

## 2023-02-13 VITALS — HEART RATE: 73 BPM | TEMPERATURE: 97.4 F | SYSTOLIC BLOOD PRESSURE: 122 MMHG | DIASTOLIC BLOOD PRESSURE: 68 MMHG

## 2023-02-13 DIAGNOSIS — S82.242D CLOSED DISPLACED SPIRAL FRACTURE OF SHAFT OF LEFT TIBIA WITH ROUTINE HEALING, SUBSEQUENT ENCOUNTER: Primary | ICD-10-CM

## 2023-02-13 DIAGNOSIS — S82.242D CLOSED DISPLACED SPIRAL FRACTURE OF SHAFT OF LEFT TIBIA WITH ROUTINE HEALING, SUBSEQUENT ENCOUNTER: ICD-10-CM

## 2023-02-13 RX ORDER — GUAIFENESIN/DEXTROMETHORPHAN 100-10MG/5
5 SYRUP ORAL 3 TIMES DAILY PRN
COMMUNITY

## 2023-02-13 NOTE — PROGRESS NOTES
Patient Name:  Andrea Borrero  MRN:  [id number]    Assessment     1  Closed displaced spiral fracture of shaft of left tibia with routine healing, subsequent encounter  XR ankle 3+ vw left          Plan     1  I would recommend follow-up in 3 weeks  Repeat x-rays will be obtained including AP, lateral and mortise images of the ankle extending to the middle of the lower leg  She is to remain nonweightbearing  The boot may be removed for gentle cleansing and then immediately reapplied  She is not to have any range of motion or strengthening  She was once again informed that she will likely need to remain nonweightbearing for approximately 3 months  Her neuropathy will increase the duration of time in which we need to be cautious about weightbearing and activity  No follow-ups on file  Subjective   Andrea Borrero returns for follow-up of her left ankle/tibial shaft fracture  The patient is 3 week(s) post injury and returns for routine follow-up  Patient denies any change in symptoms  She continues to have dense neuropathy and therefore no pain  She continues with a cam boot as instructed and nonweightbearing  She remains at a nursing home  Objective     /68   Pulse 73   Temp (!) 97 4 °F (36 3 °C) (Temporal)     Exam today demonstrates the boot in place  The toes demonstrate good color and capillary refill  She is able to actively move the toes but denies any sensory input, consistent with her dense neuropathy  Data Review     I have personally reviewed pertinent films in PACS demonstrating the fracture to remain acceptably aligned      Joslyn Vincent

## 2023-03-10 ENCOUNTER — HOSPITAL ENCOUNTER (OUTPATIENT)
Dept: RADIOLOGY | Facility: CLINIC | Age: 78
End: 2023-03-10

## 2023-03-10 ENCOUNTER — OFFICE VISIT (OUTPATIENT)
Dept: OBGYN CLINIC | Facility: CLINIC | Age: 78
End: 2023-03-10

## 2023-03-10 VITALS
SYSTOLIC BLOOD PRESSURE: 130 MMHG | BODY MASS INDEX: 41.41 KG/M2 | WEIGHT: 225 LBS | HEART RATE: 105 BPM | TEMPERATURE: 97.7 F | DIASTOLIC BLOOD PRESSURE: 80 MMHG | HEIGHT: 62 IN

## 2023-03-10 DIAGNOSIS — S82.242D CLOSED DISPLACED SPIRAL FRACTURE OF SHAFT OF LEFT TIBIA WITH ROUTINE HEALING, SUBSEQUENT ENCOUNTER: ICD-10-CM

## 2023-03-10 DIAGNOSIS — S82.242D CLOSED DISPLACED SPIRAL FRACTURE OF SHAFT OF LEFT TIBIA WITH ROUTINE HEALING, SUBSEQUENT ENCOUNTER: Primary | ICD-10-CM

## 2023-03-10 RX ORDER — ZINC SULFATE 50(220)MG
220 CAPSULE ORAL DAILY
COMMUNITY

## 2023-03-10 RX ORDER — ASCORBATE CALCIUM 500 MG
500 TABLET ORAL DAILY
COMMUNITY

## 2023-03-10 RX ORDER — BISACODYL 10 MG
10 SUPPOSITORY, RECTAL RECTAL DAILY
COMMUNITY

## 2023-03-10 NOTE — PROGRESS NOTES
Patient Name:  Katina Del Castillo  MRN:  [id number]    Assessment     1  Closed displaced spiral fracture of shaft of left tibia with routine healing, subsequent encounter  XR ankle 3+ vw left          Plan     1  Will continue nonweightbearing for another 6 weeks  She will be reevaluated in orthopedic office in 6 weeks with new x-rays of the left including tib-fib films and left ankle films  Continue in the cam boot  Elevate the ankle frequently  Return in about 6 weeks (around 4/21/2023) for Left ankle x-rays and left tib-fib x-rays  Subjective   Katina Del Castillo returns for follow-up of left tibial spiral fracture with presence of longstem knee prosthesis and ankle hardware from previous ORIF  Patient injury date 1/24/2023  The patient is 6 week(s) post injury and has been told previously that she would likely be nonweightbearing for 3 months  She returns for routine follow-up  Patient denies pain at the fracture site  She does have a history of neuropathy does not feel from mid shin and distal   She reports the cam boot is fitting well without issues or concerns  Objective     /80   Pulse 105   Temp 97 7 °F (36 5 °C) (Temporal)   Ht 5' 2" (1 575 m)   Wt 102 kg (225 lb)   BMI 41 15 kg/m²     Left leg and in the cam boot there is stocking at and cast padding about the leg  It was moved to see the most proximal aspect of the left shin and distally to the foot region  There are no obvious wounds  He has limited extension of the toes  Sensation is noted about the upper shin where there is no sensation in approximately mid shin and distal   DP and PT pulses are intact  There is no calf pain  Data Review     I have personally reviewed pertinent films in PACS noting some early healing lateral x-ray however the AP view fracture line grossly visualized without significant healing or displacement      Daniel Nuñez

## 2023-03-10 NOTE — PATIENT INSTRUCTIONS
Will continue nonweightbearing for another 6 weeks  She will be reevaluated in orthopedic office in 6 weeks with new x-rays of the left including tib-fib films and left ankle films  Continue in the cam boot  Elevate the ankle frequently

## 2023-04-21 ENCOUNTER — HOSPITAL ENCOUNTER (OUTPATIENT)
Dept: RADIOLOGY | Facility: CLINIC | Age: 78
Discharge: HOME | End: 2023-04-21

## 2023-04-21 DIAGNOSIS — S82.242D CLOSED DISPLACED SPIRAL FRACTURE OF SHAFT OF LEFT TIBIA WITH ROUTINE HEALING, SUBSEQUENT ENCOUNTER: ICD-10-CM

## 2023-05-03 ENCOUNTER — TELEPHONE (OUTPATIENT)
Dept: OBGYN CLINIC | Facility: CLINIC | Age: 78
End: 2023-05-03

## 2023-05-05 ENCOUNTER — TELEPHONE (OUTPATIENT)
Dept: OBGYN CLINIC | Facility: CLINIC | Age: 78
End: 2023-05-05

## 2023-05-05 NOTE — TELEPHONE ENCOUNTER
[facility] recalled no one got back to her regarding the bone stimulator I told her [employer] did email it to [place]   I gave her Jenifer's cell phone number

## 2023-05-16 ENCOUNTER — HOSPITAL ENCOUNTER (OUTPATIENT)
Dept: RADIOLOGY | Facility: CLINIC | Age: 78
Discharge: HOME | End: 2023-05-16

## 2023-05-16 VITALS — BODY MASS INDEX: 40.48 KG/M2 | WEIGHT: 220 LBS | HEIGHT: 62 IN

## 2023-05-16 DIAGNOSIS — Z12.31 ENCOUNTER FOR SCREENING MAMMOGRAM FOR MALIGNANT NEOPLASM OF BREAST: ICD-10-CM

## 2023-05-24 ENCOUNTER — TELEPHONE (OUTPATIENT)
Dept: OBGYN CLINIC | Facility: HOSPITAL | Age: 78
End: 2023-05-24

## 2023-05-24 NOTE — TELEPHONE ENCOUNTER
Caller: Yosvany Shonda (daughter)    Doctor: Mihir Aguirre // Jessica     Reason for call: Daughter called in with a few questions for Dr Mihir Aguirre regarding her Mothers bone stimulator  1  Does the bone stimulator have any effect on patients pace maker? 2  Does the stimulator go directly onto leg or over the boot ? Here is why she is asking this question  When Dr Mihir Aguirre placed stimulator on he marked the sock under boot where he wanted it to be attached  When staff placed stimulator on they place over the boot         Please advise     Call back#: [phone]

## 2023-05-25 NOTE — TELEPHONE ENCOUNTER
Spoke with Pt daughter German Varela  Provider her phone number of Mary Jane Manuel [phone] at [facility]  Informed her to reach out to them to have these questions answered  She understood and said she would call

## 2023-06-02 ENCOUNTER — HOSPITAL ENCOUNTER (OUTPATIENT)
Dept: RADIOLOGY | Facility: CLINIC | Age: 78
End: 2023-06-02
Payer: COMMERCIAL

## 2023-06-02 ENCOUNTER — OFFICE VISIT (OUTPATIENT)
Dept: OBGYN CLINIC | Facility: CLINIC | Age: 78
End: 2023-06-02

## 2023-06-02 VITALS
BODY MASS INDEX: 40.48 KG/M2 | WEIGHT: 220 LBS | DIASTOLIC BLOOD PRESSURE: 78 MMHG | SYSTOLIC BLOOD PRESSURE: 143 MMHG | HEART RATE: 79 BPM | HEIGHT: 62 IN | TEMPERATURE: 97.8 F

## 2023-06-02 DIAGNOSIS — S82.242K: ICD-10-CM

## 2023-06-02 DIAGNOSIS — S82.242K: Primary | ICD-10-CM

## 2023-06-02 PROCEDURE — 73590 X-RAY EXAM OF LOWER LEG: CPT

## 2023-06-02 NOTE — PATIENT INSTRUCTIONS
Patient is allowed to weight-bear as tolerated in the cam boot  She can start formal physical therapy for gait training  They can also remove the cam boot to do gentle range of motion passively and actively of the left ankle  She is to continue using the bone stimulator until her follow-up appointment with orthopedics in 4 to 6 weeks will include high x-rays of the left ankle

## 2023-06-02 NOTE — PROGRESS NOTES
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  -     XR tibia fibula 2 vw left; Future    Patient is allowed to weight-bear as tolerated in the cam boot  She can start formal physical therapy for gait training  They can also remove the cam boot to do gentle range of motion passively and actively of the left ankle  She is to continue using the bone stimulator until her follow-up appointment with orthopedics in 4 to 6 weeks will include high x-rays of the left ankle  Return 4 to 6 weeks, for I left ankle x-ray to see distal left tibial shaft   _____________________________________________________  CHIEF COMPLAINT:  Chief Complaint   Patient presents with   • Left Leg - Follow-up   • Follow-up     SUBJECTIVE:  Tera De León is a 69y o  year old female who presents for follow up left tibial shaft fracture treated conservatively to indwelling ankle and longstem knee replacement hardware  Her injury date 1/24/2023, 4+ months post injury  He finally received the bone stimulator few weeks ago and is using it daily  She notes pain about the left tibia  She reports there is 2 small wounds on her ankle and big toe which are being treated  She denies discharge  Denies fevers or chills      PAST MEDICAL HISTORY:  Past Medical History:   Diagnosis Date   • Arthritis    • CHF (congestive heart failure) ([address] )    • Diabetes mellitus ([facility] [address] )    • Disease of thyroid gland    • Hypertension    • Pacemaker    • Renal disorder      PAST SURGICAL HISTORY:  Past Surgical History:   Procedure Laterality Date   • CARDIAC PACEMAKER PLACEMENT     • CHOLECYSTECTOMY     • FL GUIDED NEEDLE PLAC BX/ASP/INJ  10/4/2022   • FL GUIDED NEEDLE PLAC BX/ASP/INJ  10/20/2022   • JOINT REPLACEMENT      bilateral knee replacements   • NERVE BLOCK Bilateral 10/4/2022    Procedure: BLOCK MEDIAL BRANCH NERVES BILATERAL L3, L4, L5 #1;  Surgeon: Kathy Bell MD; [facility]: [facility];  Service: Pain Management    • NERVE BLOCK Bilateral 10/20/2022    Procedure: BLOCK MEDIAL BRANCH L3, L4, L5 #2;  Surgeon: Roverto Anaya MD;  [facility]: [facility];  Service: Pain Management    • ORIF TIBIA & FIBULA FRACTURES Left 10/29/2020    Procedure: OPEN REDUCTION W/ INTERNAL FIXATION (ORIF) ANKLE;  Surgeon: Thong Spangler;   [facility];  Service: Orthopedics   • TONSILLECTOMY     • TUBAL LIGATION       FAMILY HISTORY:  Family History   Problem Relation Age of Onset   • Atrial fibrillation Mother    • Arthritis Father    • Kidney cancer Father    • Breast cancer Sister         early 46s   • No Known Problems Sister    • Kidney cancer Brother    • No Known Problems Daughter    • No Known Problems Daughter    • No Known Problems Daughter    • No Known Problems Daughter    • No Known Problems Maternal Aunt    • No Known Problems Maternal Aunt    • No Known Problems Maternal Aunt    • No Known Problems Paternal Aunt    • No Known Problems Maternal Grandmother    • No Known Problems Maternal Grandfather    • No Known Problems Paternal Grandmother    • No Known Problems Paternal Grandfather    • Breast cancer Cousin         early 46s   • Breast cancer additional onset Neg Hx    • Colon cancer Neg Hx    • Endometrial cancer Neg Hx    • Ovarian cancer Neg Hx    • BRCA 1/2 Neg Hx    • BRCA2 Positive Neg Hx    • BRCA2 Negative Neg Hx    • BRCA1 Positive Neg Hx    • BRCA1 Negative Neg Hx      SOCIAL HISTORY:  Social History     Tobacco Use   • Smoking status: Never   • Smokeless tobacco: Never   Vaping Use   • Vaping Use: Never used   Substance Use Topics   • Alcohol use: Not Currently   • Drug use: Not Currently     MEDICATIONS:    Current Outpatient Medications:   •  acetaminophen (TYLENOL) 325 mg tablet, Take 650 mg by mouth every 6 (six) hours as needed for mild pain, Disp: , Rfl:   •  acetaminophen (TYLENOL) 500 mg tablet, Take 500 mg by mouth, Disp: , Rfl:   •  allopurinol (ZYLOPRIM) 100 mg tablet, Take 100 mg by mouth daily, Disp: , Rfl:   •  amLODIPine (NORVASC) 2 5 mg tablet, Take 1 tablet (2 5 mg total) by mouth daily, Disp: 30 tablet, Rfl: 0  •  Ascorbic Acid, Vitamin C, (VITAMIN C) 100 MG tablet, Take 500 mg by mouth daily , Disp: , Rfl:   •  bisacodyl (DULCOLAX) 10 mg suppository, Insert 10 mg into the rectum daily, Disp: , Rfl:   •  bisacodyl (DULCOLAX) 5 mg EC tablet, Take 5 mg by mouth daily as needed for constipation, Disp: , Rfl:   •  dextromethorphan-guaiFENesin (ROBITUSSIN DM)  mg/5 mL syrup, Take 5 mL by mouth 3 (three) times a day as needed for cough, Disp: , Rfl:   •  docusate sodium (COLACE) 100 mg capsule, Take 1 capsule (100 mg total) by mouth 2 (two) times a day, Disp: , Rfl: 0  •  gabapentin (Neurontin) 600 MG tablet, Take 1 tablet (600 mg total) by mouth 3 (three) times a day, Disp: 270 tablet, Rfl: 0  •  glimepiride (AMARYL) 2 mg tablet, Take 2 mg by mouth daily, Disp: , Rfl:   •  insulin aspart (NovoLOG FlexPen ReliOn) 100 UNIT/ML injection pen, Inject under the skin, Disp: , Rfl:   •  levothyroxine 150 mcg tablet, Take 150 mcg by mouth daily , Disp: , Rfl:   •  loperamide (IMODIUM A-D) 2 MG tablet, Take 2 mg by mouth 4 (four) times a day as needed for diarrhea, Disp: , Rfl:   •  Multiple Vitamin (Multi-Day) TABS, Take 1 tablet by mouth daily , Disp: , Rfl:   •  ondansetron (Zofran ODT) 4 mg disintegrating tablet, Take 1 tablet (4 mg total) by mouth every 6 (six) hours as needed for nausea or vomiting, Disp: 20 tablet, Rfl: 0  •  polyethylene glycol (MIRALAX) 17 g packet, Take 17 g by mouth daily, Disp: , Rfl:   •  promethazine (PHENERGAN) 25 mg suppository, Insert 25 mg into the rectum every 6 (six) hours as needed for nausea or vomiting, Disp: , Rfl:   •  promethazine (PHENERGAN) 25 mg tablet, Take 25 mg by mouth every 6 (six) hours as needed for nausea or vomiting, Disp: , Rfl:   •  Sennosides (senna) 8 8 mg/5 mL oral syrup, Take 5 mL (8 8 mg total) by mouth daily Do not start before January 27, 2023 , Disp: , Rfl: 0  •  Sodium Phosphates (ENEMA RE), Insert into the rectum, Disp: , Rfl:   •  tamsulosin (FLOMAX) 0 4 mg, Take 1 capsule (0 4 mg total) by mouth daily with dinner, Disp: , Rfl: 0  •  torsemide (DEMADEX) 100 mg tablet, Take 0 5 tablets (50 mg total) by mouth daily, Disp: 15 tablet, Rfl: 0  •  traMADol (ULTRAM) 50 mg tablet, Take 50 mg by mouth every 6 (six) hours as needed for moderate pain, Disp: , Rfl:   •  warfarin (COUMADIN) 5 mg tablet, Take 4 mg by mouth daily at bedtime, Disp: , Rfl:   •  Calcium Ascorbate (VITAMIN C) 500 mg tablet, Take 500 mg by mouth daily (Patient not taking: Reported on 4/21/2023), Disp: , Rfl:   •  Cholecalciferol 125 MCG (5000 UT) capsule, Take by mouth daily (Patient not taking: Reported on 4/21/2023), Disp: , Rfl:   •  insulin lispro (HumaLOG) 100 units/mL injection, Inject 1-6 Units under the skin daily at bedtime (Patient not taking: Reported on 4/21/2023), Disp: , Rfl: 0  •  insulin lispro (HumaLOG) 100 units/mL injection, Inject 2-12 Units under the skin 3 (three) times a day before meals (Patient not taking: Reported on 4/21/2023), Disp: , Rfl: 0  •  Lactobacillus Probiotic TABS, Take 1 capsule by mouth daily (Patient not taking: Reported on 2/10/2023), Disp: , Rfl:   •  lidocaine (LIDODERM) 5 %, Apply 1 patch topically over 12 hours daily Remove & Discard patch within 12 hours or as directed by MD Do not start before January 27, 2023   (Patient not taking: Reported on 4/21/2023), Disp: , Rfl: 0  •  zinc sulfate (ZINCATE) 220 mg capsule, Take 220 mg by mouth daily (Patient not taking: Reported on 4/21/2023), Disp: , Rfl:     ALLERGIES:  Allergies   Allergen Reactions   • Rofecoxib Angioedema, Swelling, Hives and Other (See Comments)     swelling, sob  swelling, sob  Other reaction(s): Swelling / Edema  swelling, sob  Other reaction(s): SWELLING  Other reaction(s): Angioedema     • Oxycodone-Acetaminophen GI Intolerance and Other "(See Comments)     Unsure of rxn   Unsure of rxn   Unsure of rxn   Other reaction(s): \"CAN'T BREATH\"     • Duloxetine Hcl Other (See Comments)     Slept for 4 days    • Medical Tape Rash     REVIEW OF SYSTEMS:  Pertinent items are noted in HPI  A comprehensive review of systems was negative   _____________________________________________________  PHYSICAL EXAMINATION:  General: well developed and well nourished, alert, oriented times 3 and appears comfortable  Psychiatric: Normal  HEENT:  Normocephalic, atraumatic  Cardiovascular:  Regular  Pulmonary: No wheezing or stridor  Skin: No masses, erthema, lacerations, fluctation, ulcerations  Neurovascular: Intact    MUSCULOSKELETAL EXAMINATION:    Left distal tibial shaft nontender to palpation both anteriorly and medially  There is no lateral tibial pain there is no fibular pain to palpation  His limited plantar and dorsiflexion and more limited inversion and eversion of the left ankle  She has very limited flexion extension of the toes of only 3 to 4 degrees  A 4 mm very superficial not full-thickness continuous wound medial ankle and dorsum of big toe these present without any eschar erythema or discharge and do not appear infected  Not present at last visit     _____________________________________________________  STUDIES REVIEWED:  Personally viewed x-rays of left tibia taken in the office today which note progressive healing of the distal tibia fracture with maintained position and alignment  Cortices appear healed in all views      PROCEDURES PERFORMED:  None today        Epimenio Reef  "

## 2023-06-16 ENCOUNTER — APPOINTMENT (EMERGENCY)
Dept: RADIOLOGY | Facility: HOSPITAL | Age: 78
DRG: 291 | End: 2023-06-16
Payer: COMMERCIAL

## 2023-06-16 ENCOUNTER — HOSPITAL ENCOUNTER (INPATIENT)
Facility: HOSPITAL | Age: 78
LOS: 6 days | Discharge: NURSING, REHAB OR LONG-TERM CARE | DRG: 291 | End: 2023-06-22
Attending: STUDENT IN AN ORGANIZED HEALTH CARE EDUCATION/TRAINING PROGRAM | Admitting: INTERNAL MEDICINE
Payer: COMMERCIAL

## 2023-06-16 DIAGNOSIS — R09.02 HYPOXIA: ICD-10-CM

## 2023-06-16 DIAGNOSIS — I50.9 CHF EXACERBATION (HCC): ICD-10-CM

## 2023-06-16 DIAGNOSIS — R06.00 DYSPNEA: Primary | ICD-10-CM

## 2023-06-16 DIAGNOSIS — I50.33 ACUTE ON CHRONIC DIASTOLIC (CONGESTIVE) HEART FAILURE (HCC): ICD-10-CM

## 2023-06-16 DIAGNOSIS — I50.9 CHF (CONGESTIVE HEART FAILURE) (HCC): ICD-10-CM

## 2023-06-16 PROBLEM — D64.9 CHRONIC ANEMIA: Status: ACTIVE | Noted: 2023-06-16

## 2023-06-16 PROBLEM — D69.6 THROMBOCYTOPENIA (HCC): Status: ACTIVE | Noted: 2023-06-16

## 2023-06-16 LAB
2HR DELTA HS TROPONIN: 0 NG/L
ALBUMIN SERPL BCP-MCNC: 4 G/DL (ref 3.5–5)
ALP SERPL-CCNC: 122 U/L (ref 34–104)
ALT SERPL W P-5'-P-CCNC: 9 U/L (ref 7–52)
ANION GAP SERPL CALCULATED.3IONS-SCNC: 10 MMOL/L (ref 4–13)
APTT PPP: 48 SECONDS (ref 23–37)
ARTERIAL PATENCY WRIST A: YES
AST SERPL W P-5'-P-CCNC: 15 U/L (ref 13–39)
BACTERIA UR QL AUTO: ABNORMAL /HPF
BASE EXCESS BLDA CALC-SCNC: 2.6 MMOL/L
BASOPHILS # BLD AUTO: 0.04 THOUSANDS/ÂΜL (ref 0–0.1)
BASOPHILS NFR BLD AUTO: 1 % (ref 0–1)
BILIRUB SERPL-MCNC: 0.62 MG/DL (ref 0.2–1)
BILIRUB UR QL STRIP: NEGATIVE
BNP SERPL-MCNC: 96 PG/ML (ref 0–100)
BUN SERPL-MCNC: 57 MG/DL (ref 5–25)
CALCIUM SERPL-MCNC: 9.1 MG/DL (ref 8.4–10.2)
CARDIAC TROPONIN I PNL SERPL HS: 11 NG/L
CARDIAC TROPONIN I PNL SERPL HS: 11 NG/L
CHLORIDE SERPL-SCNC: 100 MMOL/L (ref 96–108)
CLARITY UR: CLEAR
CO2 SERPL-SCNC: 28 MMOL/L (ref 21–32)
COLOR UR: ABNORMAL
CREAT SERPL-MCNC: 1.55 MG/DL (ref 0.6–1.3)
EOSINOPHIL # BLD AUTO: 0.2 THOUSAND/ÂΜL (ref 0–0.61)
EOSINOPHIL NFR BLD AUTO: 4 % (ref 0–6)
ERYTHROCYTE [DISTWIDTH] IN BLOOD BY AUTOMATED COUNT: 20.1 % (ref 11.6–15.1)
FLUAV RNA RESP QL NAA+PROBE: NEGATIVE
FLUBV RNA RESP QL NAA+PROBE: NEGATIVE
GFR SERPL CREATININE-BSD FRML MDRD: 32 ML/MIN/1.73SQ M
GLUCOSE SERPL-MCNC: 116 MG/DL (ref 65–140)
GLUCOSE SERPL-MCNC: 155 MG/DL (ref 65–140)
GLUCOSE UR STRIP-MCNC: NEGATIVE MG/DL
HCO3 BLDA-SCNC: 26.7 MMOL/L (ref 22–28)
HCT VFR BLD AUTO: 31.8 % (ref 34.8–46.1)
HGB BLD-MCNC: 9.7 G/DL (ref 11.5–15.4)
HGB UR QL STRIP.AUTO: NEGATIVE
IMM GRANULOCYTES # BLD AUTO: 0.01 THOUSAND/UL (ref 0–0.2)
IMM GRANULOCYTES NFR BLD AUTO: 0 % (ref 0–2)
INR PPP: 2.24 (ref 0.84–1.19)
IPAP: 10
KETONES UR STRIP-MCNC: NEGATIVE MG/DL
LACTATE SERPL-SCNC: 1.6 MMOL/L (ref 0.5–2)
LEUKOCYTE ESTERASE UR QL STRIP: ABNORMAL
LYMPHOCYTES # BLD AUTO: 0.65 THOUSANDS/ÂΜL (ref 0.6–4.47)
LYMPHOCYTES NFR BLD AUTO: 12 % (ref 14–44)
MAGNESIUM SERPL-MCNC: 2.4 MG/DL (ref 1.9–2.7)
MCH RBC QN AUTO: 23 PG (ref 26.8–34.3)
MCHC RBC AUTO-ENTMCNC: 30.5 G/DL (ref 31.4–37.4)
MCV RBC AUTO: 75 FL (ref 82–98)
MONOCYTES # BLD AUTO: 0.42 THOUSAND/ÂΜL (ref 0.17–1.22)
MONOCYTES NFR BLD AUTO: 8 % (ref 4–12)
NEUTROPHILS # BLD AUTO: 3.94 THOUSANDS/ÂΜL (ref 1.85–7.62)
NEUTS SEG NFR BLD AUTO: 75 % (ref 43–75)
NITRITE UR QL STRIP: NEGATIVE
NON VENT- BIPAP: ABNORMAL
NON-SQ EPI CELLS URNS QL MICRO: ABNORMAL /HPF
NRBC BLD AUTO-RTO: 0 /100 WBCS
O2 CT BLDA-SCNC: 14.9 ML/DL (ref 16–23)
OXYHGB MFR BLDA: 97.9 % (ref 94–97)
PCO2 BLDA: 39.2 MM HG (ref 36–44)
PEEP MAX SETTING VENT: 5 CM[H2O]
PH BLDA: 7.45 [PH] (ref 7.35–7.45)
PH UR STRIP.AUTO: 7 [PH]
PLATELET # BLD AUTO: 148 THOUSANDS/UL (ref 149–390)
PMV BLD AUTO: 9.7 FL (ref 8.9–12.7)
PO2 BLDA: 141.1 MM HG (ref 75–129)
POTASSIUM SERPL-SCNC: 3.8 MMOL/L (ref 3.5–5.3)
PROCALCITONIN SERPL-MCNC: 0.06 NG/ML
PROT SERPL-MCNC: 7.4 G/DL (ref 6.4–8.4)
PROT UR STRIP-MCNC: NEGATIVE MG/DL
PROTHROMBIN TIME: 24.8 SECONDS (ref 11.6–14.5)
RBC # BLD AUTO: 4.22 MILLION/UL (ref 3.81–5.12)
RBC #/AREA URNS AUTO: ABNORMAL /HPF
RSV RNA RESP QL NAA+PROBE: NEGATIVE
SARS-COV-2 RNA RESP QL NAA+PROBE: NEGATIVE
SODIUM SERPL-SCNC: 138 MMOL/L (ref 135–147)
SP GR UR STRIP.AUTO: 1.01 (ref 1–1.03)
SPECIMEN SOURCE: ABNORMAL
UROBILINOGEN UR QL STRIP.AUTO: 0.2 E.U./DL
VENT BIPAP FIO2: 35 %
WBC # BLD AUTO: 5.26 THOUSAND/UL (ref 4.31–10.16)
WBC #/AREA URNS AUTO: ABNORMAL /HPF

## 2023-06-16 PROCEDURE — 87186 SC STD MICRODIL/AGAR DIL: CPT | Performed by: PHYSICIAN ASSISTANT

## 2023-06-16 PROCEDURE — 83540 ASSAY OF IRON: CPT

## 2023-06-16 PROCEDURE — 94002 VENT MGMT INPAT INIT DAY: CPT

## 2023-06-16 PROCEDURE — 99223 1ST HOSP IP/OBS HIGH 75: CPT

## 2023-06-16 PROCEDURE — 87154 CUL TYP ID BLD PTHGN 6+ TRGT: CPT | Performed by: PHYSICIAN ASSISTANT

## 2023-06-16 PROCEDURE — 94760 N-INVAS EAR/PLS OXIMETRY 1: CPT

## 2023-06-16 PROCEDURE — 96374 THER/PROPH/DIAG INJ IV PUSH: CPT

## 2023-06-16 PROCEDURE — 82805 BLOOD GASES W/O2 SATURATION: CPT | Performed by: PHYSICIAN ASSISTANT

## 2023-06-16 PROCEDURE — 83550 IRON BINDING TEST: CPT

## 2023-06-16 PROCEDURE — 85025 COMPLETE CBC W/AUTO DIFF WBC: CPT | Performed by: PHYSICIAN ASSISTANT

## 2023-06-16 PROCEDURE — 80053 COMPREHEN METABOLIC PANEL: CPT | Performed by: PHYSICIAN ASSISTANT

## 2023-06-16 PROCEDURE — 83880 ASSAY OF NATRIURETIC PEPTIDE: CPT | Performed by: PHYSICIAN ASSISTANT

## 2023-06-16 PROCEDURE — 82728 ASSAY OF FERRITIN: CPT

## 2023-06-16 PROCEDURE — 87086 URINE CULTURE/COLONY COUNT: CPT | Performed by: PHYSICIAN ASSISTANT

## 2023-06-16 PROCEDURE — 83605 ASSAY OF LACTIC ACID: CPT | Performed by: PHYSICIAN ASSISTANT

## 2023-06-16 PROCEDURE — 81001 URINALYSIS AUTO W/SCOPE: CPT | Performed by: PHYSICIAN ASSISTANT

## 2023-06-16 PROCEDURE — 99285 EMERGENCY DEPT VISIT HI MDM: CPT

## 2023-06-16 PROCEDURE — 85610 PROTHROMBIN TIME: CPT | Performed by: PHYSICIAN ASSISTANT

## 2023-06-16 PROCEDURE — 36600 WITHDRAWAL OF ARTERIAL BLOOD: CPT

## 2023-06-16 PROCEDURE — 82746 ASSAY OF FOLIC ACID SERUM: CPT

## 2023-06-16 PROCEDURE — 83735 ASSAY OF MAGNESIUM: CPT | Performed by: PHYSICIAN ASSISTANT

## 2023-06-16 PROCEDURE — 82607 VITAMIN B-12: CPT

## 2023-06-16 PROCEDURE — 87081 CULTURE SCREEN ONLY: CPT | Performed by: FAMILY MEDICINE

## 2023-06-16 PROCEDURE — 85730 THROMBOPLASTIN TIME PARTIAL: CPT | Performed by: PHYSICIAN ASSISTANT

## 2023-06-16 PROCEDURE — 82948 REAGENT STRIP/BLOOD GLUCOSE: CPT

## 2023-06-16 PROCEDURE — 87077 CULTURE AEROBIC IDENTIFY: CPT | Performed by: PHYSICIAN ASSISTANT

## 2023-06-16 PROCEDURE — 84484 ASSAY OF TROPONIN QUANT: CPT | Performed by: PHYSICIAN ASSISTANT

## 2023-06-16 PROCEDURE — 93005 ELECTROCARDIOGRAM TRACING: CPT

## 2023-06-16 PROCEDURE — 87040 BLOOD CULTURE FOR BACTERIA: CPT | Performed by: PHYSICIAN ASSISTANT

## 2023-06-16 PROCEDURE — 84145 PROCALCITONIN (PCT): CPT | Performed by: PHYSICIAN ASSISTANT

## 2023-06-16 PROCEDURE — 71045 X-RAY EXAM CHEST 1 VIEW: CPT

## 2023-06-16 PROCEDURE — 36415 COLL VENOUS BLD VENIPUNCTURE: CPT | Performed by: PHYSICIAN ASSISTANT

## 2023-06-16 PROCEDURE — 0241U HB NFCT DS VIR RESP RNA 4 TRGT: CPT

## 2023-06-16 RX ORDER — FUROSEMIDE 10 MG/ML
20 INJECTION INTRAMUSCULAR; INTRAVENOUS ONCE
Status: COMPLETED | OUTPATIENT
Start: 2023-06-16 | End: 2023-06-16

## 2023-06-16 RX ORDER — ACETAMINOPHEN 325 MG/1
650 TABLET ORAL EVERY 4 HOURS PRN
Status: DISCONTINUED | OUTPATIENT
Start: 2023-06-16 | End: 2023-06-22 | Stop reason: HOSPADM

## 2023-06-16 RX ORDER — TRAMADOL HYDROCHLORIDE 50 MG/1
50 TABLET ORAL EVERY 6 HOURS PRN
Status: DISCONTINUED | OUTPATIENT
Start: 2023-06-16 | End: 2023-06-22 | Stop reason: HOSPADM

## 2023-06-16 RX ORDER — ASCORBIC ACID 500 MG
500 TABLET ORAL DAILY
Status: DISCONTINUED | OUTPATIENT
Start: 2023-06-17 | End: 2023-06-22 | Stop reason: HOSPADM

## 2023-06-16 RX ORDER — FUROSEMIDE 10 MG/ML
60 INJECTION INTRAMUSCULAR; INTRAVENOUS ONCE
Status: COMPLETED | OUTPATIENT
Start: 2023-06-16 | End: 2023-06-16

## 2023-06-16 RX ORDER — INSULIN LISPRO 100 [IU]/ML
1-6 INJECTION, SOLUTION INTRAVENOUS; SUBCUTANEOUS
Status: DISCONTINUED | OUTPATIENT
Start: 2023-06-16 | End: 2023-06-22 | Stop reason: HOSPADM

## 2023-06-16 RX ORDER — LEVOTHYROXINE SODIUM 0.15 MG/1
150 TABLET ORAL DAILY
Status: DISCONTINUED | OUTPATIENT
Start: 2023-06-17 | End: 2023-06-22 | Stop reason: HOSPADM

## 2023-06-16 RX ORDER — NITROGLYCERIN 0.4 MG/1
0.4 TABLET SUBLINGUAL ONCE
Status: COMPLETED | OUTPATIENT
Start: 2023-06-16 | End: 2023-06-16

## 2023-06-16 RX ORDER — FUROSEMIDE 10 MG/ML
80 INJECTION INTRAMUSCULAR; INTRAVENOUS
Status: DISCONTINUED | OUTPATIENT
Start: 2023-06-17 | End: 2023-06-21

## 2023-06-16 RX ORDER — WARFARIN SODIUM 4 MG/1
4 TABLET ORAL
Status: DISCONTINUED | OUTPATIENT
Start: 2023-06-16 | End: 2023-06-22 | Stop reason: HOSPADM

## 2023-06-16 RX ORDER — POLYETHYLENE GLYCOL 3350 17 G/17G
17 POWDER, FOR SOLUTION ORAL DAILY
Status: DISCONTINUED | OUTPATIENT
Start: 2023-06-17 | End: 2023-06-21

## 2023-06-16 RX ORDER — BISACODYL 10 MG
10 SUPPOSITORY, RECTAL RECTAL DAILY PRN
Status: DISCONTINUED | OUTPATIENT
Start: 2023-06-16 | End: 2023-06-22 | Stop reason: HOSPADM

## 2023-06-16 RX ORDER — ALLOPURINOL 100 MG/1
100 TABLET ORAL DAILY
Status: DISCONTINUED | OUTPATIENT
Start: 2023-06-17 | End: 2023-06-22 | Stop reason: HOSPADM

## 2023-06-16 RX ORDER — POTASSIUM CHLORIDE 20 MEQ/1
20 TABLET, EXTENDED RELEASE ORAL ONCE
Status: COMPLETED | OUTPATIENT
Start: 2023-06-16 | End: 2023-06-16

## 2023-06-16 RX ORDER — CEFTRIAXONE 1 G/50ML
1000 INJECTION, SOLUTION INTRAVENOUS EVERY 24 HOURS
Status: DISCONTINUED | OUTPATIENT
Start: 2023-06-16 | End: 2023-06-17

## 2023-06-16 RX ORDER — GABAPENTIN 300 MG/1
600 CAPSULE ORAL 3 TIMES DAILY
Status: DISCONTINUED | OUTPATIENT
Start: 2023-06-16 | End: 2023-06-22 | Stop reason: HOSPADM

## 2023-06-16 RX ORDER — TAMSULOSIN HYDROCHLORIDE 0.4 MG/1
0.4 CAPSULE ORAL
Status: DISCONTINUED | OUTPATIENT
Start: 2023-06-17 | End: 2023-06-22 | Stop reason: HOSPADM

## 2023-06-16 RX ORDER — DOCUSATE SODIUM 100 MG/1
100 CAPSULE, LIQUID FILLED ORAL 2 TIMES DAILY
Status: DISCONTINUED | OUTPATIENT
Start: 2023-06-16 | End: 2023-06-21

## 2023-06-16 RX ORDER — INSULIN LISPRO 100 [IU]/ML
1-6 INJECTION, SOLUTION INTRAVENOUS; SUBCUTANEOUS
Status: DISCONTINUED | OUTPATIENT
Start: 2023-06-17 | End: 2023-06-22 | Stop reason: HOSPADM

## 2023-06-16 RX ADMIN — GABAPENTIN 600 MG: 300 CAPSULE ORAL at 22:08

## 2023-06-16 RX ADMIN — DOCUSATE SODIUM 100 MG: 100 CAPSULE, LIQUID FILLED ORAL at 22:08

## 2023-06-16 RX ADMIN — CEFTRIAXONE 1000 MG: 1 INJECTION, SOLUTION INTRAVENOUS at 22:57

## 2023-06-16 RX ADMIN — NITROGLYCERIN 0.4 MG: 0.4 TABLET SUBLINGUAL at 19:09

## 2023-06-16 RX ADMIN — WARFARIN SODIUM 4 MG: 4 TABLET ORAL at 22:57

## 2023-06-16 RX ADMIN — FUROSEMIDE 20 MG: 10 INJECTION, SOLUTION INTRAMUSCULAR; INTRAVENOUS at 22:08

## 2023-06-16 RX ADMIN — FUROSEMIDE 60 MG: 10 INJECTION, SOLUTION INTRAMUSCULAR; INTRAVENOUS at 19:11

## 2023-06-16 RX ADMIN — POTASSIUM CHLORIDE 20 MEQ: 1500 TABLET, EXTENDED RELEASE ORAL at 22:08

## 2023-06-16 NOTE — ED PROVIDER NOTES
"History  Chief Complaint   Patient presents with   • Shortness of Breath     Pt found to be sob and tachypnic with a room air pulseox in the \"80's\"  Pt placed on 3L and O2 sats improved to 97%  Pt still sob, 88% on RA during triage  Patient is a 67-year-old female with a past medical history of CHF, diabetes, icemaker on Coumadin, who presents emerged department today via EMS from nursing facility for the complaint of increasing shortness of breath x1 week  Patient does not wear oxygen at the nursing facility  States that she has been having worsening shortness of breath over the last week with abdominal bloating and distention  She states that her pain has worsened over the last few days where she is now feeling short of breath even with sitting and talking  They increased her Lasix/water pill at the nursing facility  She states it has not been working  EMS states that she was hypoxic per nursing staff  No history of COPD or asthma  Denies any chest pain, abdominal pain nausea vomiting dizziness back pain  Shortness of Breath  Severity:  Severe  Onset quality:  Gradual  Duration:  1 week  Timing:  Constant  Progression:  Worsening  Chronicity:  New  Relieved by:  Nothing  Worsened by: Activity and exertion  Ineffective treatments:  Diuretics and rest  Associated symptoms: no chest pain, no ear pain, no headaches, no hemoptysis, no rash, no syncope and no swollen glands        Prior to Admission Medications   Prescriptions Last Dose Informant Patient Reported? Taking?    Ascorbic Acid, Vitamin C, (VITAMIN C) 100 MG tablet   Yes No   Sig: Take 500 mg by mouth daily    Calcium Ascorbate (VITAMIN C) 500 mg tablet   Yes No   Sig: Take 500 mg by mouth daily   Patient not taking: Reported on 4/21/2023   Cholecalciferol 125 MCG (5000 UT) capsule   Yes No   Sig: Take by mouth daily   Patient not taking: Reported on 4/21/2023   Lactobacillus Probiotic TABS   Yes No   Sig: Take 1 capsule by mouth daily " Patient not taking: Reported on 2/10/2023   Multiple Vitamin (Multi-Day) TABS   Yes No   Sig: Take 1 tablet by mouth daily    Sennosides (senna) 8 8 mg/5 mL oral syrup   No No   Sig: Take 5 mL (8 8 mg total) by mouth daily Do not start before January 27, 2023     Sodium Phosphates (ENEMA RE)   Yes No   Sig: Insert into the rectum   acetaminophen (TYLENOL) 325 mg tablet   Yes No   Sig: Take 650 mg by mouth every 6 (six) hours as needed for mild pain   acetaminophen (TYLENOL) 500 mg tablet   Yes No   Sig: Take 500 mg by mouth   allopurinol (ZYLOPRIM) 100 mg tablet   Yes No   Sig: Take 100 mg by mouth daily   amLODIPine (NORVASC) 2 5 mg tablet   No No   Sig: Take 1 tablet (2 5 mg total) by mouth daily   bisacodyl (DULCOLAX) 10 mg suppository   Yes No   Sig: Insert 10 mg into the rectum daily   bisacodyl (DULCOLAX) 5 mg EC tablet   Yes No   Sig: Take 5 mg by mouth daily as needed for constipation   dextromethorphan-guaiFENesin (ROBITUSSIN DM)  mg/5 mL syrup   Yes No   Sig: Take 5 mL by mouth 3 (three) times a day as needed for cough   docusate sodium (COLACE) 100 mg capsule   No No   Sig: Take 1 capsule (100 mg total) by mouth 2 (two) times a day   gabapentin (Neurontin) 600 MG tablet   No No   Sig: Take 1 tablet (600 mg total) by mouth 3 (three) times a day   glimepiride (AMARYL) 2 mg tablet   Yes No   Sig: Take 2 mg by mouth daily   insulin aspart (NovoLOG FlexPen ReliOn) 100 UNIT/ML injection pen   Yes No   Sig: Inject under the skin   insulin lispro (HumaLOG) 100 units/mL injection   No No   Sig: Inject 1-6 Units under the skin daily at bedtime   Patient not taking: Reported on 4/21/2023   insulin lispro (HumaLOG) 100 units/mL injection   No No   Sig: Inject 2-12 Units under the skin 3 (three) times a day before meals   Patient not taking: Reported on 4/21/2023   levothyroxine 150 mcg tablet   Yes No   Sig: Take 150 mcg by mouth daily    lidocaine (LIDODERM) 5 %   No No   Sig: Apply 1 patch topically over 12 hours daily Remove & Discard patch within 12 hours or as directed by MD Do not start before January 27, 2023     Patient not taking: Reported on 4/21/2023   loperamide (IMODIUM A-D) 2 MG tablet   Yes No   Sig: Take 2 mg by mouth 4 (four) times a day as needed for diarrhea   ondansetron (Zofran ODT) 4 mg disintegrating tablet   No No   Sig: Take 1 tablet (4 mg total) by mouth every 6 (six) hours as needed for nausea or vomiting   polyethylene glycol (MIRALAX) 17 g packet   Yes No   Sig: Take 17 g by mouth daily   promethazine (PHENERGAN) 25 mg suppository   Yes No   Sig: Insert 25 mg into the rectum every 6 (six) hours as needed for nausea or vomiting   promethazine (PHENERGAN) 25 mg tablet   Yes No   Sig: Take 25 mg by mouth every 6 (six) hours as needed for nausea or vomiting   tamsulosin (FLOMAX) 0 4 mg   No No   Sig: Take 1 capsule (0 4 mg total) by mouth daily with dinner   torsemide (DEMADEX) 100 mg tablet   No No   Sig: Take 0 5 tablets (50 mg total) by mouth daily   traMADol (ULTRAM) 50 mg tablet   Yes No   Sig: Take 50 mg by mouth every 6 (six) hours as needed for moderate pain   warfarin (COUMADIN) 5 mg tablet   Yes No   Sig: Take 4 mg by mouth daily at bedtime   zinc sulfate (ZINCATE) 220 mg capsule   Yes No   Sig: Take 220 mg by mouth daily   Patient not taking: Reported on 4/21/2023      Facility-Administered Medications: None       Past Medical History:   Diagnosis Date   • Arthritis    • CHF (congestive heart failure) ([facility])    • Diabetes mellitus ([facility] )    • Disease of thyroid gland    • Hypertension    • Pacemaker    • Renal disorder        Past Surgical History:   Procedure Laterality Date   • CARDIAC PACEMAKER PLACEMENT     • CHOLECYSTECTOMY     • FL GUIDED NEEDLE PLAC BX/ASP/INJ  10/4/2022   • FL GUIDED NEEDLE PLAC BX/ASP/INJ  10/20/2022   • JOINT REPLACEMENT      bilateral knee replacements   • NERVE BLOCK Bilateral 10/4/2022    Procedure: BLOCK MEDIAL BRANCH NERVES BILATERAL L3, L4, L5 #1; Surgeon: James Galarza MD;  Location: [facility];  Service: Pain Management    • NERVE BLOCK Bilateral 10/20/2022    Procedure: BLOCK MEDIAL BRANCH L3, L4, L5 #2;  Surgeon: James Galarza MD;  Location: [facility];  Service: Pain Management    • ORIF TIBIA & FIBULA FRACTURES Left 10/29/2020    Procedure: OPEN REDUCTION W/ INTERNAL FIXATION (ORIF) ANKLE;  Surgeon: Alonso Barron; Location: [facility];  Service: Orthopedics   • TONSILLECTOMY     • TUBAL LIGATION         Family History   Problem Relation Age of Onset   • Atrial fibrillation Mother    • Arthritis Father    • Kidney cancer Father    • Breast cancer Sister         early 46s   • No Known Problems Sister    • Kidney cancer Brother    • No Known Problems Daughter    • No Known Problems Daughter    • No Known Problems Daughter    • No Known Problems Daughter    • No Known Problems Maternal Aunt    • No Known Problems Maternal Aunt    • No Known Problems Maternal Aunt    • No Known Problems Paternal Aunt    • No Known Problems Maternal Grandmother    • No Known Problems Maternal Grandfather    • No Known Problems Paternal Grandmother    • No Known Problems Paternal Grandfather    • Breast cancer Cousin         early 46s   • Breast cancer additional onset Neg Hx    • Colon cancer Neg Hx    • Endometrial cancer Neg Hx    • Ovarian cancer Neg Hx    • BRCA 1/2 Neg Hx    • BRCA2 Positive Neg Hx    • BRCA2 Negative Neg Hx    • BRCA1 Positive Neg Hx    • BRCA1 Negative Neg Hx      I have reviewed and agree with the history as documented  E-Cigarette/Vaping   • E-Cigarette Use Never User      E-Cigarette/Vaping Substances     Social History     Tobacco Use   • Smoking status: Never   • Smokeless tobacco: Never   Vaping Use   • Vaping Use: Never used   Substance Use Topics   • Alcohol use: Not Currently   • Drug use: Not Currently       Review of Systems   HENT: Negative for ear pain  Respiratory: Positive for shortness of breath  Negative for hemoptysis  Cardiovascular: Negative for chest pain and syncope  Skin: Negative for rash  Neurological: Negative for headaches  All other systems reviewed and are negative  Physical Exam  Physical Exam  Vitals and nursing note reviewed  Constitutional:       General: She is not in acute distress  Appearance: She is well-developed  HENT:      Head: Normocephalic and atraumatic  Eyes:      Extraocular Movements: Extraocular movements intact  Conjunctiva/sclera: Conjunctivae normal       Pupils: Pupils are equal, round, and reactive to light  Cardiovascular:      Rate and Rhythm: Normal rate and regular rhythm  Pulses: Normal pulses  Heart sounds: No murmur heard  Pulmonary:      Effort: Tachypnea and respiratory distress present  Breath sounds: Examination of the right-lower field reveals rales  Examination of the left-lower field reveals rales  Rales present  Abdominal:      General: There is distension  Palpations: Abdomen is soft  Tenderness: There is no abdominal tenderness  There is no right CVA tenderness or left CVA tenderness  Hernia: No hernia is present  Musculoskeletal:         General: No swelling  Cervical back: Neck supple  Skin:     General: Skin is warm and dry  Capillary Refill: Capillary refill takes less than 2 seconds  Neurological:      General: No focal deficit present  Mental Status: She is alert and oriented to person, place, and time     Psychiatric:         Mood and Affect: Mood normal          Vital Signs  ED Triage Vitals   Temperature Pulse Respirations Blood Pressure SpO2   06/16/23 1919 06/16/23 1845 06/16/23 1845 06/16/23 1845 06/16/23 1845   97 5 °F (36 4 °C) 72 (!) 28 169/71 (!) 88 %      Temp Source Heart Rate Source Patient Position - Orthostatic VS BP Location FiO2 (%)   06/16/23 1919 06/16/23 1845 -- -- 06/16/23 1855   Temporal Monitor   35      Pain Score       --                  Vitals:    06/16/23 1845 06/16/23 1945 06/16/23 2000 06/16/23 2030   BP: 169/71 168/79 164/78 165/77   Pulse: 72 78 75 75         Visual Acuity      ED Medications  Medications   nitroglycerin (NITROSTAT) SL tablet 0 4 mg (0 4 mg Sublingual Given 6/16/23 1909)   furosemide (LASIX) injection 60 mg (60 mg Intravenous Given 6/16/23 1911)       Diagnostic Studies  Results Reviewed     Procedure Component Value Units Date/Time    Procalcitonin [[id number]]  (Normal) Collected: 06/16/23 1914    Lab Status: Final result Specimen: Blood from Arm, Right Updated: 06/16/23 2031     Procalcitonin 0 06 ng/ml     Protime-INR [[id number]]  (Abnormal) Collected: 06/16/23 1914    Lab Status: Final result Specimen: Blood from Arm, Right Updated: 06/16/23 2006     Protime 24 8 seconds      INR 2 24    APTT [[id number]]  (Abnormal) Collected: 06/16/23 1914    Lab Status: Final result Specimen: Blood from Arm, Right Updated: 06/16/23 2006     PTT 48 seconds     HS Troponin I 4hr [[id number]]     Lab Status: No result Specimen: Blood     Comprehensive metabolic panel [[id number]]  (Abnormal) Collected: 06/16/23 1914    Lab Status: Final result Specimen: Blood from Arm, Right Updated: 06/16/23 1951     Sodium 138 mmol/L      Potassium 3 8 mmol/L      Chloride 100 mmol/L      CO2 28 mmol/L      ANION GAP 10 mmol/L      BUN 57 mg/dL      Creatinine 1 55 mg/dL      Glucose 155 mg/dL      Calcium 9 1 mg/dL      AST 15 U/L      ALT 9 U/L      Alkaline Phosphatase 122 U/L      Total Protein 7 4 g/dL      Albumin 4 0 g/dL      Total Bilirubin 0 62 mg/dL      eGFR 32 ml/min/1 73sq m     Narrative:      El guidelines for Chronic Kidney Disease (CKD):   •  Stage 1 with normal or high GFR (GFR > 90 mL/min/1 73 square meters)  •  Stage 2 Mild CKD (GFR = 60-89 mL/min/1 73 square meters)  •  Stage 3A Moderate CKD (GFR = 45-59 mL/min/1 73 square meters)  •  Stage 3B Moderate CKD (GFR = 30-44 mL/min/1 73 square meters)  •  Stage 4 Severe CKD (GFR = 15-29 mL/min/1 73 square meters)  •  Stage 5 End Stage CKD (GFR <15 mL/min/1 73 square meters)  Note: GFR calculation is accurate only with a steady state creatinine    Magnesium [[id number]]  (Normal) Collected: 06/16/23 1914    Lab Status: Final result Specimen: Blood from Arm, Right Updated: 06/16/23 1951     Magnesium 2 4 mg/dL     HS Troponin 0hr (reflex protocol) [[id number]]  (Normal) Collected: 06/16/23 1914    Lab Status: Final result Specimen: Blood from Arm, Right Updated: 06/16/23 1949     hs TnI 0hr 11 ng/L     HS Troponin I 2hr [[id number]]     Lab Status: No result Specimen: Blood     B-Type Natriuretic Peptide(BNP) [[id number]]  (Normal) Collected: 06/16/23 1914    Lab Status: Final result Specimen: Blood from Arm, Right Updated: 06/16/23 1949     BNP 96 pg/mL     Lactic acid [[id number]]  (Normal) Collected: 06/16/23 1914    Lab Status: Final result Specimen: Blood from Arm, Right Updated: 06/16/23 1943     LACTIC ACID 1 6 mmol/L     Narrative:      Result may be elevated if tourniquet was used during collection  Blood culture #1 [[id number]] Collected: 06/16/23 1914    Lab Status: In process Specimen: Blood from Arm, Right Updated: 06/16/23 1924    Blood culture #2 [[id number]] Collected: 06/16/23 1914    Lab Status:  In process Specimen: Blood from Hand, Left Updated: 06/16/23 1924    CBC and differential [[id number]]  (Abnormal) Collected: 06/16/23 1914    Lab Status: Final result Specimen: Blood from Arm, Right Updated: 06/16/23 1924     WBC 5 26 Thousand/uL      RBC 4 22 Million/uL      Hemoglobin 9 7 g/dL      Hematocrit 31 8 %      MCV 75 fL      MCH 23 0 pg      MCHC 30 5 g/dL      RDW 20 1 %      MPV 9 7 fL      Platelets 105 Thousands/uL      nRBC 0 /100 WBCs      Neutrophils Relative 75 %      Immat GRANS % 0 %      Lymphocytes Relative 12 %      Monocytes Relative 8 %      Eosinophils Relative 4 %      Basophils Relative 1 %      Neutrophils Absolute 3 94 Thousands/µL      Immature Grans Absolute 0 01 Thousand/uL      Lymphocytes Absolute 0 65 Thousands/µL      Monocytes Absolute 0 42 Thousand/µL      Eosinophils Absolute 0 20 Thousand/µL      Basophils Absolute 0 04 Thousands/µL     Blood gas, Arterial [[id number]]  (Abnormal) Collected: 06/16/23 1907    Lab Status: Final result Specimen: Blood, Arterial from Radial, Right Updated: 06/16/23 1915     pH, Arterial 7 451     pCO2, Arterial 39 2 mm Hg      pO2, Arterial 141 1 mm Hg      HCO3, Arterial 26 7 mmol/L      Base Excess, Arterial 2 6 mmol/L      O2 Content, Arterial 14 9 mL/dL      O2 HGB,Arterial  97 9 %      SOURCE Radial, Right     GERALDO TEST Yes     Non Vent type BIPAP BIPAP     IPAP 10     EPAP 5     BIPAP fio2 35 %     UA w Reflex to Microscopic w Reflex to Culture [[id number]]     Lab Status: No result Specimen: Urine                  XR chest portable   ED Interpretation by Dora Herrera DO (06/16 1958)   CHF                 Procedures  ECG 12 Lead Documentation Only    Date/Time: 6/16/2023 8:31 PM    Performed by: Sebastian Price PA-C  Authorized by: Sebastian Price PA-C    Indications / Diagnosis:  Sob  ECG reviewed by me, the ED Provider: yes    Patient location:  ED  Previous ECG:     Previous ECG:  Unavailable  Interpretation:     Interpretation: abnormal    Rate:     ECG rate:  75  Rhythm:     Rhythm: paced    Pacing:     Capture:  Complete    Type of pacing:  Ventricular  CriticalCare Time    Date/Time: 6/16/2023 8:34 PM    Performed by: Sebastian Price PA-C  Authorized by: Sebastian Price PA-C    Critical care provider statement:     Critical care time (minutes):  20    Critical care time was exclusive of:  Separately billable procedures and treating other patients    Critical care was necessary to treat or prevent imminent or life-threatening deterioration of the following conditions:  Respiratory failure    Critical care was time spent personally by me on the following activities:  Blood draw for specimens, obtaining history from patient or surrogate, development of treatment plan with patient or surrogate, discussions with consultants, evaluation of patient's response to treatment, examination of patient, interpretation of cardiac output measurements, ordering and performing treatments and interventions, ordering and review of laboratory studies, re-evaluation of patient's condition, ordering and review of radiographic studies and review of old charts    I assumed direction of critical care for this patient from another provider in my specialty: no               ED Course  ED Course as of 06/16/23 2039 Fri Jun 16, 2023 1902 Patient was hypoxic at 88% on room air    Patient placed on 3 L nasal cannula oxygen, increased to 90% however patient had respiratory distress with increased work of breathing therefore BiPAP placed   1929 Hemoglobin(!): 9 7   1930 Hemoglobin(!): 9 7  History of CKD previous CBC in January 10 1-10 6    1956 Creatinine(!): 1 55  Baseline 1 4 2003 Patient doing well  SBIRT 22yo+    Flowsheet Row Most Recent Value   Initial Alcohol Screen: US AUDIT-C     1  How often do you have a drink containing alcohol? 0 Filed at: 06/16/2023 1948   2  How many drinks containing alcohol do you have on a typical day you are drinking? 0 Filed at: 06/16/2023 1948   3a  Male UNDER 65: How often do you have five or more drinks on one occasion? 0 Filed at: 06/16/2023 1948   3b  FEMALE Any Age, or MALE 65+: How often do you have 4 or more drinks on one occassion? 0 Filed at: 06/16/2023 1948   Audit-C Score 0 Filed at: 06/16/2023 1948   DANIELA: How many times in the past year have you    Used an illegal drug or used a prescription medication for non-medical reasons?  Never Filed at: 06/16/2023 1948                    Medical Decision Making  Patient is a 52-year-old female with a past medical history of CHF, diabetes, icemaker on Coumadin, who presents emerged department today via EMS from nursing facility for the complaint of increasing shortness of breath x1 week  Patient does not wear oxygen at the nursing facility  States that she has been having worsening shortness of breath over the last week with abdominal bloating and distention  She states that her pain has worsened over the last few days where she is now feeling short of breath even with sitting and talking  They increased her Lasix/water pill at the nursing facility  She states it has not been working  EMS states that she was hypoxic per nursing staff  No history of COPD or asthma  Denies any chest pain, abdominal pain nausea vomiting dizziness back pain  EKG demonstrated a paced rhythm     on examination upon arrival she was in respiratory distress, 88% on room air  Patient on nasal cannula oxygen at 3 L and was 96% however work of breathing was still pretty severe so called respiratory for BiPAP  Patient remained on the BiPAP for approximately 1 hour  Had Lasix and nitro given  Trial of the BiPAP was then initiated and patient was stable on 3 L  X-ray was consistent with CHF exacerbation  Patient was discussed with the hospitalist service for admission  Patient's differential diagnosis included but was not limited to ACS, CHF exacerbation, pneumonia, abscess   patient       Amount and/or Complexity of Data Reviewed  External Data Reviewed: labs and notes  Labs: ordered  Decision-making details documented in ED Course  Radiology: ordered and independent interpretation performed  Decision-making details documented in ED Course  ECG/medicine tests: ordered  Decision-making details documented in ED Course  Discussion of management or test interpretation with external provider(s): NAVEEN Delong  Prescription drug management  Decision regarding hospitalization            Disposition  Final diagnoses:   Dyspnea   CHF exacerbation ([facility] )   Hypoxia     Time reflects when diagnosis was documented in both MDM as applicable and the Disposition within this note     Time User Action Codes Description Comment    6/16/2023  8:33 PM Nelson Cristian Add [R06 00] Dyspnea     6/16/2023  8:33 PM Nelson Cristian Add [I50 9] CHF exacerbation ([address] )     6/16/2023  8:33 PM Nelson Cristian Add [R09 02] Hypoxia       ED Disposition     ED Disposition   Admit    Condition   Stable    Date/Time   Fri Jun 16, 2023  8:33 PM    Comment   Case was discussed with ry  and the patient's admission status was agreed to be Admission Status: inpatient status to the service of Dr Derek Reaves    Follow-up Information    None         Patient's Medications   Discharge Prescriptions    No medications on file       No discharge procedures on file      PDMP Review       Value Time User    PDMP Reviewed  Yes 1/26/2023  2:41 PM Cornelio Elena          ED Provider  Electronically Signed by           Tomasz Hutson PA-C  06/16/23 2039

## 2023-06-17 LAB
4HR DELTA HS TROPONIN: 0 NG/L
ALBUMIN SERPL BCP-MCNC: 3.9 G/DL (ref 3.5–5)
ALP SERPL-CCNC: 116 U/L (ref 34–104)
ALT SERPL W P-5'-P-CCNC: 8 U/L (ref 7–52)
ANION GAP SERPL CALCULATED.3IONS-SCNC: 8 MMOL/L (ref 4–13)
AST SERPL W P-5'-P-CCNC: 14 U/L (ref 13–39)
BASOPHILS # BLD AUTO: 0.08 THOUSANDS/ÂΜL (ref 0–0.1)
BASOPHILS NFR BLD AUTO: 2 % (ref 0–1)
BILIRUB SERPL-MCNC: 0.64 MG/DL (ref 0.2–1)
BUN SERPL-MCNC: 54 MG/DL (ref 5–25)
CALCIUM SERPL-MCNC: 9 MG/DL (ref 8.4–10.2)
CARDIAC TROPONIN I PNL SERPL HS: 11 NG/L
CHLORIDE SERPL-SCNC: 102 MMOL/L (ref 96–108)
CO2 SERPL-SCNC: 30 MMOL/L (ref 21–32)
CREAT SERPL-MCNC: 1.49 MG/DL (ref 0.6–1.3)
EOSINOPHIL # BLD AUTO: 0.26 THOUSAND/ÂΜL (ref 0–0.61)
EOSINOPHIL NFR BLD AUTO: 5 % (ref 0–6)
ERYTHROCYTE [DISTWIDTH] IN BLOOD BY AUTOMATED COUNT: 19.9 % (ref 11.6–15.1)
FERRITIN SERPL-MCNC: 60 NG/ML (ref 11–307)
FOLATE SERPL-MCNC: 9.9 NG/ML
GFR SERPL CREATININE-BSD FRML MDRD: 33 ML/MIN/1.73SQ M
GLUCOSE SERPL-MCNC: 126 MG/DL (ref 65–140)
GLUCOSE SERPL-MCNC: 127 MG/DL (ref 65–140)
GLUCOSE SERPL-MCNC: 156 MG/DL (ref 65–140)
GLUCOSE SERPL-MCNC: 87 MG/DL (ref 65–140)
GLUCOSE SERPL-MCNC: 92 MG/DL (ref 65–140)
HCT VFR BLD AUTO: 31.3 % (ref 34.8–46.1)
HGB BLD-MCNC: 9.5 G/DL (ref 11.5–15.4)
IMM GRANULOCYTES # BLD AUTO: 0.02 THOUSAND/UL (ref 0–0.2)
IMM GRANULOCYTES NFR BLD AUTO: 0 % (ref 0–2)
INR PPP: 2.57 (ref 0.84–1.19)
IRON SATN MFR SERPL: 8 % (ref 15–50)
IRON SERPL-MCNC: 20 UG/DL (ref 50–170)
LYMPHOCYTES # BLD AUTO: 0.67 THOUSANDS/ÂΜL (ref 0.6–4.47)
LYMPHOCYTES NFR BLD AUTO: 14 % (ref 14–44)
MAGNESIUM SERPL-MCNC: 2.4 MG/DL (ref 1.9–2.7)
MCH RBC QN AUTO: 23.1 PG (ref 26.8–34.3)
MCHC RBC AUTO-ENTMCNC: 30.4 G/DL (ref 31.4–37.4)
MCV RBC AUTO: 76 FL (ref 82–98)
MONOCYTES # BLD AUTO: 0.47 THOUSAND/ÂΜL (ref 0.17–1.22)
MONOCYTES NFR BLD AUTO: 10 % (ref 4–12)
NEUTROPHILS # BLD AUTO: 3.4 THOUSANDS/ÂΜL (ref 1.85–7.62)
NEUTS SEG NFR BLD AUTO: 69 % (ref 43–75)
NRBC BLD AUTO-RTO: 0 /100 WBCS
PLATELET # BLD AUTO: 148 THOUSANDS/UL (ref 149–390)
PMV BLD AUTO: 10.1 FL (ref 8.9–12.7)
POTASSIUM SERPL-SCNC: 3.9 MMOL/L (ref 3.5–5.3)
PROT SERPL-MCNC: 7.2 G/DL (ref 6.4–8.4)
PROTHROMBIN TIME: 27.7 SECONDS (ref 11.6–14.5)
RBC # BLD AUTO: 4.11 MILLION/UL (ref 3.81–5.12)
S EPIDERMIDIS DNA BLD POS QL NAA+NON-PRB: DETECTED
SODIUM SERPL-SCNC: 140 MMOL/L (ref 135–147)
TIBC SERPL-MCNC: 260 UG/DL (ref 250–450)
VIT B12 SERPL-MCNC: 427 PG/ML (ref 180–914)
WBC # BLD AUTO: 4.9 THOUSAND/UL (ref 4.31–10.16)

## 2023-06-17 PROCEDURE — 83735 ASSAY OF MAGNESIUM: CPT

## 2023-06-17 PROCEDURE — 85025 COMPLETE CBC W/AUTO DIFF WBC: CPT

## 2023-06-17 PROCEDURE — 82948 REAGENT STRIP/BLOOD GLUCOSE: CPT

## 2023-06-17 PROCEDURE — 85610 PROTHROMBIN TIME: CPT

## 2023-06-17 PROCEDURE — 99232 SBSQ HOSP IP/OBS MODERATE 35: CPT | Performed by: INTERNAL MEDICINE

## 2023-06-17 PROCEDURE — 84484 ASSAY OF TROPONIN QUANT: CPT | Performed by: PHYSICIAN ASSISTANT

## 2023-06-17 PROCEDURE — 80053 COMPREHEN METABOLIC PANEL: CPT

## 2023-06-17 RX ORDER — CEFTRIAXONE 1 G/50ML
1000 INJECTION, SOLUTION INTRAVENOUS EVERY 24 HOURS
Status: COMPLETED | OUTPATIENT
Start: 2023-06-17 | End: 2023-06-18

## 2023-06-17 RX ADMIN — CEFTRIAXONE 1000 MG: 1 INJECTION, SOLUTION INTRAVENOUS at 22:41

## 2023-06-17 RX ADMIN — Medication 1 TABLET: at 08:21

## 2023-06-17 RX ADMIN — GABAPENTIN 600 MG: 300 CAPSULE ORAL at 08:22

## 2023-06-17 RX ADMIN — FUROSEMIDE 80 MG: 10 INJECTION, SOLUTION INTRAMUSCULAR; INTRAVENOUS at 15:57

## 2023-06-17 RX ADMIN — DOCUSATE SODIUM 100 MG: 100 CAPSULE, LIQUID FILLED ORAL at 18:00

## 2023-06-17 RX ADMIN — TAMSULOSIN HYDROCHLORIDE 0.4 MG: 0.4 CAPSULE ORAL at 15:57

## 2023-06-17 RX ADMIN — LEVOTHYROXINE SODIUM 150 MCG: 150 TABLET ORAL at 08:21

## 2023-06-17 RX ADMIN — ALLOPURINOL 100 MG: 100 TABLET ORAL at 08:22

## 2023-06-17 RX ADMIN — OXYCODONE HYDROCHLORIDE AND ACETAMINOPHEN 500 MG: 500 TABLET ORAL at 08:22

## 2023-06-17 RX ADMIN — INSULIN LISPRO 1 UNITS: 100 INJECTION, SOLUTION INTRAVENOUS; SUBCUTANEOUS at 16:06

## 2023-06-17 RX ADMIN — ACETAMINOPHEN 650 MG: 325 TABLET ORAL at 08:31

## 2023-06-17 RX ADMIN — WARFARIN SODIUM 4 MG: 4 TABLET ORAL at 20:53

## 2023-06-17 RX ADMIN — GABAPENTIN 600 MG: 300 CAPSULE ORAL at 20:52

## 2023-06-17 RX ADMIN — FUROSEMIDE 80 MG: 10 INJECTION, SOLUTION INTRAMUSCULAR; INTRAVENOUS at 08:22

## 2023-06-17 RX ADMIN — GABAPENTIN 600 MG: 300 CAPSULE ORAL at 15:57

## 2023-06-17 NOTE — ASSESSMENT & PLAN NOTE
· Secondary to acute CHF; initially required BiPAP in the ER though improved and now on nasal cannula O2  · Continue to wean O2 as tolerated with diuresis

## 2023-06-17 NOTE — ASSESSMENT & PLAN NOTE
· Per chart review had a fall in January 2023, suffered left tibia fracture  Patient did not undergo surgical intervention , follows with Dr Anthony Adams outpatient  Currently has CAM boot in place , has been at [facility] for rehab /PT  · Patient denies any pain currently     · Fall precautions Applied

## 2023-06-17 NOTE — ASSESSMENT & PLAN NOTE
Baseline Hgb 10-11, hgb 9 7 on admission   Monitor CBC    Consider transfusing for Hgb < 8 in the setting of CHF

## 2023-06-17 NOTE — ASSESSMENT & PLAN NOTE
Background: Presents from SNF with reports of shortness of breath and abdominal fullness for the last several days  In ED, noted to be hypoxic with increased WOB  Required BiPAP in the ER and weaned to 4 L nasal cannula O2  Patient up 15 pounds from 1 month ago      · Last echo in 2021 showing EF 55 to 60% with grade 2 diastolic dysfunction  · Lower extremity edema and vascular congestion on admission    · Await repeat echocardiogram  · Continue IV Lasix 80 mg twice daily  · Monitor renal function with daily BMP  · Monitor intake/output, daily weights  · Low-sodium fluid restricted diet

## 2023-06-17 NOTE — ASSESSMENT & PLAN NOTE
· Noted to be 88% on room air with increased WOB on arrival   Initially placed on BIPAP for WOB-> titrated off to 4L NC  No home O2    · XR chest awaiting final read  · Suspect secondary to acute CHF   Does have a left tibial fracture, however low suspicion of pulmonary embolism given patient is on coumadin with therapeutic INR  · Continue IV diuresis -> has had good response and patient feeling improved     · Will check COVID flu rsv for completeness  · Continue to wean O2 to keep SpO2> 90%

## 2023-06-17 NOTE — PROGRESS NOTES
[address]  Progress Note  Name: Ruddy Kincaid  MRN: [id number]  Unit/Bed#: -01 I Date of Admission: 6/16/2023   Date of Service: 6/17/2023 I Hospital Day: 1    Assessment/Plan   * Acute on chronic diastolic (congestive) heart failure [facility])  Assessment & Plan  Background: Presents from SNF with reports of shortness of breath and abdominal fullness for the last several days  In ED, noted to be hypoxic with increased WOB  Required BiPAP in the ER and weaned to 4 L nasal cannula O2  Patient up 15 pounds from 1 month ago  · Last echo in 2021 showing EF 55 to 60% with grade 2 diastolic dysfunction  · Lower extremity edema and vascular congestion on admission    · Await repeat echocardiogram  · Continue IV Lasix 80 mg twice daily  · Monitor renal function with daily BMP  · Monitor intake/output, daily weights  · Low-sodium fluid restricted diet      UTI (urinary tract infection)  Assessment & Plan  · Uncomplicated UTI; will plan for 3-day course of IV ceftriaxone    Acute respiratory failure with hypoxia (HCC)  Assessment & Plan  · Secondary to acute CHF; initially required BiPAP in the ER though improved and now on nasal cannula O2  · Continue to wean O2 as tolerated with diuresis      Closed displaced spiral fracture of shaft of left tibia  Assessment & Plan  · Per chart review had a fall in January 2023, suffered left tibia fracture  Patient did not undergo surgical intervention , follows with Dr Simón Ventura outpatient  Currently has CAM boot in place , has been at [facility] for rehab /PT  · Patient denies any pain currently     · Fall precautions    Thrombocytopenia ([facility] [address] )  Assessment & Plan  · Recent baseline 140-150's, Plts 148 on admission   · No reports of bleeding  · Monitor CBC    Type 2 diabetes mellitus with diabetic neuropathy (HCC)  Assessment & Plan  · Holding home Amaryl  · Monitor on ISS while inpatient  · Hypoglycemia protocol    Pacemaker  Assessment & Plan  · PPM present     Stage 3 chronic kidney disease ([facility] )  Assessment & Plan  · Baseline appears to be 1 4-1 5, renal function currently at baseline  · Daily BMP while on IV diuretics      Atrial fibrillation ([facility] )  Assessment & Plan  · Not on rate control medication   Has PPM in place as well  · Continue coumadin 4 mg daily at HS  ·  INR therapeutic on admission, goal 2-3  Check daily INR  VTE Pharmacologic Prophylaxis:   Moderate Risk (Score 3-4) - Pharmacological DVT Prophylaxis Ordered: warfarin (Coumadin)  Patient Centered Rounds: I performed bedside rounds with nursing staff today  Discussions with Specialists or Other Care Team Provider: SERGIO     Education and Discussions with Family / Patient: Patient declined call to   Total Time Spent on Date of Encounter in care of patient: 35 minutes This time was spent on one or more of the following: performing physical exam; counseling and coordination of care; obtaining or reviewing history; documenting in the medical record; reviewing/ordering tests, medications or procedures; communicating with other healthcare professionals and discussing with patient's family/caregivers  Current Length of Stay: 1 day(s)  Current Patient Status: Inpatient   Certification Statement: The patient will continue to require additional inpatient hospital stay due to IV diuresis, cardiac monitoring  Discharge Plan: Anticipate discharge in 48-72 hrs to rehab facility  Code Status: Level 1 - Full Code    Subjective:   Patient seen and examined  Feeling a bit better though still complaining of abdominal distention  Otherwise no overt dyspnea  Still on 3 L O2  Otherwise no events overnight      Objective:     Vitals:   Temp (24hrs), Av 3 °F (36 3 °C), Min:97 °F (36 1 °C), Max:97 5 °F (36 4 °C)    Temp:  [97 °F (36 1 °C)-97 5 °F (36 4 °C)] 97 °F (36 1 °C)  HR:  [71-78] 71  Resp:  [16-28] 17  BP: (125-169)/(55-81) 128/57  SpO2:  [88 %-100 %] 98 %  Body mass index is 42 98 kg/m²  Input and Output Summary (last 24 hours): Intake/Output Summary (Last 24 hours) at 6/17/2023 1422  Last data filed at 6/17/2023 1101  Gross per 24 hour   Intake 120 ml   Output 2100 ml   Net -1980 ml       PHYSICAL EXAM:    Vitals signs reviewed  Constitutional   Awake and cooperative  NAD  Head/Neck   Normocephalic  Atraumatic  HEENT   No scleral icterus  EOMI  Heart   Regular rate and rhythm  No murmers  Lungs   Clear to auscultation bilaterally  Respirations unlaboured  Abdomen    Mild abdominal distention though soft and nontender  No guarding or rebounding  Skin   Skin color normal  No rashes  Extremities   No deformities  No peripheral edema  Boot on the left lower extremity  Neuro   Alert and oriented  No new deficits  Psych   Mood stable   Affect normal          Additional Data:     Labs:  Results from last 7 days   Lab Units 06/17/23  0611   WBC Thousand/uL 4 90   HEMOGLOBIN g/dL 9 5*   HEMATOCRIT % 31 3*   PLATELETS Thousands/uL 148*   NEUTROS PCT % 69   LYMPHS PCT % 14   MONOS PCT % 10   EOS PCT % 5     Results from last 7 days   Lab Units 06/17/23  0611   SODIUM mmol/L 140   POTASSIUM mmol/L 3 9   CHLORIDE mmol/L 102   CO2 mmol/L 30   BUN mg/dL 54*   CREATININE mg/dL 1 49*   ANION GAP mmol/L 8   CALCIUM mg/dL 9 0   ALBUMIN g/dL 3 9   TOTAL BILIRUBIN mg/dL 0 64   ALK PHOS U/L 116*   ALT U/L 8   AST U/L 14   GLUCOSE RANDOM mg/dL 87     Results from last 7 days   Lab Units 06/17/23  0611   INR  2 57*     Results from last 7 days   Lab Units 06/17/23  1101 06/17/23  0742 06/16/23  2237   POC GLUCOSE mg/dl 126 92 116         Results from last 7 days   Lab Units 06/16/23  1914   LACTIC ACID mmol/L 1 6   PROCALCITONIN ng/ml 0 06       Lines/Drains:  Invasive Devices     Peripheral Intravenous Line  Duration           Peripheral IV 06/16/23 Right Antecubital <1 day    Peripheral IV 06/16/23 Right Antecubital <1 day          Drain  Duration           External Urinary Catheter <1 day                      Imaging: Reviewed radiology reports from this admission including: chest xray    Recent Cultures (last 7 days):   Results from last 7 days   Lab Units 06/16/23 1914   BLOOD CULTURE  Received in Microbiology Lab  Culture in Progress  Received in Microbiology Lab  Culture in Progress  Last 24 Hours Medication List:   Current Facility-Administered Medications   Medication Dose Route Frequency Provider Last Rate   • acetaminophen  650 mg Oral Q4H PRN SAM Monzon     • allopurinol  100 mg Oral Daily SAM Monzon     • Ascorbic Acid (Vitamin C)  500 mg Oral Daily SAM Monzon     • bisacodyl  10 mg Rectal Daily PRN SAM Monzon     • cefTRIAXone  1,000 mg Intravenous Q24H Brittaney Lacey DO     • docusate sodium  100 mg Oral BID SAM Monzon     • furosemide  80 mg Intravenous BID (diuretic) SAM Monzon     • gabapentin  600 mg Oral TID SAM Monzon     • insulin lispro  1-6 Units Subcutaneous TID AC SAM Monzon     • insulin lispro  1-6 Units Subcutaneous HS SAM Monzon     • levothyroxine  150 mcg Oral Daily SAM Monzon     • multivitamin-minerals  1 tablet Oral Daily SAM Monzon     • polyethylene glycol  17 g Oral Daily SAM Monzon     • tamsulosin  0 4 mg Oral Daily With SAM Babin     • traMADol  50 mg Oral Q6H PRN SAM Monzon     • warfarin  4 mg Oral HS SAM Monzon          Today, Patient Was Seen By: Brittaney Lacey DO    **Please Note: This note may have been constructed using a voice recognition system  **

## 2023-06-17 NOTE — ASSESSMENT & PLAN NOTE
Lab Results   Component Value Date    EGFR 32 06/16/2023    EGFR 41 01/26/2023    EGFR 39 01/25/2023    CREATININE 1 55 (H) 06/16/2023    CREATININE 1 25 01/26/2023    CREATININE 1 32 (H) 01/25/2023   · Baseline appears to be 1 4-1 5, creat 1 55 on admission   · Stable at baseline  · Monitor labs closely while getting IV diuresis

## 2023-06-17 NOTE — ASSESSMENT & PLAN NOTE
· Baseline appears to be 1 4-1 5, renal function currently at baseline  · Daily BMP while on IV diuretics

## 2023-06-17 NOTE — ED ATTENDING ATTESTATION
6/16/2023  IMadison DO, saw and evaluated the patient  I have discussed the patient with the resident/non-physician practitioner and agree with the resident's/non-physician practitioner's findings, Plan of Care, and MDM as documented in the resident's/non-physician practitioner's note, except where noted  All available labs and Radiology studies were reviewed  I was present for key portions of any procedure(s) performed by the resident/non-physician practitioner and I was immediately available to provide assistance  At this point I agree with the current assessment done in the Emergency Department  I have conducted an independent evaluation of this patient a history and physical is as follows:    56-year-old female  History of CHF  Presents with shortness of breath/increased work of breathing  Transition to BiPAP upon arrival due to increased work of breathing/hypoxia  The patient examines hypervolemic with lateral lower extremity swelling, basilar rales  CXR with signs of CHF  NTG, IV Lasix administered  Following response to treatment, the patient was able to be weaned from BiPAP and transitioned to [facility]  See Ms  Elie's note for further details  Const: ill appearing, non toxic   Eyes: PERRL, no conjunctival injection, and symmetrical lids  HEENT: Atraumatic external nose and ears  Moist MM  Neck: Symmetric, trachea midline  CVS: +S1/S2, No murmurs or gallops  Peripheral pulses 2+ and equal in all extremities  RESP: Dyspneic with bibasilar rales R>L  GI: Abdomen is distended  Normoactive bowel sounds  MSK: Normocephalic/Atraumatic  No cyanosis or clubbing  Bilateral LE edema  Skin: Warm, Dry  No rashes or lesions  Neuro: CNs II-XII grossly intact   Sensation grossly intact    Psych: Awake, Alert, & Oriented (AAO) x3   Appropriate mood and affect       ED Course  ED Course as of 06/16/23 2044 Fri Jun 16, 2023 1958 Chest x-ray with increased pulmonary congestion consistent with CHF Critical Care Time  Procedures

## 2023-06-17 NOTE — ASSESSMENT & PLAN NOTE
· Not on rate control medication   Has PPM in place as well  · Continue coumadin 4 mg daily at HS  ·  INR therapeutic on admission, goal 2-3  Check daily INR

## 2023-06-17 NOTE — ASSESSMENT & PLAN NOTE
Wt Readings from Last 3 Encounters:   06/16/23 108 kg (237 lb 10 5 oz)   06/02/23 99 8 kg (220 lb)   05/16/23 99 8 kg (220 lb)   · Presents from SNF with reports of shortness of breath and abdominal fullness for the last several days  In ED, noted to be hypoxic with increased WOB  In ED, patient received 60 mg Lasix, nitro SL, and was placed on BIPAP then weaned to 4l NC  · Last echo in 2021 showing EF 55 to 60% with grade 2 diastolic dysfunction  · BNP 96  Has +2 lower extremity edema  · XR chest appears fluid overload , awaiting final read  · Update echocardiogram   · Appreciate cardiology consult   · Takes torsemide 100 mg daily -> will give additional 20 mg IV Lasix x 1 (for total of 80 mg )  Has had almost 1 L out thus far  Then continue 80 mg IV Lasix BID for now   · Start fluid restriction   Monitor I&O's, daily weights

## 2023-06-17 NOTE — H&P
[address] [facility]  H&P  Name: Sammy Carlson 69 y o  female I MRN: [id number]  Unit/Bed#: -01 I Date of Admission: 6/16/2023   Date of Service: 6/16/2023 I Hospital Day: 0      Assessment/Plan   * Acute on chronic diastolic (congestive) heart failure (HCC)  Assessment & Plan  Wt Readings from Last 3 Encounters:   06/16/23 108 kg (237 lb 10 5 oz)   06/02/23 99 8 kg (220 lb)   05/16/23 99 8 kg (220 lb)   · Presents from SNF with reports of shortness of breath and abdominal fullness for the last several days  In ED, noted to be hypoxic with increased WOB  In ED, patient received 60 mg Lasix, nitro SL, and was placed on BIPAP then weaned to 4l NC  · Last echo in 2021 showing EF 55 to 60% with grade 2 diastolic dysfunction  · BNP 96  Has +2 lower extremity edema  · XR chest appears fluid overload , awaiting final read  · Update echocardiogram   · Appreciate cardiology consult   · Takes torsemide 100 mg daily -> will give additional 20 mg IV Lasix x 1 (for total of 80 mg )  Has had almost 1 L out thus far  Then continue 80 mg IV Lasix BID for now   · Start fluid restriction   Monitor I&O's, daily weights  Thrombocytopenia (HCC)  Assessment & Plan  · Recent baseline 140-150's, Plts 148 on admission   · No reports of bleeding  · Monitor CBC    Chronic anemia  Assessment & Plan  Baseline Hgb 10-11, hgb 9 7 on admission   Monitor CBC   Consider transfusing for Hgb < 8 in the setting of CHF    Closed displaced spiral fracture of shaft of left tibia  Assessment & Plan  · Per chart review had a fall in January 2023, suffered left tibia fracture  Patient did not undergo surgical intervention , follows with Dr Kevin Caro outpatient  Currently has CAM boot in place , has been at [facility] for rehab /PT  · Patient denies any pain currently     · Fall precautions    Acute respiratory failure with hypoxia ([address] )  Assessment & Plan  · Noted to be 88% on room air with increased WOB on arrival   Initially "placed on BIPAP for WOB-> titrated off to 4L NC  No home O2    · XR chest awaiting final read  · Suspect secondary to acute CHF   Does have a left tibial fracture, however low suspicion of pulmonary embolism given patient is on coumadin with therapeutic INR  · Continue IV diuresis -> has had good response and patient feeling improved  · Will check COVID flu rsv for completeness  · Continue to wean O2 to keep SpO2> 90%      Essential hypertension  Assessment & Plan  · BP controlled at this time  · Hold Norvasc  Continue IV lasix     Type 2 diabetes mellitus with diabetic neuropathy [facility])  Assessment & Plan  Lab Results   Component Value Date    HGBA1C 7 0 (H) 10/17/2022       No results for input(s): \"POCGLU\" in the last 72 hours  Blood Sugar Average: Last 72 hrs:  · Home regimen : Amaryl and SSI  · Continue SSI     Pacemaker  Assessment & Plan  · PPM present     Stage 3 chronic kidney disease [facility])  Assessment & Plan  Lab Results   Component Value Date    EGFR 32 06/16/2023    EGFR 41 01/26/2023    EGFR 39 01/25/2023    CREATININE 1 55 (H) 06/16/2023    CREATININE 1 25 01/26/2023    CREATININE 1 32 (H) 01/25/2023   · Baseline appears to be 1 4-1 5, creat 1 55 on admission   · Stable at baseline  · Monitor labs closely while getting IV diuresis       Atrial fibrillation ([address] )  Assessment & Plan  · Not on rate control medication   Has PPM in place as well  · Continue coumadin 4 mg daily at HS  ·  INR therapeutic on admission, goal 2-3  Check daily INR  VTE Pharmacologic Prophylaxis:   Moderate Risk (Score 3-4) - Pharmacological DVT Prophylaxis Ordered: warfarin (Coumadin)  Code Status: Level 1 - Full Code   Discussion with family: Patient declined call to   Anticipated Length of Stay: Patient will be admitted on an inpatient basis with an anticipated length of stay of greater than 2 midnights secondary to acute chf, resp fail - iv lasix, O2 , echo, cards cx      Total Time Spent on " "Date of Encounter in care of patient: 75 minutes This time was spent on one or more of the following: performing physical exam; counseling and coordination of care; obtaining or reviewing history; documenting in the medical record; reviewing/ordering tests, medications or procedures; communicating with other healthcare professionals and discussing with patient's family/caregivers  Chief Complaint: SOB    History of Present Illness:  Sammy Carlson is a 69 y o  female with a PMH of CHF, DM2, PPM, CKD 3, Afib on coumadin who presents from SNF rehab with reports of shortness of breath and abdominal fullness over the last several days  Reports she feels she is filling up with fluid  Reports she has been in rehab since January 2023, suffered a fall and left tibia fracture which was unable to be surgically intervened on   Patient reports she has been undergoing Physical therapy , follows with Dr Kevin Skinner Reports no significant pain in left lower extremity  Takes torsemide 100 mg daily per nursing home records  Denies any fevers, chest pain, abdominal pain , nausea, vomiting or diarrhea  Review of Systems:  Review of Systems   Constitutional: Positive for activity change  Negative for chills and fever  Respiratory: Positive for shortness of breath  Negative for cough  Cardiovascular: Positive for leg swelling  Negative for chest pain and palpitations  Gastrointestinal: Positive for abdominal distention  Negative for constipation, diarrhea, nausea and vomiting  \"Full of fluid \"   Genitourinary: Negative for difficulty urinating and dysuria  Musculoskeletal: Negative for arthralgias  Neurological: Negative for dizziness, weakness, light-headedness, numbness and headaches  All other systems reviewed and are negative        Past Medical and Surgical History:   Past Medical History:   Diagnosis Date   • Arthritis    • CHF (congestive heart failure) ([facility] )    • Diabetes mellitus ([facility] )    • Disease of " thyroid gland    • Hypertension    • Pacemaker    • Renal disorder        Past Surgical History:   Procedure Laterality Date   • CARDIAC PACEMAKER PLACEMENT     • CHOLECYSTECTOMY     • FL GUIDED NEEDLE PLAC BX/ASP/INJ  10/4/2022   • FL GUIDED NEEDLE PLAC BX/ASP/INJ  10/20/2022   • JOINT REPLACEMENT      bilateral knee replacements   • NERVE BLOCK Bilateral 10/4/2022    Procedure: BLOCK MEDIAL BRANCH NERVES BILATERAL L3, L4, L5 #1;  Surgeon: Vipul Khan MD;  Location: [facility];  Service: Pain Management    • NERVE BLOCK Bilateral 10/20/2022    Procedure: BLOCK MEDIAL BRANCH L3, L4, L5 #2;  Surgeon: Vipul Khan MD;  [facility]: [facility];  Service: Pain Management    • ORIF TIBIA & FIBULA FRACTURES Left 10/29/2020    Procedure: OPEN REDUCTION W/ INTERNAL FIXATION (ORIF) ANKLE;  Surgeon: Jonny Slade; Location: [facility];  Service: Orthopedics   • TONSILLECTOMY     • TUBAL LIGATION         Meds/Allergies:  Prior to Admission medications    Medication Sig Start Date End Date Taking?  Authorizing Provider   acetaminophen (TYLENOL) 325 mg tablet Take 650 mg by mouth every 6 (six) hours as needed for mild pain   Yes Historical Provider, MD   acetaminophen (TYLENOL) 500 mg tablet Take 500 mg by mouth   Yes Historical Provider, MD   allopurinol (ZYLOPRIM) 100 mg tablet Take 100 mg by mouth daily 4/10/23  Yes Historical Provider, MD   amLODIPine (NORVASC) 2 5 mg tablet Take 1 tablet (2 5 mg total) by mouth daily 7/29/21  Yes Trung Perez MD   bisacodyl (DULCOLAX) 10 mg suppository Insert 10 mg into the rectum daily   Yes Historical Provider, MD   bisacodyl (DULCOLAX) 5 mg EC tablet Take 5 mg by mouth daily as needed for constipation   Yes Historical Provider, MD   docusate sodium (COLACE) 100 mg capsule Take 1 capsule (100 mg total) by mouth 2 (two) times a day 1/26/23  Yes SAM Lee   gabapentin (Neurontin) 600 MG tablet Take 1 tablet (600 mg total) by mouth 3 (three) times a day 10/5/22  Yes Claudia Reyes MD Carito   glimepiride (AMARYL) 2 mg tablet Take 2 mg by mouth daily   Yes Historical Provider, MD   insulin lispro (HumaLOG) 100 units/mL injection Inject 1-6 Units under the skin daily at bedtime 1/26/23  Yes SAM Valderrama   insulin lispro (HumaLOG) 100 units/mL injection Inject 2-12 Units under the skin 3 (three) times a day before meals 1/26/23  Yes SAM Valderrama   levothyroxine 150 mcg tablet Take 150 mcg by mouth daily  9/8/20  Yes Historical Provider, MD   loperamide (IMODIUM A-D) 2 MG tablet Take 2 mg by mouth 4 (four) times a day as needed for diarrhea   Yes Historical Provider, MD   Multiple Vitamin (Multi-Day) TABS Take 1 tablet by mouth daily    Yes Historical Provider, MD   polyethylene glycol (MIRALAX) 17 g packet Take 17 g by mouth daily   Yes Historical Provider, MD   promethazine (PHENERGAN) 25 mg suppository Insert 25 mg into the rectum every 6 (six) hours as needed for nausea or vomiting   Yes Historical Provider, MD   promethazine (PHENERGAN) 25 mg tablet Take 25 mg by mouth every 6 (six) hours as needed for nausea or vomiting   Yes Historical Provider, MD   tamsulosin (FLOMAX) 0 4 mg Take 1 capsule (0 4 mg total) by mouth daily with dinner 1/26/23  Yes SAM Valderrama   traMADol (ULTRAM) 50 mg tablet Take 50 mg by mouth every 6 (six) hours as needed for moderate pain   Yes Historical Provider, MD   warfarin (COUMADIN) 5 mg tablet Take 4 mg by mouth daily at bedtime 9/8/20  Yes Historical Provider, MD   Ascorbic Acid, Vitamin C, (VITAMIN C) 100 MG tablet Take 500 mg by mouth daily     Historical Provider, MD   insulin aspart (NovoLOG FlexPen ReliOn) 100 UNIT/ML injection pen Inject under the skin  Patient not taking: Reported on 6/16/2023    Historical Provider, MD   Lactobacillus Probiotic TABS Take 1 capsule by mouth daily  Patient not taking: Reported on 2/10/2023    Historical Provider, MD   ondansetron (Zofran ODT) 4 mg disintegrating tablet Take 1 tablet (4 mg total) by mouth every 6 (six) hours as needed for nausea or vomiting  Patient not taking: Reported on 6/16/2023 11/23/22   Caryn Norman MD   torsemide [facility]) 100 mg tablet Take 0 5 tablets (50 mg total) by mouth daily  Patient taking differently: Take 100 mg by mouth daily 7/29/21 6/2/23  Germán Tapia MD   Calcium Ascorbate (VITAMIN C) 500 mg tablet Take 500 mg by mouth daily  Patient not taking: Reported on 4/21/2023 6/16/23  Historical Provider, MD   Cholecalciferol 125 MCG (5000 UT) capsule Take by mouth daily  Patient not taking: Reported on 4/21/2023 6/16/23  Historical Provider, MD   dextromethorphan-guaiFENesin (ROBITUSSIN DM)  mg/5 mL syrup Take 5 mL by mouth 3 (three) times a day as needed for cough  Patient not taking: Reported on 6/16/2023 6/16/23  Historical Provider, MD   lidocaine (LIDODERM) 5 % Apply 1 patch topically over 12 hours daily Remove & Discard patch within 12 hours or as directed by MD Do not start before January 27, 2023  Patient not taking: Reported on 4/21/2023 1/27/23 6/16/23  SAM Clark   Sennosides (senna) 8 8 mg/5 mL oral syrup Take 5 mL (8 8 mg total) by mouth daily Do not start before January 27, 2023  Patient not taking: Reported on 6/16/2023 1/27/23 6/16/23  SAM Clark   Sodium Phosphates (ENEMA RE) Insert into the rectum  Patient not taking: Reported on 6/16/2023 6/16/23  Historical Provider, MD   zinc sulfate (ZINCATE) 220 mg capsule Take 220 mg by mouth daily  Patient not taking: Reported on 4/21/2023 6/16/23  Historical Provider, MD     I have reveiwed home medications using records provided by [facility]  Allergies:    Allergies   Allergen Reactions   • Rofecoxib Angioedema, Swelling, Hives and Other (See Comments)     swelling, sob  swelling, sob  Other reaction(s): Swelling / Edema  swelling, sob  Other reaction(s): SWELLING  Other reaction(s): Angioedema     • Oxycodone-Acetaminophen GI Intolerance and Other (See Comments)     Unsure "of rxn   Unsure of rxn   Unsure of rxn   Other reaction(s): \"CAN'T BREATH\"     • Duloxetine Hcl Other (See Comments)     Slept for 4 days    • Medical Tape Rash       Social History:  Marital Status: /Civil Union     Patient Pre-hospital Living Situation: [facility]: rehab  Patient Pre-hospital Level of Mobility: walks with person assist  Patient Pre-hospital Diet Restrictions: none  Substance Use History:   Social History     Substance and Sexual Activity   Alcohol Use Not Currently     Social History     Tobacco Use   Smoking Status Never   Smokeless Tobacco Never     Social History     Substance and Sexual Activity   Drug Use Not Currently       Family History:  Family History   Problem Relation Age of Onset   • Atrial fibrillation Mother    • Arthritis Father    • Kidney cancer Father    • Breast cancer Sister         early 46s   • No Known Problems Sister    • Kidney cancer Brother    • No Known Problems Daughter    • No Known Problems Daughter    • No Known Problems Daughter    • No Known Problems Daughter    • No Known Problems Maternal Aunt    • No Known Problems Maternal Aunt    • No Known Problems Maternal Aunt    • No Known Problems Paternal Aunt    • No Known Problems Maternal Grandmother    • No Known Problems Maternal Grandfather    • No Known Problems Paternal Grandmother    • No Known Problems Paternal Grandfather    • Breast cancer Cousin         early 46s   • Breast cancer additional onset Neg Hx    • Colon cancer Neg Hx    • Endometrial cancer Neg Hx    • Ovarian cancer Neg Hx    • BRCA 1/2 Neg Hx    • BRCA2 Positive Neg Hx    • BRCA2 Negative Neg Hx    • BRCA1 Positive Neg Hx    • BRCA1 Negative Neg Hx        Physical Exam:     Vitals:   Blood Pressure: 125/55 (06/16/23 2115)  Pulse: 76 (06/16/23 2115)  Temperature: 97 5 °F (36 4 °C) (06/16/23 2115)  Temp Source: Temporal (06/16/23 2115)  Respirations: 18 (06/16/23 2115)  Height: 5' 2\" (157 5 cm) (06/16/23 2115)  Weight - " Scale: 108 kg (237 lb 10 5 oz) (06/16/23 2111)  SpO2: 100 % (06/16/23 2119)    Physical Exam  Vitals and nursing note reviewed  Constitutional:       General: She is not in acute distress  Appearance: Normal appearance  She is not ill-appearing, toxic-appearing or diaphoretic  HENT:      Head: Normocephalic and atraumatic  Pulmonary:      Effort: Pulmonary effort is normal  No respiratory distress  Breath sounds: No wheezing, rhonchi or rales  Comments: Diminished bases, poor inspiratory effort  No increased WOB or accessory muscle use noted  On 4l NC  Abdominal:      General: Bowel sounds are normal  There is no distension  Palpations: Abdomen is soft  Tenderness: There is no abdominal tenderness  There is no guarding or rebound  Comments: Softly distended/obese  No tenderness , guarding on exam  +BS   Musculoskeletal:         General: Normal range of motion  Right lower leg: Edema present  Left lower leg: Edema present  Comments: +2 RLE edema  LLE in cam boot, appears to have mild edema knee and thigh-ROM intact left toes and warm   Neurological:      General: No focal deficit present  Mental Status: She is alert and oriented to person, place, and time        Comments: AAOx 3           Additional Data:     Lab Results:  Results from last 7 days   Lab Units 06/16/23 1914   WBC Thousand/uL 5 26   HEMOGLOBIN g/dL 9 7*   HEMATOCRIT % 31 8*   PLATELETS Thousands/uL 148*   NEUTROS PCT % 75   LYMPHS PCT % 12*   MONOS PCT % 8   EOS PCT % 4     Results from last 7 days   Lab Units 06/16/23 1914   SODIUM mmol/L 138   POTASSIUM mmol/L 3 8   CHLORIDE mmol/L 100   CO2 mmol/L 28   BUN mg/dL 57*   CREATININE mg/dL 1 55*   ANION GAP mmol/L 10   CALCIUM mg/dL 9 1   ALBUMIN g/dL 4 0   TOTAL BILIRUBIN mg/dL 0 62   ALK PHOS U/L 122*   ALT U/L 9   AST U/L 15   GLUCOSE RANDOM mg/dL 155*     Results from last 7 days   Lab Units 06/16/23 1914   INR  2 24*             Results from last 7 days   Lab Units 06/16/23  1914   LACTIC ACID mmol/L 1 6   PROCALCITONIN ng/ml 0 06       Lines/Drains:  Invasive Devices     Peripheral Intravenous Line  Duration           Peripheral IV 06/16/23 Right Antecubital <1 day    Peripheral IV 06/16/23 Right Antecubital <1 day          Drain  Duration           External Urinary Catheter 143 days    Urethral Catheter Straight-tip 16 Fr  142 days              Urinary Catheter:  Goal for removal: does not have berg              Imaging: Personally reviewed the following imaging: chest xray  XR chest portable   ED Interpretation by Enma Davies DO (06/16 1958)   CHF          EKG and Other Studies Reviewed on Admission:   · EKG: Paced rhythm  HR with pvcs   ** Please Note: This note has been constructed using a voice recognition system   **

## 2023-06-17 NOTE — ASSESSMENT & PLAN NOTE
"Lab Results   Component Value Date    HGBA1C 7 0 (H) 10/17/2022       No results for input(s): \"POCGLU\" in the last 72 hours      Blood Sugar Average: Last 72 hrs:  · Home regimen : Amaryl and SSI  · Continue SSI   "

## 2023-06-17 NOTE — PLAN OF CARE
Problem: CARDIOVASCULAR - ADULT  Goal: Maintains optimal cardiac output and hemodynamic stability  Description: INTERVENTIONS:  - Monitor I/O, vital signs and rhythm  - Monitor for S/S and trends of decreased cardiac output SOB CARPENTER swelling  - Administer and titrate ordered vasoactive medications to optimize hemodynamic stability  - Assess quality of pulses, skin color and temperature  - Assess for signs of decreased coronary artery perfusion  - Instruct patient to report change in severity of symptoms  Outcome: Progressing

## 2023-06-17 NOTE — ASSESSMENT & PLAN NOTE
· Per chart review had a fall in January 2023, suffered left tibia fracture  Patient did not undergo surgical intervention , follows with Dr Shaan Burch outpatient  Currently has CAM boot in place , has been at [facility] for rehab /PT  · Patient denies any pain currently     · Fall precautions

## 2023-06-18 PROBLEM — R78.81 POSITIVE BLOOD CULTURE: Status: ACTIVE | Noted: 2023-06-18

## 2023-06-18 LAB
ANION GAP SERPL CALCULATED.3IONS-SCNC: 8 MMOL/L (ref 4–13)
BUN SERPL-MCNC: 54 MG/DL (ref 5–25)
CALCIUM SERPL-MCNC: 8.8 MG/DL (ref 8.4–10.2)
CHLORIDE SERPL-SCNC: 102 MMOL/L (ref 96–108)
CO2 SERPL-SCNC: 31 MMOL/L (ref 21–32)
CREAT SERPL-MCNC: 1.53 MG/DL (ref 0.6–1.3)
GFR SERPL CREATININE-BSD FRML MDRD: 32 ML/MIN/1.73SQ M
GLUCOSE SERPL-MCNC: 109 MG/DL (ref 65–140)
GLUCOSE SERPL-MCNC: 141 MG/DL (ref 65–140)
GLUCOSE SERPL-MCNC: 172 MG/DL (ref 65–140)
GLUCOSE SERPL-MCNC: 86 MG/DL (ref 65–140)
GLUCOSE SERPL-MCNC: 90 MG/DL (ref 65–140)
INR PPP: 2.6 (ref 0.84–1.19)
MAGNESIUM SERPL-MCNC: 2.4 MG/DL (ref 1.9–2.7)
POTASSIUM SERPL-SCNC: 3.4 MMOL/L (ref 3.5–5.3)
PROTHROMBIN TIME: 27.9 SECONDS (ref 11.6–14.5)
SODIUM SERPL-SCNC: 141 MMOL/L (ref 135–147)

## 2023-06-18 PROCEDURE — 99232 SBSQ HOSP IP/OBS MODERATE 35: CPT

## 2023-06-18 PROCEDURE — 82948 REAGENT STRIP/BLOOD GLUCOSE: CPT

## 2023-06-18 PROCEDURE — 85610 PROTHROMBIN TIME: CPT | Performed by: INTERNAL MEDICINE

## 2023-06-18 PROCEDURE — 80048 BASIC METABOLIC PNL TOTAL CA: CPT | Performed by: INTERNAL MEDICINE

## 2023-06-18 PROCEDURE — 87040 BLOOD CULTURE FOR BACTERIA: CPT

## 2023-06-18 PROCEDURE — 83735 ASSAY OF MAGNESIUM: CPT | Performed by: INTERNAL MEDICINE

## 2023-06-18 RX ADMIN — INSULIN LISPRO 1 UNITS: 100 INJECTION, SOLUTION INTRAVENOUS; SUBCUTANEOUS at 21:49

## 2023-06-18 RX ADMIN — GABAPENTIN 600 MG: 300 CAPSULE ORAL at 15:37

## 2023-06-18 RX ADMIN — FUROSEMIDE 80 MG: 10 INJECTION, SOLUTION INTRAMUSCULAR; INTRAVENOUS at 15:37

## 2023-06-18 RX ADMIN — GABAPENTIN 600 MG: 300 CAPSULE ORAL at 21:49

## 2023-06-18 RX ADMIN — TAMSULOSIN HYDROCHLORIDE 0.4 MG: 0.4 CAPSULE ORAL at 15:37

## 2023-06-18 RX ADMIN — FUROSEMIDE 80 MG: 10 INJECTION, SOLUTION INTRAMUSCULAR; INTRAVENOUS at 07:59

## 2023-06-18 RX ADMIN — LEVOTHYROXINE SODIUM 150 MCG: 150 TABLET ORAL at 07:47

## 2023-06-18 RX ADMIN — ALLOPURINOL 100 MG: 100 TABLET ORAL at 07:49

## 2023-06-18 RX ADMIN — OXYCODONE HYDROCHLORIDE AND ACETAMINOPHEN 500 MG: 500 TABLET ORAL at 07:48

## 2023-06-18 RX ADMIN — WARFARIN SODIUM 4 MG: 4 TABLET ORAL at 21:49

## 2023-06-18 RX ADMIN — GABAPENTIN 600 MG: 300 CAPSULE ORAL at 07:48

## 2023-06-18 RX ADMIN — CEFTRIAXONE 1000 MG: 1 INJECTION, SOLUTION INTRAVENOUS at 21:50

## 2023-06-18 RX ADMIN — DOCUSATE SODIUM 100 MG: 100 CAPSULE, LIQUID FILLED ORAL at 07:48

## 2023-06-18 RX ADMIN — DOCUSATE SODIUM 100 MG: 100 CAPSULE, LIQUID FILLED ORAL at 17:40

## 2023-06-18 RX ADMIN — Medication 1 TABLET: at 07:48

## 2023-06-18 NOTE — PROGRESS NOTES
[address]  Progress Note  Name: Maximo Jurado  MRN: [id number]  Unit/Bed#: -31 I Date of Admission: 6/16/2023   Date of Service: 6/18/2023 I Hospital Day: 2    Assessment/Plan   Closed displaced spiral fracture of shaft of left tibia  Assessment & Plan  · Per chart review had a fall in January 2023, suffered left tibia fracture  Patient did not undergo surgical intervention , follows with Dr Tri Benjamin outpatient  Currently has CAM boot in place , has been at [facility] for rehab /PT  · Patient denies any pain currently  · Fall precautions    * Acute on chronic diastolic (congestive) heart failure [facility])  Assessment & Plan  Background: Presents from SNF with reports of shortness of breath and abdominal fullness for the last several days  In ED, noted to be hypoxic with increased WOB  Required BiPAP in the ER and weaned to 4 L nasal cannula O2  Patient up 15 pounds from 1 month ago      · Last echo in 2021 showing EF 55 to 60% with grade 2 diastolic dysfunction  · Lower extremity edema and vascular congestion on admission    · Await repeat echocardiogram  · Continue IV Lasix 80 mg twice daily  · Monitor renal function with daily BMP - stable  · Monitor intake/output, daily weights  · Weights not reliable as using bed scale, -2 1 L   · Low-sodium fluid restricted diet      Positive blood culture  Assessment & Plan  1 out of 2 blood cultures positive for Staphylococcus epidermidis, likely contaminant  Currently on 3-day course of ceftriaxone for UTI, continue this course  No signs for systemic bacteremia, patient clinically improving    Thrombocytopenia (HCC)  Assessment & Plan  · Recent baseline 140-150's, Plts 148 on admission   · No reports of bleeding  · Monitor CBC    UTI (urinary tract infection)  Assessment & Plan  · Uncomplicated UTI; will plan for 3-day course of IV ceftriaxone    Acute respiratory failure with hypoxia (HCC)  Assessment & Plan  · Present with saturations of 88% and tachypnea on room air  · Secondary to acute CHF; initially required BiPAP in the ER though improved and weaned to nasal cannula O2 3 L  · Seen on room air this morning      Type 2 diabetes mellitus with diabetic neuropathy (HCC)  Assessment & Plan  · Holding home Amaryl  · Monitor on ISS while inpatient  · Hypoglycemia protocol    Pacemaker  Assessment & Plan  · PPM present     Stage 3 chronic kidney disease ([address] )  Assessment & Plan  · Baseline appears to be 1 4-1 5 based on labs in Care Everywhere, renal function currently at baseline  · Daily BMP while on IV diuretics  · Currently stable      Atrial fibrillation ([address] )  Assessment & Plan  · Not on rate control medication   Has PPM in place as well  · Continue coumadin 4 mg daily at HS  ·  INR therapeutic on admission, goal 2-3  Check daily INR  · Therapeutic, continue dose           VTE Pharmacologic Prophylaxis:   Moderate Risk (Score 3-4) - Pharmacological DVT Prophylaxis Ordered: warfarin (Coumadin)  Patient Centered Rounds: I performed bedside rounds with nursing staff today  Discussions with Specialists or Other Care Team Provider: Cm    Education and Discussions with Family / Patient: Attempted to update  () via phone  Unable to contact  Total Time Spent on Date of Encounter in care of patient: 35 minutes This time was spent on one or more of the following: performing physical exam; counseling and coordination of care; obtaining or reviewing history; documenting in the medical record; reviewing/ordering tests, medications or procedures; communicating with other healthcare professionals and discussing with patient's family/caregivers      Current Length of Stay: 2 day(s)  Current Patient Status: Inpatient   Certification Statement: The patient will continue to require additional inpatient hospital stay due to IV diuresis for CHF exacerbation  Discharge Plan: Anticipate discharge in 24-48 hrs to discharge location to be determined pending rehab evaluations  Code Status: Level 1 - Full Code    Subjective:   Seen and examined  Endorses no acute concerns today, stating she is starting to feel better with her shortness of breath, not as severe  Denies any pain  States she may be able to get from the bed to the chair if the chair was closer  Objective:     Vitals:   Temp (24hrs), Av 4 °F (36 3 °C), Min:97 2 °F (36 2 °C), Max:97 5 °F (36 4 °C)    Temp:  [97 2 °F (36 2 °C)-97 5 °F (36 4 °C)] 97 2 °F (36 2 °C)  HR:  [69-84] 84  Resp:  [18-20] 20  BP: (110-132)/(47-67) 129/67  SpO2:  [89 %-96 %] 96 %  Body mass index is 43 83 kg/m²  Input and Output Summary (last 24 hours): Intake/Output Summary (Last 24 hours) at 2023 0943  Last data filed at 2023 0701  Gross per 24 hour   Intake 240 ml   Output 3000 ml   Net -2760 ml       Physical Exam:   Physical Exam  Vitals and nursing note reviewed  Constitutional:       General: She is not in acute distress  Appearance: Normal appearance  She is obese  HENT:      Head: Normocephalic and atraumatic  Nose: No congestion  Mouth/Throat:      Mouth: Mucous membranes are moist    Eyes:      Conjunctiva/sclera: Conjunctivae normal    Cardiovascular:      Rate and Rhythm: Normal rate and regular rhythm  Pulses: Normal pulses  Heart sounds: Normal heart sounds  No murmur heard  Pulmonary:      Effort: Pulmonary effort is normal  No respiratory distress  Breath sounds: Rales (Faint) present  Abdominal:      General: Bowel sounds are normal       Palpations: Abdomen is soft  Tenderness: There is no abdominal tenderness  Musculoskeletal:         General: Normal range of motion  Right lower leg: No edema  Left lower leg: No edema  Comments: Left lower extremity in boot   Skin:     General: Skin is warm and dry  Neurological:      Mental Status: She is alert  Mental status is at baseline            Additional Data: Labs:  Results from last 7 days   Lab Units 06/17/23  0611   WBC Thousand/uL 4 90   HEMOGLOBIN g/dL 9 5*   HEMATOCRIT % 31 3*   PLATELETS Thousands/uL 148*   NEUTROS PCT % 69   LYMPHS PCT % 14   MONOS PCT % 10   EOS PCT % 5     Results from last 7 days   Lab Units 06/18/23  0533 06/17/23  0611   SODIUM mmol/L 141 140   POTASSIUM mmol/L 3 4* 3 9   CHLORIDE mmol/L 102 102   CO2 mmol/L 31 30   BUN mg/dL 54* 54*   CREATININE mg/dL 1 53* 1 49*   ANION GAP mmol/L 8 8   CALCIUM mg/dL 8 8 9 0   ALBUMIN g/dL  --  3 9   TOTAL BILIRUBIN mg/dL  --  0 64   ALK PHOS U/L  --  116*   ALT U/L  --  8   AST U/L  --  14   GLUCOSE RANDOM mg/dL 86 87     Results from last 7 days   Lab Units 06/18/23  0533   INR  2 60*     Results from last 7 days   Lab Units 06/18/23  0715 06/17/23  2101 06/17/23  1552 06/17/23  1101 06/17/23  0742 06/16/23  2237   POC GLUCOSE mg/dl 90 127 156* 126 92 116         Results from last 7 days   Lab Units 06/16/23  1914   LACTIC ACID mmol/L 1 6   PROCALCITONIN ng/ml 0 06       Lines/Drains:  Invasive Devices     Peripheral Intravenous Line  Duration           Peripheral IV 06/16/23 Right Antecubital 1 day          Drain  Duration           External Urinary Catheter <1 day                      Imaging: Reviewed radiology reports from this admission including: chest xray    Recent Cultures (last 7 days):   Results from last 7 days   Lab Units 06/16/23  1914   BLOOD CULTURE  No Growth at 24 hrs     GRAM STAIN RESULT  Gram positive cocci in clusters*       Last 24 Hours Medication List:   Current Facility-Administered Medications   Medication Dose Route Frequency Provider Last Rate   • acetaminophen  650 mg Oral Q4H PRN SAM Wolfe     • allopurinol  100 mg Oral Daily SAM Wolfe     • Ascorbic Acid (Vitamin C)  500 mg Oral Daily SAM Wolfe     • bisacodyl  10 mg Rectal Daily PRN SAM Wolfe     • cefTRIAXone  1,000 mg Intravenous Q24H Roselee Nissen Nirmala Duke, DO 1,000 mg (06/17/23 1806)   • docusate sodium  100 mg Oral BID SAM Thakur     • furosemide  80 mg Intravenous BID (diuretic) SAM Thakur     • gabapentin  600 mg Oral TID SAM Thakur     • insulin lispro  1-6 Units Subcutaneous TID AC SAM Thakur     • insulin lispro  1-6 Units Subcutaneous HS SAM Thakur     • levothyroxine  150 mcg Oral Daily SAM Thakur     • multivitamin-minerals  1 tablet Oral Daily SAM Thakur     • polyethylene glycol  17 g Oral Daily SAM Thakur     • tamsulosin  0 4 mg Oral Daily With [address] SAM Do     • traMADol  50 mg Oral Q6H PRN SAM Thakur     • warfarin  4 mg Oral HS SAM Thakur          Today, Patient Was Seen By: Jes Pacheco PA-C    **Please Note: This note may have been constructed using a voice recognition system  **

## 2023-06-18 NOTE — ASSESSMENT & PLAN NOTE
· Not on rate control medication   Has PPM in place as well  · Continue coumadin 4 mg daily at HS  ·  INR therapeutic on admission, goal 2-3  Check daily INR     · Therapeutic, continue dose

## 2023-06-18 NOTE — UTILIZATION REVIEW
"Initial Clinical Review    Admission: Date/Time/Statement:   Admission Orders (From admission, onward)     Ordered        06/16/23 2038  INPATIENT ADMISSION  Once                      Orders Placed This Encounter   Procedures   • INPATIENT ADMISSION     Standing Status:   Standing     Number of Occurrences:   1     Order Specific Question:   Level of Care     Answer:   Med Surg [16]     Order Specific Question:   Estimated length of stay     Answer:   More than 2 Midnights     Order Specific Question:   Certification     Answer:   I certify that inpatient services are medically necessary for this patient for a duration of greater than two midnights  See H&P and MD Progress Notes for additional information about the patient's course of treatment  ED Arrival Information     Expected   6/16/2023     Arrival   6/16/2023 18:34    Acuity   Emergent            Means of arrival   Ambulance    Escorted by   [id number]   Hospitalist    Admission type   Emergency            Arrival complaint   respiratory           Chief Complaint   Patient presents with   • Shortness of Breath     Pt found to be sob and tachypnic with a room air pulseox in the \"80's\"  Pt placed on 3L and O2 sats improved to 97%  Pt still sob, 88% on RA during triage  Initial Presentation: 69 y o  female with PMH of CHF, DM2, PPM, CKD 3, anemia and  Afib on coumadin who presents to the ED from SNF rehab for evaluation of hortness of breath and abdominal fullness over the last several days  Has been in rehab since January 2023, suffered a fall and left tibia fracture which was unable to be surgically repaired  Patient reports she has been undergoing Physical therapy, reports no significant pain in left lower extremity  Takes torsemide 100 mg daily per nursing home records  Does not use O2 at baseline  Required BiPap in the ED and weaned to [facility]  PE:  AOx3, lungs diminished in bases, poor inspiratory effort  On 4L NC  +2 RLE edema   " LLE in CAM boot, mild edema knee and thigh, ROM intact, left toes warm  6/16 Inpatient admission for evaluation and treatment of acute on chronic CHF, acute respiratory failure with hypoxia:  IV Lasix 80 mg BID, fluid restriction, I/O, daily weight  ECHO, consult Cardiology  Wean O2 to keep SpO2 >90%  6/17 Internal Medicine: Patient feeling a bit better though still complaining of abdominal distention  PE: AOx3, lungs CTAB  Mild abdominal distention, soft and non-tender  No peripheral edema  Continue Lasix 80 mg IV BID, ECHO pending  Uncomplicated UTI, will plan for three day course of IV ceftriaxone  6/18  Day 3: Has surpassed a 2nd midnight with active treatments and services  Internal Medicine: 1/2 blood cultures positive for Staphylococcus epidermidis, likely contaminant  Continue three day course of IV ceftriaxone  Continue IV Lasix 80 mg BID              ED Triage Vitals   Temperature Pulse Respirations Blood Pressure SpO2   06/16/23 1919 06/16/23 1845 06/16/23 1845 06/16/23 1845 06/16/23 1845   97 5 °F (36 4 °C) 72 (!) 28 169/71 (!) 88 % Room air      Temp Source Heart Rate Source Patient Position - Orthostatic VS BP Location FiO2 (%)   06/16/23 1919 06/16/23 1845 -- -- 06/16/23 1855   Temporal Monitor   35      Pain Score       06/16/23 2111       No Pain          Wt Readings from Last 1 Encounters:   06/18/23 109 kg (239 lb 10 2 oz)     Additional Vital Signs:       Date/Time Temp Pulse Resp BP MAP (mmHg) SpO2 FiO2 (%) Calculated FIO2 (%) - Nasal Cannula Nasal Cannula O2 Flow Rate (L/min) O2 Device O2 Interface Device   06/18/23 0730 -- -- -- -- -- 96 % -- -- -- None (Room air) --   06/18/23 07:17:59 97 2 °F (36 2 °C) Abnormal  84 20 129/67 88 93 % -- -- -- -- --   06/17/23 22:00:57 97 5 °F (36 4 °C) 71 18 110/47 Abnormal  68 90 % -- -- -- -- --   06/17/23 2052 -- -- -- -- -- -- -- -- -- None (Room air) --   06/17/23 15:39:18 97 5 °F (36 4 °C) 69 20 132/58 83 89 % Abnormal  -- -- -- -- -- 06/17/23 0831 -- -- -- -- -- -- -- -- -- None (Room air) --   06/17/23 07:12:35 97 °F (36 1 °C) Abnormal  71 17 128/57 81 98 % -- -- -- -- --   06/16/23 22:39:37 97 °F (36 1 °C) Abnormal  72 16 127/56 80 100 % -- -- -- -- --   06/16/23 2119 -- -- -- -- -- 100 % -- 32 3 L/min Nasal cannula --   06/16/23 2115 97 5 °F (36 4 °C) 76 18 125/55 78 99 % -- -- -- -- --   06/16/23 2045 -- 75 20 167/81 116 100 % -- -- -- Nasal cannula --   06/16/23 2030 -- 75 22 165/77 111 100 % -- -- -- Nasal cannula --   06/16/23 2000 -- 75 22 164/78 112 100 % -- -- -- BiPAP --   06/16/23 1945 -- 78 22 168/79 114 100 % -- -- -- BiPAP --   06/16/23 1919 97 5 °F (36 4 °C) -- -- -- -- -- -- -- -- -- --   06/16/23 1855 -- -- -- -- -- 98 % 35 -- -- BiPAP Face mask   06/16/23 1846 -- -- -- -- -- 98 % -- 32 3 L/min Nasal cannula --       Pertinent Labs/Diagnostic Test Results:       XR chest portable   ED Interpretation by Isabel Willingham DO (06/16 1958)   CHF      Final Result by Tano Peres MD (06/17 0728)      Congestive heart failure          6/16 ED EKG:      Previous ECG:  Unavailable   Interpretation:     Interpretation: abnormal     Rate:     ECG rate:  75   Rhythm:     Rhythm: paced     Pacing:     Capture:  Complete     Type of pacing:  Ventricular    Results from last 7 days   Lab Units 06/16/23 2309   SARS-COV-2  Negative     Results from last 7 days   Lab Units 06/17/23  0611 06/16/23 1914   WBC Thousand/uL 4 90 5 26   HEMOGLOBIN g/dL 9 5* 9 7*   HEMATOCRIT % 31 3* 31 8*   PLATELETS Thousands/uL 148* 148*   NEUTROS ABS Thousands/µL 3 40 3 94         Results from last 7 days   Lab Units 06/18/23  0533 06/17/23  0611 06/16/23  1914   SODIUM mmol/L 141 140 138   POTASSIUM mmol/L 3 4* 3 9 3 8   CHLORIDE mmol/L 102 102 100   CO2 mmol/L 31 30 28   ANION GAP mmol/L 8 8 10   BUN mg/dL 54* 54* 57*   CREATININE mg/dL 1 53* 1 49* 1 55*   EGFR ml/min/1 73sq m 32 33 32   CALCIUM mg/dL 8 8 9 0 9 1   MAGNESIUM mg/dL 2 4 2 4 2 4 Results from last 7 days   Lab Units 06/17/23  0611 06/16/23 1914   AST U/L 14 15   ALT U/L 8 9   ALK PHOS U/L 116* 122*   TOTAL PROTEIN g/dL 7 2 7 4   ALBUMIN g/dL 3 9 4 0   TOTAL BILIRUBIN mg/dL 0 64 0 62     Results from last 7 days   Lab Units 06/18/23  1107 06/18/23  0715 06/17/23  2101 06/17/23  1552 06/17/23  1101 06/17/23  0742 06/16/23  2237   POC GLUCOSE mg/dl 109 90 127 156* 126 92 116     Results from last 7 days   Lab Units 06/18/23  0533 06/17/23  0611 06/16/23 1914   GLUCOSE RANDOM mg/dL 86 87 155*           Results from last 7 days   Lab Units 06/16/23 1907   PH ART  7 451*   PCO2 ART mm Hg 39 2   PO2 ART mm Hg 141 1*   HCO3 ART mmol/L 26 7   BASE EXC ART mmol/L 2 6   O2 CONTENT ART mL/dL 14 9*   O2 HGB, ARTERIAL % 97 9*   ABG SOURCE  Radial, Right                 Results from last 7 days   Lab Units 06/17/23  0220 06/16/23  2215 06/16/23 1914   HS TNI 0HR ng/L  --   --  11   HS TNI 2HR ng/L  --  11  --    HSTNI D2 ng/L  --  0  --    HS TNI 4HR ng/L 11  --   --    HSTNI D4 ng/L 0  --   --          Results from last 7 days   Lab Units 06/18/23  0533 06/17/23  0611 06/16/23 1914   PROTIME seconds 27 9* 27 7* 24 8*   INR  2 60* 2 57* 2 24*   PTT seconds  --   --  48*         Results from last 7 days   Lab Units 06/16/23 1914   PROCALCITONIN ng/ml 0 06     Results from last 7 days   Lab Units 06/16/23 1914   LACTIC ACID mmol/L 1 6             Results from last 7 days   Lab Units 06/16/23 1914   BNP pg/mL 96     Results from last 7 days   Lab Units 06/16/23 1914   FERRITIN ng/mL 60   IRON SATURATION % 8*   IRON ug/dL 20*   TIBC ug/dL 260             Results from last 7 days   Lab Units 06/16/23  2144   CLARITY UA  Clear   COLOR UA  Straw   SPEC GRAV UA  1 010   PH UA  7 0   GLUCOSE UA mg/dl Negative   KETONES UA mg/dl Negative   BLOOD UA  Negative   PROTEIN UA mg/dl Negative   NITRITE UA  Negative   BILIRUBIN UA  Negative   UROBILINOGEN UA E U /dl 0 2   LEUKOCYTES UA  Large*   WBC UA /hpf 10-20*   RBC UA /hpf 2-4   BACTERIA UA /hpf Innumerable*   EPITHELIAL CELLS WET PREP /hpf Occasional     Results from last 7 days   Lab Units 06/16/23  2309   INFLUENZA A PCR  Negative   INFLUENZA B PCR  Negative   RSV PCR  Negative             Results from last 7 days   Lab Units 06/16/23  2144 06/16/23  1914   BLOOD CULTURE   --  Staphylococcus epidermidis*  No Growth at 24 hrs  GRAM STAIN RESULT   --  Gram positive cocci in clusters*   URINE CULTURE  Culture results to follow  --                    ED Treatment:   Medication Administration from 06/16/2023 1833 to 06/16/2023 2101       Date/Time Order Dose Route Action     06/16/2023 1909 EDT nitroglycerin (NITROSTAT) SL tablet 0 4 mg 0 4 mg Sublingual Given     06/16/2023 1911 EDT furosemide (LASIX) injection 60 mg 60 mg Intravenous Given        Past Medical History:   Diagnosis Date   • Arthritis    • CHF (congestive heart failure) ([facility])    • Diabetes mellitus ([place] [id number] )    • Disease of thyroid gland    • Hypertension    • Pacemaker    • Renal disorder      Present on Admission:  • Acute respiratory failure with hypoxia ([facility])  • Atrial fibrillation ([facility])  • Closed displaced spiral fracture of shaft of left tibia  • Pacemaker  • Stage 3 chronic kidney disease ([facility])  • Type 2 diabetes mellitus with diabetic neuropathy ([facility])      Admitting Diagnosis: Shortness of breath [R06 02]  Dyspnea [R06 00]  Hypoxia [R09 02]  CHF exacerbation ([place] [address] ) [I50 9]  Age/Sex: 69 y o  female       Admission Orders: 1800 ml fluid restriction, 2 GM sodium, SCD         Scheduled Medications:  allopurinol, 100 mg, Oral, Daily  Ascorbic Acid (Vitamin C), 500 mg, Oral, Daily  cefTRIAXone, 1,000 mg, Intravenous, Q24H  docusate sodium, 100 mg, Oral, BID  furosemide, 80 mg, Intravenous, BID (diuretic)  gabapentin, 600 mg, Oral, TID  insulin lispro, 1-6 Units, Subcutaneous, TID AC  insulin lispro, 1-6 Units, Subcutaneous, HS  levothyroxine, 150 mcg, Oral, Daily  multivitamin-minerals, 1 tablet, Oral, Daily  polyethylene glycol, 17 g, Oral, Daily  tamsulosin, 0 4 mg, Oral, Daily With Dinner  warfarin, 4 mg, Oral, HS      Continuous IV Infusions: None  PRN Meds:      acetaminophen, 650 mg, Oral, Q4H PRN  bisacodyl, 10 mg, Rectal, Daily PRN  traMADol, 50 mg, Oral, Q6H PRN        IP CONSULT TO NUTRITION SERVICES  IP CONSULT TO CARDIOLOGY  IP CONSULT TO CASE MANAGEMENT    Network Utilization Review Department  ATTENTION: Please call with any questions or concerns to [phone] and carefully listen to the prompts so that you are directed to the right person  All voicemails are confidential   Anahy Schaeffer all requests for admission clinical reviews, approved or denied determinations and any other requests to dedicated fax number below belonging to the campus where the patient is receiving treatment   List of dedicated fax numbers for the Facilities:  [address] DENIALS (Administrative/Medical Necessity) [phone]   [address] (Maternity/NICU/Pediatrics) [phone]   9 Bhavya Veliz [phone]   [place] [address] [phone]   [facility] [address] [place] [address] Yumi Sultana [facility] 28 [phone]   1559 [facility] Abbe Cummings [facility] 134 815 [facility] [phone]

## 2023-06-18 NOTE — ASSESSMENT & PLAN NOTE
Background: Presents from SNF with reports of shortness of breath and abdominal fullness for the last several days  In ED, noted to be hypoxic with increased WOB  Required BiPAP in the ER and weaned to 4 L nasal cannula O2  Patient up 15 pounds from 1 month ago      · Last echo in 2021 showing EF 55 to 60% with grade 2 diastolic dysfunction  · Lower extremity edema and vascular congestion on admission    · Await repeat echocardiogram  · Continue IV Lasix 80 mg twice daily  · Monitor renal function with daily BMP - stable  · Monitor intake/output, daily weights  · Weights not reliable as using bed scale, -2 1 L   · Low-sodium fluid restricted diet no abdominal pain, no bloating, no constipation, no diarrhea, no nausea and no vomiting.

## 2023-06-18 NOTE — PLAN OF CARE
Problem: Potential for Falls  Goal: Patient will remain free of falls  Description: INTERVENTIONS:  - Educate patient/family on patient safety including physical limitations  - Instruct patient to call for assistance with activity   - Consult OT/PT to assist with strengthening/mobility   - Keep Call bell within reach  - Keep bed low and locked with side rails adjusted as appropriate  - Keep care items and personal belongings within reach  - Initiate and maintain comfort rounds  - Make Fall Risk Sign visible to staff  - Offer Toileting every 2 Hours, in advance of need  - Initiate/Maintain bed alarm  - Obtain necessary fall risk management equipment:   - Apply yellow socks and bracelet for high fall risk patients  - Consider moving patient to room near nurses station  Outcome: Progressing     Problem: Prexisting or High Potential for Compromised Skin Integrity  Goal: Skin integrity is maintained or improved  Description: INTERVENTIONS:  - Identify patients at risk for skin breakdown  - Assess and monitor skin integrity  - Assess and monitor nutrition and hydration status  - Monitor labs   - Assess for incontinence   - Turn and reposition patient  - Assist with mobility/ambulation  - Relieve pressure over bony prominences  - Avoid friction and shearing  - Provide appropriate hygiene as needed including keeping skin clean and dry  - Evaluate need for skin moisturizer/barrier cream  - Collaborate with interdisciplinary team   - Patient/family teaching  - Consider wound care consult   Outcome: Progressing     Problem: CARDIOVASCULAR - ADULT  Goal: Maintains optimal cardiac output and hemodynamic stability  Description: INTERVENTIONS:  - Monitor I/O, vital signs and rhythm  - Monitor for S/S and trends of decreased cardiac output SOB CARPENTER swelling  - Administer and titrate ordered vasoactive medications to optimize hemodynamic stability  - Assess quality of pulses, skin color and temperature  - Assess for signs of decreased coronary artery perfusion  - Instruct patient to report change in severity of symptoms  Outcome: Progressing  Goal: Absence of cardiac dysrhythmias or at baseline rhythm  Description: INTERVENTIONS:  - Continuous cardiac monitoring, vital signs, obtain 12 lead EKG if ordered  - Administer antiarrhythmic and heart rate control medications as ordered  - Monitor electrolytes and administer replacement therapy as ordered  Outcome: Progressing

## 2023-06-18 NOTE — ASSESSMENT & PLAN NOTE
· Per chart review had a fall in January 2023, suffered left tibia fracture  Patient did not undergo surgical intervention , follows with Dr General Hanna outpatient  Currently has CAM boot in place , has been at [facility] for rehab /PT  · Patient denies any pain currently     · Fall precautions

## 2023-06-18 NOTE — ASSESSMENT & PLAN NOTE
· Present with saturations of 88% and tachypnea on room air  · Secondary to acute CHF; initially required BiPAP in the ER though improved and weaned to nasal cannula O2 3 L  · Seen on room air this morning

## 2023-06-18 NOTE — ASSESSMENT & PLAN NOTE
1 out of 2 blood cultures positive for Staphylococcus epidermidis, likely contaminant  Currently on 3-day course of ceftriaxone for UTI, continue this course  No signs for systemic bacteremia, patient clinically improving

## 2023-06-18 NOTE — ASSESSMENT & PLAN NOTE
· Baseline appears to be 1 4-1 5 based on labs in Care Everywhere, renal function currently at baseline  · Daily BMP while on IV diuretics  · Currently stable

## 2023-06-18 NOTE — NURSING NOTE
Patient tolerated oob to chair all afternoon slide to mlyesr daughter brought bone stimulator from home in box

## 2023-06-19 ENCOUNTER — APPOINTMENT (INPATIENT)
Dept: NON INVASIVE DIAGNOSTICS | Facility: HOSPITAL | Age: 78
DRG: 291 | End: 2023-06-19
Payer: COMMERCIAL

## 2023-06-19 PROBLEM — J96.01 ACUTE RESPIRATORY FAILURE WITH HYPOXIA (HCC): Status: RESOLVED | Noted: 2021-07-26 | Resolved: 2023-06-19

## 2023-06-19 LAB
ANION GAP SERPL CALCULATED.3IONS-SCNC: 10 MMOL/L (ref 4–13)
AORTIC ROOT: 3.2 CM
AORTIC VALVE MEAN VELOCITY: 12.1 M/S
APICAL FOUR CHAMBER EJECTION FRACTION: 64 %
ASCENDING AORTA: 2.8 CM
ATRIAL RATE: 56 BPM
AV AREA BY CONTINUOUS VTI: 1.5 CM2
AV AREA PEAK VELOCITY: 1.7 CM2
AV LVOT MEAN GRADIENT: 1 MMHG
AV LVOT PEAK GRADIENT: 4 MMHG
AV MEAN GRADIENT: 7 MMHG
AV PEAK GRADIENT: 12 MMHG
AV VALVE AREA: 1.5 CM2
AV VELOCITY RATIO: 0.56
BACTERIA BLD CULT: ABNORMAL
BACTERIA UR CULT: ABNORMAL
BUN SERPL-MCNC: 51 MG/DL (ref 5–25)
CALCIUM SERPL-MCNC: 8.8 MG/DL (ref 8.4–10.2)
CHLORIDE SERPL-SCNC: 101 MMOL/L (ref 96–108)
CO2 SERPL-SCNC: 30 MMOL/L (ref 21–32)
CREAT SERPL-MCNC: 1.48 MG/DL (ref 0.6–1.3)
DOP CALC AO PEAK VEL: 1.71 M/S
DOP CALC AO VTI: 38.61 CM
DOP CALC LVOT AREA: 3.14 CM2
DOP CALC LVOT DIAMETER: 2 CM
DOP CALC LVOT PEAK VEL VTI: 18.4 CM
DOP CALC LVOT PEAK VEL: 0.95 M/S
DOP CALC LVOT STROKE INDEX: 27.8 ML/M2
DOP CALC LVOT STROKE VOLUME: 57.78
ERYTHROCYTE [DISTWIDTH] IN BLOOD BY AUTOMATED COUNT: 19.9 % (ref 11.6–15.1)
FRACTIONAL SHORTENING: 31 (ref 28–44)
GFR SERPL CREATININE-BSD FRML MDRD: 33 ML/MIN/1.73SQ M
GLUCOSE SERPL-MCNC: 107 MG/DL (ref 65–140)
GLUCOSE SERPL-MCNC: 114 MG/DL (ref 65–140)
GLUCOSE SERPL-MCNC: 130 MG/DL (ref 65–140)
GLUCOSE SERPL-MCNC: 138 MG/DL (ref 65–140)
GLUCOSE SERPL-MCNC: 152 MG/DL (ref 65–140)
GRAM STN SPEC: ABNORMAL
HCT VFR BLD AUTO: 30.4 % (ref 34.8–46.1)
HGB BLD-MCNC: 9.1 G/DL (ref 11.5–15.4)
INR PPP: 2.43 (ref 0.84–1.19)
INTERVENTRICULAR SEPTUM IN DIASTOLE (PARASTERNAL SHORT AXIS VIEW): 1.1 CM
INTERVENTRICULAR SEPTUM: 1.1 CM (ref 0.6–1.1)
LAAS-AP2: 29.7 CM2
LAAS-AP4: 26.5 CM2
LEFT ATRIUM SIZE: 4.8 CM
LEFT INTERNAL DIMENSION IN SYSTOLE: 3.3 CM (ref 2.1–4)
LEFT VENTRICLE DIASTOLIC VOLUME (MOD BIPLANE): 84 ML
LEFT VENTRICLE SYSTOLIC VOLUME (MOD BIPLANE): 31 ML
LEFT VENTRICULAR INTERNAL DIMENSION IN DIASTOLE: 4.8 CM (ref 3.5–6)
LEFT VENTRICULAR POSTERIOR WALL IN END DIASTOLE: 1.3 CM
LEFT VENTRICULAR STROKE VOLUME: 63 ML
LV EF: 63 %
LVSV (TEICH): 63 ML
MCH RBC QN AUTO: 22.6 PG (ref 26.8–34.3)
MCHC RBC AUTO-ENTMCNC: 29.9 G/DL (ref 31.4–37.4)
MCV RBC AUTO: 76 FL (ref 82–98)
MITRAL REGURGITATION PEAK VELOCITY: 4.23 M/S
MITRAL VALVE MEAN INFLOW VELOCITY: 3.32 M/S
MITRAL VALVE REGURGITANT PEAK GRADIENT: 71 MMHG
MRSA NOSE QL CULT: ABNORMAL
MRSA NOSE QL CULT: ABNORMAL
MV E'TISSUE VEL-LAT: 10 CM/S
MV E'TISSUE VEL-SEP: 8 CM/S
MV STENOSIS PRESSURE HALF TIME: 48 MS
MV VALVE AREA P 1/2 METHOD: 4.58
PLATELET # BLD AUTO: 141 THOUSANDS/UL (ref 149–390)
PMV BLD AUTO: 9.1 FL (ref 8.9–12.7)
POTASSIUM SERPL-SCNC: 3.2 MMOL/L (ref 3.5–5.3)
PROTHROMBIN TIME: 26.5 SECONDS (ref 11.6–14.5)
PV PEAK GRADIENT: 3 MMHG
QRS AXIS: -77 DEGREES
QRSD INTERVAL: 200 MS
QT INTERVAL: 498 MS
QTC INTERVAL: 556 MS
RA PRESSURE ESTIMATED: 15 MMHG
RBC # BLD AUTO: 4.02 MILLION/UL (ref 3.81–5.12)
RIGHT ATRIUM AREA SYSTOLE A4C: 29.5 CM2
RIGHT VENTRICLE ID DIMENSION: 4.5 CM
RV PSP: 65 MMHG
S EPIDERMIDIS DNA BLD POS QL NAA+NON-PRB: DETECTED
SL CV DOP CALC MV VTI RETROGRADE: 130.2 CM
SL CV LEFT ATRIUM LENGTH A2C: 6.9 CM
SL CV LV EF: 55
SL CV MV MEAN GRADIENT RETROGRADE: 49 MMHG
SL CV PED ECHO LEFT VENTRICLE DIASTOLIC VOLUME (MOD BIPLANE) 2D: 107 ML
SL CV PED ECHO LEFT VENTRICLE SYSTOLIC VOLUME (MOD BIPLANE) 2D: 44 ML
SODIUM SERPL-SCNC: 141 MMOL/L (ref 135–147)
T WAVE AXIS: 99 DEGREES
TR MAX PG: 50 MMHG
TR PEAK VELOCITY: 3.5 M/S
TRICUSPID ANNULAR PLANE SYSTOLIC EXCURSION: 1.6 CM
TRICUSPID VALVE PEAK REGURGITATION VELOCITY: 3.52 M/S
VENTRICULAR RATE: 75 BPM
WBC # BLD AUTO: 5.16 THOUSAND/UL (ref 4.31–10.16)

## 2023-06-19 PROCEDURE — 85027 COMPLETE CBC AUTOMATED: CPT

## 2023-06-19 PROCEDURE — C8929 TTE W OR WO FOL WCON,DOPPLER: HCPCS

## 2023-06-19 PROCEDURE — 93306 TTE W/DOPPLER COMPLETE: CPT | Performed by: INTERNAL MEDICINE

## 2023-06-19 PROCEDURE — 99223 1ST HOSP IP/OBS HIGH 75: CPT | Performed by: INTERNAL MEDICINE

## 2023-06-19 PROCEDURE — 85610 PROTHROMBIN TIME: CPT | Performed by: INTERNAL MEDICINE

## 2023-06-19 PROCEDURE — 80048 BASIC METABOLIC PNL TOTAL CA: CPT

## 2023-06-19 PROCEDURE — 82948 REAGENT STRIP/BLOOD GLUCOSE: CPT

## 2023-06-19 PROCEDURE — 93010 ELECTROCARDIOGRAM REPORT: CPT | Performed by: INTERNAL MEDICINE

## 2023-06-19 PROCEDURE — 99232 SBSQ HOSP IP/OBS MODERATE 35: CPT

## 2023-06-19 RX ORDER — POTASSIUM CHLORIDE 20 MEQ/1
40 TABLET, EXTENDED RELEASE ORAL 2 TIMES DAILY
Status: COMPLETED | OUTPATIENT
Start: 2023-06-19 | End: 2023-06-20

## 2023-06-19 RX ADMIN — WARFARIN SODIUM 4 MG: 4 TABLET ORAL at 21:30

## 2023-06-19 RX ADMIN — LEVOTHYROXINE SODIUM 150 MCG: 150 TABLET ORAL at 10:10

## 2023-06-19 RX ADMIN — GABAPENTIN 600 MG: 300 CAPSULE ORAL at 21:30

## 2023-06-19 RX ADMIN — POTASSIUM CHLORIDE 40 MEQ: 1500 TABLET, EXTENDED RELEASE ORAL at 14:16

## 2023-06-19 RX ADMIN — GABAPENTIN 600 MG: 300 CAPSULE ORAL at 17:34

## 2023-06-19 RX ADMIN — PERFLUTREN 2 ML/MIN: 6.52 INJECTION, SUSPENSION INTRAVENOUS at 08:16

## 2023-06-19 RX ADMIN — GABAPENTIN 600 MG: 300 CAPSULE ORAL at 10:10

## 2023-06-19 RX ADMIN — OXYCODONE HYDROCHLORIDE AND ACETAMINOPHEN 500 MG: 500 TABLET ORAL at 10:10

## 2023-06-19 RX ADMIN — DOCUSATE SODIUM 100 MG: 100 CAPSULE, LIQUID FILLED ORAL at 10:10

## 2023-06-19 RX ADMIN — ALLOPURINOL 100 MG: 100 TABLET ORAL at 10:10

## 2023-06-19 RX ADMIN — Medication 1 TABLET: at 10:10

## 2023-06-19 RX ADMIN — FUROSEMIDE 80 MG: 10 INJECTION, SOLUTION INTRAMUSCULAR; INTRAVENOUS at 10:12

## 2023-06-19 RX ADMIN — TAMSULOSIN HYDROCHLORIDE 0.4 MG: 0.4 CAPSULE ORAL at 17:35

## 2023-06-19 RX ADMIN — POLYETHYLENE GLYCOL 3350 17 G: 17 POWDER, FOR SOLUTION ORAL at 10:09

## 2023-06-19 RX ADMIN — DOCUSATE SODIUM 100 MG: 100 CAPSULE, LIQUID FILLED ORAL at 17:35

## 2023-06-19 RX ADMIN — FUROSEMIDE 80 MG: 10 INJECTION, SOLUTION INTRAMUSCULAR; INTRAVENOUS at 17:35

## 2023-06-19 NOTE — ASSESSMENT & PLAN NOTE
Background: Presents from SNF with reports of shortness of breath and abdominal fullness for the last several days  In ED, noted to be hypoxic with increased WOB  Required BiPAP in the ER and weaned to 4 L nasal cannula O2  Patient up 15 pounds from 1 month ago      · Last echo in 2021 showing EF 55 to 60% with grade 2 diastolic dysfunction  · Repeat done today   · Lower extremity edema and vascular congestion on admission    · Continue IV Lasix 80 mg twice daily   · Likely dc on torsemide 100 mg daily   · Monitor renal function with daily BMP - stable  · Monitor intake/output, daily weights  · Weights not reliable as using bed scale, -2 1 L   · Low-sodium fluid restricted diet  Appreciate ongoing cardiology recommendations

## 2023-06-19 NOTE — ASSESSMENT & PLAN NOTE
History of sick sinus syndrome s/p Medtronic dual chamber PPM   Per outpatient cardiology note the atrial lead has been programmed off due to poor function/sensing  ECG this admission shows ventricular paced rhythm

## 2023-06-19 NOTE — PROGRESS NOTES
[address]  Progress Note  Name: Arian Warner  MRN: [id number]  Unit/Bed#: -01 I Date of Admission: 6/16/2023   Date of Service: 6/19/2023 I Hospital Day: 3    Assessment/Plan   * Acute on chronic diastolic (congestive) heart failure [facility])  Assessment & Plan  Background: Presents from SNF with reports of shortness of breath and abdominal fullness for the last several days  In ED, noted to be hypoxic with increased WOB  Required BiPAP in the ER and weaned to 4 L nasal cannula O2  Patient up 15 pounds from 1 month ago  · Last echo in 2021 showing EF 55 to 60% with grade 2 diastolic dysfunction  · Repeat done today   · Lower extremity edema and vascular congestion on admission    · Continue IV Lasix 80 mg twice daily   · Likely dc on torsemide 100 mg daily   · Monitor renal function with daily BMP - stable  · Monitor intake/output, daily weights  · Weights not reliable as using bed scale, -2 1 L   · Low-sodium fluid restricted diet  Appreciate ongoing cardiology recommendations     Positive blood culture  Assessment & Plan  1 out of 2 blood cultures positive for Staphylococcus epidermidis, likely contaminant  Currently on 3-day course of ceftriaxone for UTI, continue this course  No signs for systemic bacteremia, patient clinically improving    Thrombocytopenia (HCC)  Assessment & Plan  · Recent baseline 140-150's, Plts 148 on admission   · No reports of bleeding  · Monitor CBC    Closed displaced spiral fracture of shaft of left tibia  Assessment & Plan  · Per chart review had a fall in January 2023, suffered left tibia fracture  Patient did not undergo surgical intervention , follows with Dr Sravan Barakat outpatient  Currently has CAM boot in place , has been at [facility] for rehab /PT  · Patient denies any pain currently     · Fall precautions    UTI (urinary tract infection)  Assessment & Plan  · Uncomplicated UTI; will plan for 3-day course of IV ceftriaxone    Type 2 diabetes mellitus with diabetic neuropathy (HCC)  Assessment & Plan  · Holding home Amaryl  · Monitor on ISS while inpatient  · Hypoglycemia protocol    Pacemaker  Assessment & Plan  · PPM present     Stage 3 chronic kidney disease ([facility] [address] )  Assessment & Plan  · Baseline appears to be 1 4-1 5 based on labs in Care Everywhere, renal function currently at baseline  · Daily BMP while on IV diuretics  · Currently stable      Atrial fibrillation ([facility] [address] )  Assessment & Plan  · Not on rate control medication   Has PPM in place as well  · Continue coumadin 4 mg daily at HS  ·  INR therapeutic on admission, goal 2-3  Check daily INR  · Therapeutic, continue dose    Acute respiratory failure with hypoxia (HCC)-resolved as of 6/19/2023  Assessment & Plan  · Present with saturations of 88% and tachypnea on room air  · Secondary to acute CHF; initially required BiPAP in the ER though improved and weaned to nasal cannula O2 3 L  · Seen on room air this morning             VTE Pharmacologic Prophylaxis:   High Risk (Score >/= 5) - Pharmacological DVT Prophylaxis Ordered: warfarin (Coumadin)  Sequential Compression Devices Ordered  Patient Centered Rounds: I performed bedside rounds with nursing staff today  Discussions with Specialists or Other Care Team Provider: CM, cardiolgogy     Education and Discussions with Family / Patient: Attempted to update  () via phone  Left voicemail  Total Time Spent on Date of Encounter in care of patient: 35 minutes This time was spent on one or more of the following: performing physical exam; counseling and coordination of care; obtaining or reviewing history; documenting in the medical record; reviewing/ordering tests, medications or procedures; communicating with other healthcare professionals and discussing with patient's family/caregivers      Current Length of Stay: 3 day(s)  Current Patient Status: Inpatient   Certification Statement: The patient will continue to require additional inpatient hospital stay due to CHF exacerbation   Discharge Plan: Anticipate discharge in 24-48 hrs to rehab facility  Code Status: Level 1 - Full Code    Subjective:   Seen and examined  Denies new concerns  Continues to feel slightly better today     Objective:     Vitals:   Temp (24hrs), Av 5 °F (36 4 °C), Min:97 2 °F (36 2 °C), Max:97 7 °F (36 5 °C)    Temp:  [97 2 °F (36 2 °C)-97 7 °F (36 5 °C)] 97 7 °F (36 5 °C)  HR:  [68-82] 82  Resp:  [16-17] 17  BP: (124-139)/(48-66) 139/66  SpO2:  [88 %-91 %] 90 %  Body mass index is 43 15 kg/m²  Input and Output Summary (last 24 hours): Intake/Output Summary (Last 24 hours) at 2023 1246  Last data filed at 2023 1125  Gross per 24 hour   Intake 360 ml   Output 2850 ml   Net -2490 ml       Physical Exam:   Physical Exam  Vitals and nursing note reviewed  Constitutional:       General: She is not in acute distress  Appearance: Normal appearance  She is obese  HENT:      Head: Normocephalic and atraumatic  Nose: No congestion  Mouth/Throat:      Mouth: Mucous membranes are moist    Eyes:      Conjunctiva/sclera: Conjunctivae normal    Cardiovascular:      Rate and Rhythm: Normal rate and regular rhythm  Pulses: Normal pulses  Heart sounds: Murmur heard  Pulmonary:      Effort: Pulmonary effort is normal  No respiratory distress  Breath sounds: Normal breath sounds  Abdominal:      General: Bowel sounds are normal       Palpations: Abdomen is soft  Tenderness: There is no abdominal tenderness  Musculoskeletal:         General: Normal range of motion  Right lower leg: Edema present  Left lower leg: Edema present  Skin:     General: Skin is warm and dry  Neurological:      Mental Status: She is alert and oriented to person, place, and time            Additional Data:     Labs:  Results from last 7 days   Lab Units 23  0452 23  0611   WBC Thousand/uL 5 16 4 90   HEMOGLOBIN g/dL 9 1* 9 5*   HEMATOCRIT % 30 4* 31 3*   PLATELETS Thousands/uL 141* 148*   NEUTROS PCT %  --  69   LYMPHS PCT %  --  14   MONOS PCT %  --  10   EOS PCT %  --  5     Results from last 7 days   Lab Units 06/19/23  0452 06/18/23  0533 06/17/23  0611   SODIUM mmol/L 141   < > 140   POTASSIUM mmol/L 3 2*   < > 3 9   CHLORIDE mmol/L 101   < > 102   CO2 mmol/L 30   < > 30   BUN mg/dL 51*   < > 54*   CREATININE mg/dL 1 48*   < > 1 49*   ANION GAP mmol/L 10   < > 8   CALCIUM mg/dL 8 8   < > 9 0   ALBUMIN g/dL  --   --  3 9   TOTAL BILIRUBIN mg/dL  --   --  0 64   ALK PHOS U/L  --   --  116*   ALT U/L  --   --  8   AST U/L  --   --  14   GLUCOSE RANDOM mg/dL 107   < > 87    < > = values in this interval not displayed  Results from last 7 days   Lab Units 06/19/23  0452   INR  2 43*     Results from last 7 days   Lab Units 06/19/23  1104 06/19/23  0730 06/18/23  2045 06/18/23  1545 06/18/23  1107 06/18/23  0715 06/17/23  2101 06/17/23  1552 06/17/23  1101 06/17/23  0742 06/16/23  2237   POC GLUCOSE mg/dl 152* 114 172* 141* 109 90 127 156* 126 92 116         Results from last 7 days   Lab Units 06/16/23  1914   LACTIC ACID mmol/L 1 6   PROCALCITONIN ng/ml 0 06       Lines/Drains:  Invasive Devices     Peripheral Intravenous Line  Duration           Peripheral IV 06/16/23 Right Antecubital 2 days          Drain  Duration           External Urinary Catheter 2 days                      Imaging: No pertinent imaging reviewed  Recent Cultures (last 7 days):   Results from last 7 days   Lab Units 06/18/23  0533 06/16/23  2144 06/16/23  1914   BLOOD CULTURE  Received in Microbiology Lab  Culture in Progress  Received in Microbiology Lab  Culture in Progress  --  Staphylococcus epidermidis*  No Growth at 48 hrs     GRAM STAIN RESULT   --   --  Gram positive cocci in clusters*   URINE CULTURE   --  80,000-89,000 cfu/ml Proteus mirabilis*  --        Last 24 Hours Medication List:   Current Facility-Administered Medications Medication Dose Route Frequency Provider Last Rate   • acetaminophen  650 mg Oral Q4H PRN SAM Grubbs     • allopurinol  100 mg Oral Daily SAM Grubbs     • Ascorbic Acid (Vitamin C)  500 mg Oral Daily SAM Grubbs     • bisacodyl  10 mg Rectal Daily PRN SAM Grubbs     • docusate sodium  100 mg Oral BID SAM Grubbs     • furosemide  80 mg Intravenous BID (diuretic) SAM Grubbs     • gabapentin  600 mg Oral TID SAM Grubbs     • insulin lispro  1-6 Units Subcutaneous TID AC SAM Grubbs     • insulin lispro  1-6 Units Subcutaneous HS SAM Grubbs     • levothyroxine  150 mcg Oral Daily SAM Grubbs     • multivitamin-minerals  1 tablet Oral Daily SAM Grubbs     • polyethylene glycol  17 g Oral Daily SAM Grubbs     • potassium chloride  40 mEq Oral BID SAM Healy     • tamsulosin  0 4 mg Oral Daily With [address] SAM Do     • traMADol  50 mg Oral Q6H PRN SAM Grubbs     • warfarin  4 mg Oral HS SAM Grubbs          Today, Patient Was Seen By: Rebekah Calixto PA-C    **Please Note: This note may have been constructed using a voice recognition system  **

## 2023-06-19 NOTE — PLAN OF CARE
Problem: CARDIOVASCULAR - ADULT  Goal: Maintains optimal cardiac output and hemodynamic stability  Description: INTERVENTIONS:  - Monitor I/O, vital signs and rhythm  - Monitor for S/S and trends of decreased cardiac output SOB CARPENTER swelling  - Administer and titrate ordered vasoactive medications to optimize hemodynamic stability  - Assess quality of pulses, skin color and temperature  - Assess for signs of decreased coronary artery perfusion  - Instruct patient to report change in severity of symptoms  Outcome: Progressing  Goal: Absence of cardiac dysrhythmias or at baseline rhythm  Description: INTERVENTIONS:  - Continuous cardiac monitoring, vital signs, obtain 12 lead EKG if ordered  - Administer antiarrhythmic and heart rate control medications as ordered  - Monitor electrolytes and administer replacement therapy as ordered  Outcome: Progressing     Problem: MOBILITY - ADULT  Goal: Maintain or return to baseline ADL function  Description: INTERVENTIONS:  -  Assess patient's ability to carry out ADLs; assess patient's baseline for ADL function and identify physical deficits which impact ability to perform ADLs (bathing, care of mouth/teeth, toileting, grooming, dressing, etc )  - Assess/evaluate cause of self-care deficits   - Assess range of motion  - Assess patient's mobility; develop plan if impaired  - Assess patient's need for assistive devices and provide as appropriate  - Encourage maximum independence but intervene and supervise when necessary  - Involve family in performance of ADLs  - Assess for home care needs following discharge   - Consider OT consult to assist with ADL evaluation and planning for discharge  - Provide patient education as appropriate  Outcome: Progressing  Goal: Maintains/Returns to pre admission functional level  Description: INTERVENTIONS:  - Perform BMAT or MOVE assessment daily    - Set and communicate daily mobility goal to care team and patient/family/caregiver     - Collaborate with rehabilitation services on mobility goals if consulted  - Perform Range of Motion 3 times a day  - Reposition patient every 2 hours    - Dangle patient 3 times a day  - Stand patient 3 times a day  - Ambulate patient 3 times a day  - Out of bed to chair 3 times a day   - Out of bed for meals 3 times a day  - Out of bed for toileting  - Record patient progress and toleration of activity level   Outcome: Progressing     Problem: Prexisting or High Potential for Compromised Skin Integrity  Goal: Skin integrity is maintained or improved  Description: INTERVENTIONS:  - Identify patients at risk for skin breakdown  - Assess and monitor skin integrity  - Assess and monitor nutrition and hydration status  - Monitor labs   - Assess for incontinence   - Turn and reposition patient  - Assist with mobility/ambulation  - Relieve pressure over bony prominences  - Avoid friction and shearing  - Provide appropriate hygiene as needed including keeping skin clean and dry  - Evaluate need for skin moisturizer/barrier cream  - Collaborate with interdisciplinary team   - Patient/family teaching  - Consider wound care consult   Outcome: Progressing

## 2023-06-19 NOTE — CASE MANAGEMENT
Case Management Assessment & Discharge Planning Note    Patient name Waldo [address]  Location [address] 333/-01 MRN [id number]  : 1945 Date 2023       Current Admission Date: 2023  Current Admission Diagnosis:Acute on chronic diastolic (congestive) heart failure [facility])   Patient Active Problem List    Diagnosis Date Noted   • Positive blood culture 2023   • Acute on chronic diastolic (congestive) heart failure ([address] ) 2023   • Chronic anemia 2023   • Thrombocytopenia ([address] ) 2023   • Urinary retention 2023   • Acute pain of left shoulder 2023   • Closed displaced spiral fracture of shaft of left tibia 2023   • Lumbar spondylosis 10/12/2022   • Peripheral vascular disease ([address] ) 2022   • Primary osteoarthritis of both hips 2022   • Pain in left hip 2022   • Chronic pain of left knee 10/13/2021   • Presence of artificial knee joint, left 10/13/2021   • Nausea and vomiting 2021   • UTI (urinary tract infection) 2021   • Sick sinus syndrome ([address] ) 2021   • Pulmonary hypertension ([address] ) 2021   • Supratherapeutic INR 2021   • Acute kidney injury ([address] ) 06/15/2021   • Acquired hypothyroidism    • Diabetes mellitus ([address] )    • CHF (congestive heart failure) ([facility] )    • Essential hypertension    • Renal disorder    • Closed fracture of left ankle 2021   • Closed bimalleolar fracture of left ankle 2020   • Pacemaker 10/29/2020   • Type 2 diabetes mellitus with diabetic neuropathy ([address] ) 10/29/2020   • Severe obesity with body mass index (BMI) of 36 0 to 36 9 with serious comorbidity ([address] )    • Stage 3 chronic kidney disease ([address] ) 10/26/2020   • Atrial fibrillation ([address] )    • Diastolic congestive heart failure ([address] ) 10/25/2020   • Type 2 diabetes mellitus with diabetic nephropathy ([address] ) 10/25/2020   • JOS (obstructive sleep apnea) 10/25/2020      LOS (days): 3  Geometric Mean LOS (GMLOS) (days): 3 90  Days to LOS:1 2     OBJECTIVE:    Risk of Unplanned Readmission Score: 20 85         Current admission status: Inpatient       Preferred Pharmacy:   [address] [address] [place] [address]  Phone: [phone] Fax: [phone]    Primary Care Provider: Fernandez Tan MD    Primary Insurance: [facility]  Secondary Insurance:     ASSESSMENT:  Laura Honeycutt Proxies    There are no active Health Care Proxies on file  Patient Information  Admitted from[de-identified] Facility ([facility])  Mental Status: Alert  Assessment information provided by[de-identified] Other - please comment (facility)  Support Systems: Children, Spouse/significant other  [place] of Residence: [facility] entry access options  Select all that apply : No steps to enter home  Type of Current Residence: Facility ([facility])  Upon entering residence, is there a bedroom on the main floor (no further steps)?: Yes  Upon entering residence, is there a bathroom on the main floor (no further steps)?: Yes  In the last 12 months, was there a time when you were not able to pay the mortgage or rent on time?: No  In the last 12 months, how many places have you lived?: 1  In the last 12 months, was there a time when you did not have a steady place to sleep or slept in a shelter (including now)?: No  Living Arrangements: Other (Comment) (facility, [facility])    Activities of Daily Living Prior to Admission  Functional Status: Assistance  Completes ADLs independently?: No  Level of ADL dependence: Assistance  Ambulates independently?: No  Level of ambulatory dependence: Assistance  Does patient use assisted devices?: Yes  Assisted Devices (DME) used:  Wheelchair, Other (Comment) (slide board)  Does the patient have a history of Short-Term Rehab?: Yes ([facility])  Does patient have a history of HHC?: No  Does patient currently have [address]?: No         Patient Information Continued  Income Source: Pension/FPC  Does patient have prescription coverage?: Yes  Within the past 12 months, you worried that your food would run out before you got the money to buy more : Never true  Within the past 12 months, the food you bought just didn't last and you didn't have money to get more : Never true  Food insecurity resource given?: No  Does patient receive dialysis treatments?: No  Does patient have a history of Mental Health Diagnosis?: No         Means of Transportation  Means of Transport to Appts[de-identified] Family transport  In the past 12 months, has lack of transportation kept you from medical appointments or from getting medications?: No  In the past 12 months, has lack of transportation kept you from meetings, work, or from getting things needed for daily living?: No        DISCHARGE DETAILS:       Freedom of Choice: Yes                   Contacts  Patient Contacts: berna  Relationship to Patient[de-identified] Family                   Would you like to participate in our [address] service program?  : No - Declined

## 2023-06-19 NOTE — CASE MANAGEMENT
Case Management Progress Note    Patient name Claude Pleva  Location [facility] [address] 333/-34 MRN [id number]  : 1945 Date 2023       LOS (days): 3  Geometric Mean LOS (GMLOS) (days): 3 90  Days to GMLOS:1 4        OBJECTIVE:        Current admission status: Inpatient  Preferred Pharmacy:   [address] [place] [address]  Phone: [phone] Fax: [phone]    Primary Care Provider: Valerie Stahl MD    Primary Insurance: [facility]  Secondary Insurance:     PROGRESS NOTE:        Pt from [facility], Cm messaging [place] [facility] to inquire pts PLOF    1139 as per [facility], pt is a MA pending bed hold, they will accept back at time of discharge  Pt is an assist of one with a slide board

## 2023-06-19 NOTE — ASSESSMENT & PLAN NOTE
Lab Results   Component Value Date    EGFR 33 06/19/2023    EGFR 32 06/18/2023    EGFR 33 06/17/2023    CREATININE 1 48 (H) 06/19/2023    CREATININE 1 53 (H) 06/18/2023    CREATININE 1 49 (H) 06/17/2023     Stable this admission

## 2023-06-19 NOTE — ASSESSMENT & PLAN NOTE
· Per chart review had a fall in January 2023, suffered left tibia fracture  Patient did not undergo surgical intervention , follows with Dr Olivia Ritter outpatient  Currently has CAM boot in place , has been at [facility] for rehab /PT  · Patient denies any pain currently     · Fall precautions

## 2023-06-19 NOTE — ASSESSMENT & PLAN NOTE
Wt Readings from Last 3 Encounters:   06/19/23 107 kg (235 lb 14 3 oz)   06/02/23 99 8 kg (220 lb)   05/16/23 99 8 kg (220 lb)     Echocardiogram 9/2021 with EF 55-60% with dilated RV and pHTN  Echocardiogram 6/19/2023 shows EF 55% with dilated RV, mild-mod MR, mild-mod TR with PASP 65 mmHg  This is similar to prior studies  Patient presents with shortness of breath, hypoxia, edema  Vascular congestion present on chest xray  She has diuresed well with IV furosemide 80 mg BID  Transition to torsemide 100 mg daily which I recommend for discharge  May benefit from sleep study/JOS treatment  Also likely will not tolerate predominantly RV pacing which can be addressed by outpatient cardiologist   Strict I's/O's, standing daily weights if safe, fluid/sodium restriction  Optimize electrolytes for K+ >4, Mag >2

## 2023-06-19 NOTE — ASSESSMENT & PLAN NOTE
History of chronic persistent atrial fibrillation  ECG this admission shows ventricular paced rhythm  Not maintained on beta blocker outpatient  Maintained on warfarin for stroke prevention with goal INR 2-3  Optimize electrolytes for K+ >4, Mag >2

## 2023-06-19 NOTE — PLAN OF CARE
Problem: Potential for Falls  Goal: Patient will remain free of falls  Description: INTERVENTIONS:  - Educate patient/family on patient safety including physical limitations  - Instruct patient to call for assistance with activity   - Consult OT/PT to assist with strengthening/mobility   - Keep Call bell within reach  - Keep bed low and locked with side rails adjusted as appropriate  - Keep care items and personal belongings within reach  - Initiate and maintain comfort rounds  - Make Fall Risk Sign visible to staff  - Offer Toileting every 2 Hours, in advance of need  - Initiate/Maintain bed/chair alarm  - Obtain necessary fall risk management equipment: bed/chair alarm  - Apply yellow socks and bracelet for high fall risk patients  - Consider moving patient to room near nurses station  Outcome: Progressing     Problem: MOBILITY - ADULT  Goal: Maintain or return to baseline ADL function  Description: INTERVENTIONS:  -  Assess patient's ability to carry out ADLs; assess patient's baseline for ADL function and identify physical deficits which impact ability to perform ADLs (bathing, care of mouth/teeth, toileting, grooming, dressing, etc )  - Assess/evaluate cause of self-care deficits   - Assess range of motion  - Assess patient's mobility; develop plan if impaired  - Assess patient's need for assistive devices and provide as appropriate  - Encourage maximum independence but intervene and supervise when necessary  - Involve family in performance of ADLs  - Assess for home care needs following discharge   - Consider OT consult to assist with ADL evaluation and planning for discharge  - Provide patient education as appropriate  Outcome: Progressing  Goal: Maintains/Returns to pre admission functional level  Description: INTERVENTIONS:  - Perform BMAT or MOVE assessment daily    - Set and communicate daily mobility goal to care team and patient/family/caregiver     - Collaborate with rehabilitation services on mobility goals if consulted  - Perform Range of Motion 3 times a day  - Reposition patient every 2 hours    - Dangle patient 3 times a day  - Stand patient 3 times a day  - Ambulate patient 3 times a day  - Out of bed to chair 3 times a day   - Out of bed for meals 3 times a day  - Out of bed for toileting  - Record patient progress and toleration of activity level   Outcome: Progressing     Problem: Prexisting or High Potential for Compromised Skin Integrity  Goal: Skin integrity is maintained or improved  Description: INTERVENTIONS:  - Identify patients at risk for skin breakdown  - Assess and monitor skin integrity  - Assess and monitor nutrition and hydration status  - Monitor labs   - Assess for incontinence   - Turn and reposition patient  - Assist with mobility/ambulation  - Relieve pressure over bony prominences  - Avoid friction and shearing  - Provide appropriate hygiene as needed including keeping skin clean and dry  - Evaluate need for skin moisturizer/barrier cream  - Collaborate with interdisciplinary team   - Patient/family teaching  - Consider wound care consult   Outcome: Progressing     Problem: CARDIOVASCULAR - ADULT  Goal: Maintains optimal cardiac output and hemodynamic stability  Description: INTERVENTIONS:  - Monitor I/O, vital signs and rhythm  - Monitor for S/S and trends of decreased cardiac output SOB CARPENTER swelling  - Administer and titrate ordered vasoactive medications to optimize hemodynamic stability  - Assess quality of pulses, skin color and temperature  - Assess for signs of decreased coronary artery perfusion  - Instruct patient to report change in severity of symptoms  Outcome: Progressing  Goal: Absence of cardiac dysrhythmias or at baseline rhythm  Description: INTERVENTIONS:  - Continuous cardiac monitoring, vital signs, obtain 12 lead EKG if ordered  - Administer antiarrhythmic and heart rate control medications as ordered  - Monitor electrolytes and administer replacement therapy as ordered  Outcome: Progressing

## 2023-06-19 NOTE — CONSULTS
Consult Cardiology    Marguerite Hernandez 1945, 69 y o  female MRN: [id number]    Unit/Bed#: -01 Encounter: [id number]    Attending Provider: Erna Noriega MD   Primary Care Provider: Jony Eden MD   Date admitted to hospital: 6/16/2023       Inpatient consult to Cardiology  Consult performed by: SAM Wong  Consult ordered by: SAM Huang          * Acute on chronic diastolic (congestive) heart failure [facility])  Assessment & Plan  Wt Readings from Last 3 Encounters:   06/19/23 107 kg (235 lb 14 3 oz)   06/02/23 99 8 kg (220 lb)   05/16/23 99 8 kg (220 lb)     Echocardiogram 9/2021 with EF 55-60% with dilated RV and pHTN  Echocardiogram this admission pending  Patient presents with shortness of breath, hypoxia, edema  Vascular congestion present on chest xray  Currently she is diuresing well with IV furosemide 80 mg BID  Will continue IV diuretic today and reassess tomorrow  Likely will benefit from discharging home on higher dose of torsemide at 100 mg daily  May benefit from sleep study/JOS treatment  Strict I's/O's, standing daily weights if safe, fluid/sodium restriction  Optimize electrolytes for K+ >4, Mag >2  Thrombocytopenia (HCC)  Assessment & Plan  Platelet count 623 today which is stable  Pacemaker  Assessment & Plan  History of sick sinus syndrome s/p Medtronic dual chamber PPM   Per outpatient cardiology note the atrial lead has been programmed off due to poor function/sensing  ECG this admission shows ventricular paced rhythm  Stage 3 chronic kidney disease [facility])  Assessment & Plan  Lab Results   Component Value Date    EGFR 33 06/19/2023    EGFR 32 06/18/2023    EGFR 33 06/17/2023    CREATININE 1 48 (H) 06/19/2023    CREATININE 1 53 (H) 06/18/2023    CREATININE 1 49 (H) 06/17/2023     Stable this admission  Will monitor with aggressive diuresis      Atrial fibrillation [facility])  Assessment & Plan  History of chronic persistent atrial fibrillation  ECG this admission shows ventricular paced rhythm  Not maintained on beta blocker outpatient  Maintained on warfarin for stroke prevention with goal INR 2-3  Optimize electrolytes for K+ >4, Mag >2  Physician Requesting Consult: Estella Eaton MD    Reason for Consult / Principal Problem: Acute on chronic heart failure    HPI: Reggie Patel is a 69y o  year old female who has a history of chronic persistent atrial fibrillation on warfarin, sick sinus syndrome s/p PPM, chronic diastolic heart failure, pulmonary hypertension, T2DM, HTN, CKD who follows with [facility]  Dry weight per outpatient office note is 215 pounds  Per outpatient cardiology note atrial lead was programmed off due to poor function/sensing  Echocardiogram 9/21/2021 with EF 23-87%, grade 2 diastolic dysfunction, moderate MR, mild-mod TR  Patient presented to [address] [place] [address] ER 6/16/2023  with increasing shortness of breath and edema  O2 sats in the 80's on room air initially  She did require Bipap temporarily for respiratory support  ECG showed ventricular pacing, rate in the 70's  BNP 96  HS trop 11 x 3  Chest xray consistent with vascular congestion and pulmonary hypertension  She was started on IV furosemide  Cardiology consulted and echocardiogram ordered  At the time of my assessment she is sitting up in bed breathing comfortably on room air  She reports vigorous urination with the IV furosemide  She states she was experiencing significant abdominal distention initially which has nearly resolved since admission  She denies leg swelling  She denies chest pain, shortness of breath, lightheadedness  No palpitations or heart racing  She reports that she has been experiencing progressive abdominal distention for over a month  She is residing at [facility] for skilled rehab  She states her diet there is higher in salt than at home   She was previously on torsemide 50 mg daily which was increased to 100 mg daily 1 week ago with no improvement in swelling  She denies alcohol, tobacco, or illicit substance use  She denies known CAD history  Review of Systems   Constitutional: Negative for chills and fever  HENT: Negative for ear pain and sore throat  Eyes: Negative for pain and visual disturbance  Respiratory: Negative for cough and shortness of breath  Cardiovascular: Negative for chest pain, palpitations and leg swelling  Gastrointestinal: Positive for abdominal distention  Negative for abdominal pain and vomiting  Genitourinary: Negative for dysuria and hematuria  Musculoskeletal: Negative for arthralgias and back pain  Skin: Negative for color change and rash  Neurological: Negative for seizures and syncope  All other systems reviewed and are negative  Historical Information     Past Medical History:   Diagnosis Date   • Arthritis    • Atrial fibrillation ([email or web address] )    • CHF (congestive heart failure) ([facility])    • Diabetes mellitus ([email or web address] 75 )    • Disease of thyroid gland    • Hypertension    • Pacemaker    • Renal disorder      Past Surgical History:   Procedure Laterality Date   • CARDIAC PACEMAKER PLACEMENT     • CHOLECYSTECTOMY     • FL GUIDED NEEDLE PLAC BX/ASP/INJ  10/4/2022   • FL GUIDED NEEDLE PLAC BX/ASP/INJ  10/20/2022   • JOINT REPLACEMENT      bilateral knee replacements   • NERVE BLOCK Bilateral 10/4/2022    Procedure: BLOCK MEDIAL BRANCH NERVES BILATERAL L3, L4, L5 #1;  Surgeon: Josh Rain MD;  Location:  ENDO;  Service: Pain Management    • NERVE BLOCK Bilateral 10/20/2022    Procedure: BLOCK MEDIAL BRANCH L3, L4, L5 #2;  Surgeon: Josh Rain MD;  Location:  ENDO;  Service: Pain Management    • ORIF TIBIA & FIBULA FRACTURES Left 10/29/2020    Procedure: OPEN REDUCTION W/ INTERNAL FIXATION (ORIF) ANKLE;  Surgeon: Christian Pan;   Location:  MAIN OR;  Service: Orthopedics   • TONSILLECTOMY     • TUBAL LIGATION       Social History     Substance and Sexual Activity Alcohol Use Not Currently     Social History     Substance and Sexual Activity   Drug Use Never     Social History     Tobacco Use   Smoking Status Never   Smokeless Tobacco Never       Family History:   Family History   Problem Relation Age of Onset   • Atrial fibrillation Mother    • Arthritis Father    • Kidney cancer Father    • Breast cancer Sister         early 46s   • No Known Problems Sister    • Kidney cancer Brother    • No Known Problems Daughter    • No Known Problems Daughter    • No Known Problems Daughter    • No Known Problems Daughter    • No Known Problems Maternal Aunt    • No Known Problems Maternal Aunt    • No Known Problems Maternal Aunt    • No Known Problems Paternal Aunt    • No Known Problems Maternal Grandmother    • No Known Problems Maternal Grandfather    • No Known Problems Paternal Grandmother    • No Known Problems Paternal Grandfather    • Breast cancer Cousin         early 46s   • Breast cancer additional onset Neg Hx    • Colon cancer Neg Hx    • Endometrial cancer Neg Hx    • Ovarian cancer Neg Hx    • BRCA 1/2 Neg Hx    • BRCA2 Positive Neg Hx    • BRCA2 Negative Neg Hx    • BRCA1 Positive Neg Hx    • BRCA1 Negative Neg Hx        Meds/Allergies     current meds:   Current Facility-Administered Medications   Medication Dose Route Frequency   • acetaminophen (TYLENOL) tablet 650 mg  650 mg Oral Q4H PRN   • allopurinol (ZYLOPRIM) tablet 100 mg  100 mg Oral Daily   • ascorbic acid (VITAMIN C) tablet 500 mg  500 mg Oral Daily   • bisacodyl (DULCOLAX) rectal suppository 10 mg  10 mg Rectal Daily PRN   • docusate sodium (COLACE) capsule 100 mg  100 mg Oral BID   • furosemide (LASIX) injection 80 mg  80 mg Intravenous BID (diuretic)   • gabapentin (NEURONTIN) capsule 600 mg  600 mg Oral TID   • insulin lispro (HumaLOG) 100 units/mL subcutaneous injection 1-6 Units  1-6 Units Subcutaneous TID AC   • insulin lispro (HumaLOG) 100 units/mL subcutaneous injection 1-6 Units  1-6 Units "Subcutaneous HS   • levothyroxine tablet 150 mcg  150 mcg Oral Daily   • multivitamin-minerals (CENTRUM) tablet 1 tablet  1 tablet Oral Daily   • polyethylene glycol (MIRALAX) packet 17 g  17 g Oral Daily   • potassium chloride (K-DUR,KLOR-CON) CR tablet 40 mEq  40 mEq Oral BID   • tamsulosin (FLOMAX) capsule 0 4 mg  0 4 mg Oral Daily With Dinner   • traMADol (ULTRAM) tablet 50 mg  50 mg Oral Q6H PRN   • warfarin (COUMADIN) tablet 4 mg  4 mg Oral HS          Allergies   Allergen Reactions   • Rofecoxib Angioedema, Swelling, Hives and Other (See Comments)     swelling, sob  swelling, sob  Other reaction(s): Swelling / Edema  swelling, sob  Other reaction(s): SWELLING  Other reaction(s): Angioedema     • Oxycodone-Acetaminophen GI Intolerance and Other (See Comments)     Unsure of rxn   Unsure of rxn   Unsure of rxn   Other reaction(s): \"CAN'T BREATH\"     • Duloxetine Hcl Other (See Comments)     Slept for 4 days    • Medical Tape Rash       Objective     Vitals: Blood pressure 139/66, pulse 82, temperature 97 7 °F (36 5 °C), resp  rate 17, height 5' 2\" (1 575 m), weight 107 kg (235 lb 14 3 oz), SpO2 90 %  , Body mass index is 43 15 kg/m²  Orthostatic Blood Pressures    Flowsheet Row Most Recent Value   Blood Pressure 139/66 filed at 2023 5606          Systolic (05AFD), YYP:369 , Min:124 , EC     Diastolic (17WLX), HFY:40, Min:48, Max:66        Intake/Output Summary (Last 24 hours) at 2023 1232  Last data filed at 2023 1125  Gross per 24 hour   Intake 360 ml   Output 2850 ml   Net -2490 ml       Weight (last 2 days)     Date/Time Weight    23 07:30:56 107 (235 89)    23 0554 107 (235 23)     Weight: patient unable to stand     at 23 0554    23 0502 107 (235 23)    23 0535 109 (239 64)    23 0600 107 (235 01)     Weight: without scd on bed at 23 0600          Invasive Devices     Peripheral Intravenous Line  Duration           Peripheral IV 23 " Right Antecubital 2 days          Drain  Duration           External Urinary Catheter 2 days                Physical Exam  Vitals and nursing note reviewed  Constitutional:       General: She is not in acute distress  Appearance: She is well-developed  She is obese  HENT:      Head: Normocephalic and atraumatic  Eyes:      Conjunctiva/sclera: Conjunctivae normal    Cardiovascular:      Rate and Rhythm: Normal rate and regular rhythm  Heart sounds: Murmur heard  Systolic murmur is present  Pulmonary:      Effort: Pulmonary effort is normal  No respiratory distress  Breath sounds: Normal breath sounds  Comments: Breathing comfortably on room air  Abdominal:      Palpations: Abdomen is soft  Tenderness: There is no abdominal tenderness  Musculoskeletal:         General: No swelling  Cervical back: Neck supple  Right lower leg: Edema present  Left lower leg: Edema present  Skin:     General: Skin is warm and dry  Capillary Refill: Capillary refill takes less than 2 seconds  Neurological:      Mental Status: She is alert  Psychiatric:         Mood and Affect: Mood normal          Speech: Speech normal          Behavior: Behavior normal  Behavior is cooperative           Cognition and Memory: Cognition normal               Laboratory Results:          CBC with diff:   Results from last 7 days   Lab Units 06/19/23 0452 06/17/23 0611 06/16/23 1914   WBC Thousand/uL 5 16 4 90 5 26   HEMOGLOBIN g/dL 9 1* 9 5* 9 7*   HEMATOCRIT % 30 4* 31 3* 31 8*   MCV fL 76* 76* 75*   PLATELETS Thousands/uL 141* 148* 148*   RBC Million/uL 4 02 4 11 4 22   MCH pg 22 6* 23 1* 23 0*   MCHC g/dL 29 9* 30 4* 30 5*   RDW % 19 9* 19 9* 20 1*   MPV fL 9 1 10 1 9 7   NRBC AUTO /100 WBCs  --  0 0         CMP:  Results from last 7 days   Lab Units 06/19/23  0452 06/18/23  0533 06/17/23  0611 06/16/23 1914   POTASSIUM mmol/L 3 2* 3 4* 3 9 3 8   CHLORIDE mmol/L 101 102 102 100   CO2 "mmol/L 30 31 30 28   BUN mg/dL 51* 54* 54* 57*   CREATININE mg/dL 1 48* 1 53* 1 49* 1 55*   CALCIUM mg/dL 8 8 8 8 9 0 9 1   AST U/L  --   --  14 15   ALT U/L  --   --  8 9   ALK PHOS U/L  --   --  116* 122*   EGFR ml/min/1 73sq m 33 32 33 32       BMP:  Results from last 7 days   Lab Units 23  0452 23  0533 23  0611   POTASSIUM mmol/L 3 2* 3 4* 3 9   CHLORIDE mmol/L 101 102 102   CO2 mmol/L 30 31 30   BUN mg/dL 51* 54* 54*   CREATININE mg/dL 1 48* 1 53* 1 49*   CALCIUM mg/dL 8 8 8 8 9 0       BNP:    Recent Labs     23   BNP 96     HS troponin 11->11->11    Magnesium:   Results from last 7 days   Lab Units 23  0533 23  0611 23   MAGNESIUM mg/dL 2 4 2 4 2 4       Coags:   Results from last 7 days   Lab Units 23  0452 23  0533 23  0611 23   PTT seconds  --   --   --  48*   INR  2 43* 2 60* 2 57* 2 24*       Lipid Profile:   Lab Results   Component Value Date    CHOLESTEROL 135 2021     Lab Results   Component Value Date    HDL 64 2021     Lab Results   Component Value Date    TRIG 60 2021     No results found for: \"NONHDLC\"     Cardiac testing:     Results for orders placed during the hospital encounter of 21    Echo complete with contrast if indicated    [place] [id number]    Transthoracic Echocardiogram  2D, M-mode, Doppler, and Color Doppler    Study date:  21-Sep-2021    Patient: Kamran Lund  MR number: [id number]  Account number: [de-identified]  : 1945  Age: 77 years  Gender: Female  Status: Outpatient  Location: [facility]  Height: 62 in  Weight: 201 lb  BP: 128/ 60 mmHg    Indications: Atrial Fibrillation    Diagnoses: I48 0 - Atrial fibrillation    Sonographer:  JASMYNE Simeon  Primary Physician:  Bard Chelsea MD  Referring Physician:  Candance Law, PA-C  Group:  [facility]  Interpreting " Physician:  Angel Luis Figueroa, DO    SUMMARY    LEFT VENTRICLE:  Systolic function was normal by visual assessment  Ejection fraction was estimated in the range of 55 % to 60 %  There were no regional wall motion abnormalities  Features were consistent with a pseudonormal left ventricular filling pattern, with concomitant abnormal relaxation and increased filling pressure (grade 2 diastolic dysfunction)  RIGHT VENTRICLE:  The ventricle was dilated  LEFT ATRIUM:  The atrium was markedly dilated  ATRIAL SEPTUM:  Intact atrial septum by color flow doppler  RIGHT ATRIUM:  The atrium was markedly dilated  MITRAL VALVE:  There was moderate regurgitation  TRICUSPID VALVE:  There was mild to moderate regurgitation  PULMONIC VALVE:  There was trace regurgitation  IVC, HEPATIC VEINS:  Enlarged IVC with blunted inspiratory collapse  COMPARISONS:  Compared to study 4/5/21, LV function remains normal  RV was again enlarged with biatrial enlargement  Pulmonary pressures remain elevated and were previously 72 mmHg  Trace mitral regurgitation was noted  HISTORY: PRIOR HISTORY: DM2, JOS, AFIB, CHF, HTN, Pulmonary HTN    PROCEDURE: The study was performed in the [facility]  This was a routine study  The transthoracic approach was used  The study included complete 2D imaging, M-mode, complete spectral Doppler, and color Doppler  The heart  rate was 83 bpm, at the start of the study  Image quality was adequate  LEFT VENTRICLE: Size was normal  Systolic function was normal by visual assessment  Ejection fraction was estimated in the range of 55 % to 60 %  There were no regional wall motion abnormalities  DOPPLER: Features were consistent with a  pseudonormal left ventricular filling pattern, with concomitant abnormal relaxation and increased filling pressure (grade 2 diastolic dysfunction)  RIGHT VENTRICLE: The ventricle was dilated   Systolic function was normal  Device lead seen in the RV  LEFT ATRIUM: The atrium was markedly dilated  ATRIAL SEPTUM: Intact atrial septum by color flow doppler  RIGHT ATRIUM: The atrium was markedly dilated  MITRAL VALVE: There was mild thickening  There was normal leaflet separation  DOPPLER: There was moderate regurgitation  AORTIC VALVE: Leaflets exhibited mild calcification  DOPPLER: There was no significant regurgitation  TRICUSPID VALVE: There was mild thickening  DOPPLER: There was mild to moderate regurgitation  Estimated peak PA pressure was 55 mmHg  PULMONIC VALVE: DOPPLER: There was trace regurgitation  PERICARDIUM: There was no pericardial effusion  AORTA: The root exhibited normal size  SYSTEMIC VEINS: IVC: Enlarged IVC with blunted inspiratory collapse  SYSTEM MEASUREMENT TABLES    2D  %FS: 25 85 %  AV Diam: 2 74 cm  EDV(Teich): 76 23 ml  EF(Teich): 51 25 %  ESV(Teich): 37 16 ml  IVSd: 0 94 cm  LA Diam: 4 98 cm  LAAs A4C: 31 05 cm2  LAESV A-L A4C: 111 65 ml  LAESV MOD A4C: 108 49 ml  LALs A4C: 7 33 cm  LVEDV MOD A4C: 79 09 ml  LVEF MOD A4C: 52 %  LVESV MOD A4C: 37 96 ml  LVIDd: 4 15 cm  LVIDs: 3 07 cm  LVLd A4C: 6 87 cm  LVLs A4C: 5 59 cm  LVPWd: 1 19 cm  RAAs A4C: 25 49 cm2  RAESV A-L: 79 55 ml  RAESV MOD: 80 56 ml  RALs: 6 93 cm  RVIDd: 3 68 cm  RWT: 0 57  SV MOD A4C: 41 13 ml  SV(Teich): 39 07 ml    CF  MR Als  Yaron: 0 39 m/s  MR Flow: 29 67 ml/s  MR Rad: 0 35 cm    CW  AV Vmax: 1 39 m/s  AV maxP 75 mmHg  MR VTI: 176 25 cm  MR Vmax: 5 37 m/s  TR Vmax: 3 3 m/s  TR maxP 66 mmHg    MM  TAPSE: 1 58 cm    PW  E' Av 1 m/s  E' Lat: 0 11 m/s  E' Sept: 0 1 m/s  E/E' Av 59  E/E' Lat: 10 96  E/E' Sept: 12 29  MR ERO: 0 06 cm2  MR RV: 9 74 ml  MV A Yaron: 0 34 m/s  MV Dec [place]: 7 9 m/s2  MV DecT: 149 62 ms  MV E Yaron: 1 18 m/s  MV E/A Ratio: 3 5    [employer] Accredited Echocardiography Laboratory    Prepared and electronically signed by    Simi Montiel DO  Signed 21-Sep-2021 09:42:15      Imaging: I have personally reviewed pertinent reports  XR chest portable    Result Date: 6/17/2023  Narrative: CHEST INDICATION:   sob  COMPARISON: 08/18/2021, 07/28/2021 EXAM PERFORMED/VIEWS:  XR CHEST PORTABLE Images:  1 FINDINGS: Cardiomediastinal silhouette appears enlarged  Left-sided pacemaker unchanged  The patient is in congestive heart failure  Prominent central pulmonary vessels compatible with pulmonary artery hypertension  The lungs are clear  No pneumothorax or pleural effusion  Osseous structures appear within normal limits for patient age  Impression: Congestive heart failure  Workstation performed: [id number]     XR tibia fibula 2 vw left    Result Date: 6/8/2023  Narrative: LEFT TIBIA AND FIBULA INDICATION:   [id number]: Displaced spiral fracture of shaft of left tibia, subsequent encounter for closed fracture with nonunion  COMPARISON: Left tibia/fibular x-ray 4/21/2023 VIEWS:  XR TIBIA FIBULA 2 VW LEFT Images: 4 FINDINGS: Stable alignment of distal tibial and proximal fibular fractures with bone callus formation  Stable bimalleolar ORIF hardware  Partially visualized total knee arthroplasty hardware is stable  No lytic or blastic osseous lesion  Soft tissues are unremarkable  Impression: Healing distal tibial and proximal fibular fractures with stable alignment  Workstation performed: [id number]       EKG reviewed personally: EKG: ventricular paced rhythm with PVC  Counseling / Coordination of Care  Total floor / unit time spent today 45 minutes  Greater than 50% of total time was spent with the patient and / or family counseling and / or coordination of care  A description of the counseling / coordination of care: Discussed case with Virgil Burton PA-C          Code Status: Level 1 - Full Code

## 2023-06-19 NOTE — UTILIZATION REVIEW
NOTIFICATION OF INPATIENT ADMISSION   AUTHORIZATION REQUEST   SERVICING FACILITY:   [address] [place], [place] [address]hema Simmons [address] [place] [address], [address]  Tax ID: [id number]  NPI: [id number] ATTENDING PROVIDER:  Attending Name and NPI#: Micky Lopez Md [[id number]]  Address: [facility]  Davis Simmons [address] [place] [address], [facility] [address]iz  Phone: [phone]   ADMISSION INFORMATION:  Place of Service: Inpatient [address] [place] [address]  60  Place of Service Code: 21  Inpatient Admission Date/Time: 6/16/23  8:38 PM  Discharge Date/Time: No discharge date for patient encounter  Admitting Diagnosis Code/Description:  Shortness of breath [R06 02]  Dyspnea [R06 00]  Hypoxia [R09 02]  [facility] ([facility] ) [I50 9]     UTILIZATION REVIEW CONTACT:  Bety Moreno Utilization   Network Utilization Review Department  Phone: [phone]  Fax [phone]  Email: [email or web address]  Contact for approvals/pending authorizations, clinical reviews, and discharge  PHYSICIAN ADVISORY SERVICES:  Medical Necessity Denial & Upja-tu-Kdsl Review  Phone: [phone]  Fax: [phone]  Email: [email or web address]

## 2023-06-20 LAB
ANION GAP SERPL CALCULATED.3IONS-SCNC: 9 MMOL/L (ref 4–13)
BUN SERPL-MCNC: 46 MG/DL (ref 5–25)
CALCIUM SERPL-MCNC: 8.9 MG/DL (ref 8.4–10.2)
CHLORIDE SERPL-SCNC: 101 MMOL/L (ref 96–108)
CO2 SERPL-SCNC: 30 MMOL/L (ref 21–32)
CREAT SERPL-MCNC: 1.45 MG/DL (ref 0.6–1.3)
GFR SERPL CREATININE-BSD FRML MDRD: 34 ML/MIN/1.73SQ M
GLUCOSE SERPL-MCNC: 128 MG/DL (ref 65–140)
GLUCOSE SERPL-MCNC: 146 MG/DL (ref 65–140)
GLUCOSE SERPL-MCNC: 168 MG/DL (ref 65–140)
GLUCOSE SERPL-MCNC: 171 MG/DL (ref 65–140)
GLUCOSE SERPL-MCNC: 178 MG/DL (ref 65–140)
INR PPP: 2.32 (ref 0.84–1.19)
POTASSIUM SERPL-SCNC: 3.7 MMOL/L (ref 3.5–5.3)
PROTHROMBIN TIME: 25.5 SECONDS (ref 11.6–14.5)
SODIUM SERPL-SCNC: 140 MMOL/L (ref 135–147)

## 2023-06-20 PROCEDURE — 97163 PT EVAL HIGH COMPLEX 45 MIN: CPT

## 2023-06-20 PROCEDURE — 80048 BASIC METABOLIC PNL TOTAL CA: CPT

## 2023-06-20 PROCEDURE — 99232 SBSQ HOSP IP/OBS MODERATE 35: CPT | Performed by: INTERNAL MEDICINE

## 2023-06-20 PROCEDURE — 97530 THERAPEUTIC ACTIVITIES: CPT

## 2023-06-20 PROCEDURE — 97167 OT EVAL HIGH COMPLEX 60 MIN: CPT

## 2023-06-20 PROCEDURE — 99232 SBSQ HOSP IP/OBS MODERATE 35: CPT

## 2023-06-20 PROCEDURE — 85610 PROTHROMBIN TIME: CPT

## 2023-06-20 PROCEDURE — 82948 REAGENT STRIP/BLOOD GLUCOSE: CPT

## 2023-06-20 RX ADMIN — DOCUSATE SODIUM 100 MG: 100 CAPSULE, LIQUID FILLED ORAL at 17:30

## 2023-06-20 RX ADMIN — LEVOTHYROXINE SODIUM 150 MCG: 150 TABLET ORAL at 08:23

## 2023-06-20 RX ADMIN — GABAPENTIN 600 MG: 300 CAPSULE ORAL at 21:37

## 2023-06-20 RX ADMIN — WARFARIN SODIUM 4 MG: 4 TABLET ORAL at 21:37

## 2023-06-20 RX ADMIN — TAMSULOSIN HYDROCHLORIDE 0.4 MG: 0.4 CAPSULE ORAL at 16:18

## 2023-06-20 RX ADMIN — INSULIN LISPRO 1 UNITS: 100 INJECTION, SOLUTION INTRAVENOUS; SUBCUTANEOUS at 11:23

## 2023-06-20 RX ADMIN — INSULIN LISPRO 1 UNITS: 100 INJECTION, SOLUTION INTRAVENOUS; SUBCUTANEOUS at 16:17

## 2023-06-20 RX ADMIN — GABAPENTIN 600 MG: 300 CAPSULE ORAL at 08:23

## 2023-06-20 RX ADMIN — FUROSEMIDE 80 MG: 10 INJECTION, SOLUTION INTRAMUSCULAR; INTRAVENOUS at 16:17

## 2023-06-20 RX ADMIN — POTASSIUM CHLORIDE 40 MEQ: 1500 TABLET, EXTENDED RELEASE ORAL at 08:23

## 2023-06-20 RX ADMIN — GABAPENTIN 600 MG: 300 CAPSULE ORAL at 16:18

## 2023-06-20 RX ADMIN — ALLOPURINOL 100 MG: 100 TABLET ORAL at 08:23

## 2023-06-20 RX ADMIN — INSULIN LISPRO 1 UNITS: 100 INJECTION, SOLUTION INTRAVENOUS; SUBCUTANEOUS at 21:37

## 2023-06-20 RX ADMIN — POLYETHYLENE GLYCOL 3350 17 G: 17 POWDER, FOR SOLUTION ORAL at 08:23

## 2023-06-20 RX ADMIN — DOCUSATE SODIUM 100 MG: 100 CAPSULE, LIQUID FILLED ORAL at 08:23

## 2023-06-20 RX ADMIN — FUROSEMIDE 80 MG: 10 INJECTION, SOLUTION INTRAMUSCULAR; INTRAVENOUS at 08:23

## 2023-06-20 RX ADMIN — OXYCODONE HYDROCHLORIDE AND ACETAMINOPHEN 500 MG: 500 TABLET ORAL at 08:23

## 2023-06-20 RX ADMIN — Medication 1 TABLET: at 08:23

## 2023-06-20 NOTE — PROGRESS NOTES
-- Patient:  -- MRN: [id number]  -- Aidin Request ID: [id number]  -- Level of care reserved: [facility]  -- Partner Reserved: [facility], [address] [phone]  -- Clinical needs requested:  -- Geography searched: 10 miles around [phone]  -- Start of Service:  -- Request sent: 9:42am EDT on 6/19/2023 by Frantz Park  -- Partner reserved: 8:49am EDT on 6/20/2023 by Frantz Park  -- Choice list shared:

## 2023-06-20 NOTE — PROGRESS NOTES
[address]  Progress Note  Name: Teri Baca  MRN: [id number]  Unit/Bed#: -01 I Date of Admission: 6/16/2023   Date of Service: 6/20/2023 I Hospital Day: 4    Assessment/Plan   * Acute on chronic diastolic (congestive) heart failure [facility])  Assessment & Plan  Background: Presents from SNF with reports of shortness of breath and abdominal fullness for the last several days  In ED, noted to be hypoxic with increased WOB  Required BiPAP in the ER and weaned to 4 L nasal cannula O2  Patient up 15 pounds from 1 month ago  · Last echo in 2021 showing EF 55 to 60% with grade 2 diastolic dysfunction  · Repeat done today   · Lower extremity edema and vascular congestion on admission    · Continue IV Lasix 80 mg twice daily   · Likely dc on torsemide 100 mg daily   · Monitor renal function with daily BMP - stable  · Monitor intake/output, daily weights  · Weights not reliable as using bed scale, -2L  · Watch for retention   · Low-sodium fluid restricted diet  Appreciate ongoing cardiology recommendations  - wound continue IV diuresis today given mild abdominal distension     Positive blood culture  Assessment & Plan  1 out of 2 blood cultures positive for Staphylococcus epidermidis, likely contaminant  Currently on 3-day course of ceftriaxone for UTI, continue this course - finished course of abx  No signs for systemic bacteremia, patient clinically improving, repeat BC negative    Thrombocytopenia (HCC)  Assessment & Plan  · Recent baseline 140-150's, Plts 148 on admission   · No reports of bleeding  · Monitor CBC    Closed displaced spiral fracture of shaft of left tibia  Assessment & Plan  · Per chart review had a fall in January 2023, suffered left tibia fracture  Patient did not undergo surgical intervention , follows with Dr Lorrie Winston outpatient  Currently has CAM boot in place , has been at [facility] for rehab /PT  · PT/OT re-eval pending  · Patient denies any pain currently  · Fall precautions    UTI (urinary tract infection)  Assessment & Plan  · Uncomplicated UTI; will plan for 3-day course of IV ceftriaxone  · Finished course     Type 2 diabetes mellitus with diabetic neuropathy (HCC)  Assessment & Plan  · Holding home Amaryl  · Monitor on ISS while inpatient  · Hypoglycemia protocol    Pacemaker  Assessment & Plan  · PPM present   · Persistent v-pacing     Stage 3 chronic kidney disease ([facility] )  Assessment & Plan  · Baseline appears to be 1 4-1 5 based on labs in Care Everywhere, renal function currently at baseline  · Daily BMP while on IV diuretics  · Currently stable      Atrial fibrillation ([facility] )  Assessment & Plan  · Not on rate control medication   Has PPM in place as well  · Continue coumadin 4 mg daily at HS  ·  INR therapeutic on admission, goal 2-3  Check daily INR  · Therapeutic, continue dose         VTE Pharmacologic Prophylaxis:   High Risk (Score >/= 5) - Pharmacological DVT Prophylaxis Ordered: warfarin (Coumadin)  Sequential Compression Devices Ordered  Patient Centered Rounds: I performed bedside rounds with nursing staff today  Discussions with Specialists or Other Care Team Provider: CM, cardiology    Education and Discussions with Family / Patient: Patient declined call to   Total Time Spent on Date of Encounter in care of patient: 35 minutes This time was spent on one or more of the following: performing physical exam; counseling and coordination of care; obtaining or reviewing history; documenting in the medical record; reviewing/ordering tests, medications or procedures; communicating with other healthcare professionals and discussing with patient's family/caregivers  Current Length of Stay: 4 day(s)  Current Patient Status: Inpatient   Certification Statement: The patient will continue to require additional inpatient hospital stay due to IV diuresis   Discharge Plan: Anticipate discharge in 24-48 hrs to rehab facility      Code Status: Level 1 - Full Code    Subjective:   Seen and examined  Continues to feel better  Still mildly bloated     Objective:     Vitals:   Temp (24hrs), Av 3 °F (36 3 °C), Min:97 2 °F (36 2 °C), Max:97 3 °F (36 3 °C)    Temp:  [97 2 °F (36 2 °C)-97 3 °F (36 3 °C)] 97 2 °F (36 2 °C)  HR:  [64-70] 68  Resp:  [17-18] 18  BP: (132-150)/(60-67) 132/67  SpO2:  [88 %-93 %] 90 %  Body mass index is 43 15 kg/m²  Input and Output Summary (last 24 hours): Intake/Output Summary (Last 24 hours) at 2023 1021  Last data filed at 2023 0811  Gross per 24 hour   Intake 840 ml   Output 2350 ml   Net -1510 ml       Physical Exam:   Physical Exam  Vitals and nursing note reviewed  Constitutional:       General: She is not in acute distress  Appearance: Normal appearance  She is obese  She is ill-appearing (chronically)  HENT:      Head: Normocephalic and atraumatic  Nose: No congestion  Mouth/Throat:      Mouth: Mucous membranes are moist    Eyes:      Conjunctiva/sclera: Conjunctivae normal    Cardiovascular:      Rate and Rhythm: Normal rate and regular rhythm  Pulses: Normal pulses  Heart sounds: Normal heart sounds  No murmur heard  Pulmonary:      Effort: Pulmonary effort is normal  No respiratory distress  Breath sounds: Normal breath sounds  Abdominal:      General: Bowel sounds are normal  There is distension (soft)  Palpations: Abdomen is soft  Tenderness: There is no abdominal tenderness  Musculoskeletal:         General: Normal range of motion  Right lower leg: No edema  Left lower leg: No edema  Skin:     General: Skin is warm and dry  Neurological:      Mental Status: She is alert and oriented to person, place, and time            Additional Data:     Labs:  Results from last 7 days   Lab Units 23  0452 23  0611   WBC Thousand/uL 5 16 4 90   HEMOGLOBIN g/dL 9 1* 9 5*   HEMATOCRIT % 30 4* 31 3*   PLATELETS Thousands/uL 141* 148* NEUTROS PCT %  --  69   LYMPHS PCT %  --  14   MONOS PCT %  --  10   EOS PCT %  --  5     Results from last 7 days   Lab Units 06/20/23  0448 06/18/23  0533 06/17/23  0611   SODIUM mmol/L 140   < > 140   POTASSIUM mmol/L 3 7   < > 3 9   CHLORIDE mmol/L 101   < > 102   CO2 mmol/L 30   < > 30   BUN mg/dL 46*   < > 54*   CREATININE mg/dL 1 45*   < > 1 49*   ANION GAP mmol/L 9   < > 8   CALCIUM mg/dL 8 9   < > 9 0   ALBUMIN g/dL  --   --  3 9   TOTAL BILIRUBIN mg/dL  --   --  0 64   ALK PHOS U/L  --   --  116*   ALT U/L  --   --  8   AST U/L  --   --  14   GLUCOSE RANDOM mg/dL 128   < > 87    < > = values in this interval not displayed  Results from last 7 days   Lab Units 06/20/23  0448   INR  2 32*     Results from last 7 days   Lab Units 06/20/23  0658 06/19/23  2134 06/19/23  1553 06/19/23  1104 06/19/23  0730 06/18/23  2045 06/18/23  1545 06/18/23  1107 06/18/23  0715 06/17/23  2101 06/17/23  1552 06/17/23  1101   POC GLUCOSE mg/dl 146* 138 130 152* 114 172* 141* 109 90 127 156* 126         Results from last 7 days   Lab Units 06/16/23  1914   LACTIC ACID mmol/L 1 6   PROCALCITONIN ng/ml 0 06       Lines/Drains:  Invasive Devices     Peripheral Intravenous Line  Duration           Peripheral IV 06/20/23 Left;Ventral (anterior) Forearm <1 day          Drain  Duration           External Urinary Catheter 2 days                      Imaging: Reviewed radiology reports from this admission including: ECHO    Recent Cultures (last 7 days):   Results from last 7 days   Lab Units 06/18/23  0533 06/16/23  2144 06/16/23  1914   BLOOD CULTURE  No Growth at 24 hrs  No Growth at 24 hrs   --  No Growth at 72 hrs    Staphylococcus epidermidis*   GRAM STAIN RESULT   --   --  Gram positive cocci in clusters*   URINE CULTURE   --  80,000-89,000 cfu/ml Proteus mirabilis*  --        Last 24 Hours Medication List:   Current Facility-Administered Medications   Medication Dose Route Frequency Provider Last Rate   • acetaminophen 650 mg Oral Q4H PRN SAM Espinoza     • allopurinol  100 mg Oral Daily SAM Espinoza     • Ascorbic Acid (Vitamin C)  500 mg Oral Daily SAM Espinoza     • bisacodyl  10 mg Rectal Daily PRN SAM Espinoza     • docusate sodium  100 mg Oral BID SAM Espinoza     • furosemide  80 mg Intravenous BID (diuretic) SAM Espinoza     • gabapentin  600 mg Oral TID SAM Espinoza     • insulin lispro  1-6 Units Subcutaneous TID AC SAM Espinoza     • insulin lispro  1-6 Units Subcutaneous HS SAM Espinoza     • levothyroxine  150 mcg Oral Daily SAM Espinoza     • multivitamin-minerals  1 tablet Oral Daily SAM Espinoza     • polyethylene glycol  17 g Oral Daily SAM Espinoza     • tamsulosin  0 4 mg Oral Daily With SAM Babin     • traMADol  50 mg Oral Q6H PRN SAM Espinoza     • warfarin  4 mg Oral HS SAM Espinoza          Today, Patient Was Seen By: Luis Connor PA-C    **Please Note: This note may have been constructed using a voice recognition system  **

## 2023-06-20 NOTE — OCCUPATIONAL THERAPY NOTE
Occupational Therapy Evaluation     Patient Name: Sammy Carlson  WUECD'V Date: 6/20/2023  Problem List  Principal Problem:    Acute on chronic diastolic (congestive) heart failure (HCC)  Active Problems:    Atrial fibrillation (HCC)    Stage 3 chronic kidney disease (HCC)    Pacemaker    Type 2 diabetes mellitus with diabetic neuropathy (HCC)    UTI (urinary tract infection)    Closed displaced spiral fracture of shaft of left tibia    Thrombocytopenia (HCC)    Positive blood culture    Past Medical History  Past Medical History:   Diagnosis Date    Arthritis     Atrial fibrillation (HCC)     CHF (congestive heart failure) (HCC)     Diabetes mellitus ([address] )     Disease of thyroid gland     Hypertension     Pacemaker     Renal disorder      Past Surgical History  Past Surgical History:   Procedure Laterality Date    CARDIAC PACEMAKER PLACEMENT      CHOLECYSTECTOMY      FL GUIDED NEEDLE PLAC BX/ASP/INJ  10/4/2022    FL GUIDED NEEDLE PLAC BX/ASP/INJ  10/20/2022    JOINT REPLACEMENT      bilateral knee replacements    NERVE BLOCK Bilateral 10/4/2022    Procedure: BLOCK MEDIAL BRANCH NERVES BILATERAL L3, L4, L5 #1;  Surgeon: Rachael Amato MD;  Location: [facility];  Service: Pain Management     NERVE BLOCK Bilateral 10/20/2022    Procedure: BLOCK MEDIAL BRANCH L3, L4, L5 #2;  Surgeon: Rachael Amato MD;  [facility];  Service: Pain Management     ORIF TIBIA & FIBULA FRACTURES Left 10/29/2020    Procedure: OPEN REDUCTION W/ INTERNAL FIXATION (ORIF) ANKLE;  Surgeon: Eduardo Mcfarland; Location: [facility];  Service: Orthopedics    TONSILLECTOMY      TUBAL LIGATION          06/20/23 7765   Note Type   Note type Evaluation   Pain Assessment   Pain Assessment Tool 0-10   Pain Score No Pain   Restrictions/Precautions   Braces or Orthoses CAM Boot  (LLE)   Other Precautions Chair Alarm; Bed Alarm;WBS   Home Living   Type of Home SNF   Home Equipment Walker;Stair glide   Additional Comments Pt reports residing at [facility] SNF since closed displaced spiral fracture of shaft of L tibia in January  W/C for mobility at SNF, completing transfers in/out of bed with slideboard  Prior to injury, pt lived in a [facility] with her , 1STE with 2nd floor bed/bath  Pt LTG to return home  Prior Function   Level of [place] Needs assistance with ADLs; Needs assistance with functional mobility; Needs assistance with IADLS   Falls in the last 6 months 1 to 4   Comments Pt currently completing UB ADLs @ S/set-up at SNF with assist for LB ADLs, IADLs and functional mobility  Pt reports independence prior to injury  ADL   UB Dressing Assistance 5  Supervision/Setup   UB Dressing Deficit Setup; Increased time to complete   LB Dressing Assistance 1  Total Assistance   LB Dressing Deficit Setup; Requires assistive device for steadying;Verbal cueing;Supervision/safety; Increased time to complete   Toileting Assistance  1  Total Assistance   Toileting Deficit Setup;Verbal cueing;Supervison/safety; Increased time to complete;Perineal hygiene;Clothing management down;Clothing management up   Additional Comments UB ADLs @ S/set-up while seated at EOB  LB ADLs @ Total A  Pt incontinent of bowel/bladder at beginning of session requiring Total A for pericare and CM while supine in bed  Bed Mobility   Rolling R 2  Maximal assistance   Additional items Assist x 1; Increased time required;Verbal cues   Rolling L 2  Maximal assistance   Additional items Assist x 1; Increased time required;Verbal cues   Supine to Sit 3  Moderate assistance   Additional items Assist x 1; Increased time required;Verbal cues   Additional Comments Pt supine in bed at beginning of session on 0 5lpm  Max A to roll L/R for pericare and CM  Supine to sit @ Mod A  Pt O2 decreased while seated requiring increase to 1lpm    Transfers   Sit to Stand 3  Moderate assistance   Additional items Assist x 2; Increased time required;Verbal cues   Stand to Sit 3  Moderate assistance   Additional items Assist x 2; Increased time required;Verbal cues   Sliding Board transfer 2  Maximal assistance  (+ Min A x1)   Additional items Assist x 1; Increased time required;Verbal cues   Additional Comments STS from elevated EOB to RW @ Mod A x2  Pt able to maintain standing at RW for approximately 30 seconds @ Mod A x1 until requiring seat  Pt unable to advance steps at this time  See tx assessment for greater details regarding functional mobility  Balance   Static Sitting Fair +   Dynamic Sitting Fair   Static Standing Poor +   Activity Tolerance   Activity Tolerance Patient limited by fatigue;Patient limited by pain   Medical Staff Made Aware Spoke with PT Alvarado Dailey   Nurse Made Aware Spoke with RN Hardik Chavarria Assessment   RUE Assessment WFL   LUE Assessment   LUE Assessment WFL   Hand Function   Gross Motor Coordination Functional   Fine Motor Coordination Functional   Cognition   Overall Cognitive Status WFL   Arousal/Participation Alert; Responsive; Cooperative   Attention Within functional limits   Orientation Level Oriented X4   Memory Within functional limits   Following Commands Follows one step commands without difficulty   Assessment   Limitation Decreased ADL status; Decreased UE strength;Decreased endurance;Decreased high-level ADLs; Decreased self-care trans   Prognosis Good   Assessment Pt is a 69 y o  female, admitted to [address] [place] [address] 6/16/2023 d/t experiencing increased SOB and abdominal fullness  Dx: acute on chronic diastolic (congestive) heart failure  Pt with PMHx impacting their performance during ADL tasks, including: CHF, DM2, PPM, CKD3, Afib  Prior to admission to the hospital Pt was performing ADLs with physical assistance  IADLs with physical assistance  Functional transfers/ambulation with physical assistance  Cognitive status was PTA was Intact   OT order placed to assess Pt's ADLs, cognitive status, and performance during functional tasks in order to maximize safety and independence while making most appropriate d/c recommendations  PT/OT co-evaluation completed at this time d/t significant mobility deficits and safety concerns  Pt's clinical presentation is currently evolving given new onset deficits that effect Pt's occupational performance and ability to safely return to PLOF including decrease activity tolerance, decrease standing balance, decrease performance during ADL tasks, decrease UB MS, increased pain, decrease generalized strength, decrease activity engagement and decrease performance during functional transfers combined with medical complications of pain impacting overall mobility status, abnormal renal lab values, abnormal H&H, abnormal CBC, low SpO2 values, new onset O2 use and need for input for mobility technique/safety  Pt maintained Good O2 sats on 0 5lpm at rest and 1lpm with activity  Personal factors affecting Pt at time of initial evaluation include: step(s) to enter environment, multi-level environment, advanced age, inability to perform IADLs, inability to perform ADLs, inability to ambulate household distances and recent fall(s)/fall history  Pt will benefit from continued skilled OT services to address deficits as defined above and to maximize level independence/participation during ADLs and functional tasks to facilitate return toward PLOF and improved quality of life  From an occupational therapy standpoint, recommendation at time of d/c would be post acute rehabilitation services  Plan   Treatment Interventions ADL retraining;Visual perceptual retraining;Functional transfer training;UE strengthening/ROM; Endurance training;Cognitive reorientation;Patient/family training;Equipment evaluation/education; Neuromuscular reeducation; Fine motor coordination activities; Compensatory technique education;UE splinting;Continued evaluation;Cardiac education; Energy conservation; Activityengagement   Goal Expiration Date 07/04/23   OT Treatment Day 1   OT Frequency 3-5x/wk   Recommendation   OT Discharge Recommendation Post acute rehabilitation services   [facility] Daily Activity Inpatient   Lower Body Dressing 1   Bathing 2   Toileting 1   Upper Body Dressing 3   Grooming 3   Eating 4   Daily Activity Raw Score 14   Daily Activity Standardized Score (Calc for Raw Score >=11) 33 39   AM-PAC Applied Cognition Inpatient   Following a Speech/Presentation 3   Understanding Ordinary Conversation 4   Taking Medications 3   Remembering Where Things Are Placed or Put Away 3   Remembering List of 4-5 Errands 2   Taking Care of Complicated Tasks 2   Applied Cognition Raw Score 17   Applied Cognition Standardized Score 36 52   Additional Treatment Session   Start Time 1415   End Time 1428   Treatment Assessment Pt completed OT tx session #1 focused on functional mobility  Pt alert and agreeable to participate  Slide board transfer from EOB to drop-arm recliner chair @ Max A x1 anteriorly + Min A x1 posteriorly  Once seated in chair, STS using bedrails for BUE support @ Mod A x2  Pt able to maintain standing for another 30 seconds @ Mod A  Would benefit from quick move trial next tx session to improve standing tolerance  Pt seated OOB in chair at end of session on 0 5lpm with chair alarm intact, call bell within reach and all needs met  The patient's raw score on the AM-PAC Daily Activity Inpatient Short Form is 14  A raw score of less than 19 suggests the patient may benefit from discharge to post-acute rehabilitation services  Please refer to the recommendation of the Occupational Therapist for safe discharge planning  Pt goals to be met by 7/4/2023    Pt will demonstrate ability to complete grooming/hygiene tasks @ Mod I while seated after set-up  Pt will demonstrate ability to complete supine<>sit @ Min A in order to increase safety and independence during ADL tasks    Pt will demonstrate ability to complete UB ADLs including washing/dressing @ Mod I while seated after set-up in order to increase performance and participation during meaningful tasks  Pt will demonstrate ability to complete LB dressing @ Mod A in order to increase safety and independence during meaningful tasks  Pt will demonstrate ability to complete toileting tasks including CM and pericare @ Mod A in order to increase safety and independence during meaningful tasks  Pt will demonstrate ability to complete EOB, chair, toilet/commode transfers @ Mod A in order to increase performance and participation during functional tasks  Pt will demonstrate ability to stand for 1-2 minutes while maintaining Fair + balance with use of RW for UB support PRN  Pt will demonstrate ability to tolerate 30-35 minute OT session with no vc'ing for deep breathing or use of energy conservation techniques in order to increase activity tolerance during functional tasks  Pt will demonstrate Good carryover of use of energy conservation/compensatory strategies during ADLs and functional tasks in order to increase safety and reduce risk for falls  Pt will demonstrate Good attention and participation in continued evaluation of functional ambulation house hold distances in order to assist with safe d/c planning  Pt will attend to continued cognitive assessments 100% of the time in order to provide most appropriate d/c recommendations  Pt will follow 100% simple 2-step commands and be A&O x4 consistently with environmental cues to increase participation in functional activities  Pt will identify 3 areas of interest/hobbies and 1 intervention on how to incorporate into daily life in order to increase interaction with environment and peers as well as increase participation in meaningful tasks  Pt will demonstrate 100% carryover of BUE HEP in order to increase BUE MS and increase performance during functional tasks upon d/c home      Carlie Bill OTR/L

## 2023-06-20 NOTE — ASSESSMENT & PLAN NOTE
· Baseline appears to be 1 4-1 5 based on labs in Care Everywhere, renal function currently at baseline  · Daily BMP while on IV diuretics  · Currently stable Mirvaso Pregnancy And Lactation Text: This medication has not been assigned a Pregnancy Risk Category. It is unknown if the medication is excreted in breast milk.

## 2023-06-20 NOTE — ASSESSMENT & PLAN NOTE
1 out of 2 blood cultures positive for Staphylococcus epidermidis, likely contaminant  Currently on 3-day course of ceftriaxone for UTI, continue this course - finished course of abx  No signs for systemic bacteremia, patient clinically improving, repeat BC negative

## 2023-06-20 NOTE — PLAN OF CARE
Problem: PHYSICAL THERAPY ADULT  Goal: Performs mobility at highest level of function for planned discharge setting  See evaluation for individualized goals  Description: Treatment/Interventions: ADL retraining, Functional transfer training, LE strengthening/ROM, Therapeutic exercise, Endurance training, Patient/family training, Equipment eval/education, Bed mobility, Gait training, Compensatory technique education, Spoke to nursing, Spoke to case management, OT  Equipment Recommended:  (TBD by rehab)       See flowsheet documentation for full assessment, interventions and recommendations  1/21/3435 1539 by Jay Newton PT  Note: Prognosis: Fair  Problem List: Decreased strength, Decreased range of motion, Decreased endurance, Impaired balance, Decreased mobility, Obesity, Pain, Impaired sensation  Assessment: Pt is a 69 y o  female seen for PT evaluation s/p admission to [address] [place] [address] on 6/16/2023 with Acute on chronic diastolic (congestive) heart failure ([facility] )  Order placed for PT services  Upon evaluation: Pt is presenting with impaired functional mobility due to pain, decreased strength, decreased ROM, decreased endurance, impaired balance, impaired proprioception, gait deviations, altered sensation, decreased safety awareness, and fall risk requiring  maximal to moderate assistance for bed mobility, moderate of 2 to maximal and minimal of 2 assistance for transfers, and unable to spt with RW despite max cues    Pt's clinical presentation is currently unpredictable given the functional mobility deficits above, especially weakness, decreased ROM, decreased endurance, gait deviations, pain, decreased activity tolerance, decreased functional mobility tolerance, altered sensation, decreased safety awareness, and SOB upon exertion, coupled with fall risks as indicated by AM-PAC 6-Clicks: 8/62 as well as hx of falls, impaired balance, polypharmacy, decreased safety awareness, limited sensation/neuropathy, and obesity and combined with medical complications of pain impacting overall mobility status, abnormal renal lab values, abnormal blood sugars, low SpO2 values, new onset O2 use, fear/retreat, need for input for mobility technique/safety and Congestive heart failure on chest xray, thrombocytopenia, UTI  Pt's PMHx and comorbidities that may affect physical performance and progress include: A fib, arthritis, CHF, CKD, HTN and h/o SSS s/p ppm with atrial lead off due to poor funciton/sensing per cardiology note-patient with ventricular paced rhythm, closed displaced spiral fracture of left tibia now WBAT in CAM boot, DM  Personal factors affecting pt at time of IE include: inaccessible home environment, step(s) to enter environment, limited home support, inability to perform IADLs, inability to perform ADLs, inability to navigate level surfaces without external assistance and recent fall(s)/fall history  Pt will benefit from continued skilled PT services to address deficits as defined above and to maximize level of functional mobility to facilitate return toward PLOF and improved QOL  From PT/mobility standpoint, recommendation at time of d/c would be post acute rehab ([facility]) pending progress in order to reduce fall risk and maximize pt's functional independence and consistency with mobility in order to facilitate ret  Barriers to Discharge: Decreased caregiver support, Inaccessible home environment  Barriers to Discharge Comments: requires assistance to complete mobility  PT Discharge Recommendation: Post acute rehabilitation services    See flowsheet documentation for full assessment

## 2023-06-20 NOTE — PLAN OF CARE
Problem: OCCUPATIONAL THERAPY ADULT  Goal: Performs self-care activities at highest level of function for planned discharge setting  See evaluation for individualized goals  Description: Treatment Interventions: ADL retraining, Visual perceptual retraining, Functional transfer training, UE strengthening/ROM, Endurance training, Cognitive reorientation, Patient/family training, Equipment evaluation/education, Neuromuscular reeducation, Fine motor coordination activities, Compensatory technique education, UE splinting, Continued evaluation, Cardiac education, Energy conservation, Activityengagement          See flowsheet documentation for full assessment, interventions and recommendations  Note: Limitation: Decreased ADL status, Decreased UE strength, Decreased endurance, Decreased high-level ADLs, Decreased self-care trans  Prognosis: Good  Assessment: Pt is a 69 y o  female, admitted to [address] [place] [address] 6/16/2023 d/t experiencing increased SOB and abdominal fullness  Dx: acute on chronic diastolic (congestive) heart failure  Pt with PMHx impacting their performance during ADL tasks, including: CHF, DM2, PPM, CKD3, Afib  Prior to admission to the hospital Pt was performing ADLs with physical assistance  IADLs with physical assistance  Functional transfers/ambulation with physical assistance  Cognitive status was PTA was Intact  OT order placed to assess Pt's ADLs, cognitive status, and performance during functional tasks in order to maximize safety and independence while making most appropriate d/c recommendations  PT/OT co-evaluation completed at this time d/t significant mobility deficits and safety concerns   Pt's clinical presentation is currently evolving given new onset deficits that effect Pt's occupational performance and ability to safely return to OF including decrease activity tolerance, decrease standing balance, decrease performance during ADL tasks, decrease UB MS, increased pain, decrease generalized strength, decrease activity engagement and decrease performance during functional transfers combined with medical complications of pain impacting overall mobility status, abnormal renal lab values, abnormal H&H, abnormal CBC, low SpO2 values, new onset O2 use and need for input for mobility technique/safety  Pt maintained Good O2 sats on 0 5lpm at rest and 1lpm with activity  Personal factors affecting Pt at time of initial evaluation include: step(s) to enter environment, multi-level environment, advanced age, inability to perform IADLs, inability to perform ADLs, inability to ambulate household distances and recent fall(s)/fall history  Pt will benefit from continued skilled OT services to address deficits as defined above and to maximize level independence/participation during ADLs and functional tasks to facilitate return toward PLOF and improved quality of life  From an occupational therapy standpoint, recommendation at time of d/c would be post acute rehabilitation services       OT Discharge Recommendation: Post acute rehabilitation services

## 2023-06-20 NOTE — PLAN OF CARE
Problem: Potential for Falls  Goal: Patient will remain free of falls  Description: INTERVENTIONS:  - Educate patient/family on patient safety including physical limitations  - Instruct patient to call for assistance with activity   - Consult OT/PT to assist with strengthening/mobility   - Keep Call bell within reach  - Keep bed low and locked with side rails adjusted as appropriate  - Keep care items and personal belongings within reach  - Initiate and maintain comfort rounds  - Make Fall Risk Sign visible to staff  - Offer Toileting every  Hours, in advance of need  - Initiate/Maintain alarm  - Obtain necessary fall risk management equipment:   - Apply yellow socks and bracelet for high fall risk patients  - Consider moving patient to room near nurses station  Outcome: Progressing     Problem: MOBILITY - ADULT  Goal: Maintain or return to baseline ADL function  Description: INTERVENTIONS:  -  Assess patient's ability to carry out ADLs; assess patient's baseline for ADL function and identify physical deficits which impact ability to perform ADLs (bathing, care of mouth/teeth, toileting, grooming, dressing, etc )  - Assess/evaluate cause of self-care deficits   - Assess range of motion  - Assess patient's mobility; develop plan if impaired  - Assess patient's need for assistive devices and provide as appropriate  - Encourage maximum independence but intervene and supervise when necessary  - Involve family in performance of ADLs  - Assess for home care needs following discharge   - Consider OT consult to assist with ADL evaluation and planning for discharge  - Provide patient education as appropriate  Outcome: Progressing     Problem: Prexisting or High Potential for Compromised Skin Integrity  Goal: Skin integrity is maintained or improved  Description: INTERVENTIONS:  - Identify patients at risk for skin breakdown  - Assess and monitor skin integrity  - Assess and monitor nutrition and hydration status  - Monitor labs   - Assess for incontinence   - Turn and reposition patient  - Assist with mobility/ambulation  - Relieve pressure over bony prominences  - Avoid friction and shearing  - Provide appropriate hygiene as needed including keeping skin clean and dry  - Evaluate need for skin moisturizer/barrier cream  - Collaborate with interdisciplinary team   - Patient/family teaching  - Consider wound care consult   Outcome: Progressing     Problem: CARDIOVASCULAR - ADULT  Goal: Maintains optimal cardiac output and hemodynamic stability  Description: INTERVENTIONS:  - Monitor I/O, vital signs and rhythm  - Monitor for S/S and trends of decreased cardiac output SOB CARPENTER swelling  - Administer and titrate ordered vasoactive medications to optimize hemodynamic stability  - Assess quality of pulses, skin color and temperature  - Assess for signs of decreased coronary artery perfusion  - Instruct patient to report change in severity of symptoms  Outcome: Progressing  Goal: Absence of cardiac dysrhythmias or at baseline rhythm  Description: INTERVENTIONS:  - Continuous cardiac monitoring, vital signs, obtain 12 lead EKG if ordered  - Administer antiarrhythmic and heart rate control medications as ordered  - Monitor electrolytes and administer replacement therapy as ordered  Outcome: Progressing

## 2023-06-20 NOTE — PHYSICAL THERAPY NOTE
Medication [facility]isted Treatment Program Follow Up Appointment       Patient Report: Etelvina presents in person for UDS and medication count prior to a video visit appointment with Dr. Prakash.  Patient denies slips and denies cravings. Patient's daughter is getting  the end of .  Patient has moved back out to the [facility] at [facility].  Patient reports increased back pain since switching to the [facility] and feels it may have something to do with her mattress in the [facility].  Patient denies any questions or concerns. Patient left her wrappers at her [facility] and does not have them at the appointment today.      Refill needed of: Suboxone 8-2 mg film, takes 1 daily            Suboxone 12-3 mg film, takes 1/2 film BID  Medication remainin (12-3mg) per patient report                                             1 (8-2 mg) per patient report  Narcan: Denies - has refill at pharmacy she will     Patient goals for today's visit: Etelvina wishes to remain on current dose of Suboxone.     Drug   Name Date  20 Date  20 Date  9/10/20 Date  20 Date   21 Date   4/15/21             Bup [phone]  [phone]       Nor [phone]  [phone]  [id number]                                                                                                              Tobacco use: 1.5 packs per day, not ready to quit  Cravings or urges to use: Denies  Triggers: None identified  Sleep: varied, working varied shifts     Medication Count Correct: Did not bring in wrappers  UDS: Positive for amphetamine and buprenorphine (2021)    MAT Start Date: 18  Substance Used: Percocet, Crack Cocaine,marijuana, alcohol  Route: Smoke  Last Use: percocet, crack and cocaine , Marijuana 20, alcohol 20     Total Daily Dose: Suboxone 20-5 mg ( takes 0.5 of 12-3 mg bid and 1 8-2mg strip daily)      Outstanding Labs: None  Hep B Immune Status: Non-immune  Hepatitis C Status: Negative     SI/HI: Denies suicidal  PHYSICAL THERAPY EVALUATION  NAME:  Barbara Azevedo  DATE: 06/20/23    AGE:   69 y o  Mrn:   [id number]  ADMIT DX:  Shortness of breath [R06 02]  Dyspnea [R06 00]  Hypoxia [R09 02]  CHF exacerbation ([facility] ) [I50 9]    Past Medical History:   Diagnosis Date   • Arthritis    • Atrial fibrillation (HCC)    • CHF (congestive heart failure) (HCC)    • Diabetes mellitus ([facility] )    • Disease of thyroid gland    • Hypertension    • Pacemaker    • Renal disorder      Length Of Stay: 4  Performed at least 2 patient identifiers during session: Name and Birthday  PHYSICAL THERAPY EVALUATION :    06/20/23 1354   PT Last Visit   PT Visit Date 06/20/23   Note Type   Note type Evaluation   Pain Assessment   Pain Assessment Tool FLACC   Pain Location/Orientation Orientation: Left; Location: Leg  (thigh and knee)   Pain Rating: FLACC (Rest) - Face 0   Pain Rating: FLACC (Rest) - Legs 0   Pain Rating: FLACC (Rest) - Activity 0   Pain Rating: FLACC (Rest) - Cry 0   Pain Rating: FLACC (Rest) - Consolability 0   Score: FLACC (Rest) 0   Pain Rating: FLACC (Activity) - Face 1   Pain Rating: FLACC (Activity) - Legs 0   Pain Rating: FLACC (Activity) - Activity 0   Pain Rating: FLACC (Activity) - Cry 1   Pain Rating: FLACC (Activity) - Consolability 0   Score: FLACC (Activity) 2   Restrictions/Precautions   LLE Weight Bearing Per Order WBAT  (per orthopedic follow up on 6/2/23, WBAT in CAM boot, may remove boot for gentle active and passive ROM)   Braces or Orthoses CAM Boot  (left LE  pt reports typically using B MAFOs due to foot drop )   Other Precautions Chair Alarm; Bed Alarm; Fall Risk;Pain   Home Living   Type of Home House  (1 FERNA[address])   Home Layout Two level;1/2 bath on main level;Bed/bath upstairs  (stair glide to 2nd floor)   Bathroom Shower/Tub Tub/shower unit   Bathroom Toilet Raised   Bathroom Equipment Tub transfer bench;Grab bars in shower;Grab bars around toilet   [address] [facility]; Wheelchair-manual;Stair glide   Additional "Comments Pt has been at [facility] since January 2023 s/p fall resulting in minimally displaced distal tibial metadiaphyseal fracture  Pt was NWB in CAM boot upon discharge in January  Per orthopedic follow up note, patient is WBAT in CAM boot and may remove boot for gentle AROM and PROM  Prior Function   Level of [place] Independent with ADLs; Independent with functional mobility; Independent with IADLS   Lives With Spouse   Receives Help From [facility] in the last 6 months 1 to 4   Comments Pt was independent with mobility, ADLs and IADLs prior to injury in January 2023  Plan is to return to home when appropriate  Currently requires assistance at [facility] for mobility completing lateral transfers via transfer board  General   Additional Pertinent History Per orthopedics on 6/2/23: Patient is allowed to weight-bear as tolerated in the cam boot  She can start formal physical therapy for gait training  They can also remove the cam boot to do gentle range of motion passively and actively of the left ankle  Cognition   Orientation Level Oriented X4   Following Commands Follows one step commands without difficulty   Subjective   Subjective \"I haven't really svetlana standing  \"   RLE Assessment   RLE Assessment   (minimal ankle DF < neutral, knee ext/flex WFL, hip flexion < 90 degrees  strength 3-/5 except hip 2/5 and ankle 2-/5)   LLE Assessment   LLE Assessment   (minimal ankle DF/PF < neutral, PROM to neutral, knee ext minimal seated EOB, flexion WFL, hip flexion minimal  strength 2-/5 except knee flexion 2/5)   Light Touch   RLE Light Touch Impaired   RLE Light Touch Comments absent distal to bilateral knees, diminished proximally   LLE Light Touch Impaired   LLE Light Touch Comments absent distal to bilateral knees, diminished proximally   Proprioception   RLE Proprioception Impaired   LLE Proprioception Impaired   Bed Mobility   Rolling R 2  Maximal assistance   Additional items Assist x 1; Increased time " ideations, homicidal ideations, and thoughts to self-harm.    COWS: 0  PHQ-9: 2 (5/13/2021) 8 (7/16/20)   MANUEL-7: 4 (5/13/2021) 4 (7/16/20)      Chemical Dependency Treatment:  AODA PHP/IOP Status: Sober for 10 years, no IOP  Current Counseling: None  Participation in Community Resources: Not at this time.  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  PDMP reviewed as a delegate, no unexpected activity observed.     TIME SPENT: 10 minutes were spent during this evaluation.      Last Treatment Plan:  4/15/21   Last Med Consent:     5/15/20    Last CONNER:                    6/9/20     required;Verbal cues   Rolling L 2  Maximal assistance   Additional items Assist x 1; Increased time required;Verbal cues   Supine to Sit 3  Moderate assistance   Additional items Assist x 1; Increased time required;Verbal cues;LE management;HOB elevated; Bedrails   Additional Comments rolling right and left wiht maxAx1 wiht bedrail with inc time and effort to complete  HOB elevated > 30 degrees  use of bedrails  modAx1 to ahcieve sitting at [facility] wiht maual cues for LE and trunk management  Transfers   Sit to Stand 3  Moderate assistance   Additional items Assist x 2; Increased time required;Verbal cues   Stand to Sit 3  Moderate assistance   Additional items Assist x 2; Increased time required;Verbal cues   Additional Comments use of RW  sit<>stand with bed elevated with modAx2 with manual cues for antwerior wt shifitng and to achieve standing and verbal cues for hand placement and technique  pt wiht NBOS and inc post lean  modAx1 to maintain standing with cues for LE placement  stood statically with B UE support on RW for 30 seconds  attempted to complete spt with RW, hwoever pt unable to wt shift to advance LEs  returned to sitting on EOB  Ambulation/Elevation   Distance not appropriate at this time  Balance   Static Sitting Fair +   Dynamic Sitting Fair   Static Standing Poor   Dynamic Standing Poor -   Endurance Deficit   Endurance Deficit Yes   Endurance Deficit Description pt on  5 lpm on arrival to room  Spo2 at rest at 93-95% on  5 lpm  trialed on room air  Spo2 at rest on room air 91-93%  pt supine, Spo2 decreased to 86-87%, but upon sitting upright in bed wiht HOB elevated Spo2 91-93%  after bed mobility, Spo2 desaturated to 85-87%  slight inaccuracy of pleth however pt wiht CARPENTER  O2 increased to  5 lpm after 1-2' pursed lip breathing and pt not recovering  then remained at 86-87% on  5 lpm  O2 increased to 1 lpm  Spo2 94-96% on 1 lpm  remained on 1 lpm throughout session with Spo2 >/=90%   end of session, "O2 decreased to 0 5 lpm and Spo2 at 91-92%  RNDonya aware of O2 reading during session and need for increased O2 during mobility  Activity Tolerance   Activity Tolerance Patient limited by fatigue;Patient limited by pain   Medical Staff Made Aware Lexie AYERS   Nurse Made Aware RNDimple   Assessment   Prognosis Fair   Problem List Decreased strength;Decreased range of motion;Decreased endurance; Impaired balance;Decreased mobility;Obesity;Pain; Impaired sensation   Barriers to Discharge Decreased caregiver support; Inaccessible home environment   Barriers to Discharge Comments requires assistance to complete mobility   Goals   Patient Goals \"Go home\"   STG Expiration Date 07/04/23   PT Treatment Day 1   Plan   Treatment/Interventions ADL retraining;Functional transfer training;LE strengthening/ROM; Therapeutic exercise; Endurance training;Patient/family training;Equipment eval/education; Bed mobility;Gait training; Compensatory technique education;Spoke to nursing;Spoke to case management;OT   PT Frequency 3-5x/wk   Recommendation   PT Discharge Recommendation Post acute rehabilitation services   Equipment Recommended   (TBD by rehab)   AM-PAC Basic Mobility Inpatient   Turning in Flat Bed Without Bedrails 2   Lying on Back to Sitting on Edge of Flat Bed Without Bedrails 2   Moving Bed to Chair 2   Standing Up From Chair Using Arms 1   Walk in Room 1   Climb 3-5 Stairs With Railing 1   Basic Mobility Inpatient Raw Score 9   Turning Head Towards Sound 4   Follow Simple Instructions 4   Low Function Basic Mobility Raw Score  17   Low Function Basic Mobility Standardized Score  27 46   Highest Level Of Mobility   JH-HLM Goal 3: Sit at edge of bed   JH-HLM Achieved 4: Move to chair/commode   Additional Treatment Session   Start Time 1415   End Time 1430   Treatment Assessment Pt tolerated session fairly   She was able to complete lateral transfer via transfer board with increased assistance and then was able to achieve " standing with 2 people with inc time and effort  She requires increased time to complete mobility and cues for technique  She is fearful of falling and requires reassurance  She is limited by decreased strength, balance, endurance and inc c/o pain  She will continue to beenfit from PT services to maximize LOF  Equipment Use use of transfer board and drop arm recliner  maxAx1 to place transfer board  lateral scooting to patient's right wiht transfer board with maxAx1 and minAx1 with manual cues for wt shifting and verbal cues for technique and sequence  multiple lateral scooting to complete transfer  facing bed wiht use of bedrail  completed sit<>stand with modAx2 with amnaul cues to ahcieve standing and verbal cues for uprihgt posture and inc LEIF  stood for 30 seconds with B UE support on RW  modAx2 for controlled descent to chair  End of Consult   Patient Position at End of Consult Bedside chair;Bed/Chair alarm activated; All needs within reach   End of Consult Comments can trial quick move for transfers  Pt requires PT/OT co-eval due to medical complexity, signficant assistance with mobility and/or cognitive-behavioral impairments  (Please find full objective findings from PT assessment regarding body systems outlined above)  Assessment: Pt is a 69 y o  female seen for PT evaluation s/p admission to [address] [place] [address] on 6/16/2023 with Acute on chronic diastolic (congestive) heart failure ([facility] )  Order placed for PT services  Upon evaluation: Pt is presenting with impaired functional mobility due to pain, decreased strength, decreased ROM, decreased endurance, impaired balance, impaired proprioception, gait deviations, altered sensation, decreased safety awareness, and fall risk requiring  maximal to moderate assistance for bed mobility, moderate of 2 to maximal and minimal of 2 assistance for transfers, and unable to spt with RW despite max cues    Pt's clinical presentation is currently unpredictable given the functional mobility deficits above, especially weakness, decreased ROM, decreased endurance, gait deviations, pain, decreased activity tolerance, decreased functional mobility tolerance, altered sensation, decreased safety awareness, and SOB upon exertion, coupled with fall risks as indicated by AM-PAC 6-Clicks: 5/49 as well as hx of falls, impaired balance, polypharmacy, decreased safety awareness, limited sensation/neuropathy, and obesity and combined with medical complications of pain impacting overall mobility status, abnormal renal lab values, abnormal blood sugars, low SpO2 values, new onset O2 use, fear/retreat, need for input for mobility technique/safety and Congestive heart failure on chest xray, thrombocytopenia, UTI  Pt's PMHx and comorbidities that may affect physical performance and progress include: A fib, arthritis, CHF, CKD, HTN and h/o SSS s/p ppm with atrial lead off due to poor funciton/sensing per cardiology note-patient with ventricular paced rhythm, closed displaced spiral fracture of left tibia now WBAT in CAM boot, DM  Personal factors affecting pt at time of IE include: inaccessible home environment, step(s) to enter environment, limited home support, inability to perform IADLs, inability to perform ADLs, inability to navigate level surfaces without external assistance and recent fall(s)/fall history  Pt will benefit from continued skilled PT services to address deficits as defined above and to maximize level of functional mobility to facilitate return toward PLOF and improved QOL  From PT/mobility standpoint, recommendation at time of d/c would be post acute rehab ([facility]) pending progress in order to reduce fall risk and maximize pt's functional independence and consistency with mobility in order to facilitate return to PLOF  Recommend trial with walker next 1-2 sessions and ther ex next 1-2 sessions       The patient's AM-PAC Basic Mobility Inpatient Short Form Raw Score is 9  A Raw score of less than or equal to 16 suggests the patient may benefit from discharge to post-acute rehabilitation services  Please also refer to the recommendation of the Physical Therapist for safe discharge planning  Goals: Pt will: Perform bed mobility tasks with consistent min A of 1 to reposition in bed and prepare for transfers  Pt will perform transfers with consistent modA of 1 to decrease burden of care, decrease risk for falls and improve activity tolerance and prepare for ambulation  Pt will ambulate with RW for >/= 10' with  consistent modA of 1  to decrease burden of care, decrease risk for falls, improve activity tolerance and improve gait quality and to access home environment  Pt will increase B LE strength >/= 1/2 MMT grade to facilitate functional mobility        Chela Bolton, PT,DPT

## 2023-06-20 NOTE — ASSESSMENT & PLAN NOTE
· Per chart review had a fall in January 2023, suffered left tibia fracture  Patient did not undergo surgical intervention , follows with Dr Karo Wiley outpatient  Currently has CAM boot in place , has been at [facility] for rehab /PT  · PT/OT re-eval pending  · Patient denies any pain currently     · Fall precautions

## 2023-06-20 NOTE — PROGRESS NOTES
Progress Note:Cardiology  Deepa Parent 1945, 69 y o  female MRN: [id number]    Unit/Bed#: -01 Encounter: [id number]  Attending Physician: David Kendall MD   Primary Care Provider: William Frederick MD   Date admitted to hospital: 6/16/2023  Length of stay: 4         * Acute on chronic diastolic (congestive) heart failure [facility])  Assessment & Plan  Wt Readings from Last 3 Encounters:   06/19/23 107 kg (235 lb 14 3 oz)   06/02/23 99 8 kg (220 lb)   05/16/23 99 8 kg (220 lb)     Echocardiogram 9/2021 with EF 55-60% with dilated RV and pHTN  Echocardiogram 6/19/2023 shows EF 55% with dilated RV, mild-mod MR, mild-mod TR with PASP 65 mmHg  This is similar to prior studies  Patient presents with shortness of breath, hypoxia, edema  Vascular congestion present on chest xray  Currently she is diuresing well with IV furosemide 80 mg BID  Will continue IV diuretic today and reassess tomorrow  Likely will benefit from discharging home on higher dose of torsemide at 100 mg daily  May benefit from sleep study/JOS treatment  Also likely will not tolerate predominantly RV pacing which can be addressed by outpatient cardiologist   Strict I's/O's, standing daily weights if safe, fluid/sodium restriction  Optimize electrolytes for K+ >4, Mag >2  Thrombocytopenia (HCC)  Assessment & Plan  Platelet count 762 on 6/19/2023 which is stable  Pacemaker  Assessment & Plan  History of sick sinus syndrome s/p Medtronic dual chamber PPM   Per outpatient cardiology note the atrial lead has been programmed off due to poor function/sensing  ECG this admission shows ventricular paced rhythm  Stage 3 chronic kidney disease [facility])  Assessment & Plan  Lab Results   Component Value Date    EGFR 33 06/19/2023    EGFR 32 06/18/2023    EGFR 33 06/17/2023    CREATININE 1 48 (H) 06/19/2023    CREATININE 1 53 (H) 06/18/2023    CREATININE 1 49 (H) 06/17/2023     Stable this admission   Will monitor with aggressive "diuresis  Atrial fibrillation [facility])  Assessment & Plan  History of chronic persistent atrial fibrillation  ECG this admission shows ventricular paced rhythm  Not maintained on beta blocker outpatient  Maintained on warfarin for stroke prevention with goal INR 2-3  Optimize electrolytes for K+ >4, Mag >2  Subjective:   Patient seen and examined  No significant events overnight  She continues to note improvement in symptoms since admission  She does continue to report some abdominal distention  No chest pain or shortness of breath  Review of Systems   Constitutional: Negative  HENT: Negative  Cardiovascular: Negative for chest pain, dyspnea on exertion, irregular heartbeat, leg swelling, near-syncope, orthopnea and palpitations  Respiratory: Negative for cough, shortness of breath and snoring  Endocrine: Negative  Skin: Negative  Musculoskeletal: Negative  Gastrointestinal: Positive for bloating  Genitourinary: Negative  Neurological: Negative  Psychiatric/Behavioral: Negative  Objective:     Vitals: Blood pressure 132/67, pulse 68, temperature (!) 97 2 °F (36 2 °C), resp  rate 18, height 5' 2\" (1 575 m), weight 107 kg (235 lb 14 3 oz), SpO2 90 %  , Body mass index is 43 15 kg/m² ,     Orthostatic Blood Pressures    Flowsheet Row Most Recent Value   Blood Pressure 132/67 filed at 06/20/2023 0312          Physical Exam  Vitals and nursing note reviewed  Constitutional:       General: She is not in acute distress  Appearance: She is well-developed  She is obese  HENT:      Head: Normocephalic and atraumatic  Eyes:      Conjunctiva/sclera: Conjunctivae normal    Neck:      Vascular: No JVD  Cardiovascular:      Rate and Rhythm: Normal rate  Rhythm irregular  Heart sounds: Murmur heard  Systolic murmur is present  Pulmonary:      Effort: Pulmonary effort is normal  No respiratory distress  Breath sounds: Normal breath sounds     Abdominal:    " Palpations: Abdomen is soft  Tenderness: There is no abdominal tenderness  Musculoskeletal:         General: No swelling  Cervical back: Neck supple  Right lower leg: No edema  Left lower leg: No edema  Skin:     General: Skin is warm and dry  Capillary Refill: Capillary refill takes less than 2 seconds  Neurological:      Mental Status: She is alert  Psychiatric:         Mood and Affect: Mood normal          Speech: Speech normal          Behavior: Behavior normal  Behavior is cooperative  Cognition and Memory: Cognition normal             Intake/Output Summary (Last 24 hours) at 6/20/2023 1306  Last data filed at 6/20/2023 0811  Gross per 24 hour   Intake 840 ml   Output 2050 ml   Net -1210 ml       Weight (last 2 days)     Date/Time Weight    06/19/23 07:30:56 107 (235 89)    06/19/23 0554 107 (235 23)     Weight: patient unable to stand     at 06/19/23 0554    06/19/23 0502 107 (235 23)    06/18/23 0535 109 (239 64)             Medications:      Current Facility-Administered Medications:   •  acetaminophen (TYLENOL) tablet 650 mg, 650 mg, Oral, Q4H PRN, SAM Yeboah, 650 mg at 06/17/23 0831  •  allopurinol (ZYLOPRIM) tablet 100 mg, 100 mg, Oral, Daily, SAM Yeboah, 100 mg at 06/20/23 4912  •  ascorbic acid (VITAMIN C) tablet 500 mg, 500 mg, Oral, Daily, SAM Yeboah, 500 mg at 06/20/23 4316  •  bisacodyl (DULCOLAX) rectal suppository 10 mg, 10 mg, Rectal, Daily PRN, SAM Yeboah  •  docusate sodium (COLACE) capsule 100 mg, 100 mg, Oral, BID, SAM Yeboah, 100 mg at 06/20/23 7113  •  furosemide (LASIX) injection 80 mg, 80 mg, Intravenous, BID (diuretic), SAM Yeboah, 80 mg at 06/20/23 0955  •  gabapentin (NEURONTIN) capsule 600 mg, 600 mg, Oral, TID, SAM Yeboah, 600 mg at 06/20/23 2220  •  insulin lispro (HumaLOG) 100 units/mL subcutaneous injection 1-6 Units, 1-6 Units, Subcutaneous, TID AC, 1 Units at 06/20/23 1123 **AND** Fingerstick Glucose (POCT), , , TID AC, SAM Winters  •  insulin lispro (HumaLOG) 100 units/mL subcutaneous injection 1-6 Units, 1-6 Units, Subcutaneous, HS, SAM Winters, 1 Units at 06/18/23 2149  •  levothyroxine tablet 150 mcg, 150 mcg, Oral, Daily, SAM Winters, 150 mcg at 06/20/23 0460  •  multivitamin-minerals (CENTRUM) tablet 1 tablet, 1 tablet, Oral, Daily, SAM Winters, 1 tablet at 06/20/23 9850  •  polyethylene glycol (MIRALAX) packet 17 g, 17 g, Oral, Daily, SAM Winters, 17 g at 06/20/23 8004  •  tamsulosin (FLOMAX) capsule 0 4 mg, 0 4 mg, Oral, Daily With SAM Crawford, 0 4 mg at 06/19/23 1735  •  traMADol (ULTRAM) tablet 50 mg, 50 mg, Oral, Q6H PRN, SAM Winters  •  warfarin (COUMADIN) tablet 4 mg, 4 mg, Oral, HS, ASM Winters, 4 mg at 06/19/23 2130     Labs & Results:        Results from last 7 days   Lab Units 06/19/23  0452 06/17/23  0611 06/16/23  1914   WBC Thousand/uL 5 16 4 90 5 26   HEMOGLOBIN g/dL 9 1* 9 5* 9 7*   HEMATOCRIT % 30 4* 31 3* 31 8*   PLATELETS Thousands/uL 141* 148* 148*         Results from last 7 days   Lab Units 06/20/23  0448 06/19/23  0452 06/18/23  0533 06/17/23  0611 06/16/23 1914   POTASSIUM mmol/L 3 7 3 2* 3 4* 3 9 3 8   CHLORIDE mmol/L 101 101 102 102 100   CO2 mmol/L 30 30 31 30 28   BUN mg/dL 46* 51* 54* 54* 57*   CREATININE mg/dL 1 45* 1 48* 1 53* 1 49* 1 55*   CALCIUM mg/dL 8 9 8 8 8 8 9 0 9 1   ALK PHOS U/L  --   --   --  116* 122*   ALT U/L  --   --   --  8 9   AST U/L  --   --   --  14 15     Results from last 7 days   Lab Units 06/20/23  0448 06/19/23  0452 06/18/23  0533 06/17/23  0611 06/16/23  1914   INR  2 32* 2 43* 2 60*   < > 2 24*   PTT seconds  --   --   --   --  48*    < > = values in this interval not displayed       Results from last 7 days   Lab Units 06/18/23  0533 06/17/23  0611 06/16/23  1914   MAGNESIUM mg/dL 2 4 2 4 2 4     Results from last 7 days   Lab Units 06/16/23  1914   BNP pg/mL 96          Counseling / Coordination of Care  Total floor / unit time spent today 25 minutes  Greater than 50% of total time was spent with the patient and / or family counseling and / or coordination of care    A description of the counseling / coordination of care: Discussed case with German Morrissey PA-C

## 2023-06-20 NOTE — ASSESSMENT & PLAN NOTE
Background: Presents from SNF with reports of shortness of breath and abdominal fullness for the last several days  In ED, noted to be hypoxic with increased WOB  Required BiPAP in the ER and weaned to 4 L nasal cannula O2  Patient up 15 pounds from 1 month ago      · Last echo in 2021 showing EF 55 to 60% with grade 2 diastolic dysfunction  · Repeat done today   · Lower extremity edema and vascular congestion on admission    · Continue IV Lasix 80 mg twice daily   · Likely dc on torsemide 100 mg daily   · Monitor renal function with daily BMP - stable  · Monitor intake/output, daily weights  · Weights not reliable as using bed scale, -2L  · Watch for retention   · Low-sodium fluid restricted diet  Appreciate ongoing cardiology recommendations  - wound continue IV diuresis today given mild abdominal distension

## 2023-06-20 NOTE — PLAN OF CARE
Problem: Potential for Falls  Goal: Patient will remain free of falls  Description: INTERVENTIONS:  - Educate patient/family on patient safety including physical limitations  - Instruct patient to call for assistance with activity   - Consult OT/PT to assist with strengthening/mobility   - Keep Call bell within reach  - Keep bed low and locked with side rails adjusted as appropriate  - Keep care items and personal belongings within reach  - Initiate and maintain comfort rounds  - Make Fall Risk Sign visible to staff  - Offer Toileting every 2 Hours, in advance of need  - Initiate/Maintain alarm  - Obtain necessary fall risk management equipment:   - Apply yellow socks and bracelet for high fall risk patients  - Consider moving patient to room near nurses station  6/20/2023 0952 by Telma Son RN  Outcome: Progressing       Problem: MOBILITY - ADULT  Goal: Maintain or return to baseline ADL function  Description: INTERVENTIONS:  -  Assess patient's ability to carry out ADLs; assess patient's baseline for ADL function and identify physical deficits which impact ability to perform ADLs (bathing, care of mouth/teeth, toileting, grooming, dressing, etc )  - Assess/evaluate cause of self-care deficits   - Assess range of motion  - Assess patient's mobility; develop plan if impaired  - Assess patient's need for assistive devices and provide as appropriate  - Encourage maximum independence but intervene and supervise when necessary  - Involve family in performance of ADLs  - Assess for home care needs following discharge   - Consider OT consult to assist with ADL evaluation and planning for discharge  - Provide patient education as appropriate  6/20/2023 0952 by Telma Son RN  Outcome: Progressing       Problem: Prexisting or High Potential for Compromised Skin Integrity  Goal: Skin integrity is maintained or improved  Description: INTERVENTIONS:  - Identify patients at risk for skin breakdown  - Assess and monitor skin integrity  - Assess and monitor nutrition and hydration status  - Monitor labs   - Assess for incontinence   - Turn and reposition patient  - Assist with mobility/ambulation  - Relieve pressure over bony prominences  - Avoid friction and shearing  - Provide appropriate hygiene as needed including keeping skin clean and dry  - Evaluate need for skin moisturizer/barrier cream  - Collaborate with interdisciplinary team   - Patient/family teaching  - Consider wound care consult   6/20/2023 0952 by Phuong Kumar RN  Outcome: Progressing       Problem: CARDIOVASCULAR - ADULT  Goal: Maintains optimal cardiac output and hemodynamic stability  Description: INTERVENTIONS:  - Monitor I/O, vital signs and rhythm  - Monitor for S/S and trends of decreased cardiac output SOB CARPENTER swelling  - Administer and titrate ordered vasoactive medications to optimize hemodynamic stability  - Assess quality of pulses, skin color and temperature  - Assess for signs of decreased coronary artery perfusion  - Instruct patient to report change in severity of symptoms  6/20/2023 0952 by Phuong Kumar RN  Outcome: Progressing    Goal: Absence of cardiac dysrhythmias or at baseline rhythm  Description: INTERVENTIONS:  - Continuous cardiac monitoring, vital signs, obtain 12 lead EKG if ordered  - Administer antiarrhythmic and heart rate control medications as ordered  - Monitor electrolytes and administer replacement therapy as ordered  6/20/2023 8059 by Phuong Kumar RN  Outcome: Progressing

## 2023-06-21 PROBLEM — N39.0 UTI (URINARY TRACT INFECTION): Status: RESOLVED | Noted: 2021-07-26 | Resolved: 2023-06-21

## 2023-06-21 PROBLEM — R78.81 POSITIVE BLOOD CULTURE: Status: RESOLVED | Noted: 2023-06-18 | Resolved: 2023-06-21

## 2023-06-21 LAB
ANION GAP SERPL CALCULATED.3IONS-SCNC: 8 MMOL/L
BUN SERPL-MCNC: 43 MG/DL (ref 5–25)
CALCIUM SERPL-MCNC: 8.8 MG/DL (ref 8.4–10.2)
CHLORIDE SERPL-SCNC: 100 MMOL/L (ref 96–108)
CO2 SERPL-SCNC: 31 MMOL/L (ref 21–32)
CREAT SERPL-MCNC: 1.54 MG/DL (ref 0.6–1.3)
GFR SERPL CREATININE-BSD FRML MDRD: 32 ML/MIN/1.73SQ M
GLUCOSE SERPL-MCNC: 126 MG/DL (ref 65–140)
GLUCOSE SERPL-MCNC: 141 MG/DL (ref 65–140)
GLUCOSE SERPL-MCNC: 167 MG/DL (ref 65–140)
GLUCOSE SERPL-MCNC: 235 MG/DL (ref 65–140)
GLUCOSE SERPL-MCNC: 275 MG/DL (ref 65–140)
INR PPP: 2.28 (ref 0.84–1.19)
POTASSIUM SERPL-SCNC: 3.7 MMOL/L (ref 3.5–5.3)
PROTHROMBIN TIME: 25.2 SECONDS (ref 11.6–14.5)
SODIUM SERPL-SCNC: 139 MMOL/L (ref 135–147)

## 2023-06-21 PROCEDURE — 97110 THERAPEUTIC EXERCISES: CPT

## 2023-06-21 PROCEDURE — 99232 SBSQ HOSP IP/OBS MODERATE 35: CPT | Performed by: INTERNAL MEDICINE

## 2023-06-21 PROCEDURE — 97530 THERAPEUTIC ACTIVITIES: CPT

## 2023-06-21 PROCEDURE — 80048 BASIC METABOLIC PNL TOTAL CA: CPT

## 2023-06-21 PROCEDURE — 99232 SBSQ HOSP IP/OBS MODERATE 35: CPT

## 2023-06-21 PROCEDURE — 85610 PROTHROMBIN TIME: CPT

## 2023-06-21 PROCEDURE — 82948 REAGENT STRIP/BLOOD GLUCOSE: CPT

## 2023-06-21 RX ORDER — TORSEMIDE 100 MG/1
100 TABLET ORAL DAILY
Status: DISCONTINUED | OUTPATIENT
Start: 2023-06-21 | End: 2023-06-22 | Stop reason: HOSPADM

## 2023-06-21 RX ADMIN — GABAPENTIN 600 MG: 300 CAPSULE ORAL at 16:59

## 2023-06-21 RX ADMIN — INSULIN LISPRO 3 UNITS: 100 INJECTION, SOLUTION INTRAVENOUS; SUBCUTANEOUS at 12:01

## 2023-06-21 RX ADMIN — GABAPENTIN 600 MG: 300 CAPSULE ORAL at 21:38

## 2023-06-21 RX ADMIN — LEVOTHYROXINE SODIUM 150 MCG: 150 TABLET ORAL at 10:26

## 2023-06-21 RX ADMIN — INSULIN LISPRO 1 UNITS: 100 INJECTION, SOLUTION INTRAVENOUS; SUBCUTANEOUS at 21:38

## 2023-06-21 RX ADMIN — WARFARIN SODIUM 4 MG: 4 TABLET ORAL at 21:38

## 2023-06-21 RX ADMIN — DOCUSATE SODIUM 100 MG: 100 CAPSULE, LIQUID FILLED ORAL at 10:26

## 2023-06-21 RX ADMIN — TAMSULOSIN HYDROCHLORIDE 0.4 MG: 0.4 CAPSULE ORAL at 16:59

## 2023-06-21 RX ADMIN — Medication 1 TABLET: at 10:26

## 2023-06-21 RX ADMIN — ALLOPURINOL 100 MG: 100 TABLET ORAL at 10:26

## 2023-06-21 RX ADMIN — TORSEMIDE 100 MG: 100 TABLET ORAL at 10:26

## 2023-06-21 RX ADMIN — GABAPENTIN 600 MG: 300 CAPSULE ORAL at 10:26

## 2023-06-21 RX ADMIN — INSULIN LISPRO 4 UNITS: 100 INJECTION, SOLUTION INTRAVENOUS; SUBCUTANEOUS at 17:00

## 2023-06-21 RX ADMIN — OXYCODONE HYDROCHLORIDE AND ACETAMINOPHEN 500 MG: 500 TABLET ORAL at 10:26

## 2023-06-21 RX ADMIN — POLYETHYLENE GLYCOL 3350 17 G: 17 POWDER, FOR SOLUTION ORAL at 10:26

## 2023-06-21 NOTE — CASE MANAGEMENT
Yadi Enciso 51 has received approved authorization from insurance:   Raman Galeana in by Jc Arias P#  [phone]  Authorization received for: [facility]  Facility: [place] [id number] #: [id number]  Start of Care: 6/21  Next Review Date: 2 business days  Continued Stay Care Coordinator: none given   Submit next review to: [address] [place] [address] Mathew Simpson notified: Micah oJhnson

## 2023-06-21 NOTE — CASE MANAGEMENT
Yadi Enciso 51 received request for authorization from Care Manager  Authorization request for: SNF  Facility Name: [facility]  NPI: [id number]  Facility MD:  Dr Mark Vargas  NPI: [id number]  Authorization initiated by contacting insurance: [employer] Via: Fax  Clinicals submitted via:  Fax

## 2023-06-21 NOTE — PHYSICAL THERAPY NOTE
"   PHYSICAL THERAPY TREATMENT NOTE  NAME:  Kimberlee Ferraro  DATE: 06/21/23    Length Of Stay: 5  Performed at least 2 patient identifiers during session: Name and ID bracelet    TREATMENT NOTE:     06/21/23 1424   PT Last Visit   PT Visit Date 06/21/23   Note Type   Note Type Treatment   Pain Assessment   Pain Assessment Tool 0-10   Pain Score No Pain   Restrictions/Precautions   LLE Weight Bearing Per Order WBAT   Braces or Orthoses CAM Boot  (L LE)   Other Precautions Chair Alarm; Bed Alarm; Fall Risk;Pain   General   Chart Reviewed Yes   Family/Caregiver Present No   Cognition   Overall Cognitive Status WFL   Arousal/Participation Alert; Responsive; Cooperative   Attention Within functional limits   Orientation Level Oriented X4   Memory Within functional limits   Following Commands Follows one step commands without difficulty   Comments pt pleasant and cooperative   Subjective   Subjective \"I haven't been able to walk at rehab\"   Bed Mobility   Rolling R 4  Minimal assistance   Additional items Assist x 1;Bedrails; Increased time required;Verbal cues   Rolling L 4  Minimal assistance   Additional items Assist x 1; Increased time required;Verbal cues   Supine to Sit 3  Moderate assistance   Additional items Assist x 1; Increased time required;Verbal cues;LE management;HOB elevated; Bedrails   Sit to Supine 3  Moderate assistance   Additional items Assist x 1;Bedrails; Increased time required;Verbal cues;LE management   Additional Comments pt completed multiple trials of rolling in bed for hygiene and brief change, pt able to sit at EOB x25' with occassional R UE support, patient required modA to scoot toward [address],[place] in sitting   Balance   Static Sitting Fair +   Dynamic Sitting Fair   Endurance Deficit   Endurance Deficit Yes   Endurance Deficit Description deconditioned, SpO2 88-92% on RA throughout session   Activity Tolerance   Activity Tolerance Patient limited by fatigue   Medical Staff Made Aware PCA present and assited " with hygiene and repositioning   Nurse Made Aware yes   Exercises   Heelslides Sitting;20 reps;AROM; Bilateral   Glute Sets Sitting;20 reps;AROM; Bilateral   Hip Abduction Sitting;20 reps;AROM; Bilateral   Hip Adduction Sitting;20 reps;AROM; Bilateral   Knee AROM Long Arc Quad Sitting;20 reps;AROM; Bilateral   Ankle Pumps Sitting;20 reps;AROM; Right   Marching Sitting;20 reps;AROM; Bilateral   Balance training  pt completed ther ex seated at EOB, unsupported with occ R UE support   Assessment   Prognosis Fair   Problem List Decreased strength;Decreased range of motion;Decreased endurance; Impaired balance;Decreased mobility;Obesity; Impaired sensation;Pain   Assessment Pt seen for PT treatment session this date with interventions consisting of Therapeutic exercise consisting of: AROM 2 sets of 10 reps B LE and R LE in seated, unsupported at EOB with occ RUE support position and therapeutic activity consisting of training: bed mobility, supine<>sit transfers, static sitting tolerance at EOB for 25 minutes w/ occ R UE support and vc and tactile cues for static sitting posture faciliation  Pt agreeable to PT treatment session upon arrival, pt found supine in bed w/ HOB elevated, in no apparent distress and responsive  In comparison to previous session, pt with no improvements as evidenced by pt continues to require extensive assist to complete mobility  Post session: pt returned BTB, bed alarm engaged, all needs in reach and RN notified of session findings/recommendations  Continue to recommend post acute rehabilitation services at time of d/c in order to maximize pt's functional independence and safety w/ mobility  Pt continues to be functioning below baseline level  PT will continue to see pt during current hospitalization in order to address the deficits listed above and provide interventions consistent w/ POC in effort to achieve STGs  Barriers to Discharge Decreased caregiver support; Inaccessible home environment Barriers to Discharge Comments requires assistance to complete mobility   Goals   Patient Goals to go home   STG Expiration Date 07/04/23   PT Treatment Day 2   Plan   Treatment/Interventions ADL retraining;Functional transfer training;LE strengthening/ROM; Therapeutic exercise; Endurance training;Equipment eval/education; Bed mobility;Gait training;Spoke to nursing   PT Frequency 3-5x/wk   Recommendation   PT Discharge Recommendation Post acute rehabilitation services   AM-PAC Basic Mobility Inpatient   Turning in Flat Bed Without Bedrails 2   Lying on Back to Sitting on Edge of Flat Bed Without Bedrails 2   Moving Bed to Chair 2   Standing Up From Chair Using Arms 1   Walk in Room 1   Climb 3-5 Stairs With Railing 1   Basic Mobility Inpatient Raw Score 9   Turning Head Towards Sound 4   Follow Simple Instructions 4   Low Function Basic Mobility Raw Score  17   Low Function Basic Mobility Standardized Score  27 46   Highest Level Of Mobility   -[facility] Goal 3: Sit at edge of bed   -HLM Achieved 3: Sit at edge of bed   End of Consult   Patient Position at End of Consult Supine;Bed/Chair alarm activated; All needs within reach       The patient's AM-PAC Basic Mobility Inpatient Short Form Raw Score is 9  A Raw score of less than or equal to 16 suggests the patient may benefit from discharge to post-acute rehabilitation services  Please also refer to the recommendation of the Physical Therapist for safe discharge planning        Lizabeth Contrreas PTA,PTA

## 2023-06-21 NOTE — PROGRESS NOTES
[address]  Progress Note  Name: Kika Mercado  MRN: [id number]  Unit/Bed#: -01 I Date of Admission: 6/16/2023   Date of Service: 6/21/2023 I Hospital Day: 5    Assessment/Plan   * Acute on chronic diastolic (congestive) heart failure [facility])  Assessment & Plan  Background: Presents from SNF with reports of shortness of breath and abdominal fullness for the last several days  In ED, noted to be hypoxic with increased WOB  Required BiPAP in the ER and weaned to 4 L nasal cannula O2  Patient up 15 pounds from 1 month ago  · Last echo in 2021 showing EF 55 to 60% with grade 2 diastolic dysfunction  · Repeat done today   · Lower extremity edema and vascular congestion on admission    · Initially on IV Lasix 80 mg twice daily   · Transition to oral torsemide 100 mg daily   · Monitor renal function with daily BMP - stable  · Monitor intake/output, daily weights  · Weights not reliable as using bed scale  · NO OUTPUT DOCUMENT YESTERDAY - important to document closely while watching on oral torsemide   · Watch for retention   · Low-sodium fluid restricted diet  Appreciate ongoing cardiology recommendations     Thrombocytopenia (HCC)  Assessment & Plan  · Recent baseline 140-150's, Plts 148 on admission   · No reports of bleeding  · Monitor CBC    Closed displaced spiral fracture of shaft of left tibia  Assessment & Plan  · Per chart review had a fall in January 2023, suffered left tibia fracture  Patient did not undergo surgical intervention , follows with Dr Adrian Noguera outpatient  Currently has CAM boot in place , has been at [facility] for rehab /PT  · PT/OT re-eval pending - return to rehab  · Patient denies any pain currently     · Fall precautions    Type 2 diabetes mellitus with diabetic neuropathy ([facility] [address] )  Assessment & Plan  · Holding home Amaryl  · Monitor on ISS while inpatient  · Hypoglycemia protocol    Pacemaker  Assessment & Plan  · PPM present   · Persistent v-pacing     Stage 3 chronic kidney disease ([facility] )  Assessment & Plan  · Baseline appears to be 1 4-1 5 based on labs in Care Everywhere, renal function currently at baseline  · Daily BMP while on IV diuretics  · Currently stable      Atrial fibrillation ([facility]  Assessment & Plan  · Not on rate control medication   Has PPM in place as well  · Continue coumadin 4 mg daily at HS  ·  INR therapeutic on admission, goal 2-3  Check daily INR  · Therapeutic, continue dose    Positive blood culture-resolved as of 6/21/2023  Assessment & Plan  1 out of 2 blood cultures positive for Staphylococcus epidermidis, likely contaminant  Currently on 3-day course of ceftriaxone for UTI, continue this course - finished course of abx  No signs for systemic bacteremia, patient clinically improving, repeat BC negative    UTI (urinary tract infection)-resolved as of 6/21/2023  Assessment & Plan  · Uncomplicated UTI; will plan for 3-day course of IV ceftriaxone  · Finished course   · No UTI symptoms            VTE Pharmacologic Prophylaxis:   Moderate Risk (Score 3-4) - Pharmacological DVT Prophylaxis Ordered: warfarin (Coumadin)  Patient Centered Rounds: I performed bedside rounds with nursing staff today  Discussions with Specialists or Other Care Team Provider: CM, cardiology     Education and Discussions with Family / Patient: Patient declined call to   Total Time Spent on Date of Encounter in care of patient: 35 minutes This time was spent on one or more of the following: performing physical exam; counseling and coordination of care; obtaining or reviewing history; documenting in the medical record; reviewing/ordering tests, medications or procedures; communicating with other healthcare professionals and discussing with patient's family/caregivers      Current Length of Stay: 5 day(s)  Current Patient Status: Inpatient   Certification Statement: The patient will continue to require additional inpatient hospital stay due to diuresis, monitoring of I&O on new regimen   Discharge Plan: Anticipate discharge tomorrow to rehab facility  Code Status: Level 1 - Full Code    Subjective:   Seen and examined  Is feeling a bit better, less distended  No new concerns  Denies sob    Objective:     Vitals:   Temp (24hrs), Av 3 °F (36 3 °C), Min:97 2 °F (36 2 °C), Max:97 5 °F (36 4 °C)    Temp:  [97 2 °F (36 2 °C)-97 5 °F (36 4 °C)] 97 3 °F (36 3 °C)  HR:  [65-70] 70  Resp:  [16-18] 16  BP: (118-136)/(55-66) 118/55  SpO2:  [91 %-95 %] 91 %  Body mass index is 42 06 kg/m²  Input and Output Summary (last 24 hours): Intake/Output Summary (Last 24 hours) at 2023 0954  Last data filed at 2023 0066  Gross per 24 hour   Intake 720 ml   Output --   Net 720 ml       Physical Exam:   Physical Exam  Vitals and nursing note reviewed  Constitutional:       General: She is not in acute distress  Appearance: Normal appearance  She is obese  HENT:      Head: Normocephalic and atraumatic  Nose: No congestion  Mouth/Throat:      Mouth: Mucous membranes are moist    Eyes:      Conjunctiva/sclera: Conjunctivae normal    Cardiovascular:      Rate and Rhythm: Normal rate and regular rhythm  Pulses: Normal pulses  Heart sounds: Normal heart sounds  No murmur heard  Pulmonary:      Effort: Pulmonary effort is normal  No respiratory distress  Breath sounds: Normal breath sounds  Comments: diffusely diminished   Abdominal:      General: Bowel sounds are normal       Palpations: Abdomen is soft  Tenderness: There is no abdominal tenderness  Musculoskeletal:         General: Normal range of motion  Right lower leg: Edema (trace) present  Left lower leg: Edema (trace) present  Skin:     General: Skin is warm and dry  Neurological:      Mental Status: She is alert and oriented to person, place, and time            Additional Data:     Labs:  Results from last 7 days   Lab Units 23  6633 06/17/23  0611   WBC Thousand/uL 5 16 4 90   HEMOGLOBIN g/dL 9 1* 9 5*   HEMATOCRIT % 30 4* 31 3*   PLATELETS Thousands/uL 141* 148*   NEUTROS PCT %  --  69   LYMPHS PCT %  --  14   MONOS PCT %  --  10   EOS PCT %  --  5     Results from last 7 days   Lab Units 06/21/23  0523 06/18/23  0533 06/17/23  0611   SODIUM mmol/L 139   < > 140   POTASSIUM mmol/L 3 7   < > 3 9   CHLORIDE mmol/L 100   < > 102   CO2 mmol/L 31   < > 30   BUN mg/dL 43*   < > 54*   CREATININE mg/dL 1 54*   < > 1 49*   ANION GAP mmol/L 8   < > 8   CALCIUM mg/dL 8 8   < > 9 0   ALBUMIN g/dL  --   --  3 9   TOTAL BILIRUBIN mg/dL  --   --  0 64   ALK PHOS U/L  --   --  116*   ALT U/L  --   --  8   AST U/L  --   --  14   GLUCOSE RANDOM mg/dL 126   < > 87    < > = values in this interval not displayed  Results from last 7 days   Lab Units 06/21/23  0523   INR  2 28*     Results from last 7 days   Lab Units 06/21/23  0723 06/20/23  2101 06/20/23  1544 06/20/23  1049 06/20/23  0658 06/19/23  2134 06/19/23  1553 06/19/23  1104 06/19/23  0730 06/18/23  2045 06/18/23  1545 06/18/23  1107   POC GLUCOSE mg/dl 141* 171* 168* 178* 146* 138 130 152* 114 172* 141* 109         Results from last 7 days   Lab Units 06/16/23  1914   LACTIC ACID mmol/L 1 6   PROCALCITONIN ng/ml 0 06       Lines/Drains:  Invasive Devices     Peripheral Intravenous Line  Duration           Peripheral IV 06/20/23 Left;Ventral (anterior) Forearm 1 day          Drain  Duration           External Urinary Catheter 3 days                      Imaging: No pertinent imaging reviewed  Recent Cultures (last 7 days):   Results from last 7 days   Lab Units 06/18/23  0533 06/16/23  2144 06/16/23  1914   BLOOD CULTURE  No Growth at 48 hrs  No Growth at 48 hrs  --  No Growth After 4 Days    Staphylococcus epidermidis*   GRAM STAIN RESULT   --   --  Gram positive cocci in clusters*   URINE CULTURE   --  80,000-89,000 cfu/ml Proteus mirabilis*  --        Last 24 Hours Medication List:   Current Facility-Administered Medications   Medication Dose Route Frequency Provider Last Rate   • acetaminophen  650 mg Oral Q4H PRN SAM Jalloh     • allopurinol  100 mg Oral Daily SAM Jalloh     • Ascorbic Acid (Vitamin C)  500 mg Oral Daily SAM Jalloh     • bisacodyl  10 mg Rectal Daily PRN SAM Jalloh     • docusate sodium  100 mg Oral BID SAM Jalloh     • gabapentin  600 mg Oral TID SAM Jalloh     • insulin lispro  1-6 Units Subcutaneous TID AC SAM Jalloh     • insulin lispro  1-6 Units Subcutaneous HS SAM Jalloh     • levothyroxine  150 mcg Oral Daily SAM Jalloh     • multivitamin-minerals  1 tablet Oral Daily SAM Jalloh     • polyethylene glycol  17 g Oral Daily SAM Jalloh     • tamsulosin  0 4 mg Oral Daily With SAM Babin     • torsemide  100 mg Oral Daily SAM Prieto     • traMADol  50 mg Oral Q6H PRN SAM Jalloh     • warfarin  4 mg Oral HS SAM Jalloh          Today, Patient Was Seen By: Franky Isaac PA-C    **Please Note: This note may have been constructed using a voice recognition system  **

## 2023-06-21 NOTE — ASSESSMENT & PLAN NOTE
· Uncomplicated UTI; will plan for 3-day course of IV ceftriaxone  · Finished course   · No UTI symptoms

## 2023-06-21 NOTE — PROGRESS NOTES
Progress Note:Cardiology  Spero Patella 1945, 69 y o  female MRN: [id number]    Unit/Bed#: -01 Encounter: [id number]  Attending Physician: Eloy Gupta MD   Primary Care Provider: Jhoan Morris MD   Date admitted to hospital: 6/16/2023  Length of stay: 5         * Acute on chronic diastolic (congestive) heart failure [facility])  Assessment & Plan  Wt Readings from Last 3 Encounters:   06/19/23 107 kg (235 lb 14 3 oz)   06/02/23 99 8 kg (220 lb)   05/16/23 99 8 kg (220 lb)     Echocardiogram 9/2021 with EF 55-60% with dilated RV and pHTN  Echocardiogram 6/19/2023 shows EF 55% with dilated RV, mild-mod MR, mild-mod TR with PASP 65 mmHg  This is similar to prior studies  Patient presents with shortness of breath, hypoxia, edema  Vascular congestion present on chest xray  She has diuresed well with IV furosemide 80 mg BID  Transition to torsemide 100 mg daily today and assess output  May benefit from sleep study/JOS treatment  Also likely will not tolerate predominantly RV pacing which can be addressed by outpatient cardiologist   Strict I's/O's, standing daily weights if safe, fluid/sodium restriction  Optimize electrolytes for K+ >4, Mag >2  Thrombocytopenia (HCC)  Assessment & Plan  Platelet count 335 on 6/19/2023 which is stable  Pacemaker  Assessment & Plan  History of sick sinus syndrome s/p Medtronic dual chamber PPM   Per outpatient cardiology note the atrial lead has been programmed off due to poor function/sensing  ECG this admission shows ventricular paced rhythm  Stage 3 chronic kidney disease [facility])  Assessment & Plan  Lab Results   Component Value Date    EGFR 33 06/19/2023    EGFR 32 06/18/2023    EGFR 33 06/17/2023    CREATININE 1 48 (H) 06/19/2023    CREATININE 1 53 (H) 06/18/2023    CREATININE 1 49 (H) 06/17/2023     Stable this admission  Atrial fibrillation [facility])  Assessment & Plan  History of chronic persistent atrial fibrillation    ECG this admission shows "ventricular paced rhythm  Not maintained on beta blocker outpatient  Maintained on warfarin for stroke prevention with goal INR 2-3  Optimize electrolytes for K+ >4, Mag >2  Subjective:   Patient seen and examined  No significant events overnight  She is sitting up in bed  She feels well  Reports resolution of abdominal distention  No chest pain  No shortness of breath  Review of Systems   Constitutional: Negative  HENT: Negative  Cardiovascular: Negative for chest pain, dyspnea on exertion, irregular heartbeat, leg swelling, near-syncope, orthopnea and palpitations  Respiratory: Negative for cough and snoring  Endocrine: Negative  Skin: Negative  Musculoskeletal: Negative  Gastrointestinal: Negative  Genitourinary: Negative  Neurological: Negative  Psychiatric/Behavioral: Negative  Objective:     Vitals: Blood pressure 118/55, pulse 70, temperature (!) 97 3 °F (36 3 °C), resp  rate 16, height 5' 2\" (1 575 m), weight 104 kg (229 lb 15 oz), SpO2 91 % , Body mass index is 42 06 kg/m² ,     Orthostatic Blood Pressures    Flowsheet Row Most Recent Value   Blood Pressure 118/55 filed at 06/21/2023 0725          Physical Exam  Vitals and nursing note reviewed  Constitutional:       General: She is not in acute distress  Appearance: She is well-developed  She is obese  HENT:      Head: Normocephalic and atraumatic  Eyes:      Conjunctiva/sclera: Conjunctivae normal    Neck:      Vascular: No JVD  Cardiovascular:      Rate and Rhythm: Normal rate  Rhythm irregular  Heart sounds: Murmur heard  Systolic murmur is present  Pulmonary:      Effort: Pulmonary effort is normal  No respiratory distress  Breath sounds: Normal breath sounds  Comments: Breathing comfortably on room air  Abdominal:      Palpations: Abdomen is soft  Tenderness: There is no abdominal tenderness  Musculoskeletal:         General: No swelling        Cervical back: " Neck supple  Right lower leg: No edema  Left lower leg: No edema  Skin:     General: Skin is warm and dry  Capillary Refill: Capillary refill takes less than 2 seconds  Neurological:      Mental Status: She is alert  Psychiatric:         Mood and Affect: Mood normal          Speech: Speech normal          Behavior: Behavior normal  Behavior is cooperative  Cognition and Memory: Cognition normal             Intake/Output Summary (Last 24 hours) at 6/21/2023 1023  Last data filed at 6/21/2023 2653  Gross per 24 hour   Intake 720 ml   Output 800 ml   Net -80 ml       Weight (last 2 days)     Date/Time Weight    06/21/23 0552 104 (229 94)    06/21/23 0500 104 (229 94)    06/19/23 07:30:56 107 (235 89)    06/19/23 0554 107 (235 23)     Weight: patient unable to stand     at 06/19/23 0554    06/19/23 0502 107 (235 23)             Medications:      Current Facility-Administered Medications:   •  acetaminophen (TYLENOL) tablet 650 mg, 650 mg, Oral, Q4H PRN, SAM Yeboah, 650 mg at 06/17/23 0831  •  allopurinol (ZYLOPRIM) tablet 100 mg, 100 mg, Oral, Daily, SAM Yeboah, 100 mg at 06/20/23 8947  •  ascorbic acid (VITAMIN C) tablet 500 mg, 500 mg, Oral, Daily, SAM Yeboah, 500 mg at 06/20/23 5556  •  bisacodyl (DULCOLAX) rectal suppository 10 mg, 10 mg, Rectal, Daily PRN, SAM Yeboah  •  docusate sodium (COLACE) capsule 100 mg, 100 mg, Oral, BID, SAM Yeboah, 100 mg at 06/20/23 1730  •  gabapentin (NEURONTIN) capsule 600 mg, 600 mg, Oral, TID, SAM Yeboah, 600 mg at 06/20/23 2137  •  insulin lispro (HumaLOG) 100 units/mL subcutaneous injection 1-6 Units, 1-6 Units, Subcutaneous, TID AC, 1 Units at 06/20/23 1617 **AND** Fingerstick Glucose (POCT), , , TID AC, SAM Yeboah  •  insulin lispro (HumaLOG) 100 units/mL subcutaneous injection 1-6 Units, 1-6 Units, Subcutaneous, Pedro FRAGA Cheikhmark Woodrowuce, CRNP, 1 Units at 06/20/23 2137  •  levothyroxine tablet 150 mcg, 150 mcg, Oral, Daily, Dallis Pro Dimler, CRNP, 150 mcg at 06/20/23 2161  •  multivitamin-minerals (CENTRUM) tablet 1 tablet, 1 tablet, Oral, Daily, Dallis Pro Dimler, CRNP, 1 tablet at 06/20/23 7661  •  polyethylene glycol (MIRALAX) packet 17 g, 17 g, Oral, Daily, Dallis Pro Dimler, CRNP, 17 g at 06/20/23 7351  •  tamsulosin (FLOMAX) capsule 0 4 mg, 0 4 mg, Oral, Daily With Cisco Dingwall Kaleler, CRNP, 0 4 mg at 06/20/23 1618  •  torsemide (DEMADEX) tablet 100 mg, 100 mg, Oral, Daily, Tawnya Coho, CRNP  •  traMADol (ULTRAM) tablet 50 mg, 50 mg, Oral, Q6H PRN, Dallis Pro Dimler, CRNP  •  warfarin (COUMADIN) tablet 4 mg, 4 mg, Oral, HS, Dallis Pro Dimler, CRNP, 4 mg at 06/20/23 2137     Labs & Results:        Results from last 7 days   Lab Units 06/19/23  0452 06/17/23  0611 06/16/23 1914   WBC Thousand/uL 5 16 4 90 5 26   HEMOGLOBIN g/dL 9 1* 9 5* 9 7*   HEMATOCRIT % 30 4* 31 3* 31 8*   PLATELETS Thousands/uL 141* 148* 148*         Results from last 7 days   Lab Units 06/21/23  0523 06/20/23  0448 06/19/23  0452 06/18/23  0533 06/17/23  0611 06/16/23 1914   POTASSIUM mmol/L 3 7 3 7 3 2*   < > 3 9 3 8   CHLORIDE mmol/L 100 101 101   < > 102 100   CO2 mmol/L 31 30 30   < > 30 28   BUN mg/dL 43* 46* 51*   < > 54* 57*   CREATININE mg/dL 1 54* 1 45* 1 48*   < > 1 49* 1 55*   CALCIUM mg/dL 8 8 8 9 8 8   < > 9 0 9 1   ALK PHOS U/L  --   --   --   --  116* 122*   ALT U/L  --   --   --   --  8 9   AST U/L  --   --   --   --  14 15    < > = values in this interval not displayed  Results from last 7 days   Lab Units 06/21/23  0523 06/20/23  0448 06/19/23  0452 06/17/23 0611 06/16/23 1914   INR  2 28* 2 32* 2 43*   < > 2 24*   PTT seconds  --   --   --   --  48*    < > = values in this interval not displayed       Results from last 7 days   Lab Units 06/18/23  0533 06/17/23  0611 06/16/23 1914 MAGNESIUM mg/dL 2 4 2 4 2 4     Results from last 7 days   Lab Units 06/16/23  1914   BNP pg/mL 96          Counseling / Coordination of Care  Total floor / unit time spent today 25 minutes  Greater than 50% of total time was spent with the patient and / or family counseling and / or coordination of care    A description of the counseling / coordination of care: Discussed case with Virgil Burton PA-C

## 2023-06-21 NOTE — ASSESSMENT & PLAN NOTE
· Per chart review had a fall in January 2023, suffered left tibia fracture  Patient did not undergo surgical intervention , follows with Dr Feliciano Medina outpatient  Currently has CAM boot in place , has been at [facility] for rehab /PT  · PT/OT re-eval pending - return to rehab  · Patient denies any pain currently     · Fall precautions

## 2023-06-21 NOTE — CASE MANAGEMENT
Case Management Progress Note    Patient name Marguerite Hernandez  Location [address] 333/-[id number] MRN [id number]  : 1945 Date 2023       LOS (days): 5  Geometric Mean LOS (GMLOS) (days): 3 90  Days to GMLOS:-0 6        OBJECTIVE:        Current admission status: Inpatient  Preferred Pharmacy:   [address] [place] [address]  Phone: [phone] Fax: [phone]    Primary Care Provider: Jony Eden MD    Primary Insurance: [employer] [facility]  Secondary Insurance:     PROGRESS NOTE:      CM initiating prior authorization  through \[employer] [facility]\"" insurance company

## 2023-06-21 NOTE — CASE MANAGEMENT
Case Management Discharge Planning Note    Patient name Deanna Hensley  Location /-04 MRN [id number]  : 1945 Date 2023       Current Admission Date: 2023  Current Admission Diagnosis:Acute on chronic diastolic (congestive) heart failure [facility])   Patient Active Problem List    Diagnosis Date Noted   • Acute on chronic diastolic (congestive) heart failure ([facility] [address] ) 2023   • Chronic anemia 2023   • Thrombocytopenia ([address] ) 2023   • Urinary retention 2023   • Acute pain of left shoulder 2023   • Closed displaced spiral fracture of shaft of left tibia 2023   • Lumbar spondylosis 10/12/2022   • Peripheral vascular disease ([facility] [address] ) 2022   • Primary osteoarthritis of both hips 2022   • Pain in left hip 2022   • Chronic pain of left knee 10/13/2021   • Presence of artificial knee joint, left 10/13/2021   • Nausea and vomiting 2021   • Sick sinus syndrome ([address] ) 2021   • Pulmonary hypertension ([facility] [address] [facility] ) 2021   • Supratherapeutic INR 2021   • Acute kidney injury ([facility] [address] ) 06/15/2021   • Acquired hypothyroidism    • Diabetes mellitus ([facility] [address] )    • CHF (congestive heart failure) ([facility] [facility] )    • Essential hypertension    • Renal disorder    • Closed fracture of left ankle 2021   • Closed bimalleolar fracture of left ankle 2020   • Pacemaker 10/29/2020   • Type 2 diabetes mellitus with diabetic neuropathy ([address] ) 10/29/2020   • Severe obesity with body mass index (BMI) of 36 0 to 36 9 with serious comorbidity ([address] )    • Stage 3 chronic kidney disease ([address] ) 10/26/2020   • Atrial fibrillation ([address] )    • Diastolic congestive heart failure ([address] ) 10/25/2020   • Type 2 diabetes mellitus with diabetic nephropathy ([address] ) 10/25/2020   • JOS (obstructive sleep apnea) 10/25/2020      LOS (days): 5  Geometric Mean LOS (GMLOS) (days): 3 90  Days to GMLOS:-0 7     OBJECTIVE:  Risk of Unplanned Readmission Score: 20 76         Current admission status: Inpatient   Preferred Pharmacy:   [address] [address] [place] [address]  Phone: [phone] Fax: [phone]    Primary Care Provider: Edilma Peacock MD    Primary Insurance: [employer] [facility]  Secondary Insurance:     [facility] Number: [id number]

## 2023-06-21 NOTE — ASSESSMENT & PLAN NOTE
Background: Presents from SNF with reports of shortness of breath and abdominal fullness for the last several days  In ED, noted to be hypoxic with increased WOB  Required BiPAP in the ER and weaned to 4 L nasal cannula O2  Patient up 15 pounds from 1 month ago      · Last echo in 2021 showing EF 55 to 60% with grade 2 diastolic dysfunction  · Repeat done today   · Lower extremity edema and vascular congestion on admission    · Initially on IV Lasix 80 mg twice daily   · Transition to oral torsemide 100 mg daily   · Monitor renal function with daily BMP - stable  · Monitor intake/output, daily weights  · Weights not reliable as using bed scale  · NO OUTPUT DOCUMENT YESTERDAY - important to document closely while watching on oral torsemide   · Watch for retention   · Low-sodium fluid restricted diet  Appreciate ongoing cardiology recommendations

## 2023-06-21 NOTE — PLAN OF CARE
Problem: PHYSICAL THERAPY ADULT  Goal: Performs mobility at highest level of function for planned discharge setting  See evaluation for individualized goals  Description: Treatment/Interventions: ADL retraining, Functional transfer training, LE strengthening/ROM, Therapeutic exercise, Endurance training, Patient/family training, Equipment eval/education, Bed mobility, Gait training, Compensatory technique education, Spoke to nursing, Spoke to case management, OT  Equipment Recommended:  (TBD by rehab)       See flowsheet documentation for full assessment, interventions and recommendations  Outcome: Not Progressing  Note: Prognosis: Fair  Problem List: Decreased strength, Decreased range of motion, Decreased endurance, Impaired balance, Decreased mobility, Obesity, Impaired sensation, Pain  Assessment: Pt seen for PT treatment session this date with interventions consisting of Therapeutic exercise consisting of: AROM 2 sets of 10 reps B LE and R LE in seated, unsupported at EOB with occ RUE support position and therapeutic activity consisting of training: bed mobility, supine<>sit transfers, static sitting tolerance at EOB for 25 minutes w/ occ R UE support and vc and tactile cues for static sitting posture faciliation  Pt agreeable to PT treatment session upon arrival, pt found supine in bed w/ HOB elevated, in no apparent distress and responsive  In comparison to previous session, pt with no improvements as evidenced by pt continues to require extensive assist to complete mobility  Post session: pt returned BTB, bed alarm engaged, all needs in reach and RN notified of session findings/recommendations  Continue to recommend post acute rehabilitation services at time of d/c in order to maximize pt's functional independence and safety w/ mobility  Pt continues to be functioning below baseline level   PT will continue to see pt during current hospitalization in order to address the deficits listed above and provide interventions consistent w/ POC in effort to achieve STGs  Barriers to Discharge: Decreased caregiver support, Inaccessible home environment  Barriers to Discharge Comments: requires assistance to complete mobility  PT Discharge Recommendation: Post acute rehabilitation services    See flowsheet documentation for full assessment

## 2023-06-21 NOTE — CASE MANAGEMENT
Per CM request, Mya @ [facility] ([phone]) to change accepting facility  Updated accepting facility info:  [facility] NPI: [id number]  Cindy Erickson  NPI: [id number]  Erin De Santiago stated facility will be changed and all auth info would remain the same  CM notified

## 2023-06-22 VITALS
TEMPERATURE: 97.3 F | DIASTOLIC BLOOD PRESSURE: 63 MMHG | HEIGHT: 62 IN | HEART RATE: 72 BPM | RESPIRATION RATE: 18 BRPM | SYSTOLIC BLOOD PRESSURE: 127 MMHG | OXYGEN SATURATION: 94 % | BODY MASS INDEX: 42.4 KG/M2 | WEIGHT: 230.38 LBS

## 2023-06-22 LAB
ANION GAP SERPL CALCULATED.3IONS-SCNC: 9 MMOL/L
BACTERIA BLD CULT: NORMAL
BUN SERPL-MCNC: 44 MG/DL (ref 5–25)
CALCIUM SERPL-MCNC: 8.5 MG/DL (ref 8.4–10.2)
CHLORIDE SERPL-SCNC: 97 MMOL/L (ref 96–108)
CO2 SERPL-SCNC: 29 MMOL/L (ref 21–32)
CREAT SERPL-MCNC: 1.48 MG/DL (ref 0.6–1.3)
ERYTHROCYTE [DISTWIDTH] IN BLOOD BY AUTOMATED COUNT: 19.5 % (ref 11.6–15.1)
GFR SERPL CREATININE-BSD FRML MDRD: 33 ML/MIN/1.73SQ M
GLUCOSE SERPL-MCNC: 127 MG/DL (ref 65–140)
GLUCOSE SERPL-MCNC: 136 MG/DL (ref 65–140)
GLUCOSE SERPL-MCNC: 233 MG/DL (ref 65–140)
HCT VFR BLD AUTO: 30.9 % (ref 34.8–46.1)
HGB BLD-MCNC: 9.3 G/DL (ref 11.5–15.4)
INR PPP: 3.35 (ref 0.84–1.19)
MAGNESIUM SERPL-MCNC: 2.3 MG/DL (ref 1.9–2.7)
MCH RBC QN AUTO: 22.4 PG (ref 26.8–34.3)
MCHC RBC AUTO-ENTMCNC: 30.1 G/DL (ref 31.4–37.4)
MCV RBC AUTO: 75 FL (ref 82–98)
PLATELET # BLD AUTO: 147 THOUSANDS/UL (ref 149–390)
PMV BLD AUTO: 9.8 FL (ref 8.9–12.7)
POTASSIUM SERPL-SCNC: 3.3 MMOL/L (ref 3.5–5.3)
PROTHROMBIN TIME: 33.9 SECONDS (ref 11.6–14.5)
RBC # BLD AUTO: 4.15 MILLION/UL (ref 3.81–5.12)
SODIUM SERPL-SCNC: 135 MMOL/L (ref 135–147)
WBC # BLD AUTO: 11.58 THOUSAND/UL (ref 4.31–10.16)

## 2023-06-22 PROCEDURE — 80048 BASIC METABOLIC PNL TOTAL CA: CPT

## 2023-06-22 PROCEDURE — 82948 REAGENT STRIP/BLOOD GLUCOSE: CPT

## 2023-06-22 PROCEDURE — 99232 SBSQ HOSP IP/OBS MODERATE 35: CPT | Performed by: INTERNAL MEDICINE

## 2023-06-22 PROCEDURE — 85610 PROTHROMBIN TIME: CPT

## 2023-06-22 PROCEDURE — 85027 COMPLETE CBC AUTOMATED: CPT

## 2023-06-22 PROCEDURE — 83735 ASSAY OF MAGNESIUM: CPT

## 2023-06-22 PROCEDURE — 99239 HOSP IP/OBS DSCHRG MGMT >30: CPT | Performed by: INTERNAL MEDICINE

## 2023-06-22 RX ORDER — WARFARIN SODIUM 4 MG/1
4 TABLET ORAL
Qty: 30 TABLET | Refills: 0 | Status: SHIPPED | OUTPATIENT
Start: 2023-06-22

## 2023-06-22 RX ORDER — TRAMADOL HYDROCHLORIDE 50 MG/1
50 TABLET ORAL EVERY 8 HOURS PRN
Qty: 12 TABLET | Refills: 0 | Status: SHIPPED | OUTPATIENT
Start: 2023-06-22 | End: 2023-06-25

## 2023-06-22 RX ORDER — POTASSIUM CHLORIDE 20 MEQ/1
40 TABLET, EXTENDED RELEASE ORAL ONCE
Status: COMPLETED | OUTPATIENT
Start: 2023-06-22 | End: 2023-06-22

## 2023-06-22 RX ORDER — TORSEMIDE 100 MG/1
100 TABLET ORAL DAILY
Qty: 30 TABLET | Refills: 0 | Status: SHIPPED | OUTPATIENT
Start: 2023-06-23 | End: 2023-07-23

## 2023-06-22 RX ADMIN — POTASSIUM CHLORIDE 40 MEQ: 1500 TABLET, EXTENDED RELEASE ORAL at 11:48

## 2023-06-22 RX ADMIN — ALLOPURINOL 100 MG: 100 TABLET ORAL at 08:12

## 2023-06-22 RX ADMIN — TAMSULOSIN HYDROCHLORIDE 0.4 MG: 0.4 CAPSULE ORAL at 15:47

## 2023-06-22 RX ADMIN — TORSEMIDE 100 MG: 100 TABLET ORAL at 08:12

## 2023-06-22 RX ADMIN — OXYCODONE HYDROCHLORIDE AND ACETAMINOPHEN 500 MG: 500 TABLET ORAL at 08:12

## 2023-06-22 RX ADMIN — LEVOTHYROXINE SODIUM 150 MCG: 150 TABLET ORAL at 08:12

## 2023-06-22 RX ADMIN — GABAPENTIN 600 MG: 300 CAPSULE ORAL at 15:47

## 2023-06-22 RX ADMIN — INSULIN LISPRO 3 UNITS: 100 INJECTION, SOLUTION INTRAVENOUS; SUBCUTANEOUS at 11:49

## 2023-06-22 RX ADMIN — Medication 1 TABLET: at 08:12

## 2023-06-22 RX ADMIN — GABAPENTIN 600 MG: 300 CAPSULE ORAL at 08:12

## 2023-06-22 NOTE — DISCHARGE SUMMARY
[address]  Discharge- Siomara Stein 1945, 69 y o  female MRN: [id number]  Unit/Bed#: -01 Encounter: [id number]  Primary Care Provider: Madi Harrell MD   Date and time admitted to hospital: 6/16/2023  6:34 PM    Thrombocytopenia (HCC)  Assessment & Plan  · Recent baseline 140-150's, Plts 148 on admission   · No reports of bleeding  Platelets are 886    Closed displaced spiral fracture of shaft of left tibia  Assessment & Plan  · Per chart review had a fall in January 2023, suffered left tibia fracture  Patient did not undergo surgical intervention , follows with Dr Mino Bush outpatient  Currently has CAM boot in place , has been at [facility] for rehab /PT  · PT/OT re-eval pending - return to rehab  · Patient denies any pain currently  · Fall precautions    Type 2 diabetes mellitus with diabetic neuropathy ([facility] [address] )  Assessment & Plan  · restart home Amaryl  · Restart home insulin    Pacemaker  Assessment & Plan  · PPM present   · Persistent v-pacing     Stage 3 chronic kidney disease ([facility] [address] )  Assessment & Plan  · Baseline appears to be 1 4-1 5 based on labs in Care Everywhere, renal function currently at baseline  · Daily BMP while on IV diuretics  · Currently stable at 1 48      Atrial fibrillation [facility])  Assessment & Plan  · Not on rate control medication   Has PPM in place as well  · Slightly sub therapeutic today at 3 35 should take 2 mg today and then continue with 4 mg daily after with follow up INR with PCP    * Acute on chronic diastolic (congestive) heart failure (HCC)  Assessment & Plan  Background: Presents from SNF with reports of shortness of breath and abdominal fullness for the last several days  In ED, noted to be hypoxic with increased WOB  Required BiPAP in the ER and weaned to 4 L nasal cannula O2  Patient up 15 pounds from 1 month ago      · Last echo in 2021 showing EF 55 to 60% with grade 2 diastolic dysfunction  · Repeat done today   · Lower extremity edema "and vascular congestion on admission    · Initially on IV Lasix 80 mg twice daily   Discussed with cardiology they are okay with DC today on oral 100 mg daily of torsemide        Medical Problems     Resolved Problems  Date Reviewed: 6/22/2023          Resolved    Acute respiratory failure with hypoxia ([address] ) 6/19/2023     Resolved by  Debbie Quinteros PA-C    UTI (urinary tract infection) 6/21/2023     Resolved by  Debbie Quinteros PA-C    Positive blood culture 6/21/2023     Resolved by  Debbie Quinteros PA-C        Discharging Physician / Practitioner: Sara Noonan MD  PCP: Karlie Thornton MD  Admission Date:   Admission Orders (From admission, onward)     Ordered        06/16/23 2038  INPATIENT ADMISSION  Once                      Discharge Date: 06/22/23    Consultations During Hospital Stay:  · Cardiology     Procedures Performed:   · Echo -     \"Left Ventricle: Left ventricular cavity size is normal  Wall thickness is mildly increased  The left ventricular ejection fraction is 55% but difficult to assess due to image quality and abnormal septal motion in combination with ectopy  Systolic function is low normal to normal visually  Wall motion cannot be accurately assessed  •  IVS: There is abnormal septal motion consistent with ventricular pacing  •  Right Ventricle: Right ventricular cavity size is dilated  Systolic function is low normal visually  A pacer wire is present  •  Left Atrium: The atrium is dilated  •  Right Atrium: The atrium is dilated  •  Mitral Valve: There is annular calcification  There is mild to moderate regurgitation  There is no evidence of stenosis  •  Tricuspid Valve: There is mild to moderate regurgitation  There is no evidence of stenosis  The right ventricular systolic pressure is severely elevated  The estimated right ventricular systolic pressure is 86 55 mmHg  •  Aorta: The aortic root is normal in size  The ascending aorta is normal in size  The aortic root is 3 20 cm   The " "ascending aorta is 2 8 cm  •  IVC/SVC: The right atrial pressure is estimated at 15 0 mmHg  The inferior vena cava is dilated  Respirophasic changes in dimension were absent  •  Prior study available for comparison  Prior study date: 9/21/2021  No significant changes noted compared to the prior study  RVSP previously estimated at 55 mmHg but there is a prior study 4/5/2021 with an estimated RVSP of 72 mmHg  EF may be slightly lower on current study  \"    CXR - CHF      Significant Findings / Test Results:   · As above  Incidental Findings:   · None  · I reviewed the above mentioned incidental findings with the patient and/or family and they expressed understanding  Test Results Pending at Discharge (will require follow up):   · none     Outpatient Tests Requested:  · Patient should have INR checked tomorrow   · Should have BmP in 3-5 days    Complications:  None   Reason for Admission: shortness of breath     Hospital Course:   Reggie Patel is a 69 y o  female patient who originally presented to the hospital on 6/16/2023 due to shortness of breath  Patient was diuresed with IV Lasix and then transition to oral torsemide yesterday  Cardiology were okay with discharge of note the patient's INR was slightly supra therapeutic at 3 35 so we recommended that she take 2 mg today and then continue with 4 mg and follow-up with her primary care doctor with regard to how to adjust the Coumadin going forward  Please see above list of diagnoses and related plan for additional information       Condition at Discharge: stable    Discharge Day Visit / Exam:   Subjective:    Seen and examined  Vitals: Blood Pressure: 129/62 (06/22/23 0719)  Pulse: 73 (06/22/23 0719)  Temperature: (!) 97 2 °F (36 2 °C) (06/22/23 0719)  Temp Source: Temporal (06/16/23 2115)  Respirations: 16 (06/22/23 0719)  Height: 5' 2\" (157 5 cm) (06/19/23 0730)  Weight - Scale: 104 kg (230 lb 6 1 oz) (06/22/23 0601)  SpO2: 94 % (06/22/23 " Ran Agustin  Exam:   Physical Exam  Constitutional:       Appearance: She is not ill-appearing, toxic-appearing or diaphoretic  HENT:      Head: Normocephalic  Mouth/Throat:      Mouth: Mucous membranes are moist    Eyes:      Pupils: Pupils are equal, round, and reactive to light  Cardiovascular:      Rate and Rhythm: Normal rate  Pulmonary:      Effort: Pulmonary effort is normal  No respiratory distress  Breath sounds: No stridor  No wheezing, rhonchi or rales  Chest:      Chest wall: No tenderness  Abdominal:      General: Abdomen is flat  There is no distension  Palpations: There is no mass  Tenderness: There is no abdominal tenderness  There is no right CVA tenderness, left CVA tenderness, guarding or rebound  Hernia: No hernia is present  Musculoskeletal:         General: No swelling, tenderness, deformity or signs of injury  Right lower leg: No edema  Left lower leg: No edema  Skin:     Capillary Refill: Capillary refill takes less than 2 seconds  Coloration: Skin is not jaundiced or pale  Findings: No bruising, erythema, lesion or rash  Neurological:      General: No focal deficit present  Mental Status: She is alert  Cranial Nerves: No cranial nerve deficit  Sensory: No sensory deficit  Motor: No weakness  Coordination: Coordination normal       Gait: Gait normal       Deep Tendon Reflexes: Reflexes normal    Psychiatric:         Mood and Affect: Mood normal           Discussion with Family: called  Dickson Uriarte   - no answer  Discharge instructions/Information to patient and family:   See after visit summary for information provided to patient and family  Provisions for Follow-Up Care:  See after visit summary for information related to follow-up care and any pertinent home health orders  Disposition:   Home    Planned Readmission: none      Discharge Statement:  I spent 45 minutes discharging the patient  This time was spent on the day of discharge  I had direct contact with the patient on the day of discharge  Greater than 50% of the total time was spent examining patient, answering all patient questions, arranging and discussing plan of care with patient as well as directly providing post-discharge instructions  Additional time then spent on discharge activities  Discharge Medications:  See after visit summary for reconciled discharge medications provided to patient and/or family        **Please Note: This note may have been constructed using a voice recognition system**

## 2023-06-22 NOTE — ASSESSMENT & PLAN NOTE
Background: Presents from SNF with reports of shortness of breath and abdominal fullness for the last several days  In ED, noted to be hypoxic with increased WOB  Required BiPAP in the ER and weaned to 4 L nasal cannula O2  Patient up 15 pounds from 1 month ago      · Last echo in 2021 showing EF 55 to 60% with grade 2 diastolic dysfunction  · Repeat done today   · Lower extremity edema and vascular congestion on admission    · Initially on IV Lasix 80 mg twice daily   Discussed with cardiology they are okay with DC today on oral 100 mg daily of torsemide

## 2023-06-22 NOTE — PLAN OF CARE
Problem: Potential for Falls  Goal: Patient will remain free of falls  Description: INTERVENTIONS:  - Educate patient/family on patient safety including physical limitations  - Instruct patient to call for assistance with activity   - Consult OT/PT to assist with strengthening/mobility   - Keep Call bell within reach  - Keep bed low and locked with side rails adjusted as appropriate  - Keep care items and personal belongings within reach  - Initiate and maintain comfort rounds  - Make Fall Risk Sign visible to staff    Problem: Prexisting or High Potential for Compromised Skin Integrity  Goal: Skin integrity is maintained or improved  Description: INTERVENTIONS:  - Identify patients at risk for skin breakdown  - Assess and monitor skin integrity  - Assess and monitor nutrition and hydration status  - Monitor labs   - Assess for incontinence   - Turn and reposition patient  - Assist with mobility/ambulation  - Relieve pressure over bony prominences  - Avoid friction and shearing  - Provide appropriate hygiene as needed including keeping skin clean and dry  - Evaluate need for skin moisturizer/barrier cream  - Collaborate with interdisciplinary team   - Patient/family teaching  - Consider wound care consult   6/22/2023 1643 by Mahogany Lisa RN  Outcome: Adequate for Discharge  6/22/2023 0950 by Mahogany Lisa RN  Outcome: Progressing     - Apply yellow socks and bracelet for high fall risk patients  - Consider moving patient to room near nurses station  6/22/2023 1643 by Mahogany Lisa RN  Outcome: Adequate for Discharge  6/22/2023 0950 by Mahogany Lisa RN  Outcome: Progressing     Problem: MOBILITY - ADULT  Goal: Maintain or return to baseline ADL function  Description: INTERVENTIONS:  -  Assess patient's ability to carry out ADLs; assess patient's baseline for ADL function and identify physical deficits which impact ability to perform ADLs (bathing, care of mouth/teeth, toileting, grooming, dressing, etc )  - Assess/evaluate cause of self-care deficits   - Assess range of motion  - Assess patient's mobility; develop plan if impaired  - Assess patient's need for assistive devices and provide as appropriate  - Encourage maximum independence but intervene and supervise when necessary  - Involve family in performance of ADLs  - Assess for home care needs following discharge   - Consider OT consult to assist with ADL evaluation and planning for discharge  - Provide patient education as appropriate  6/22/2023 1643 by Domenic Dominique RN  Outcome: Adequate for Discharge  6/22/2023 0950 by Domenic Dominique RN  Outcome: Progressing  Goal: Maintains/Returns to pre admission functional level  Description: INTERVENTIONS:  - Perform BMAT or MOVE assessment daily    - Set and communicate daily mobility goal to care team and patient/family/caregiver     - Collaborate with rehabilitation services on mobility goals if consulted  -  - Out of bed for toileting  - Record patient progress and toleration of activity level   6/22/2023 1643 by Domenic Dominique RN  Outcome: Adequate for Discharge  6/22/2023 0950 by Domenic Dominique RN  Outcome: Progressing

## 2023-06-22 NOTE — CASE MANAGEMENT
Case Management Discharge Planning Note    Patient name Sebastien Book  Location /-97 MRN [id number]  : 1945 Date 2023       Current Admission Date: 2023  Current Admission Diagnosis:Acute on chronic diastolic (congestive) heart failure [facility])   Patient Active Problem List    Diagnosis Date Noted   • Acute on chronic diastolic (congestive) heart failure ([facility] [address] ) 2023   • Chronic anemia 2023   • Thrombocytopenia ([address] ) 2023   • Urinary retention 2023   • Acute pain of left shoulder 2023   • Closed displaced spiral fracture of shaft of left tibia 2023   • Lumbar spondylosis 10/12/2022   • Peripheral vascular disease ([facility] [address] ) 2022   • Primary osteoarthritis of both hips 2022   • Pain in left hip 2022   • Chronic pain of left knee 10/13/2021   • Presence of artificial knee joint, left 10/13/2021   • Nausea and vomiting 2021   • Sick sinus syndrome ([address] ) 2021   • Pulmonary hypertension ([address] ) 2021   • Supratherapeutic INR 2021   • Acute kidney injury ([facility] [address] ) 06/15/2021   • Acquired hypothyroidism    • Diabetes mellitus ([facility] [address] )    • CHF (congestive heart failure) ([facility] [address] )    • Essential hypertension    • Renal disorder    • Closed fracture of left ankle 2021   • Closed bimalleolar fracture of left ankle 2020   • Pacemaker 10/29/2020   • Type 2 diabetes mellitus with diabetic neuropathy ([address] ) 10/29/2020   • Severe obesity with body mass index (BMI) of 36 0 to 36 9 with serious comorbidity ([facility] [address] )    • Stage 3 chronic kidney disease ([address] ) 10/26/2020   • Atrial fibrillation ([facility] [address] )    • Diastolic congestive heart failure ([address] ) 10/25/2020   • Type 2 diabetes mellitus with diabetic nephropathy ([address] ) 10/25/2020   • JOS (obstructive sleep apnea) 10/25/2020      LOS (days): 6  Geometric Mean LOS (GMLOS) (days): 3 90  Days to GMLOS:-1 9     OBJECTIVE:  Risk of Unplanned Readmission Score: 20 89         Current admission status: Inpatient   Preferred Pharmacy:   [address] [address]  Phone: [phone] Fax: [phone]    Primary Care Provider: Altagracia Francis MD    Primary Insurance: [facility]  Secondary Insurance:     DISCHARGE DETAILS:    Discharge planning discussed with[de-identified] estela trujillo and spouse Dickson Uriarte via phone  Freedom of Choice: Yes  Comments - Freedom of Choice: Patient stated she is ready to go back to [place]  CM contacted family/caregiver?: Yes (spouse  Dickson Uriarte)  Were Treatment Team discharge recommendations reviewed with patient/caregiver?: Yes  Did patient/caregiver verbalize understanding of patient care needs?: Yes  Were patient/caregiver advised of the risks associated with not following Treatment Team discharge recommendations?: Yes    Contacts  Patient Contacts:  Rafita Motley  Relationship to Patient[de-identified] Family  Contact Method: Phone  Phone Number: see face sheet  Reason/Outcome: Discharge [address]         Is the patient interested in [facility] at discharge?: No    DME Referral Provided  Referral made for DME?: No              Treatment Team Recommendation: Short Term Rehab (set[facility]uj[facility])  Discharge Destination Plan[de-identified] Short Term Rehab (setchanel trujillo)  Transport at Discharge : BLS Ambulance (due to recent surgery on leg and cam boot  WBAT to LLE pateint using  sit to stand and slide board to transfer to chair at hospital)        Patient discussed in huddle  stable for discharge today back to [facility]  WBAT with cam boot to LLE   No o2 need on room air for last 2 days  CM spoke with patient and she is in agreement with discharge  CM was unaware of discharge today  CM spoke to patient at the bedside, reviewed DC IMM with patient and informed that patient can stay an additional 4 hours for reconsidering appealing the discharge as the medicare rights were review on the day of discharge   Pt verbalized understanding and feels ready to go home and does not intend to stay 4 hours to reconsider  Copy with patient and copy placed in bin to be scanned  [facility] is able to accept patient today  Request for transport submitted for BLS to [facility] approx pick time 1630   due to difficulty with standing needs sit to stand and slide board and assist to get oob  Recent sx on leg this year and now fx tibia non surgical wearing cam boot WBAT  Awaiting confirmation of  time    Medical Necessity for transportation was completed  Copy available for transport team along with face sheet placed in patient chart for transport  Dc info uploaded to aidin with medication script / progress notes SLIM and Cardiology  AVS     Provider, nursing and pt daughter Steven Rosenberg  aware of  Discharge today approx 1630 via BLS to [facility]

## 2023-06-22 NOTE — ASSESSMENT & PLAN NOTE
· Not on rate control medication   Has PPM in place as well     · Slightly sub therapeutic today at 3 35 should take 2 mg today and then continue with 4 mg daily after with follow up INR with PCP

## 2023-06-22 NOTE — ASSESSMENT & PLAN NOTE
· Baseline appears to be 1 4-1 5 based on labs in Care Everywhere, renal function currently at baseline  · Daily BMP while on IV diuretics  · Currently stable at 1 48

## 2023-06-22 NOTE — DISCHARGE INSTR - AVS FIRST PAGE
Dear Marie Birmingham,     It was our pleasure to care for you here at [facility]  It is our hope that we were always able to exceed the expected standards for your care during your stay  You were hospitalized due to volume overload  You were cared for on the 3rd  floor under the service of Salma Banuelos MD with the [facility] who covers for your primary care physician (PCP), Konrad Merino MD, while you were hospitalized  If you have any questions or concerns related to this hospitalization, you may contact us at [phone]  For follow up as well as medication refills, we recommend that you follow up with your primary care physician  A registered nurse will reach out to you by phone within a few days after your discharge to answer any additional questions that you may have after going home  However, at this time we provide for you here, the most important instructions / recommendations at discharge:     Notable Medication Adjustments -   We discharged you on 100 mg of torsemide once a day   We recommend that you take 2 mg of Coumadin tonight which is half of your regular dose and then continue with 4 the rest of the week but you should have your at the nursing home check your INR to ensure that it is too high    Today it was slightly above the goal 3 35  Testing Required after Discharge -   You should have your INR checked at the nursing home tomorrow or the day after you should have a basic metabolic panel checked within 3 to 5 days time to ensure that your potassium is not too low  ** Please contact your PCP to request testing orders for any of the testing recommended here **  Important follow up information -   You should follow instructions above failure to follow-up could lead to a worsening of your medical conditions or additional new problems such as bleeding, stroke, volume overload, heart problems/arrhythmias  You should weigh yourself every day if you gain 2 pounds for 2 or more consecutive days or 5 pounds at any given time call your doctor, PCP or cardiologist as your diuretic dose may need to be adjusted  Other Instructions -   If you experience shortness of breath, leg swelling chest pain please come back into the emergency department  Please review this entire after visit summary as additional general instructions including medication list, appointments, activity, diet, any pertinent wound care, and other additional recommendations from your care team that may be provided for you        Sincerely,     Aleja Goncalves MD

## 2023-06-22 NOTE — PROGRESS NOTES
Progress Note:Cardiology  Suella Book 1945, 69 y o  female MRN: [id number]    Unit/Bed#: -01 Encounter: [id number]  Attending Physician: Lew Gilliland MD   Primary Care Provider: Pelon Ching MD   Date admitted to hospital: 6/16/2023  Length of stay: 6         * Acute on chronic diastolic (congestive) heart failure [facility])  Assessment & Plan  Wt Readings from Last 3 Encounters:   06/19/23 107 kg (235 lb 14 3 oz)   06/02/23 99 8 kg (220 lb)   05/16/23 99 8 kg (220 lb)     Echocardiogram 9/2021 with EF 55-60% with dilated RV and pHTN  Echocardiogram 6/19/2023 shows EF 55% with dilated RV, mild-mod MR, mild-mod TR with PASP 65 mmHg  This is similar to prior studies  Patient presents with shortness of breath, hypoxia, edema  Vascular congestion present on chest xray  She has diuresed well with IV furosemide 80 mg BID  Transition to torsemide 100 mg daily which I recommend for discharge  May benefit from sleep study/JOS treatment  Also likely will not tolerate predominantly RV pacing which can be addressed by outpatient cardiologist   Strict I's/O's, standing daily weights if safe, fluid/sodium restriction  Optimize electrolytes for K+ >4, Mag >2  Thrombocytopenia (HCC)  Assessment & Plan  Platelet count 262 on 6/19/2023 which is stable  Pacemaker  Assessment & Plan  History of sick sinus syndrome s/p Medtronic dual chamber PPM   Per outpatient cardiology note the atrial lead has been programmed off due to poor function/sensing  ECG this admission shows ventricular paced rhythm  Stage 3 chronic kidney disease [facility])  Assessment & Plan  Lab Results   Component Value Date    EGFR 33 06/19/2023    EGFR 32 06/18/2023    EGFR 33 06/17/2023    CREATININE 1 48 (H) 06/19/2023    CREATININE 1 53 (H) 06/18/2023    CREATININE 1 49 (H) 06/17/2023     Stable this admission  Atrial fibrillation [facility])  Assessment & Plan  History of chronic persistent atrial fibrillation    ECG this admission shows "ventricular paced rhythm  Not maintained on beta blocker outpatient  Maintained on warfarin for stroke prevention with goal INR 2-3  Optimize electrolytes for K+ >4, Mag >2  Subjective:   Patient seen and examined  No significant events overnight  She feels very well and feels ready to go back to rehab  No complaints at this time  Review of Systems   Constitutional: Negative  HENT: Negative  Cardiovascular: Negative for chest pain, dyspnea on exertion, irregular heartbeat, near-syncope, orthopnea and palpitations  Respiratory: Negative for cough and snoring  Endocrine: Negative  Skin: Negative  Musculoskeletal: Negative  Gastrointestinal: Negative  Genitourinary: Negative  Neurological: Negative  Psychiatric/Behavioral: Negative  Objective:     Vitals: Blood pressure 129/62, pulse 73, temperature (!) 97 2 °F (36 2 °C), resp  rate 16, height 5' 2\" (1 575 m), weight 104 kg (230 lb 6 1 oz), SpO2 (!) 86 %  , Body mass index is 42 14 kg/m² ,     Orthostatic Blood Pressures    Flowsheet Row Most Recent Value   Blood Pressure 129/62 filed at 06/22/2023 0719          Physical Exam  Vitals and nursing note reviewed  Constitutional:       General: She is not in acute distress  Appearance: She is well-developed  She is obese  HENT:      Head: Normocephalic and atraumatic  Eyes:      Conjunctiva/sclera: Conjunctivae normal    Cardiovascular:      Rate and Rhythm: Normal rate  Rhythm irregular  Heart sounds: Murmur heard  Systolic murmur is present  Comments: Trace lower leg edema  Pulmonary:      Effort: Pulmonary effort is normal  No respiratory distress  Breath sounds: Normal breath sounds  Comments: Breathing comfortably on room air  Abdominal:      Palpations: Abdomen is soft  Tenderness: There is no abdominal tenderness  Musculoskeletal:         General: No swelling  Cervical back: Neck supple     Skin:     General: Skin is warm " and dry  Capillary Refill: Capillary refill takes less than 2 seconds  Neurological:      Mental Status: She is alert  Psychiatric:         Mood and Affect: Mood normal          Speech: Speech normal          Behavior: Behavior normal  Behavior is cooperative           Cognition and Memory: Cognition normal             Intake/Output Summary (Last 24 hours) at 6/22/2023 1253  Last data filed at 6/22/2023 0838  Gross per 24 hour   Intake 840 ml   Output 350 ml   Net 490 ml       Weight (last 2 days)     Date/Time Weight    06/22/23 0601 104 (230 38)    06/21/23 0552 104 (229 94)    06/21/23 0500 104 (229 94)             Medications:      Current Facility-Administered Medications:   •  acetaminophen (TYLENOL) tablet 650 mg, 650 mg, Oral, Q4H PRN, Brianaone Tammy Do CRNP, 650 mg at 06/17/23 0831  •  allopurinol (ZYLOPRIM) tablet 100 mg, 100 mg, Oral, Daily, Evone Tammy Do, CRNP, 100 mg at 06/22/23 9178  •  ascorbic acid (VITAMIN C) tablet 500 mg, 500 mg, Oral, Daily, Evone Tammy Do, CRNP, 500 mg at 06/22/23 5971  •  bisacodyl (DULCOLAX) rectal suppository 10 mg, 10 mg, Rectal, Daily PRN, TONE MonzonNP  •  gabapentin (NEURONTIN) capsule 600 mg, 600 mg, Oral, TID, Evone Tammy Do, CRNP, 600 mg at 06/22/23 6448  •  insulin lispro (HumaLOG) 100 units/mL subcutaneous injection 1-6 Units, 1-6 Units, Subcutaneous, TID AC, 3 Units at 06/22/23 1149 **AND** Fingerstick Glucose (POCT), , , TID AC, Brianaone TONE HardwickNP  •  insulin lispro (HumaLOG) 100 units/mL subcutaneous injection 1-6 Units, 1-6 Units, Subcutaneous, HS, Evone Tammy Do, CRNP, 1 Units at 06/21/23 2138  •  levothyroxine tablet 150 mcg, 150 mcg, Oral, Daily, Evone Tammy Do CRNP, 150 mcg at 06/22/23 4379  •  multivitamin-minerals (CENTRUM) tablet 1 tablet, 1 tablet, Oral, Daily, SAM Monzon, 1 tablet at 06/22/23 8569  •  tamsulosin (FLOMAX) capsule 0 4 mg, 0 4 mg, Oral, Daily With Manuel Do, CRNP, 0 4 mg at 06/21/23 1659  •  torsemide (DEMADEX) tablet 100 mg, 100 mg, Oral, Daily, Ethyin Woodruffad, CRNP, 100 mg at 06/22/23 2773  •  traMADol (ULTRAM) tablet 50 mg, 50 mg, Oral, Q6H PRN, Eddye Carbon Dimler, CRNP  •  warfarin (COUMADIN) tablet 4 mg, 4 mg, Oral, HS, Eddye Carbon Dimler, CRNP, 4 mg at 06/21/23 2138     Labs & Results:        Results from last 7 days   Lab Units 06/22/23  0602 06/19/23  0452 06/17/23  0611   WBC Thousand/uL 11 58* 5 16 4 90   HEMOGLOBIN g/dL 9 3* 9 1* 9 5*   HEMATOCRIT % 30 9* 30 4* 31 3*   PLATELETS Thousands/uL 147* 141* 148*         Results from last 7 days   Lab Units 06/22/23 0602 06/21/23 0523 06/20/23 0448 06/18/23  0533 06/17/23  0611 06/16/23  1914   POTASSIUM mmol/L 3 3* 3 7 3 7   < > 3 9 3 8   CHLORIDE mmol/L 97 100 101   < > 102 100   CO2 mmol/L 29 31 30   < > 30 28   BUN mg/dL 44* 43* 46*   < > 54* 57*   CREATININE mg/dL 1 48* 1 54* 1 45*   < > 1 49* 1 55*   CALCIUM mg/dL 8 5 8 8 8 9   < > 9 0 9 1   ALK PHOS U/L  --   --   --   --  116* 122*   ALT U/L  --   --   --   --  8 9   AST U/L  --   --   --   --  14 15    < > = values in this interval not displayed  Results from last 7 days   Lab Units 06/22/23  0602 06/21/23 0523 06/20/23 0448 06/17/23  0611 06/16/23  1914   INR  3 35* 2 28* 2 32*   < > 2 24*   PTT seconds  --   --   --   --  48*    < > = values in this interval not displayed  Results from last 7 days   Lab Units 06/22/23  0602 06/18/23  0533 06/17/23  0611   MAGNESIUM mg/dL 2 3 2 4 2 4     Results from last 7 days   Lab Units 06/16/23  1914   BNP pg/mL 96        Counseling / Coordination of Care  Total floor / unit time spent today 25 minutes  Greater than 50% of total time was spent with the patient and / or family counseling and / or coordination of care  A description of the counseling / coordination of care: Discussed case with Dr Nye Neither

## 2023-06-22 NOTE — PLAN OF CARE
Problem: MOBILITY - ADULT  Goal: Maintain or return to baseline ADL function  Description: INTERVENTIONS:  -  Assess patient's ability to carry out ADLs; assess patient's baseline for ADL function and identify physical deficits which impact ability to perform ADLs (bathing, care of mouth/teeth, toileting, grooming, dressing, etc )  - Assess/evaluate cause of self-care deficits   - Assess range of motion  - Assess patient's mobility; develop plan if impaired  - Assess patient's need for assistive devices and provide as appropriate  - Encourage maximum independence but intervene and supervise when necessary  - Involve family in performance of ADLs  - Assess for home care needs following discharge   - Consider OT consult to assist with ADL evaluation and planning for discharge  - Provide patient education as appropriate  Outcome: Progressing  Goal: Maintains/Returns to pre admission functional level  Description: INTERVENTIONS:  - Perform BMAT or MOVE assessment daily    - Set and communicate daily mobility goal to care team and patient/family/caregiver     - Collaborate with rehabilitation services on mobility goals if consulted    Problem: CARDIOVASCULAR - ADULT  Goal: Maintains optimal cardiac output and hemodynamic stability  Description: INTERVENTIONS:  - Monitor I/O, vital signs and rhythm  - Monitor for S/S and trends of decreased cardiac output SOB CARPENTER swelling  - Administer and titrate ordered vasoactive medications to optimize hemodynamic stability  - Assess quality of pulses, skin color and temperature  - Assess for signs of decreased coronary artery perfusion  - Instruct patient to report change in severity of symptoms  Outcome: Progressing  Goal: Absence of cardiac dysrhythmias or at baseline rhythm  Description: INTERVENTIONS:  - Continuous cardiac monitoring, vital signs, obtain 12 lead EKG if ordered  - Administer antiarrhythmic and heart rate control medications as ordered  - Monitor electrolytes and administer replacement therapy as ordered  Outcome: Progressing     - Out of bed for toileting  - Record patient progress and toleration of activity level   Outcome: Progressing

## 2023-06-22 NOTE — ASSESSMENT & PLAN NOTE
· Per chart review had a fall in January 2023, suffered left tibia fracture  Patient did not undergo surgical intervention , follows with Dr Piter Santos outpatient  Currently has CAM boot in place , has been at [facility] for rehab /PT  · PT/OT re-eval pending - return to rehab  · Patient denies any pain currently     · Fall precautions

## 2023-06-23 LAB
BACTERIA BLD CULT: NORMAL
BACTERIA BLD CULT: NORMAL

## 2023-06-23 NOTE — UTILIZATION REVIEW
NOTIFICATION OF ADMISSION DISCHARGE   This is a Notification of Discharge from [facility]  Please be advised that this patient has been discharge from our facility  Below you will find the admission and discharge date and time including the patient’s disposition  UTILIZATION REVIEW CONTACT:  [address] [place]  Utilization   Network Utilization Review Department  Phone: [phone] x carefully listen to the prompts  All voicemails are confidential   Email: [email or web address]     ADMISSION INFORMATION  PRESENTATION DATE: 6/16/2023  6:34 PM  OBERVATION ADMISSION DATE:   INPATIENT ADMISSION DATE: 6/16/23  8:38 PM   DISCHARGE DATE: 6/22/2023  4:51 PM   DISPOSITION:Discharged/Transferred to Long Term Care/Personal Care Home/Assisted Living    IMPORTANT INFORMATION:  Send all requests for admission clinical reviews, approved or denied determinations and any other requests to dedicated fax number below belonging to the campus where the patient is receiving treatment   List of dedicated fax numbers:  [address] DENIALS (Administrative/Medical Necessity) [phone]   [address] (Maternity/NICU/Pediatrics) [phone]   [facility] [phone]   [facility] [phone]   [address] [phone]   [address] [facility] [phone]   90 [facility] [phone]   [address] [place] [address] [phone]   [facility]  [phone]   [facility] [facility] [phone]   412 [facility] [address] [facility] [phone]

## 2023-07-19 ENCOUNTER — HOSPITAL ENCOUNTER (OUTPATIENT)
Dept: RADIOLOGY | Facility: CLINIC | Age: 78
Discharge: HOME | End: 2023-07-19
Payer: COMMERCIAL

## 2023-07-19 ENCOUNTER — OFFICE VISIT (OUTPATIENT)
Dept: OBGYN CLINIC | Facility: CLINIC | Age: 78
End: 2023-07-19
Payer: COMMERCIAL

## 2023-07-19 VITALS
DIASTOLIC BLOOD PRESSURE: 70 MMHG | WEIGHT: 230 LBS | TEMPERATURE: 97.4 F | BODY MASS INDEX: 42.33 KG/M2 | SYSTOLIC BLOOD PRESSURE: 126 MMHG | HEIGHT: 62 IN | HEART RATE: 76 BPM

## 2023-07-19 DIAGNOSIS — S82.242D CLOSED DISPLACED SPIRAL FRACTURE OF SHAFT OF LEFT TIBIA WITH ROUTINE HEALING, SUBSEQUENT ENCOUNTER: Primary | ICD-10-CM

## 2023-07-19 DIAGNOSIS — S82.242D CLOSED DISPLACED SPIRAL FRACTURE OF SHAFT OF LEFT TIBIA WITH ROUTINE HEALING, SUBSEQUENT ENCOUNTER: ICD-10-CM

## 2023-07-19 PROCEDURE — 99213 OFFICE O/P EST LOW 20 MIN: CPT | Performed by: ORTHOPAEDIC SURGERY

## 2023-07-19 PROCEDURE — 73590 X-RAY EXAM OF LOWER LEG: CPT

## 2023-07-19 RX ORDER — BISACODYL 5 MG/1
5 TABLET, DELAYED RELEASE ORAL DAILY PRN
COMMUNITY

## 2023-07-19 RX ORDER — TRAMADOL HYDROCHLORIDE 50 MG/1
50 TABLET ORAL EVERY 8 HOURS PRN
COMMUNITY

## 2023-07-19 NOTE — PROGRESS NOTES
Patient Name:  Bebe Aragon  MRN:  [id number]    Assessment     1. Closed displaced spiral fracture of shaft of left tibia with routine healing, subsequent encounter  XR tibia fibula 2 vw left        Plan     1. Patient will follow-up in orthopedic office with Dr. Sarbjit Lee in 2 months for follow-up x-rays of the left tib-fib. She can continue weightbearing as tolerated on the left lower extremity. Transition out of the cam boot into a regular shoe over the next 1 to 2 weeks. The bone stimulator is not required but may be used for another 3 to 4 weeks if available. Return in about 2 months (around 9/19/2023) for X-ray left tib-fib. Magnolia Coronado returns for follow-up of closed displaced spiral fracture left tibial shaft, delayed/nonunion and proximal fibula fracture. The patient is 6 month(s) post injury and returns for routine follow-up. She has been using a bone stimulator. Patient has previous longstem knee replacement hardware in place and ankle ORIF hardware is well the fracture tween these areas. Patient denies pain at the fracture site. She does note some left thigh pain dated 2 left total knee longstem arthroplasty that has been managed by other orthopedic providers. Objective     /70   Pulse 76   Temp (!) 97.4 °F (36.3 °C) (Temporal)   Ht 5' 2" (1.575 m)   Wt 104 kg (230 lb)   BMI 42.07 kg/m²     Leg is without gross deformity or ecchymosis. There are no open wounds. She has limited sensation and dorsiflexion of the left ankle. No calf pain. Data Review     I have personally reviewed pertinent films in PACS documenting good healing of the tibial shaft and proximal fibular fractures. There is some thinning of the lateral tibia or techs that is being managed by other orthopedic providers.     Lilliam Wright PA-C

## 2023-07-19 NOTE — PATIENT INSTRUCTIONS
Patient will follow-up in orthopedic office with Dr. Blanca Walton in 2 months for follow-up x-rays of the left tib-fib. She can continue weightbearing as tolerated on the left lower extremity. Transition out of the cam boot into a regular shoe over the next 1 to 2 weeks. The bone stimulator is not required but may be used for another 3 to 4 weeks if available.

## 2023-09-20 ENCOUNTER — HOSPITAL ENCOUNTER (OUTPATIENT)
Dept: RADIOLOGY | Facility: CLINIC | Age: 78
Discharge: HOME | End: 2023-09-20
Payer: COMMERCIAL

## 2023-09-20 ENCOUNTER — OFFICE VISIT (OUTPATIENT)
Dept: OBGYN CLINIC | Facility: CLINIC | Age: 78
End: 2023-09-20
Payer: COMMERCIAL

## 2023-09-20 VITALS
WEIGHT: 230 LBS | HEIGHT: 62 IN | SYSTOLIC BLOOD PRESSURE: 124 MMHG | DIASTOLIC BLOOD PRESSURE: 66 MMHG | BODY MASS INDEX: 42.33 KG/M2 | TEMPERATURE: 97.7 F | HEART RATE: 79 BPM

## 2023-09-20 DIAGNOSIS — S82.242D CLOSED DISPLACED SPIRAL FRACTURE OF SHAFT OF LEFT TIBIA WITH ROUTINE HEALING, SUBSEQUENT ENCOUNTER: Primary | ICD-10-CM

## 2023-09-20 DIAGNOSIS — S82.242D CLOSED DISPLACED SPIRAL FRACTURE OF SHAFT OF LEFT TIBIA WITH ROUTINE HEALING, SUBSEQUENT ENCOUNTER: ICD-10-CM

## 2023-09-20 PROCEDURE — 73590 X-RAY EXAM OF LOWER LEG: CPT

## 2023-09-20 PROCEDURE — 99213 OFFICE O/P EST LOW 20 MIN: CPT | Performed by: ORTHOPAEDIC SURGERY

## 2023-09-20 RX ORDER — GUAIFENESIN/DEXTROMETHORPHAN 100-10MG/5
5 SYRUP ORAL 3 TIMES DAILY PRN
COMMUNITY

## 2023-09-20 RX ORDER — INSULIN ASPART 100 [IU]/ML
INJECTION, SOLUTION INTRAVENOUS; SUBCUTANEOUS
COMMUNITY

## 2023-09-20 NOTE — PROGRESS NOTES
ASSESSMENT/PLAN:    Diagnoses and all orders for this visit:    Closed displaced spiral fracture of shaft of left tibia with routine healing, subsequent encounter  -     XR tibia fibula 2 vw left; Future    Other orders  -     Insulin Aspart (NovoLOG PenFill) 100 UNITS/ML cartridge for injection; Inject under the skin 3 (three) times a day with meals  -     dextromethorphan-guaiFENesin (ROBITUSSIN DM)  mg/5 mL syrup; Take 5 mL by mouth 3 (three) times a day as needed for cough      She may continue to use her bone stimulator daily for another 3 to 4 weeks. She may weight-bear as tolerated. May be discharged from care and follow-up only on an as-needed basis. Patient will likely have revision surgery by [facility] orthopedics in the future. Return if symptoms worsen or fail to improve.  _____________________________________________________  CHIEF COMPLAINT:  Chief Complaint   Patient presents with   • Follow-up     SUBJECTIVE:  Violeta Ziegler is a 69y.o. 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 Her injury date was 1/24/2023 making her 8 months post injury. Presents for x-ray/ followup and has been weightbearing as tolerated with the use of a bone stimulator. She has done minimal ambulation due to the symptomatic thigh pain dated 2 DFR management by [facility] orthopedics. She has a chronic foot drop.     PAST MEDICAL HISTORY:  Past Medical History:   Diagnosis Date   • Arthritis    • Atrial fibrillation ([address])    • CHF (congestive heart failure) ([address])    • Diabetes mellitus ([address])    • Disease of thyroid gland    • Hypertension    • Pacemaker    • Renal disorder      PAST SURGICAL HISTORY:  Past Surgical History:   Procedure Laterality Date   • CARDIAC PACEMAKER PLACEMENT     • CHOLECYSTECTOMY     • FL GUIDED NEEDLE PLAC BX/ASP/INJ  10/4/2022   • FL GUIDED NEEDLE PLAC BX/ASP/INJ  10/20/2022   • JOINT REPLACEMENT      bilateral knee replacements   • NERVE BLOCK Bilateral 10/4/2022    Procedure: BLOCK MEDIAL BRANCH NERVES BILATERAL L3, L4, L5 #1;  Surgeon: Sydney Sawant MD;  Location: [facility];  Service: Pain Management    • NERVE BLOCK Bilateral 10/20/2022    Procedure: BLOCK MEDIAL BRANCH L3, L4, L5 #2;  Surgeon: Sydney Sawant MD;  Location: [facility];  Service: Pain Management    • ORIF TIBIA & FIBULA FRACTURES Left 10/29/2020    Procedure: OPEN REDUCTION W/ INTERNAL FIXATION (ORIF) ANKLE;  Surgeon: Janessa Sauceda;   Location[facility];  Service: Orthopedics   • TONSILLECTOMY     • TUBAL LIGATION       FAMILY HISTORY:  Family History   Problem Relation Age of Onset   • Atrial fibrillation Mother    • Arthritis Father    • Kidney cancer Father    • Breast cancer Sister         early 46s   • No Known Problems Sister    • Kidney cancer Brother    • No Known Problems Daughter    • No Known Problems Daughter    • No Known Problems Daughter    • No Known Problems Daughter    • No Known Problems Maternal Aunt    • No Known Problems Maternal Aunt    • No Known Problems Maternal Aunt    • No Known Problems Paternal Aunt    • No Known Problems Maternal Grandmother    • No Known Problems Maternal Grandfather    • No Known Problems Paternal Grandmother    • No Known Problems Paternal Grandfather    • Breast cancer Cousin         early 46s   • Breast cancer additional onset Neg Hx    • Colon cancer Neg Hx    • Endometrial cancer Neg Hx    • Ovarian cancer Neg Hx    • BRCA 1/2 Neg Hx    • BRCA2 Positive Neg Hx    • BRCA2 Negative Neg Hx    • BRCA1 Positive Neg Hx    • BRCA1 Negative Neg Hx      SOCIAL HISTORY:  Social History     Tobacco Use   • Smoking status: Never   • Smokeless tobacco: Never   Vaping Use   • Vaping Use: Never used   Substance Use Topics   • Alcohol use: Not Currently   • Drug use: Never     MEDICATIONS:    Current Outpatient Medications:   •  acetaminophen (TYLENOL) 325 mg tablet, Take 650 mg by mouth every 6 (six) hours as needed for mild pain, Disp: , Rfl:   •  acetaminophen (TYLENOL) 500 mg tablet, Take 500 mg by mouth, Disp: , Rfl:   •  allopurinol (ZYLOPRIM) 100 mg tablet, Take 100 mg by mouth daily, Disp: , Rfl:   •  Ascorbic Acid, Vitamin C, (VITAMIN C) 100 MG tablet, Take 500 mg by mouth daily , Disp: , Rfl:   •  bisacodyl (DULCOLAX) 10 mg suppository, Insert 10 mg into the rectum daily, Disp: , Rfl:   •  bisacodyl (DULCOLAX) 5 mg EC tablet, Take 5 mg by mouth daily as needed for constipation, Disp: , Rfl:   •  dextromethorphan-guaiFENesin (ROBITUSSIN DM)  mg/5 mL syrup, Take 5 mL by mouth 3 (three) times a day as needed for cough, Disp: , Rfl:   •  docusate sodium (COLACE) 100 mg capsule, Take 1 capsule (100 mg total) by mouth 2 (two) times a day, Disp: , Rfl: 0  •  gabapentin (Neurontin) 600 MG tablet, Take 1 tablet (600 mg total) by mouth 3 (three) times a day, Disp: 270 tablet, Rfl: 0  •  glimepiride (AMARYL) 2 mg tablet, Take 2 mg by mouth daily, Disp: , Rfl:   •  Insulin Aspart (NovoLOG PenFill) 100 UNITS/ML cartridge for injection, Inject under the skin 3 (three) times a day with meals, Disp: , Rfl:   •  levothyroxine 150 mcg tablet, Take 150 mcg by mouth daily , Disp: , Rfl:   •  Multiple Vitamin (Multi-Day) TABS, Take 1 tablet by mouth daily , Disp: , Rfl:   •  polyethylene glycol (MIRALAX) 17 g packet, Take 17 g by mouth daily, Disp: , Rfl:   •  promethazine (PHENERGAN) 25 mg suppository, Insert 25 mg into the rectum every 6 (six) hours as needed for nausea or vomiting, Disp: , Rfl:   •  promethazine (PHENERGAN) 25 mg tablet, Take 25 mg by mouth every 6 (six) hours as needed for nausea or vomiting, Disp: , Rfl:   •  Sodium Phosphates (ENEMA RE), Insert into the rectum, Disp: , Rfl:   •  tamsulosin (FLOMAX) 0.4 mg, Take 1 capsule (0.4 mg total) by mouth daily with dinner, Disp: , Rfl: 0  •  torsemide (DEMADEX) 100 mg tablet, Take 1 tablet (100 mg total) by mouth daily Do not start before June 23, 2023., Disp: 30 tablet, Rfl: 0  •  traMADol (ULTRAM) 50 mg tablet, Take 50 mg by mouth every 8 (eight) hours as needed for moderate pain, Disp: , Rfl:   •  warfarin (COUMADIN) 4 mg tablet, Take 1 tablet (4 mg total) by mouth daily at bedtime Take the half dose tonight and then recheck INR tomorrow and dose accordingly. , Disp: 30 tablet, Rfl: 0  •  insulin lispro (HumaLOG) 100 units/mL injection, Inject 1-6 Units under the skin daily at bedtime (Patient not taking: Reported on 9/20/2023), Disp: , Rfl: 0  •  insulin lispro (HumaLOG) 100 units/mL injection, Inject 2-12 Units under the skin 3 (three) times a day before meals (Patient not taking: Reported on 9/20/2023), Disp: , Rfl: 0    ALLERGIES:  Allergies   Allergen Reactions   • Rofecoxib Angioedema, Swelling, Hives and Other (See Comments)     swelling, sob  swelling, sob  Other reaction(s): Swelling / Edema  swelling, sob  Other reaction(s): SWELLING  Other reaction(s): Angioedema     • Oxycodone-Acetaminophen GI Intolerance and Other (See Comments)     Unsure of rxn   Unsure of rxn   Unsure of rxn   Other reaction(s): "CAN'T BREATH"     • Duloxetine Hcl Other (See Comments)     Slept for 4 days    • Medical Tape Rash     REVIEW OF SYSTEMS:  Pertinent items are noted in HPI. A comprehensive review of systems was negative.  ____________________________________________________  PHYSICAL EXAMINATION:  General: well developed and well nourished, alert, oriented times 3 and appears comfortable  Psychiatric: Normal  HEENT:  Normocephalic, atraumatic  Cardiovascular:  Regular  Pulmonary: No wheezing or stridor  Skin: No masses, erthema, lacerations, fluctation, ulcerations  Neurovascular: intact  MUSCULOSKELETAL EXAMINATION:    Left tibial shaft is without tenderness or deformity. There is no pain to palpation. No cutaneous breakdown. She has her  Shoe MAFO brace in place. No calf pain.   Pain with palpation the proximal fibula. Increased skin sensation in the ankle and distally. _____________________________________________________  STUDIES REVIEWED:  I personally reviewed x-rays of the left tibia taken in the office today which document old spiral fracture distal tibial shaft.     PROCEDURES PERFORMED:  None today    Carrie Thapa PA-C

## 2023-09-20 NOTE — PATIENT INSTRUCTIONS
She may continue to use her bone stimulator daily for another 3 to 4 weeks. She may weight-bear as tolerated. May be discharged from care and follow-up only on an as-needed basis. Patient will likely have revision surgery by [facility] orthopedics in the future.

## 2023-12-28 ENCOUNTER — HOSPITAL ENCOUNTER (OUTPATIENT)
Dept: NUCLEAR MEDICINE | Facility: HOSPITAL | Age: 78
Discharge: HOME | End: 2023-12-28
Payer: COMMERCIAL

## 2023-12-28 ENCOUNTER — HOSPITAL ENCOUNTER (OUTPATIENT)
Dept: NON INVASIVE DIAGNOSTICS | Facility: HOSPITAL | Age: 78
Discharge: HOME | End: 2023-12-28
Payer: COMMERCIAL

## 2023-12-28 DIAGNOSIS — N18.31 CHRONIC KIDNEY DISEASE, STAGE 3A (HCC): ICD-10-CM

## 2023-12-28 DIAGNOSIS — I10 ESSENTIAL (PRIMARY) HYPERTENSION: ICD-10-CM

## 2023-12-28 DIAGNOSIS — E11.9 TYPE 2 DIABETES MELLITUS WITHOUT COMPLICATIONS (HCC): ICD-10-CM

## 2023-12-28 DIAGNOSIS — I48.20 CHRONIC ATRIAL FIBRILLATION, UNSPECIFIED (HCC): ICD-10-CM

## 2023-12-28 DIAGNOSIS — Z95.0 PRESENCE OF CARDIAC PACEMAKER: ICD-10-CM

## 2023-12-28 DIAGNOSIS — I27.20 PULMONARY HYPERTENSION, UNSPECIFIED (HCC): ICD-10-CM

## 2023-12-28 DIAGNOSIS — I50.32 CHRONIC DIASTOLIC (CONGESTIVE) HEART FAILURE (HCC): ICD-10-CM

## 2023-12-28 DIAGNOSIS — Z01.810 ENCOUNTER FOR PREPROCEDURAL CARDIOVASCULAR EXAMINATION: ICD-10-CM

## 2023-12-28 DIAGNOSIS — I50.21 ACUTE SYSTOLIC (CONGESTIVE) HEART FAILURE (HCC): ICD-10-CM

## 2023-12-28 LAB
MAX HR: 78 BPM
NUC STRESS EJECTION FRACTION: 62 %
RATE PRESSURE PRODUCT: NORMAL
SL CV REST NUCLEAR ISOTOPE DOSE: 10.9 MCI
SL CV STRESS NUCLEAR ISOTOPE DOSE: 33 MCI
SL CV STRESS RECOVERY BP: NORMAL MMHG
SL CV STRESS RECOVERY HR: 77 BPM
SL CV STRESS RECOVERY O2 SAT: 98 %
STRESS ANGINA INDEX: 0
STRESS BASELINE BP: NORMAL MMHG
STRESS BASELINE HR: 76 BPM
STRESS O2 SAT REST: 95 %
STRESS PEAK HR: 78 BPM
STRESS POST EXERCISE DUR MIN: 3 MIN
STRESS POST EXERCISE DUR SEC: 0 SEC
STRESS POST O2 SAT PEAK: 100 %
STRESS POST PEAK BP: 133 MMHG
STRESS/REST PERFUSION RATIO: 1.09

## 2023-12-28 PROCEDURE — A9502 TC99M TETROFOSMIN: HCPCS

## 2023-12-28 PROCEDURE — 93017 CV STRESS TEST TRACING ONLY: CPT

## 2023-12-28 PROCEDURE — 78452 HT MUSCLE IMAGE SPECT MULT: CPT

## 2023-12-28 RX ORDER — CYCLOBENZAPRINE HCL 10 MG
10 TABLET ORAL 3 TIMES DAILY PRN
COMMUNITY

## 2023-12-28 RX ORDER — REGADENOSON 0.08 MG/ML
0.4 INJECTION, SOLUTION INTRAVENOUS ONCE
Status: COMPLETED | OUTPATIENT
Start: 2023-12-28 | End: 2023-12-28

## 2023-12-28 RX ADMIN — REGADENOSON 0.4 MG: 0.08 INJECTION, SOLUTION INTRAVENOUS at 11:08

## 2023-12-31 LAB
ARRHY DURING EX: NORMAL
CHEST PAIN STATEMENT: NORMAL
MAX DIASTOLIC BP: 70 MMHG
MAX PREDICTED HEART RATE: 143 BPM
PROTOCOL NAME: NORMAL
REASON FOR TERMINATION: NORMAL
STRESS POST EXERCISE DUR MIN: 3 MIN
STRESS POST EXERCISE DUR SEC: 0 SEC
STRESS POST PEAK HR: 78 BPM
STRESS POST PEAK SYSTOLIC BP: 146 MMHG
TARGET HR FORMULA: NORMAL
TEST INDICATION: NORMAL

## (undated) DEVICE — CHLORAPREP HI-LITE 26ML ORANGE

## (undated) DEVICE — NEEDLE 25G X 1 1/2

## (undated) DEVICE — CHLORAPREP HI-LITE 10.5ML ORANGE

## (undated) DEVICE — 2.7MM CANNULATED DRILL BIT/QC 160MM

## (undated) DEVICE — 2.5MM DRILL BIT/QC/GOLD/110MM

## (undated) DEVICE — GLOVE SRG BIOGEL 7

## (undated) DEVICE — NEEDLE SPINAL 22G X 5IN QUINCKE

## (undated) DEVICE — PLUMEPEN PRO 10FT

## (undated) DEVICE — PADDING CAST 4 IN  COTTON STRL

## (undated) DEVICE — UNDYED BRAIDED (POLYGLACTIN 910), SYNTHETIC ABSORBABLE SUTURE: Brand: COATED VICRYL

## (undated) DEVICE — CAST PADDING 4 IN SYNTHETIC STRL

## (undated) DEVICE — SPLINT COMFORT 4 X 30

## (undated) DEVICE — TUBING SUCTION 5MM X 12 FT

## (undated) DEVICE — FLEXIBLE ADHESIVE BANDAGE,X-LARGE: Brand: CURITY

## (undated) DEVICE — CURITY NON-ADHERENT STRIPS: Brand: CURITY

## (undated) DEVICE — COBAN 6 IN STERILE

## (undated) DEVICE — GLOVE INDICATOR PI UNDERGLOVE SZ 7.5 BLUE

## (undated) DEVICE — SKIN MARKER DUAL TIP WITH RULER CAP, FLEXIBLE RULER AND LABELS: Brand: DEVON

## (undated) DEVICE — 1.25MM THREADED GUIDE WIRE 150MM

## (undated) DEVICE — DRAPE TOWEL: Brand: CONVERTORS

## (undated) DEVICE — DRAPE SHEET THREE QUARTER

## (undated) DEVICE — GAUZE SPONGES,16 PLY: Brand: CURITY

## (undated) DEVICE — GLOVE SRG BIOGEL 7.5

## (undated) DEVICE — NEEDLE 18 G X 1 1/2 SAFETY

## (undated) DEVICE — SUT ETHILON 3-0 PS-1 18 IN 1663G

## (undated) DEVICE — PAD CAST 6 IN COTTON NON STERILE

## (undated) DEVICE — SUT ETHILON 4-0 PS-2 18 IN 1667H

## (undated) DEVICE — GAUZE SPONGES,USP TYPE VII GAUZE, 12 PLY: Brand: CURITY

## (undated) DEVICE — SYRINGE 10ML LL

## (undated) DEVICE — DRAPE STERI 1010 18IN X 12IN

## (undated) DEVICE — 2.0MM DRILL BIT WITH DEPTH MARK/QC/140MM

## (undated) DEVICE — SUT VICRYL 2-0 CP-1 27 IN J266H

## (undated) DEVICE — SPLINT 4 X 30 IN PRECUT SYNTHETIC

## (undated) DEVICE — SPONGE LAP 18 X 18 IN STRL RFD

## (undated) DEVICE — 1.25MM NON-THREADED GUIDE WIRE 150MM

## (undated) DEVICE — INTENDED FOR TISSUE SEPARATION, AND OTHER PROCEDURES THAT REQUIRE A SHARP SURGICAL BLADE TO PUNCTURE OR CUT.: Brand: BARD-PARKER SAFETY BLADES SIZE 15, STERILE

## (undated) DEVICE — PLASTIC ADHESIVE BANDAGE: Brand: CURITY

## (undated) DEVICE — POV-IOD SOLUTION 4OZ BT

## (undated) DEVICE — GLOVE SRG LF STRL BGL SKNSNS 7.5 PF

## (undated) DEVICE — ACE WRAP 4 IN STERILE

## (undated) DEVICE — ALL PURPOSE SPONGES,NON-WOVEN, 4 PLY: Brand: CURITY

## (undated) DEVICE — BETHLEHEM UNIVERSAL  MIONR EXT: Brand: CARDINAL HEALTH

## (undated) DEVICE — GLOVE INDICATOR PI UNDERGLOVE SZ 7 BLUE

## (undated) DEVICE — ACE WRAP 6 IN STERILE

## (undated) DEVICE — 1.25MM KIRSCHNER WIRE W/TROCAR POINT 150MM
Type: IMPLANTABLE DEVICE | Site: ANKLE | Status: NON-FUNCTIONAL
Removed: 2020-10-29

## (undated) DEVICE — DRAPE C-ARM X-RAY

## (undated) DEVICE — CUFF TOURNIQUET 30 X 4 IN QUICK CONNECT DISP 1BLA